# Patient Record
Sex: MALE | Race: WHITE | NOT HISPANIC OR LATINO | Employment: OTHER | URBAN - METROPOLITAN AREA
[De-identification: names, ages, dates, MRNs, and addresses within clinical notes are randomized per-mention and may not be internally consistent; named-entity substitution may affect disease eponyms.]

---

## 2018-06-20 ENCOUNTER — TELEPHONE (OUTPATIENT)
Dept: PULMONOLOGY | Facility: CLINIC | Age: 78
End: 2018-06-20

## 2018-06-20 DIAGNOSIS — R06.02 SOB (SHORTNESS OF BREATH): Primary | ICD-10-CM

## 2018-06-21 ENCOUNTER — HOSPITAL ENCOUNTER (OUTPATIENT)
Dept: PULMONOLOGY | Facility: CLINIC | Age: 78
Discharge: HOME | End: 2018-06-21
Payer: MEDICARE

## 2018-06-21 ENCOUNTER — HOSPITAL ENCOUNTER (OUTPATIENT)
Dept: RADIOLOGY | Facility: HOSPITAL | Age: 78
Discharge: HOME | End: 2018-06-21
Attending: INTERNAL MEDICINE
Payer: MEDICARE

## 2018-06-21 ENCOUNTER — OFFICE VISIT (OUTPATIENT)
Dept: PULMONOLOGY | Facility: CLINIC | Age: 78
End: 2018-06-21
Payer: MEDICARE

## 2018-06-21 VITALS
SYSTOLIC BLOOD PRESSURE: 172 MMHG | DIASTOLIC BLOOD PRESSURE: 94 MMHG | HEART RATE: 78 BPM | WEIGHT: 174.19 LBS | HEIGHT: 69 IN | WEIGHT: 174.19 LBS | OXYGEN SATURATION: 93 % | BODY MASS INDEX: 25.8 KG/M2 | HEIGHT: 69 IN | BODY MASS INDEX: 25.8 KG/M2

## 2018-06-21 DIAGNOSIS — R06.02 SHORTNESS OF BREATH: ICD-10-CM

## 2018-06-21 DIAGNOSIS — R06.02 SOB (SHORTNESS OF BREATH): ICD-10-CM

## 2018-06-21 DIAGNOSIS — I48.0 PAROXYSMAL ATRIAL FIBRILLATION: ICD-10-CM

## 2018-06-21 DIAGNOSIS — I25.10 CORONARY ARTERY DISEASE INVOLVING NATIVE CORONARY ARTERY OF NATIVE HEART WITHOUT ANGINA PECTORIS: Primary | ICD-10-CM

## 2018-06-21 LAB
PRE FEV1 FVC: 70
PRE FEV1: 3.62
PRE FVC: 5.15
PREDICTED FEV1 FVC: 78
PREDICTED FEV1: 2.91
PREDICTED FVC: 3.7

## 2018-06-21 PROCEDURE — 99213 OFFICE O/P EST LOW 20 MIN: CPT | Mod: PBBFAC,25 | Performed by: INTERNAL MEDICINE

## 2018-06-21 PROCEDURE — 94729 DIFFUSING CAPACITY: CPT | Mod: 26,S$PBB,, | Performed by: INTERNAL MEDICINE

## 2018-06-21 PROCEDURE — 71046 X-RAY EXAM CHEST 2 VIEWS: CPT | Mod: TC,FY

## 2018-06-21 PROCEDURE — 94010 BREATHING CAPACITY TEST: CPT | Mod: 26,S$PBB,, | Performed by: INTERNAL MEDICINE

## 2018-06-21 PROCEDURE — 71046 X-RAY EXAM CHEST 2 VIEWS: CPT | Mod: 26,,, | Performed by: RADIOLOGY

## 2018-06-21 PROCEDURE — 99999 PR PBB SHADOW E&M-EST. PATIENT-LVL III: CPT | Mod: PBBFAC,,, | Performed by: INTERNAL MEDICINE

## 2018-06-21 PROCEDURE — 94618 PULMONARY STRESS TESTING: CPT | Mod: PBBFAC | Performed by: INTERNAL MEDICINE

## 2018-06-21 PROCEDURE — 94010 BREATHING CAPACITY TEST: CPT | Mod: PBBFAC,59 | Performed by: INTERNAL MEDICINE

## 2018-06-21 PROCEDURE — 99204 OFFICE O/P NEW MOD 45 MIN: CPT | Mod: 25,S$PBB,ICN, | Performed by: INTERNAL MEDICINE

## 2018-06-21 PROCEDURE — 94729 DIFFUSING CAPACITY: CPT | Mod: PBBFAC | Performed by: INTERNAL MEDICINE

## 2018-06-21 PROCEDURE — 94618 PULMONARY STRESS TESTING: CPT | Mod: 26,S$PBB,, | Performed by: INTERNAL MEDICINE

## 2018-06-21 RX ORDER — TALC
1 POWDER (GRAM) TOPICAL DAILY PRN
COMMUNITY

## 2018-06-21 RX ORDER — ALLOPURINOL 300 MG/1
300 TABLET ORAL DAILY
COMMUNITY

## 2018-06-21 RX ORDER — RABEPRAZOLE SODIUM 20 MG/1
20 TABLET, DELAYED RELEASE ORAL 2 TIMES DAILY
COMMUNITY

## 2018-06-21 RX ORDER — ALPRAZOLAM 1 MG/1
0.5 TABLET ORAL
COMMUNITY
Start: 2018-06-07 | End: 2019-04-10

## 2018-06-21 RX ORDER — ISOSORBIDE MONONITRATE 30 MG/1
30 TABLET, EXTENDED RELEASE ORAL
COMMUNITY
Start: 2018-02-01 | End: 2018-07-12

## 2018-06-21 RX ORDER — SOTALOL HYDROCHLORIDE 120 MG/1
120 TABLET ORAL DAILY
Status: ON HOLD | COMMUNITY
End: 2019-01-15 | Stop reason: HOSPADM

## 2018-06-21 NOTE — LETTER
June 25, 2018      aPblo Lorenzo MD  [address]  [place] [address]           [facility] - Pulmonary Services  [facility]  [place] [address]  Phone: [phone]          Patient: Greg Nolasco   MR Number: [id number]   YOB: 1940   Date of Visit: 6/21/2018       Dear Dr. Pablo Lorenzo:    Thank you for referring Greg Nolasco to me for evaluation. Attached you will find relevant portions of my assessment and plan of care.    If you have questions, please do not hesitate to call me. I look forward to following Greg Nolasco along with you.    Sincerely,    Surya Pelletier MD    Enclosure  CC:  No Recipients    If you would like to receive this communication electronically, please contact [email or web address] or [phone] to request more information on [facility] Link access.    For providers and/or their staff who would like to refer a patient to [facility], please contact us through our one-stop-shop provider referral line, Mildred Garay, at [phone].    If you feel you have received this communication in error or would no longer like to receive these types of communications, please e-mail [email or web address]

## 2018-06-21 NOTE — Clinical Note
Miguelina:  I think this patient would benefit from being seen in [facility].  He comes from around [place], so I'd like to make things as easy as possible.  I'm getting a chest CT to assess his lung parenchyma better.  Thanks for your help.  DET

## 2018-06-21 NOTE — PROCEDURES
Greg Nolasco is a 77 y.o.  male patient, who presents for a 6 minute walk test ordered by Surya Pelletier MD.  The diagnosis is Exertional Dyspnea.  The patient's BMI is 25.8 kg/m2. Predicted distance (lower limit of normal) is 307.88 meters.    Test Results:    The test was completed with stops.  The patient stopped 1 time for a total of 50 seconds.  The total time walked was 310 seconds.  During walking, the patient reported:  Dyspnea (dizziness and off balance). The patient used supplemental oxygen during testing.       Oxygen Qualification:    06/21/2018---------Distance: 304.8 meters (1000 feet)       O2 Sat % Supplemental Oxygen Heart Rate Blood Pressure Lenard Scale   Pre-exercise  (Resting) 81 % Room Air 85 bpm 180/90 mmHg 2   Pre-exercise  (Resting) 93 % 4 L/M  78 bpm  172/94 mmHg  2    During Exercise 74 %  4 L/M  102 bpm  180/93 mmHg  5-6    Post-exercise   90 %  4 L/M  92 bpm        Recovery Time: 473 seconds    Performing nurse/tech:  L Estopinal RRT      PREVIOUS STUDY:   The patient has not had a previous study.      CLINICAL INTERPRETATION:  Six minute walk distance is 304.8 meters (1000 feet) with heavy dyspnea.  At rest, there was significant desaturation while breathing room air.  During exercise, there was significant desaturation while breathing supplemental oxygen.  Blood pressure remained stable and Heart rate increased significantly with walking.  Hypertension was present prior to exercise.  The patient reported non-pulmonary symptoms during exercise.  The patient did complete the study, walking 310 seconds of the 360 second test.  The patient may benefit from using supplemental oxygen.  No previous study performed.  Based upon age and body mass index, exercise capacity is less than predicted.   Resting oxygen saturation did improve while breathing supplemental oxygen.

## 2018-06-22 PROBLEM — I48.0 PAROXYSMAL ATRIAL FIBRILLATION: Status: ACTIVE | Noted: 2018-01-29

## 2018-06-22 PROBLEM — I25.10 CORONARY ARTERY DISEASE INVOLVING NATIVE CORONARY ARTERY OF NATIVE HEART WITHOUT ANGINA PECTORIS: Status: ACTIVE | Noted: 2018-01-29

## 2018-06-22 NOTE — PROGRESS NOTES
Subjective:       Patient ID: Greg Nolasco is a 77 y.o. male.    Chief Complaint: Shortness of Breath    HPI     Mr. Nolasco is a 77 year old past smoker (~1 ppd for ~35 years) until stopping in 1991 at the time he underwent cardiac angioplasty.  He comes to [facility] at the request of his primary care MD (Dr. Pablo Lorenzo) for evaluation of worsening dyspnea.  He reports that he has had episodic shortness of breath dating back about a year.  However, this became much more noticeable this winter (Jan/Feb 2018) when there was a big snow in [place].  At that time, he noticed getting short-winded with minimal activity, even just walking around the house.  In addition to dyspnea, he has noted increased weakness in his legs and difficulty with his balance that has occurred around the same time frame.  Lastly, he has also noted increased restless legs type symptoms with body jerking (especially his left arm) when sleeping for the last few months.  He denies cough, sputum production, chest pain, pleurisy, fever, chills, night sweats, appetite change, or weight loss.    He initially sought care at [place] [facility] where he saw Dr. Jing Fonseca (Pulmonary) and Víctor Knight (Cardiology) for his symptoms.  Selected work up is outlined below.  He was started on Anoro and albuterol for presumed COPD but has not really noticed any improvement using this regimen.  He was started on supplemental oxygen that he occasionally uses but also denies any real improvement in activity tolerance.    Pulmonary history is notable for no prior pneumonia, frequent bronchitis, cancer, or other lung diseases.  Family members (who were smokers) had COPD/emphysema.  He lives outside town with a cat and a dog but denies exposure to known hypersensitivity agents or pulmonary toxins.  He denies history of connective tissue or auto-immune diseases.  He is retired from the  where he was a .  He denies significant brake dust  "exposure.      Other history is notable for paroxysmal atrial fibrillation that has been well-controlled on sotalol for many years.  He has never been on amiodarone.  Although his blood pressure is high today, he says it was normal this morning.  He attributes his BP to rushing to get to his appointments.  He also had a thyroid "growth" biopsied in March 2018 that was benign.     Review of Systems   Constitutional: Positive for activity change. Negative for fever, chills, weight loss, appetite change, fatigue, night sweats and weakness.   HENT: Negative for sore throat, trouble swallowing and voice change.    Respiratory: Positive for shortness of breath and dyspnea on extertion. Negative for cough, hemoptysis, sputum production, choking, chest tightness, wheezing, orthopnea, asthma nighttime symptoms, pleurisy and use of rescue inhaler.    Cardiovascular: Negative for chest pain, palpitations and leg swelling.   Musculoskeletal: Negative for arthralgias, gait problem and myalgias.   Gastrointestinal: Positive for acid reflux (Occasional). Negative for nausea and vomiting.   Neurological: Positive for weakness. Negative for light-headedness and headaches.   Psychiatric/Behavioral: Positive for sleep disturbance. The patient is nervous/anxious.        Objective:      Physical Exam   Constitutional: He is oriented to person, place, and time. He appears well-nourished. No distress.   HENT:   Nose: Nose normal. No mucosal edema.   Mouth/Throat: Oropharynx is clear and moist. No oropharyngeal exudate.   Neck: No JVD present.   Cardiovascular: Normal rate, regular rhythm, normal heart sounds and intact distal pulses.  Exam reveals no gallop.    No murmur heard.  Pulmonary/Chest: Normal expansion, effort normal and breath sounds normal. No stridor. No respiratory distress. He has no decreased breath sounds. He has no wheezes. He has no rhonchi. He has no rales. He exhibits no tenderness.   Abdominal: Soft. Bowel sounds are " normal. He exhibits no distension. There is no tenderness.   Musculoskeletal: He exhibits no edema.   Lymphadenopathy: No supraclavicular adenopathy is present.     He has no cervical adenopathy.   Neurological: He is alert and oriented to person, place, and time. Gait normal.   Skin: There is erythema. Nails show no clubbing.   Chaparro complexion to his hands   Psychiatric: Judgment and thought content normal.   Nursing note and vitals reviewed.    Personal Diagnostic Review    CXR today shows mildly increased interstitial markings with normal cardiac silhouette and no pleural effusion.    PFTs show normal spirometry with severe DLCO impairment.    6MWT shows acceptable exercise capacity but severe oxygen desaturation at rest.  When using oxygen, resting SpO2 improves (81% to 93%) but he still desaturates significantly with exertion (93% to 74%).    PFTs 02/01/2018  [facility] 05/31/2018  [facility] 06/21/2018  OM   FVC  (pre-BD) 4.54 liters 5.38 liters 5.15 liters   FVC%  129%  139%   FEV1 (pre-BD)  3.64 liters 3.62 liters   FEV1%  111% 133% 126%   FEV1/FVC  67 68 70   TLC       TLC%  134%     RV       RV%  124%     DLCO    9.3 mL/mmHg/min   DLCO%  39%  39%   VA   7.04 liters   IVC   4.97 liters   6MWT   06/21/2018   Distance   305 meters   SpO2 (bryant)   74 % on O2   SpO2 (delta)   -19 % on O2       Polysomnogram at [facility] on 06/02/2018:    SLEEP PROFILE: Latency to sleep onset was 20.5 minutes. REM latency was 149 minutes. Sleep efficiency was 61 % for a total of 271.5 minutes of sleep. Of that time 12 % was spent in stage N1, 71.3 % in stage N2, 0 % in stage N3, and 16.8 % in stage R. The patient was supine and asleep for 17.9 % of the sleep interval.    RESPIRATORY PROFILE: On this diagnostic night, the Apnea/Hypopnea Index (AHI) was 3.1 and the Respiratory Disturbance Index is 6.6. The patient had 1 obstructive apneas, 0 central apneas, 0 mixed apneas, 13 hypopneas, and 16 RERAs.  The AHI was 9.2 in REM sleep as compared with 1.9 in non-REM sleep. The RDI was 13.2 in REM sleep as compared with 5.3 in non-REM sleep.     OXYGEN SATURATION PROFILE: During this diagnostic study, the oxygen bryant was 86% in non-REM sleep and 87% in REM sleep. The total time with saturations <= 88% was 187.2 minutes during this study.    PERIODIC LIMB MOVEMENT PROFILE: PLMs were not seen.     CARDIAC PROFILE: Unremarkable.     IMPRESSION:     - Mild upper airways resistance syndrome with chiefly REM related obstructive sleep apnea.    - Very poor sleep efficiency.         Echocardiogram at [facility] on 05/31/2018:    Impressions                                                             -----------                                                                                                                                     1.The estimated LV ejection fraction is 61 %                                                                                                    2.There is mild concentric LV hypertrophy                                                                                                       3.RV size is mildly dilated                                                                                                                     4.There is mild aortic valve sclerosis without significant stenosis                                                                             5.There is mild mitral annular calcification                                                                                                    6.There is mild mitral regurgitation                                                                                                            7.There is moderate tricuspid regurgitation                                                                                                     8.Moderate elevation of estimated RV systolic  pressure                                                                                          Compared to the previous Echocardiogram report, there is no change.                                                                                                                                                                                                                         Findings                                                                --------                                                                                                                                        Left Ventricle                                                          The estimated LV ejection fraction is 61 %. LV chamber size is normal.  There is mild concentric LV hypertrophy. LV systolic function is        normal. There are no wall motion abnormalities observed. Normal left    atrial pressure with Grade I diastolic dysfunction.                                                                                                                                                                     Right Ventricle                                                         RV size is mildly dilated. The RV systolic function is normal.                                                                                  Septum                                                                  There is no atrial septal defect (ASD). There is no ventricular septal  defect (VSD).                                                                                                                                                                                                           Left Atrium                                                             LA chamber size is normal. The LA volume index (MOD, Biplane) is 26.5    ml/m2.                                                                                                                                                                                                                  Right Atrium                                                            RA chamber size is normal.                                                                                                                      Aortic Valve                                                            There is mild aortic valve sclerosis without significant stenosis.      There is mild aortic regurgitation. There is no aortic valve stenosis.  AV Peak gradient: 10 mmHg.                                                                                                                      Mitral Valve                                                            There is mild mitral annular calcification. There is mild mitral        regurgitation. There is no mitral stenosis.                                                                                                                                                                             Tricuspid Valve                                                         Tricuspid valve is structurally normal. There is moderate tricuspid     regurgitation. Estimated RVSP is 72 mmHg. Moderate elevation of         estimated RV systolic pressure. There is no tricuspid valve stenosis.                                                     Assessment:       1. Coronary artery disease involving native coronary artery of native heart without angina pectoris    2. Shortness of breath    3. Paroxysmal atrial fibrillation        Outpatient Encounter Prescriptions as of 6/21/2018   Medication Sig Dispense Refill    allopurinol (ZYLOPRIM) 300 MG tablet Take 300 mg by mouth.      ALPRAZolam (XANAX) 1 MG tablet       aspirin-calcium carbonate 81 mg-300 mg calcium(777 mg) Tab Take 81 mg by  mouth.      isosorbide mononitrate (IMDUR) 30 MG 24 hr tablet Take 30 mg by mouth.      melatonin 3 mg Tab Take 1 capsule by mouth.      multivit-minerals/ferrous fum (MULTI VITAMIN ORAL) Take 1 tablet by mouth.      RABEprazole (ACIPHEX) 20 mg tablet Take 20 mg by mouth.      sotalol (BETAPACE) 120 MG Tab Take 120 mg by mouth.      umeclidinium-vilanterol 62.5-25 mcg/actuation DsDv Inhale 1 puff into the lungs.       No facility-administered encounter medications on file as of 6/21/2018.      Orders Placed This Encounter   Procedures    CT Chest Without Contrast     Standing Status:   Future     Standing Expiration Date:   6/21/2019     Order Specific Question:   May the Radiologist modify the order per protocol to meet the clinical needs of the patient?     Answer:   Yes     Plan:   Mr. Nolasco has severe desaturation at rest and with exertion despite supplemental oxygen.  Resting SpO2 does improve with supplemental oxygen, so shunt is less likely.  PFTs both in [place] and at [facility] show severe DLCO impairment with normal (to increased) mechanics on spirometry.  Even if he has COPD/emphysema or interstitial lung disease, the well-preserved forced vital capacity makes me doubt this would cause such severe diffusion defect.  Echo results suggest element of RV dysfunction and increased RV systolic pressure makes me suspect significant pulmonary hypertension.  We will check chest CT scan (non-contrast) to assess lung parenchyma.  He may also need repeat echocardiogram with contrast to rule out patent foramen ovale.  I also think that he will benefit from evaluation by Pulmonary Hypertension.  If the the CT scan is only modestly abnormal, he will likely need rest/exercise RHC to assess for pulmonary hypertension.  Sleep study in [place] does not suggest significant sleep apnea as a contributor to pulmonary hypertension if present.    · Chest CT scan without contrast  · Consult to Cardiology (Pulmonary  Hypertension)  · Return to Pulmonary Clinic in ~ three weeks at time of the CT scan.    Since he comes from [place] than two [place] away, we will try to coordinate these activities with his return visit in a few weeks.      I will forward a copy of this note to Dr. Pablo Lorenzo (Primary Care).    Surya Pelletier MD  Pulmonary/Critical Care Medicine

## 2018-06-26 DIAGNOSIS — I27.9 CHRONIC PULMONARY HEART DISEASE: ICD-10-CM

## 2018-06-26 DIAGNOSIS — R06.82 TACHYPNEA: ICD-10-CM

## 2018-06-26 DIAGNOSIS — Z79.899 POLYPHARMACY: Primary | ICD-10-CM

## 2018-07-12 ENCOUNTER — HOSPITAL ENCOUNTER (OUTPATIENT)
Dept: RADIOLOGY | Facility: HOSPITAL | Age: 78
Discharge: HOME | End: 2018-07-12
Attending: INTERNAL MEDICINE
Payer: MEDICARE

## 2018-07-12 ENCOUNTER — INITIAL CONSULT (OUTPATIENT)
Dept: TRANSPLANT | Facility: CLINIC | Age: 78
End: 2018-07-12
Payer: MEDICARE

## 2018-07-12 ENCOUNTER — HOSPITAL ENCOUNTER (OUTPATIENT)
Dept: PULMONOLOGY | Facility: CLINIC | Age: 78
Discharge: HOME | End: 2018-07-12
Payer: MEDICARE

## 2018-07-12 ENCOUNTER — OFFICE VISIT (OUTPATIENT)
Dept: PULMONOLOGY | Facility: CLINIC | Age: 78
End: 2018-07-12
Payer: MEDICARE

## 2018-07-12 ENCOUNTER — NURSE TRIAGE (OUTPATIENT)
Dept: ADMINISTRATIVE | Facility: CLINIC | Age: 78
End: 2018-07-12

## 2018-07-12 VITALS
OXYGEN SATURATION: 83 % | SYSTOLIC BLOOD PRESSURE: 112 MMHG | BODY MASS INDEX: 25.6 KG/M2 | WEIGHT: 172.81 LBS | HEART RATE: 94 BPM | HEIGHT: 69 IN | DIASTOLIC BLOOD PRESSURE: 70 MMHG

## 2018-07-12 VITALS
HEIGHT: 69 IN | DIASTOLIC BLOOD PRESSURE: 74 MMHG | WEIGHT: 171.5 LBS | BODY MASS INDEX: 25.37 KG/M2 | HEART RATE: 81 BPM | SYSTOLIC BLOOD PRESSURE: 138 MMHG | OXYGEN SATURATION: 81 % | BODY MASS INDEX: 25.4 KG/M2 | HEIGHT: 69 IN | WEIGHT: 171.31 LBS

## 2018-07-12 DIAGNOSIS — I27.9 CHRONIC PULMONARY HEART DISEASE: ICD-10-CM

## 2018-07-12 DIAGNOSIS — J40 BRONCHITIS: Primary | ICD-10-CM

## 2018-07-12 DIAGNOSIS — I25.10 CORONARY ARTERY DISEASE INVOLVING NATIVE CORONARY ARTERY OF NATIVE HEART WITHOUT ANGINA PECTORIS: ICD-10-CM

## 2018-07-12 DIAGNOSIS — J43.2 CENTRILOBULAR EMPHYSEMA: ICD-10-CM

## 2018-07-12 DIAGNOSIS — R06.00 DYSPNEA AND RESPIRATORY ABNORMALITY: ICD-10-CM

## 2018-07-12 DIAGNOSIS — I27.20 PULMONARY HYPERTENSION: ICD-10-CM

## 2018-07-12 DIAGNOSIS — I48.0 PAROXYSMAL ATRIAL FIBRILLATION: ICD-10-CM

## 2018-07-12 DIAGNOSIS — R06.89 DYSPNEA AND RESPIRATORY ABNORMALITY: ICD-10-CM

## 2018-07-12 DIAGNOSIS — R93.89 ABNORMAL CHEST CT: ICD-10-CM

## 2018-07-12 DIAGNOSIS — J96.11 CHRONIC HYPOXEMIC RESPIRATORY FAILURE: ICD-10-CM

## 2018-07-12 DIAGNOSIS — R06.02 SHORTNESS OF BREATH: ICD-10-CM

## 2018-07-12 DIAGNOSIS — R06.02 SOB (SHORTNESS OF BREATH): Primary | ICD-10-CM

## 2018-07-12 PROCEDURE — 99213 OFFICE O/P EST LOW 20 MIN: CPT | Mod: PBBFAC,25,27 | Performed by: INTERNAL MEDICINE

## 2018-07-12 PROCEDURE — 94618 PULMONARY STRESS TESTING: CPT | Mod: 26,S$PBB,, | Performed by: INTERNAL MEDICINE

## 2018-07-12 PROCEDURE — 71250 CT THORAX DX C-: CPT | Mod: 26,,, | Performed by: RADIOLOGY

## 2018-07-12 PROCEDURE — 71250 CT THORAX DX C-: CPT | Mod: TC

## 2018-07-12 PROCEDURE — 99205 OFFICE O/P NEW HI 60 MIN: CPT | Mod: S$PBB,,, | Performed by: INTERNAL MEDICINE

## 2018-07-12 PROCEDURE — 99999 PR PBB SHADOW E&M-EST. PATIENT-LVL III: CPT | Mod: PBBFAC,,, | Performed by: INTERNAL MEDICINE

## 2018-07-12 PROCEDURE — 99214 OFFICE O/P EST MOD 30 MIN: CPT | Mod: 25,S$PBB,, | Performed by: INTERNAL MEDICINE

## 2018-07-12 PROCEDURE — 99999 PR PBB SHADOW E&M-EST. PATIENT-LVL IV: CPT | Mod: PBBFAC,,, | Performed by: INTERNAL MEDICINE

## 2018-07-12 PROCEDURE — 94618 PULMONARY STRESS TESTING: CPT | Mod: PBBFAC | Performed by: INTERNAL MEDICINE

## 2018-07-12 PROCEDURE — 99214 OFFICE O/P EST MOD 30 MIN: CPT | Mod: PBBFAC,25 | Performed by: INTERNAL MEDICINE

## 2018-07-12 RX ORDER — FUROSEMIDE 20 MG/1
20 TABLET ORAL DAILY
COMMUNITY
Start: 2018-07-02 | End: 2018-07-12

## 2018-07-12 RX ORDER — AZITHROMYCIN 250 MG/1
TABLET, FILM COATED ORAL
Qty: 6 TABLET | Refills: 0 | Status: SHIPPED | OUTPATIENT
Start: 2018-07-12 | End: 2018-07-17

## 2018-07-12 NOTE — PROCEDURES
Greg Nolasco is a 77 y.o.  male patient, who presents for a 6 minute walk test ordered by Miguelina Ruffin MD.  The diagnosis is Pulmonary Hypertension.  The patient's BMI is 25.3 kg/m2. Predicted distance (lower limit of normal) is 310.69 meters.    Test Results:    The test was not completed.  The patient stopped 1 time for a total of 109 seconds.  The total time walked was 251 seconds.  During walking, the patient reported:  Dyspnea, Lightheadedness; Leg weakness. The patient used supplemental oxygen during repeat testing.       Oxygen Qualification:    07/12/2018 ----------Distance:  182.88 meters (600 feet)     O2 Sat % Supplemental Oxygen Heart Rate Blood Pressure Lenard Scale   Pre-exercise  (Resting) 82 % Room Air 69 bpm 145/75 mmHg 0   Pre-exercise  (Resting) 90 % 4 L/M  69 bpm  145/75 mmHg 0    During Exercise 66 %  4 L/M  85 bpm  171/85 mmHg 4    Post-exercise   89 %  4 L/M  71 bpm  169/88 mmHg      Recovery Time:  438 seconds    Performing nurse/tech:  LONNY Hemphill      PREVIOUS STUDY:   06/21/2018---------Distance: 304.8 meters (1000 feet)          O2 Sat % Supplemental Oxygen Heart Rate Blood Pressure Lenard Scale   Pre-exercise  (Resting) 81 % Room Air 85 bpm 180/90 mmHg 2   Pre-exercise  (Resting) 93 % 4 L/M  78 bpm  172/94 mmHg  2    During Exercise 74 %  4 L/M  102 bpm  180/93 mmHg  5-6    Post-exercise    90 %  4 L/M  92 bpm            CLINICAL INTERPRETATION:  Six minute walk distance is 182.88 meters (600 feet) with somewhat heavy dyspnea.  At rest and during exercise, there was significant desaturation while breathing supplemental oxygen.  Blood pressure increased significantly and Heart rate remained stable with walking.  The patient reported non-pulmonary symptoms during exercise.  Significant exercise impairment is likely due to desaturation and subjective symptoms.  The patient did not complete the study, walking 251 seconds of the 360 second test.  The patient may benefit from using  supplemental oxygen.  Since the previous study in June 2018, exercise capacity is significantly worse.  Based upon age and body mass index, exercise capacity is less than predicted.   Resting oxygen saturation did improve while breathing supplemental oxygen.

## 2018-07-12 NOTE — TELEPHONE ENCOUNTER
Reason for Disposition   General information question, no triage required and triager able to answer question    Protocols used: ST INFORMATION ONLY CALL-A-AH

## 2018-07-13 NOTE — PROGRESS NOTES
Subjective:       Patient ID: Greg Nolasco is a 77 y.o. male.    Chief Complaint: Shortness of Breath    HPI     Mr. Nolasco returns to Pulmonary Clinic for subsequent evaluation of exertional dyspnea and suspected pulmonary hypertension.  At his initial visit on 6/21, he was found to have well-preserved lung mechanics with severe DLCO impairment.  There were no significant CXR findings except hyperinflation, and 6MWT confirmed severe oxygen desaturation at rest (on room air) and with exertion (on oxygen).  Outside records showed increased RV pressures on echocardiogram and sleep study showed modest upper airway obstruction and desaturation during sleep.  Given his surprisingly preserved FVC and FEV1, I felt that he was likely to have significant right heart impairment and possible patent foramen ovale.    Since his visit few weeks ago, he continues to be short of breath with even modest activity.  He has been accompanied by his son at this visit, since this was a pretty extensive visit.  He denies cough or sputum production, as well as fever/night sweats.  He is sleeping better with less trouble with restless legs after his PCP started nocturnal alprazolam.    He was seen earlier today by Dr. Dale (Cardiology) who recommended evaluation by Arrhythmia and Interventional Cardiology.      Review of Systems   Respiratory: Positive for shortness of breath and dyspnea on extertion. Negative for cough, hemoptysis, sputum production, wheezing and asthma nighttime symptoms.    Cardiovascular: Negative for chest pain, palpitations and leg swelling.       Objective:      Physical Exam   Constitutional: He is oriented to person, place, and time. No distress.   SpO2 83% on room air.   Musculoskeletal: He exhibits no edema.   Neurological: He is alert and oriented to person, place, and time.   Skin: There is cyanosis.   Psychiatric: He has a normal mood and affect. Judgment normal.   Nursing note and vitals  reviewed.    Personal Diagnostic Review    I have personally reviewed today's chest CT scan with the patient and his son.  The degree of emphysematous disease is out of proportion to his normal spirometry findings.  There is extensive parenchymal destruction in both upper/mid lung zones.  In addition to an 8 mm nodule in the RUL, there is some haziness in the periphery of the right base.  There is borderline enlargement of mediastinal nodes.  There is no pleural disease present.  There is no evidence of interstitial lung disease or stigmata of asbestos exposure.      Impression     1. Severe centrilobular emphysema with upper lobe predominance.  2. Areas of localized fibrotic change in the right and left lower lobes with peripheral reticulation, cystic changes and traction bronchiectasis.  However, we do not believe this to be widespread enough to diagnose diffuse interstitial lung disease.  3. Multiple lymph nodes in the paratracheal and subcarinal area which are normal to mildly enlarged in size.  4. There is a 0.8 cm nodule in the right upper lobe (axial series 4, image 176). Per the Fleischner society guidelines, for any solitary nodule >8 mm recommend follow-up CT at 3 months, and/or CT-PET, and/or biopsy.  5. Atherosclerosis, cholelithiasis, renal cysts, and other findings as above.     Six minute walk today distance is worse than a few weeks ago with comparable degree of oxygen desaturation.      Assessment:       1. Bronchitis    2. Chronic pulmonary heart disease    3. Dyspnea and respiratory abnormality    4. Centrilobular emphysema    5. Abnormal chest CT    6. Chronic hypoxemic respiratory failure        Outpatient Encounter Prescriptions as of 7/12/2018   Medication Sig Dispense Refill    allopurinol (ZYLOPRIM) 300 MG tablet Take 300 mg by mouth.      ALPRAZolam (XANAX) 1 MG tablet 0.5 mg.       aspirin-calcium carbonate 81 mg-300 mg calcium(777 mg) Tab Take 81 mg by mouth.      melatonin 3 mg Tab  Take 1 capsule by mouth.      multivit-minerals/ferrous fum (MULTI VITAMIN ORAL) Take 1 tablet by mouth.      OXYGEN-AIR DELIVERY SYSTEMS MISC by [facility]Non-Drug; Combo Route) route.      RABEprazole (ACIPHEX) 20 mg tablet Take 20 mg by mouth.      sotalol (BETAPACE) 120 MG Tab Take 120 mg by mouth.      umeclidinium-vilanterol 62.5-25 mcg/actuation DsDv Inhale 1 puff into the lungs.      azithromycin (Z-NATE) 250 MG tablet Take 2 tablets by mouth on day 1; Take 1 tablet by mouth on days 2-5 6 tablet 0    [DISCONTINUED] furosemide (LASIX) 20 MG tablet Take 20 mg by mouth Daily.      [DISCONTINUED] isosorbide mononitrate (IMDUR) 30 MG 24 hr tablet Take 30 mg by mouth.       No facility-administered encounter medications on file as of 7/12/2018.      No orders of the defined types were placed in this encounter.    Plan:   Mr. Nolasco clearly has radiographically significant COPD/emphysema despite his preserved spirometry measures.  This makes it more likely that any right heart dysfunction is secondary to chronic lung disease.  However, he reports relatively sudden onset of exertional dyspnea earlier this year.  This seems somewhat atypical for emphysema that is usually more gradual.  I am still worried about cardiovascular contributions and I think that RHC is warranted to measure right-sided pressures and look for shunt.  Other consideration would be acute thromboembolic event.  In any event, he needs to use supplemental oxygen as much as possible with flow rate up to 4 LPM.  It is likely that he has relative polycythemia from chronic hypoxemia.    I recommended the following:    · Use supplemental oxygen at 4 lpm as much as possible.  · Keep appointments as scheduled in Cardiology.  · Continue with every other day diuretic use.  Follow daily weights and lower extremity swelling for signs of fluid retention.  · Continue using daily Anoro for now.   · Azithromycin (1 Z-pack) for focal haziness in the right base  on CT scan  · Pulmonary follow up to be determined after Cardiology evaluation(s) is complete.    Please note that this was a 30 minute visit with greater than 50% of time spent in counseling the patient and his son, as well as reviewing events and studies since the prior clinic visit with me.    Surya Pelletier MD  Pulmonary/Critical Care Medicine

## 2018-07-18 ENCOUNTER — TELEPHONE (OUTPATIENT)
Dept: ELECTROPHYSIOLOGY | Facility: CLINIC | Age: 78
End: 2018-07-18

## 2018-07-18 DIAGNOSIS — I48.0 PAROXYSMAL ATRIAL FIBRILLATION: Primary | ICD-10-CM

## 2018-07-18 NOTE — TELEPHONE ENCOUNTER
Called pt to confirm apt tomorrow PM and ask new pt questions. Pt states cardiologist is Dr. Young and PCP Dr. Lorenzo. About 15 years ago had episode of AF and reports shocked back in rhythm. Has been on sotalol ever since which is renewed by PCP but has seen cardiologist within last several years. All records are in care everywhere tab.

## 2018-07-19 ENCOUNTER — INITIAL CONSULT (OUTPATIENT)
Dept: CARDIOLOGY | Facility: CLINIC | Age: 78
End: 2018-07-19
Payer: MEDICARE

## 2018-07-19 ENCOUNTER — INITIAL CONSULT (OUTPATIENT)
Dept: ELECTROPHYSIOLOGY | Facility: CLINIC | Age: 78
End: 2018-07-19
Payer: MEDICARE

## 2018-07-19 ENCOUNTER — DOCUMENTATION ONLY (OUTPATIENT)
Dept: CARDIOLOGY | Facility: CLINIC | Age: 78
End: 2018-07-19

## 2018-07-19 ENCOUNTER — HOSPITAL ENCOUNTER (OUTPATIENT)
Dept: CARDIOLOGY | Facility: CLINIC | Age: 78
Discharge: HOME | End: 2018-07-19
Payer: MEDICARE

## 2018-07-19 VITALS
SYSTOLIC BLOOD PRESSURE: 133 MMHG | HEART RATE: 83 BPM | WEIGHT: 173.5 LBS | DIASTOLIC BLOOD PRESSURE: 71 MMHG | OXYGEN SATURATION: 80 % | BODY MASS INDEX: 25.7 KG/M2 | HEIGHT: 69 IN

## 2018-07-19 VITALS
HEART RATE: 84 BPM | BODY MASS INDEX: 25.7 KG/M2 | WEIGHT: 173.5 LBS | HEIGHT: 69 IN | SYSTOLIC BLOOD PRESSURE: 122 MMHG | DIASTOLIC BLOOD PRESSURE: 78 MMHG

## 2018-07-19 DIAGNOSIS — I48.0 PAROXYSMAL ATRIAL FIBRILLATION: ICD-10-CM

## 2018-07-19 DIAGNOSIS — J96.11 CHRONIC HYPOXEMIC RESPIRATORY FAILURE: Primary | ICD-10-CM

## 2018-07-19 DIAGNOSIS — J96.11 CHRONIC HYPOXEMIC RESPIRATORY FAILURE: ICD-10-CM

## 2018-07-19 DIAGNOSIS — I25.10 CORONARY ARTERY DISEASE INVOLVING NATIVE CORONARY ARTERY OF NATIVE HEART WITHOUT ANGINA PECTORIS: ICD-10-CM

## 2018-07-19 DIAGNOSIS — I48.0 PAROXYSMAL ATRIAL FIBRILLATION: Primary | ICD-10-CM

## 2018-07-19 PROCEDURE — 99204 OFFICE O/P NEW MOD 45 MIN: CPT | Mod: S$PBB,,, | Performed by: INTERNAL MEDICINE

## 2018-07-19 PROCEDURE — 99213 OFFICE O/P EST LOW 20 MIN: CPT | Mod: PBBFAC,27 | Performed by: INTERNAL MEDICINE

## 2018-07-19 PROCEDURE — 93005 ELECTROCARDIOGRAM TRACING: CPT | Mod: PBBFAC | Performed by: INTERNAL MEDICINE

## 2018-07-19 PROCEDURE — 93010 ELECTROCARDIOGRAM REPORT: CPT | Mod: S$PBB,,, | Performed by: INTERNAL MEDICINE

## 2018-07-19 PROCEDURE — 99999 PR PBB SHADOW E&M-EST. PATIENT-LVL III: CPT | Mod: PBBFAC,,, | Performed by: INTERNAL MEDICINE

## 2018-07-19 PROCEDURE — 99999 PR PBB SHADOW E&M-EST. PATIENT-LVL IV: CPT | Mod: PBBFAC,,, | Performed by: INTERNAL MEDICINE

## 2018-07-19 PROCEDURE — 99214 OFFICE O/P EST MOD 30 MIN: CPT | Mod: PBBFAC,25 | Performed by: INTERNAL MEDICINE

## 2018-07-19 RX ORDER — DIPHENHYDRAMINE HCL 25 MG
50 CAPSULE ORAL ONCE
Status: CANCELLED | OUTPATIENT
Start: 2018-07-19 | End: 2018-07-19

## 2018-07-19 RX ORDER — SODIUM CHLORIDE 9 MG/ML
3 INJECTION, SOLUTION INTRAVENOUS CONTINUOUS
Status: CANCELLED | OUTPATIENT
Start: 2018-07-19 | End: 2018-07-19

## 2018-07-19 RX ORDER — FUROSEMIDE 10 MG/ML
SOLUTION ORAL
COMMUNITY
End: 2018-07-23 | Stop reason: CLARIF

## 2018-07-19 RX ORDER — ATORVASTATIN CALCIUM 10 MG/1
TABLET, FILM COATED ORAL
Status: ON HOLD | COMMUNITY
Start: 2018-07-13 | End: 2018-08-01 | Stop reason: HOSPADM

## 2018-07-19 NOTE — PROGRESS NOTES
Interventional Cardiology Clinic Note  Reason for Visit: Shortness  Of breath    HPI:   Mr. Nolasco is a 77 y.o. Male with CAD (s/p angioplasty '91), ex-smoker (quit '91) on 2-3L O2 via NC at home, history of AF without recent RVR episodes, who was initially referred by Dr. Pelletier to Dr. Dale for evaluation of Shortness of breath.     He was walking up to 2 miles daily, but since this Jan/Feb, he reports progressively worsening shortness of breath on exertion. He can now walk up to about 50 feet before he has to stop and catch his breath. He denies any exertional chest pain. His shortness of breath is now present at rest. He has associated PND and bendopnea, but is able to sleep flat.  Denies leg edema. He denies palpitations, syncope, or near syncope.    He is on 2-3L O2 via NC at home, but did not bring it with him. Denies fevers, chills, chest pain    6mw 7/12/18  Distance: 183 meters  Oxygen sats: 90% to 66%  BP: 145/75 to 171/85  HR 69 to 85 bpm    ROS:    Constitution: Negative for fever, chills, weight loss or gain.   HENT: Negative for sore throat, rhinorrhea, or headache.  Eyes: Negative for blurred or double vision.   Cardiovascular: See above  Pulmonary: Negative for SOB   Gastrointestinal: Negative for abdominal pain, nausea, vomiting, or diarrhea.   : Negative for dysuria.   Neurological: Negative for focal weakness or sensory changes.  PMH:     Past Medical History:   Diagnosis Date    Chronic hypoxemic respiratory failure     BARBARA (obstructive sleep apnea)      Past Surgical History:   Procedure Laterality Date    BACK SURGERY      HERNIA REPAIR      KNEE SURGERY      SHOULDER SURGERY      SINUS SURGERY       Allergies:     Review of patient's allergies indicates:   Allergen Reactions    Clindamycin Shortness Of Breath    Penicillins Rash     Medications:     Current Outpatient Prescriptions on File Prior to Visit   Medication Sig Dispense Refill    allopurinol (ZYLOPRIM) 300 MG  "tablet Take 300 mg by mouth.      ALPRAZolam (XANAX) 1 MG tablet 0.5 mg.       aspirin-calcium carbonate 81 mg-300 mg calcium(777 mg) Tab Take 81 mg by mouth.      atorvastatin (LIPITOR) 10 MG tablet       melatonin 3 mg Tab Take 1 capsule by mouth.      multivit-minerals/ferrous fum (MULTI VITAMIN ORAL) Take 1 tablet by mouth.      OXYGEN-AIR DELIVERY SYSTEMS [place] [facility]Non-Drug; Combo Route) route.      RABEprazole (ACIPHEX) 20 mg tablet Take 20 mg by mouth.      sotalol (BETAPACE) 120 MG Tab Take 120 mg by mouth.      umeclidinium-vilanterol 62.5-25 mcg/actuation DsDv Inhale 1 puff into the lungs.       No current facility-administered medications on file prior to visit.      Social History:     Social History   Substance Use Topics    Smoking status: Never Smoker    Smokeless tobacco: Current User    Alcohol use Yes     Family History:   History reviewed. No pertinent family history.  Physical Exam:   /71 (BP Location: Right arm, Patient Position: Sitting, BP Method: Large (Automatic))   Pulse 83   Ht 5' 9" (1.753 m)   Wt 78.7 kg (173 lb 8 oz)   SpO2 (!) 80%   BMI 25.62 kg/m²      Constitutional: Mild respiratory effort, obese, conversant  HEENT: Sclera anicteric, PERRLA, EOMI. Lips cyanotic  Neck: No JVD, no masses, good movement  CV: RRR, S1 and S2 normal, no additional heart sounds or murmurs. Pulses 2+ and equal bilaterally in radial arteries, Jonathan's normal on right. Distal pulses are 2+ and equal in the femoral, DP and PT areas bilaterally. LUSB second heart sound mildly muffled  Pulm: Clear to auscultation bilaterally with symmetrical expansion. No extended expiratory phase or wheezing noted  GI: Abdomen soft, non-tender, good bowel sounds  Extremities: Both extremities intact and grossly normal, skin is warm, no edema noted. Fingertips (but not toes) cyanotic  Skin: No ecchymosis, erythema, or ulcers  Psych: AOx3, appropriate affect  Neuro: CNII-XII intact, no focal " deficits      Labs:     Lab Results   Component Value Date     07/12/2018    K 4.9 07/12/2018     07/12/2018    CO2 22 (L) 07/12/2018    BUN 12 07/12/2018    CREATININE 1.2 07/12/2018    ANIONGAP 12 07/12/2018     No results found for: HGBA1C  Lab Results   Component Value Date    BNP 1,120 (H) 07/12/2018    Lab Results   Component Value Date    WBC 8.46 07/12/2018    HGB 18.6 (H) 07/12/2018    HCT 56.0 (H) 07/12/2018     07/12/2018    GRAN 4.8 07/12/2018    GRAN 57.1 07/12/2018     No results found for: CHOL, HDL, LDLCALC, TRIG       Imaging:   OSH echo:  LVEF 60, mild RV dysfunction moderate PAH       EKG: NSR, first degree heart block, normal axis, prolonged QT, QTc 489.  Assessment:   Greg Nolasco is a 77 y.o. male being seen for initial consult for evaluation for shunt/RV failure given his pulmonary hypertension and shortness of breath    Plan:      1. SOB (shortness of breath) - likely multifactorial due to shunt vs PAH, possibly exacerbated by emphysema (latter mild by pulmonary exam). CT shows significant calcifications in the aorta and coronaries, part of dyspnea may be attributable to obstructive CAD, but not hypoxia.  - proceed with coronary angiograph, RHC, and shunt run tomorrow  - NPO after midnight  - will need supportive O2  - likely chronic given secondary polycythemia (hgb: 18.6, Hct: 56) & peripheral cyanosis     Access: Right radial (5fr) and right antecubital vein    2. Paroxysmal atrial fibrillation - in NSR, [id number] = 2  - takes sotalol for last 15 years  - on baby aspirin, wants to continue this    3. Chronic emphysematous pulmonary heart disease  - if no cardiovascular cause found, follow up for further workup/treatment with pulmonology    -The risks, benefits & alternatives of the procedure were explained to the patient.    -The risks of coronary angiography include but are not limited to:  Bleeding, infection, heart rhythm abnormalities, allergic reactions, kidney  injury, stroke and death.    -Should stenting be indicated, the patient has agreed to dual anti-platelet therapy for 1-consecutive year with a drug-eluting stent and a minimum of 1-month with the use of a bare metal stent.    -The risks of moderate sedation include hypotension, respiratory depression, arrhythmias, bronchospasm, & death.    -Informed consent was obtained & the patient is agreeable to proceed with the procedure.  -This patient was discussed with the attending interventional cardiologist who agrees with the above assessment & plan.     Signed:  Pepe Obrien MD  Cardiology Fellow  Pager - [phone]  7/19/2018 3:52 PM    I have personally taken the history and examined this patient and agree with the resident's note as stated above.  Mr. Nolasco reports approximately 6 months of progressive dyspnea that appears to be associated with severe hypoxia.  His exam is remarkable for cyanotic finger tips and lips, but pink toes.  He has no murmur on exam today.  The etiology of his dypsnea and his hypoxia are likely multi-factorial  -Rec EKG  -Rec TTE to evaluate for shunt  -Will proceed with LHC/ RHC with shunt run as stated above  All of the patient's and his son's questions were answered.

## 2018-07-19 NOTE — PROGRESS NOTES
OUTPATIENT CATHETERIZATION INSTRUCTIONS    You have been scheduled for a procedure in the catheterization lab on Friday, July 20, 2018.     Please report to the Cardiology Waiting Area on the Third floor of the hospital and check in at 10 AM.   You will then be taken to the SSCU (Short Stay Cardiac Unit) and prepared for your procedure. Please be aware that this is not the time of your procedure but the time you are to arrive. The procedures are scheduled on an hourly basis; however, emergency cases take precedence over all other cases.       You may not have anything to eat or drink after midnight the night before your test. You may take your regular morning medications with water. If there are any medications that you should not take you will be instructed to hold them that morning. If you are diabetic and on Metformin (Glucophage) do not take it the day before, the day of, and for 2 days after your procedure.      The procedure will take 1-2 hours to perform. After the procedure, you will return to SSCU on the third floor of the hospital. You will need to lie still (or keep your arm still) for the next 4 to 6 hours to minimize bleeding from the puncture site. Your family may remain in the room with you during this time.       You may be able to be discharged home that same afternoon if there is someone to drive you home and there were no complications. If you have one of the balloon, stent, or device procedures you may spend the night in the hospital. Your doctor will determine, based on your progress, the date and time of your discharge. The results of your procedure will be discussed with you before you are discharged. Any further testing or procedures will be scheduled for you either before you leave or you will be called with these appointments.       If you should have any questions, concerns, or need to change the date of your procedure, please call  JENNFIER Riojas @ [phone]    Special  Instructions:  Drink plenty of water the day before and after your procedure.       THE ABOVE INSTRUCTIONS WERE GIVEN TO THE PATIENT VERBALLY AND THEY VERBALIZED UNDERSTANDING.  THEY DO NOT REQUIRE ANY SPECIAL NEEDS AND DO NOT HAVE ANY LEARNING BARRIERS.          Directions for Reporting to Cardiology Waiting Area in the Hospital  If you park in the Parking Garage:  Take elevators to the1st floor of the parking garage.  Continue past the gift shop, coffee shop, and piano.  Take a right and go to the gold elevators. (Elevator B)  Take the elevator to the 3rd floor.  Follow the arrow on the sign on the wall that says Cath Lab Registration/EP Lab Registration.  Follow the long hallway all the way around until you come to a big open area.  This is the registration area.  Check in at Reception Desk.    OR    If family is dropping you off:  Have them drop you off at the front of the Hospital under the green overhang.  Enter through the doors and take a right.  Take the E elevators to the 3rd floor Cardiology Waiting Area.  Check in at the Reception Desk in the waiting room.

## 2018-07-19 NOTE — PROGRESS NOTES
Subjective:    Patient ID:  Greg Nolasco is a 77 y.o. male who presents for evaluation of Shortness of Breath      HPI  I had the pleasure of seeing Mr. Nolasco in our electrophysiology clinic in consultation for his paroxysmal atrial fibrillation. As you are aware he is a pleasant 77 year-old man with coronary artery disease s/p angioplasty, paroxysmal atrial fibrillation, and severe pulmonary hypertension by echocardiography with ambulatory desaturation. He is on home oxygen. He was referred from [facility] to [facility] for PH evaluation. He is being evaluated by Dr. Dale and was referred to discuss if sotalol is a necessary medication. Mr. Nolasco reports having 3 distinct episodes of AF 20 years ago. Went to local ER ([facility]) for each episdoe. He was given IV medications and spontaneously converted. He has never required cardioversion and has never been on an anticoagulant. He does not report anticoagulation has ever been discussed with him. He was initiated on sotalol 120mg bid. He has had no further episodes since starting sotalol.     My interpretation of today's in clinic ECG is sinus rhythm with QTc of 489 msec.    Review of Systems   Constitution: Positive for malaise/fatigue. Negative for weakness.   HENT: Negative for congestion and sore throat.    Eyes: Negative for visual disturbance.   Cardiovascular: Positive for dyspnea on exertion. Negative for chest pain, irregular heartbeat, leg swelling, near-syncope, orthopnea, palpitations, paroxysmal nocturnal dyspnea and syncope.   Respiratory: Positive for shortness of breath. Negative for cough.    Hematologic/Lymphatic: Negative for bleeding problem. Does not bruise/bleed easily.   Skin: Negative.    Musculoskeletal: Negative.    Gastrointestinal: Negative for bloating, abdominal pain, hematochezia and melena.   Neurological: Negative for focal weakness and light-headedness.        Objective:    Physical Exam   Constitutional: He is  oriented to person, place, and time. He appears well-developed and well-nourished. No distress.   HENT:   Head: Normocephalic and atraumatic.   Eyes: Conjunctivae are normal. Right eye exhibits no discharge.   Neck: Neck supple.   Cardiovascular: Normal rate, regular rhythm and normal heart sounds.  Exam reveals no gallop and no friction rub.    No murmur heard.  Pulmonary/Chest: Effort normal and breath sounds normal. No respiratory distress. He has no wheezes. He has no rales.   Abdominal: Soft. Bowel sounds are normal. He exhibits no distension. There is no tenderness. There is no rebound.   Musculoskeletal: He exhibits no edema.   Neurological: He is alert and oriented to person, place, and time.   Skin: Skin is warm and dry. He is not diaphoretic.   Psychiatric: He has a normal mood and affect. His behavior is normal. Judgment and thought content normal.   Vitals reviewed.        Assessment:       1. Paroxysmal atrial fibrillation    2. Coronary artery disease involving native coronary artery of native heart without angina pectoris    3. Chronic hypoxemic respiratory failure         Plan:       In summary, Mr. Nolasco is a pleasant 77 year-old man with coronary artery disease s/p angioplasty, paroxysmal atrial fibrillation, and severe pulmonary hypertension by echocardiography with ambulatory desaturation. His AF has responded well to sotalol. I would not stop this as he has tolerated it well and would not tolerate atrial fibrillation in current state of severe PH and hypoxemia. I had a long discussion with the patient about the pathophysiology and risks of atrial fibrillation and its basic pathophysiology, including its health implications and treatment options with the patient. Specifically, I addressed the need for CVA (stroke) prophylaxis with aspirin versus oral anticoagulation (warfarin vs DOACs, discussed bleeding risks). His [id number] score is 3 and I recommend long-term anticoagulation for stroke  prophylaxis. He currently is not interested in anticoagulation initiation. He will continue to follow with his local cardiologist and can follow-up with me on an as needed basis. He was given literature on atrial fibrillation, stroke risk, and how anticoagulation helps prevent strokes.    Thank you for allowing me to participate in the care of this patient. Please do not hesitate to call me with any questions or concerns.    Royce Bajwa MD, PhD  Cardiac Electrophysiology

## 2018-07-19 NOTE — LETTER
July 19, 2018      Ken Dale MD  [address]  [facility] [address]           [facility]  [address]  [facility] [address]  Phone: [phone]          Patient: Greg Nolasco   MR Number: [id number]   YOB: 1940   Date of Visit: 7/19/2018       Dear Dr. Ken Dale:    Thank you for referring Greg Nolasco to me for evaluation. Attached you will find relevant portions of my assessment and plan of care.    If you have questions, please do not hesitate to call me. I look forward to following Greg Nolasco along with you.    Sincerely,    Dex Lomas MD    Enclosure  CC:  No Recipients    If you would like to receive this communication electronically, please contact [email or web address] or [phone] to request more information on [employer] Link access.    For providers and/or their staff who would like to refer a patient to [facility], please contact us through our one-stop-shop provider referral line, [facility], at [phone].    If you feel you have received this communication in error or would no longer like to receive these types of communications, please e-mail [email or web address]

## 2018-07-19 NOTE — PATIENT INSTRUCTIONS
Understanding Atrial Fibrillation    An arrhythmia is any problem with the speed or pattern of the heartbeat. Atrial fibrillation (AFib) is a common type of arrhythmia. It causes fast, chaotic electrical signals in the atria. This leads to poor functioning of the heart. It also affects how much blood your heart can pump out to the body.  Afib may occur once in a while and go away on its own. Or it may continue for longer periods and need treatment.  AFib can lead to serious problems, such as stroke. Your healthcare provider will need to monitor and manage it.  What happens during atrial fibrillation?   The heart has an electrical system that sends signals to control the heartbeat. As signals move through the heart, they tell the hearts upper chambers (atria) and lower chambers (ventricles) when to squeeze (contract) and relax. This lets blood move through the heart and out to the body and lungs.  With AFib, the atria receive abnormal signals. This causes them to contract in a fast and irregular way, and out of sync with the ventricles. When this happens, the atria also have a harder time moving blood into the ventricles. Blood may then pool in the atria, which increases the risk for blood clots and stroke. The ventricles also may contract too quickly and irregularly. As a result, they may not pump blood to the body and lungs as well as they should. This can weaken the heart muscle over time and cause heart failure.  What causes atrial fibrillation?  AFib is more common in older adults. It has many possible causes including:  · Coronary artery disease  · Heart valve disease  · Heart attack  · Heart surgery  · High blood pressure  · Thyroid disease  · Diabetes  · Lung disease  · Sleep apnea  · Heavy alcohol use  In some cases of AFib, doctors do not know the cause.  What are the symptoms of atrial fibrillation?  AFib may or may not cause symptoms. If symptoms do occur, they may include:  · A fast, pounding,  irregular heartbeat  · Shortness of breath  · Tiredness  · Dizziness or fainting  · Chest pain  How is atrial fibrillation treated?  Treatments for AFib can include any of the options below.  · Medicines. You may be prescribed:  ¨ Heart rate medicines to help slow down the heartbeat  ¨ Heart rhythm medicines to help the heart beat more regularly  ¨ Anti-clotting medicines to help reduce the risk for blood clots and stroke  · Electrical cardioversion. Your healthcare provider uses special pads or paddles to send one or more brief electrical shocks to the heart. This can help reset the heartbeat to normal.  · Ablation. Long, thin tubes called catheters are threaded through a blood vessel to the heart. There, the catheters send out hot or cold energy to the areas causing the abnormal signals. This energy destroys the problem tissue or cells. This improves the chances that your heart will stay in normal rhythm without using medicines. If your heart rate and rhythm cant be controlled, you may need ablation and a pacemaker. These will help control the heart rate and regularity of the heartbeat.  · Surgery. During surgery, your healthcare provider may use different methods to create scar tissue in the areas of the heart causing the abnormal signals. The scar tissue disrupts the abnormal signals and may stop AFib from occurring.  What are the complications of atrial fibrillation?  These can include:  · Blood clots  · Stroke  · Heart failure. This problem occurs when the heart muscle weakens so much that it can no longer pump blood well.  When should I call my healthcare provider?  Call your healthcare provider right away if you have any of these:  · Symptoms that dont get better with treatment, or get worse  · New symptoms   Date Last Reviewed: 5/1/2016  © 3067-7377 [employer]. [address] [place] [address], [place] [address]. All rights reserved. This information is not intended as a substitute for professional  medical care. Always follow your healthcare professional's instructions.        Living with Atrial Fibrillation: Preventing Stroke  Atrial fibrillation (AFib) is the most common abnormal heart rhythm in the world. The heart has 2 upper chambers called atria and 2 lower chambers called ventricles. AFib causes the atria to quiver (fibrillate) instead of pumping normally. Blood can then pool in the heart instead of moving in and out as usual. This can cause blood clots to form inside the heart. A clot can break free, travel to the brain and cause a stroke. A stroke can cause brain damage very quickly.    Taking medicine to prevent stroke  Your healthcare provider may prescribe a medicine to help prevent blood clots. This type of medicine is called a blood thinner. Blood thinners include:  · Antiplatelet medicines, such as aspirin or clopidrogrel  · Anticoagulation medicines, such as warfarin, dabigatran, rivaroxaban, apixaban, or edoxaban  Risks of blood thinner medicine  Blood thinners increase your risk of bleeding. If you take certain blood thinners, you may need to take extra steps to stay healthy. You may need regular blood tests to check the levels of medicine in your blood. Youll need to be careful not to injure yourself. And you may need to watch your diet for foods that affect blood clotting.  If your blood is too thin, you may have symptoms of excess bleeding, such as:  · Unusual bruising  · Bleeding from the gums  · Blood in the urine or stool  · Black stools  · Vomiting blood  · Nosebleeds  · An unusual or severe headache  Taking the right dose  Youll need to make sure to take the medicine exactly as directed by your healthcare provider. Take it at the same time each day. If you miss a dose, call your provider right away to find out how much to take. Never take a double dose. If you take too much, it can cause too much bleeding. It can cause bleeding you can see, on the outside of your body. And it can  cause bleeding on the inside of your body that you may not be aware of.  Getting your blood tested  Depending on which blood thinner you take, you may need to have your blood tested on a regular schedule. This is to make sure you dont have too much or too little of the medicine in your blood. Too much can cause excess bleeding. Too little may not prevent blood clots from harming you.  You may need to visit a hospital or clinic every week to have your blood tested. Or a nurse may come to your home and test your blood. In some cases, you may be able to test your blood at home with a small machine. Talk with your healthcare provider to find out whats best for you. After the blood test, your healthcare provider may tell you to change your dose of medicine.  Watching your diet  Some foods can affect how certain blood thinners work. In particular, warfarin levels are sensitive to your diet. For example, many foods contain vitamin K. Vitamin K is a substance that helps your blood clot. You dont need to avoid foods that have vitamin K. But you do need to keep the amount of them you eat steady as possible from day to day. Examples of foods high in vitamin K are asparagus, avocado, broccoli, cabbage, kale, spinach, and some other leafy green vegetables. Oils, such as soybean, canola, and olive, are also high in vitamin K.  Other foods and drinks can affect the way blood thinners work in your body. These include:  · Grapefruit and grapefruit juice  · Cranberries and cranberry juice  · Fish oil supplements  · Garlic, chidi, licorice, and turmeric  · Herbs used in herbal teas or supplements  · Alcohol  If any of these items are part of your regular diet, continue using them as you normally would. Dont make any major changes in your diet without first talking with your healthcare provider.  You may also need to limit fats in your diet to 2 to 4 tablespoons a day.  Preventing injury  Because blood thinners make you bleed  more, youll need to protect yourself from breaks in the skin. Follow these guidelines:  · Don't go barefoot - always wear shoes.  · Don't trim corns or calluses yourself.  · Consider using an electric razor instead of a manual one.  · Use a soft-bristled toothbrush and waxed dental floss.  Youll also need to avoid any activities that may cause injury. If you fall or are injured, you could be bleeding inside your body and not know it. Make sure to get medical attention right away if you fall, hit your head, or have any other kind of injury.  Other safety tips  While on your medicine, be sure to:  · Tell all of your healthcare providers that you take a blood thinner for AFib. This includes all of your doctors, dental care providers, and your pharmacist.  · Ask your doctor before taking any new medicines, vitamins, or other supplements. Any of these can cause problems when you take a blood thinner.  · Wear a medical alert bracelet or carry an ID card in your wallet if you will be taking blood thinners for months or longer.  · Keep all appointments for your blood tests.  Procedures to prevent stroke  Most blood clots that form in the heart occur in a pouch of the left atrium called the appendage. This pouch can often be large and have multiple lobes which can permit blood pooling and clot formation. Left atrial appendage closure is a nonsurgical procedure in which a self-expanding plug is placed at the opening of the left atrial appendage to close off the appendage from the rest of the heart. Once the plug has fully sealed, no blood can enter or leave the appendage. This reduces blood clot formation and stroke risk. Ask your doctor if you qualify for this type of procedure.  Other ways to help prevent stroke  Your healthcare provider might give you other advice about how to lower your risk for stroke, such as:  · Lowering your cholesterol with lifestyle changes or medicine  · Not smoking  · Getting physical  activity  · Losing weight if needed  · Eating a heart-healthy diet  · Not drinking too much alcohol     When to call your healthcare provider  Call your healthcare provider right away if you have any of these:  · Unusual or severe headache  · Confusion, weakness, or numbness  · Loss of vision  · Difficulty with speech  · Bleeding that wont stop  · Coughing or vomiting blood  · Bright red blood in the stool  · Fall or injury to the head  · Symptoms of atrial fibrillation that are new or getting worse   Date Last Reviewed: 5/1/2016  © 6186-5820 [employer]. [address] [place] [address]. All rights reserved. This information is not intended as a substitute for professional medical care. Always follow your healthcare professional's instructions.

## 2018-07-20 PROCEDURE — B31S1ZZ FLUOROSCOPY OF RIGHT PULMONARY ARTERY USING LOW OSMOLAR CONTRAST: ICD-10-PCS | Performed by: INTERNAL MEDICINE

## 2018-07-20 PROCEDURE — B31T1ZZ FLUOROSCOPY OF LEFT PULMONARY ARTERY USING LOW OSMOLAR CONTRAST: ICD-10-PCS | Performed by: INTERNAL MEDICINE

## 2018-07-20 PROCEDURE — 4A023N8 MEASUREMENT OF CARDIAC SAMPLING AND PRESSURE, BILATERAL, PERCUTANEOUS APPROACH: ICD-10-PCS | Performed by: INTERNAL MEDICINE

## 2018-07-20 PROCEDURE — B2111ZZ FLUOROSCOPY OF MULTIPLE CORONARY ARTERIES USING LOW OSMOLAR CONTRAST: ICD-10-PCS | Performed by: INTERNAL MEDICINE

## 2018-07-20 PROCEDURE — 5A1221Z PERFORMANCE OF CARDIAC OUTPUT, CONTINUOUS: ICD-10-PCS | Performed by: INTERNAL MEDICINE

## 2018-07-23 ENCOUNTER — TELEPHONE (OUTPATIENT)
Dept: TRANSPLANT | Facility: CLINIC | Age: 78
End: 2018-07-23

## 2018-07-23 ENCOUNTER — NURSE TRIAGE (OUTPATIENT)
Dept: ADMINISTRATIVE | Facility: CLINIC | Age: 78
End: 2018-07-23

## 2018-07-23 ENCOUNTER — HOSPITAL ENCOUNTER (INPATIENT)
Facility: HOSPITAL | Age: 78
LOS: 9 days | Discharge: HOME | DRG: 287 | End: 2018-08-01
Attending: EMERGENCY MEDICINE | Admitting: INTERNAL MEDICINE
Payer: MEDICARE

## 2018-07-23 DIAGNOSIS — I27.9 CHRONIC PULMONARY HEART DISEASE: ICD-10-CM

## 2018-07-23 DIAGNOSIS — I27.29 RIGHT HEART FAILURE DUE TO PULMONARY HYPERTENSION: ICD-10-CM

## 2018-07-23 DIAGNOSIS — I27.20 PULMONARY HYPERTENSION: ICD-10-CM

## 2018-07-23 DIAGNOSIS — R06.02 SHORTNESS OF BREATH: ICD-10-CM

## 2018-07-23 DIAGNOSIS — I50.813 ACUTE ON CHRONIC RIGHT-SIDED CONGESTIVE HEART FAILURE: Primary | ICD-10-CM

## 2018-07-23 DIAGNOSIS — I25.10 CORONARY ARTERY DISEASE INVOLVING NATIVE CORONARY ARTERY OF NATIVE HEART WITHOUT ANGINA PECTORIS: ICD-10-CM

## 2018-07-23 DIAGNOSIS — I50.810 RIGHT HEART FAILURE DUE TO PULMONARY HYPERTENSION: ICD-10-CM

## 2018-07-23 LAB
ALBUMIN SERPL BCP-MCNC: 3.9 G/DL
ALLENS TEST: ABNORMAL
ALP SERPL-CCNC: 135 U/L
ALT SERPL W/O P-5'-P-CCNC: 14 U/L
ANION GAP SERPL CALC-SCNC: 11 MMOL/L
AST SERPL-CCNC: 26 U/L
BASOPHILS # BLD AUTO: 0.07 K/UL
BASOPHILS NFR BLD: 0.9 %
BILIRUB SERPL-MCNC: 3.5 MG/DL
BNP SERPL-MCNC: 970 PG/ML
BUN SERPL-MCNC: 10 MG/DL
CALCIUM SERPL-MCNC: 9.7 MG/DL
CHLORIDE SERPL-SCNC: 108 MMOL/L
CO2 SERPL-SCNC: 21 MMOL/L
CREAT SERPL-MCNC: 1.1 MG/DL
DIFFERENTIAL METHOD: ABNORMAL
EOSINOPHIL # BLD AUTO: 0.4 K/UL
EOSINOPHIL NFR BLD: 4.6 %
ERYTHROCYTE [DISTWIDTH] IN BLOOD BY AUTOMATED COUNT: 15.6 %
EST. GFR  (AFRICAN AMERICAN): >60 ML/MIN/1.73 M^2
EST. GFR  (NON AFRICAN AMERICAN): >60 ML/MIN/1.73 M^2
GLUCOSE SERPL-MCNC: 97 MG/DL
HCO3 UR-SCNC: 21.7 MMOL/L (ref 24–28)
HCT VFR BLD AUTO: 45.8 %
HGB BLD-MCNC: 16.3 G/DL
IMM GRANULOCYTES # BLD AUTO: 0.03 K/UL
IMM GRANULOCYTES NFR BLD AUTO: 0.4 %
INR PPP: 1.1
LYMPHOCYTES # BLD AUTO: 2.1 K/UL
LYMPHOCYTES NFR BLD: 27.4 %
MCH RBC QN AUTO: 32.7 PG
MCHC RBC AUTO-ENTMCNC: 35.6 G/DL
MCV RBC AUTO: 92 FL
MONOCYTES # BLD AUTO: 0.8 K/UL
MONOCYTES NFR BLD: 10.2 %
NEUTROPHILS # BLD AUTO: 4.4 K/UL
NEUTROPHILS NFR BLD: 56.5 %
NRBC BLD-RTO: 0 /100 WBC
PCO2 BLDA: 27.8 MMHG (ref 35–45)
PH SMN: 7.5 [PH] (ref 7.35–7.45)
PLATELET # BLD AUTO: 196 K/UL
PMV BLD AUTO: 9.1 FL
PO2 BLDA: 39 MMHG (ref 40–60)
POC BE: -2 MMOL/L
POC SATURATED O2: 79 % (ref 95–100)
POC TCO2: 23 MMOL/L (ref 24–29)
POTASSIUM SERPL-SCNC: 3.7 MMOL/L
PROT SERPL-MCNC: 7 G/DL
PROTHROMBIN TIME: 11 SEC
RBC # BLD AUTO: 4.99 M/UL
SAMPLE: ABNORMAL
SITE: ABNORMAL
SODIUM SERPL-SCNC: 140 MMOL/L
TROPONIN I SERPL DL<=0.01 NG/ML-MCNC: 0.07 NG/ML
WBC # BLD AUTO: 7.75 K/UL

## 2018-07-23 PROCEDURE — 63600175 PHARM REV CODE 636 W HCPCS: Performed by: EMERGENCY MEDICINE

## 2018-07-23 PROCEDURE — 96374 THER/PROPH/DIAG INJ IV PUSH: CPT

## 2018-07-23 PROCEDURE — 63600175 PHARM REV CODE 636 W HCPCS: Performed by: STUDENT IN AN ORGANIZED HEALTH CARE EDUCATION/TRAINING PROGRAM

## 2018-07-23 PROCEDURE — 83880 ASSAY OF NATRIURETIC PEPTIDE: CPT

## 2018-07-23 PROCEDURE — 82803 BLOOD GASES ANY COMBINATION: CPT

## 2018-07-23 PROCEDURE — 93010 ELECTROCARDIOGRAM REPORT: CPT | Mod: ,,, | Performed by: INTERNAL MEDICINE

## 2018-07-23 PROCEDURE — 99285 EMERGENCY DEPT VISIT HI MDM: CPT | Mod: ,,, | Performed by: EMERGENCY MEDICINE

## 2018-07-23 PROCEDURE — 80053 COMPREHEN METABOLIC PANEL: CPT

## 2018-07-23 PROCEDURE — 99285 EMERGENCY DEPT VISIT HI MDM: CPT | Mod: 25

## 2018-07-23 PROCEDURE — 12000002 HC ACUTE/MED SURGE SEMI-PRIVATE ROOM

## 2018-07-23 PROCEDURE — 85025 COMPLETE CBC W/AUTO DIFF WBC: CPT

## 2018-07-23 PROCEDURE — 96372 THER/PROPH/DIAG INJ SC/IM: CPT

## 2018-07-23 PROCEDURE — 85610 PROTHROMBIN TIME: CPT

## 2018-07-23 PROCEDURE — A4216 STERILE WATER/SALINE, 10 ML: HCPCS | Performed by: STUDENT IN AN ORGANIZED HEALTH CARE EDUCATION/TRAINING PROGRAM

## 2018-07-23 PROCEDURE — 93005 ELECTROCARDIOGRAM TRACING: CPT

## 2018-07-23 PROCEDURE — 84484 ASSAY OF TROPONIN QUANT: CPT

## 2018-07-23 PROCEDURE — 20600001 HC STEP DOWN PRIVATE ROOM

## 2018-07-23 PROCEDURE — 25000003 PHARM REV CODE 250: Performed by: STUDENT IN AN ORGANIZED HEALTH CARE EDUCATION/TRAINING PROGRAM

## 2018-07-23 RX ORDER — ATORVASTATIN CALCIUM 20 MG/1
40 TABLET, FILM COATED ORAL DAILY
Status: DISCONTINUED | OUTPATIENT
Start: 2018-07-24 | End: 2018-08-01 | Stop reason: HOSPADM

## 2018-07-23 RX ORDER — FUROSEMIDE 10 MG/ML
40 INJECTION INTRAMUSCULAR; INTRAVENOUS 2 TIMES DAILY
Status: DISCONTINUED | OUTPATIENT
Start: 2018-07-24 | End: 2018-07-26

## 2018-07-23 RX ORDER — PANTOPRAZOLE SODIUM 40 MG/1
40 TABLET, DELAYED RELEASE ORAL DAILY
Status: DISCONTINUED | OUTPATIENT
Start: 2018-07-24 | End: 2018-08-01 | Stop reason: HOSPADM

## 2018-07-23 RX ORDER — ALLOPURINOL 100 MG/1
300 TABLET ORAL DAILY
Status: DISCONTINUED | OUTPATIENT
Start: 2018-07-24 | End: 2018-07-24

## 2018-07-23 RX ORDER — SODIUM CHLORIDE 0.9 % (FLUSH) 0.9 %
3 SYRINGE (ML) INJECTION EVERY 8 HOURS
Status: DISCONTINUED | OUTPATIENT
Start: 2018-07-23 | End: 2018-08-01 | Stop reason: HOSPADM

## 2018-07-23 RX ORDER — FUROSEMIDE 10 MG/ML
40 INJECTION INTRAMUSCULAR; INTRAVENOUS
Status: COMPLETED | OUTPATIENT
Start: 2018-07-23 | End: 2018-07-23

## 2018-07-23 RX ORDER — ACETAMINOPHEN 325 MG/1
650 TABLET ORAL EVERY 4 HOURS PRN
Status: DISCONTINUED | OUTPATIENT
Start: 2018-07-23 | End: 2018-08-01 | Stop reason: HOSPADM

## 2018-07-23 RX ORDER — HEPARIN SODIUM 5000 [USP'U]/ML
5000 INJECTION, SOLUTION INTRAVENOUS; SUBCUTANEOUS EVERY 8 HOURS
Status: DISCONTINUED | OUTPATIENT
Start: 2018-07-23 | End: 2018-08-01 | Stop reason: HOSPADM

## 2018-07-23 RX ORDER — FUROSEMIDE 20 MG/1
20 TABLET ORAL DAILY
Status: ON HOLD | COMMUNITY
End: 2018-08-01

## 2018-07-23 RX ORDER — ASPIRIN 81 MG/1
81 TABLET ORAL DAILY
Status: DISCONTINUED | OUTPATIENT
Start: 2018-07-24 | End: 2018-07-24

## 2018-07-23 RX ORDER — SOTALOL HYDROCHLORIDE 120 MG/1
120 TABLET ORAL EVERY 12 HOURS
Status: DISCONTINUED | OUTPATIENT
Start: 2018-07-24 | End: 2018-08-01 | Stop reason: HOSPADM

## 2018-07-23 RX ADMIN — HEPARIN SODIUM 5000 UNITS: 5000 INJECTION, SOLUTION INTRAVENOUS; SUBCUTANEOUS at 10:07

## 2018-07-23 RX ADMIN — FUROSEMIDE 40 MG: 10 INJECTION, SOLUTION INTRAMUSCULAR; INTRAVENOUS at 07:07

## 2018-07-23 RX ADMIN — Medication 3 ML: at 10:07

## 2018-07-23 NOTE — TELEPHONE ENCOUNTER
Reason for Disposition   Nursing judgment    Protocols used: ST NO PROTOCOL CALL: SICK ADULT-A-[place]    Mr. Nolasco states he continues to experience sob at rest. Patient states he has a new onset of swelling in his feet and ankles. Mr. Nolasco is s/p a cardiac cath on 7/20/18.

## 2018-07-23 NOTE — ED NOTES
LOC: The patient is awake, alert, and aware of environment. The patient is oriented x 3 and speaking appropriately.   APPEARANCE: No acute distress noted.   PSYCHOSOCIAL: Patient is calm and cooperative.   SKIN: The skin is warm, dry.   RESPIRATORY: Airway is open and patent. Bilateral chest rise and fall. Respirations are spontaneous, even and slightly labored. 02 sat 97% on 4 L NC. No accessory muscle use noted.   CARDIAC: Patient has a normal rate and rhythm. Denies chest pain at this time.  ABDOMEN: Soft and non tender to palpation. No distention noted.   URINARY:  Voids independently.   EXTREMITIES: 2+ pitting edema noted to bilateral lower extremities up to knees.   NEUROLOGIC: Eyes open spontaneously. Speech clear. Tolerating saliva secretions well. Able to follow commands, demonstrating ability to actively and appropriately communicate within context of current conversation. Symmetrical facial muscles. Moving all extremities well. Movement is purposeful.   MUSCULOSKELETAL: No obvious deformities noted.

## 2018-07-23 NOTE — TELEPHONE ENCOUNTER
Mrs. Nolasco called to report that he is not feeling well. He was not able to get an appt until 7/31 and asks if there is any medication he can take. He reports being short of breath and sounds short of breath on the call. Conferred with Dr. Dale and instructed the patient to come to [facility] ED and prepare for a stay. Patient is agreeable. Will have to call his son and arrange for transportation. Possibly arrive in the evening.  Notified [facility].

## 2018-07-23 NOTE — ED PROVIDER NOTES
Encounter Date: 7/23/2018    SCRIBE #1 NOTE: I, Edward Baron, am scribing for, and in the presence of,  Dr. Rankin. I have scribed the entire note.       History     Chief Complaint   Patient presents with    Hypoxia     Pt presented to the ED via pov. Pt c/o hypoxia and bilateral leg swelling. Pt has a hx of pulmonary hypotension. Pt had a heart cath on Thursday.     Leg Swelling     Time patient was seen by the provider: 6:08 PM      The patient is a 77 y.o. male with co-morbidities including: Chronic hypoxemic respiratory failure on 4 L home oxygen, CAD, and Pulmonary HTN as well as heart cath 4 days ago who presents to the ED with a complaint of worsening SOB as well as associated sudden onset of bilateral leg swelling. Pt states that his SOB has been worsening since January but was the worst within the past week. Pt reports his SOB is worsened with exertion. Pt also states his leg swelling is greater on right than left. Other associated sx include: productive cough and an episode of chest pressure experienced last night. Pt denies any fever. He is on lasix and reports medical compliance.  He was recently hospitalized for shortness of breath at which time patient was recently hospitalized at which time right heart catheterization revealed severe pulmonary hypertension. He was discharged for outpatient follow up with Pulm HTN clinic.  However, he has been unable to follow up with them since that time.  He spoke with [facility] clinic given severity of symptoms he recommended drive in from his home in [facility].      The history is provided by the patient, medical records and a relative.     Review of patient's allergies indicates:   Allergen Reactions    Clindamycin Shortness Of Breath    Penicillins Rash     Past Medical History:   Diagnosis Date    Chronic hypoxemic respiratory failure     Coronary artery disease     BARBARA (obstructive sleep apnea)      Past Surgical History:   Procedure Laterality  Date    ANGIOPLASTY  1991    BACK SURGERY      HERNIA REPAIR      KNEE SURGERY      SHOULDER SURGERY      SINUS SURGERY       History reviewed. No pertinent family history.  Social History   Substance Use Topics    Smoking status: Former Smoker     Quit date: 1998    Smokeless tobacco: Current User    Alcohol use Yes     Review of Systems   Constitutional: Negative for fever.   HENT: Negative for sore throat.    Respiratory: Positive for cough (productive) and shortness of breath (worsened with exertion).    Cardiovascular: Positive for leg swelling (bilateral; right greater than left). Negative for chest pain.        + chest pressure   Gastrointestinal: Negative for nausea.   Genitourinary: Negative for dysuria.   Musculoskeletal: Negative for back pain.   Skin: Negative for rash.   Neurological: Negative for weakness.   Hematological: Does not bruise/bleed easily.       Physical Exam     Initial Vitals [07/23/18 1754]   BP Pulse Resp Temp SpO2   134/80 68 17 97.8 °F (36.6 °C) (!) 85 %      MAP       --         Physical Exam    Nursing note and vitals reviewed.  Constitutional: He appears well-developed and well-nourished. He is not diaphoretic. No distress.   HENT:   Head: Normocephalic and atraumatic.   Eyes: Conjunctivae and EOM are normal.   Neck: Normal range of motion. Neck supple. JVD present.   Cardiovascular: Normal rate, regular rhythm, normal heart sounds and intact distal pulses. Exam reveals no gallop and no friction rub.    No murmur heard.  Pulmonary/Chest: No respiratory distress. He has no wheezes. He has no rhonchi. He has rales. He exhibits no tenderness.   Increased WOB on 4 L nasal cannula and speaking in 5-word sentences.  Tachypnea.   Subcostal retractions noted.   Faint bibasilar rales noted.    Abdominal: Soft. Bowel sounds are normal. He exhibits no distension and no mass. There is no tenderness. There is no rebound and no guarding.   Musculoskeletal: Normal range of motion. He  exhibits edema. He exhibits no tenderness.   1+ pitting edema to ankles bilaterally.   No calf assymmetry.    Lymphadenopathy:     He has no cervical adenopathy.   Neurological: He is alert and oriented to person, place, and time. He has normal strength. No sensory deficit.   Skin: Skin is warm and dry. Capillary refill takes less than 2 seconds.   Psychiatric: He has a normal mood and affect.         ED Course   Procedures  Labs Reviewed   CBC W/ AUTO DIFFERENTIAL - Abnormal; Notable for the following:        Result Value    MCH 32.7 (*)     RDW 15.6 (*)     MPV 9.1 (*)     All other components within normal limits   COMPREHENSIVE METABOLIC PANEL - Abnormal; Notable for the following:     CO2 21 (*)     Total Bilirubin 3.5 (*)     All other components within normal limits   TROPONIN I - Abnormal; Notable for the following:     Troponin I 0.073 (*)     All other components within normal limits   B-TYPE NATRIURETIC PEPTIDE - Abnormal; Notable for the following:      (*)     All other components within normal limits   ISTAT PROCEDURE - Abnormal; Notable for the following:     POC PH 7.500 (*)     POC PCO2 27.8 (*)     POC PO2 39 (*)     POC HCO3 21.7 (*)     POC SATURATED O2 79 (*)     POC TCO2 23 (*)     All other components within normal limits   PROTIME-INR     EKG Readings: (Independently Interpreted)   NSR at rate of 74 bpm. T-wave inversion in leads 3, aVF, V2, and V3. ST segments are normal. Normal axis. Prolonged QTC of 495 msec. No changes form previous EKG.        Imaging Results          X-Ray Chest AP Portable (Final result)  Result time 07/23/18 18:45:39    Final result by Reymundo Lou MD (07/23/18 18:45:39)                 Impression:      Pulmonary emphysema without detrimental change or radiographic acute intrathoracic process seen.  Specifically, no focal consolidation or convincing evidence of failure.      Electronically signed by: Reymundo Lou MD  Date:    07/23/2018  Time:    18:45              Narrative:    EXAMINATION:  XR CHEST AP PORTABLE    CLINICAL HISTORY:  CHF;    TECHNIQUE:  Single frontal view of the chest was performed.    COMPARISON:  CT thorax 07/12/2018 and chest radiograph 06/21/2018    FINDINGS:  Monitoring leads overlie the chest.  Patient is slightly rotated.  Skin fold projects over the right hemithorax.  Cardiac silhouette remains enlarged without convincing evidence of failure.  Grossly stable mediastinal contours and hilar regions.  Pulmonary hyperinflation with bilateral coarsened interstitial opacities consistent with sequela of known pulmonary emphysema, without large consolidation, pleural effusion or pneumothorax seen.  No acute osseous process seen.  PA and lateral views can be obtained.                              X-Rays:   Independently Interpreted Readings:   Chest X-Ray: CXR is without evidence of pneumonia or pneumothorax. Overall, no significant change from previous.      Medical Decision Making:   History:   Old Medical Records: I decided to obtain old medical records.  Initial Assessment:   76 yo male with hx of pulmonary HTN, hypoxic respiratory failure on home O2, and BARBARA presents with worsening SOB and leg swelling. This patient's differential diagnosis includes, but is not limited to: ACS, pneumothorax, pleural effusion, pneumonia, pulmonary embolus, congestive heart failure, viral infections.    Will obtain labs and CXR. Given severity of pt's sx and hypoxia, he will likely require admission.     Reassessment:  VBG with no hypercapnia. BNP persistently elevated at 970, this is down from 1120 on July 12. Troponin is positive at 0.073. I do not feel this troponin is secondary to ACS, rather more consistent with CHF. Discussed with Cardiology fellow for possible admission for pulmonary HTN service. Will administer Lasix. Pt appears unchanged on 4 L nasal cannula with pulse ox of 97%.     Reassessment:  Pt will be admitted to Pulm HTN service.  Independently  Interpreted Test(s):   I have ordered and independently interpreted X-rays - see prior notes.  I have ordered and independently interpreted EKG Reading(s) - see prior notes  Clinical Tests:   Lab Tests: Ordered and Reviewed  Radiological Study: Ordered and Reviewed  Medical Tests: Ordered and Reviewed  Other:   I have discussed this case with another health care provider.            Scribe Attestation:   Scribe #1: I performed the above scribed service and the documentation accurately describes the services I performed. I attest to the accuracy of the note.    Attending Attestation:           Physician Attestation for Scribe:      Comments: I, Dr. Enzo Rankin, personally performed the services described in this documentation. All medical record entries made by the scribe were at my direction and in my presence.  I have reviewed the chart and agree that the record reflects my personal performance and is accurate and complete. Enzo Rankin MD.  1:37 AM 07/24/2018                 Clinical Impression:   The primary encounter diagnosis was Acute on chronic right-sided congestive heart failure. Diagnoses of Shortness of breath and Pulmonary hypertension were also pertinent to this visit.      Disposition:   Disposition: Admitted                        Enzo Rankin MD  07/24/18 0138

## 2018-07-24 PROBLEM — I27.29 RIGHT HEART FAILURE DUE TO PULMONARY HYPERTENSION: Status: ACTIVE | Noted: 2018-07-24

## 2018-07-24 PROBLEM — I50.810 RIGHT HEART FAILURE DUE TO PULMONARY HYPERTENSION: Status: ACTIVE | Noted: 2018-07-24

## 2018-07-24 PROBLEM — I50.813 ACUTE ON CHRONIC RIGHT HEART FAILURE: Status: ACTIVE | Noted: 2018-07-24

## 2018-07-24 PROBLEM — R06.02 SHORTNESS OF BREATH: Status: RESOLVED | Noted: 2018-07-23 | Resolved: 2018-07-24

## 2018-07-24 LAB
ALBUMIN SERPL BCP-MCNC: 3.8 G/DL
ALP SERPL-CCNC: 129 U/L
ALT SERPL W/O P-5'-P-CCNC: 16 U/L
ANION GAP SERPL CALC-SCNC: 9 MMOL/L
ANION GAP SERPL CALC-SCNC: 9 MMOL/L
AST SERPL-CCNC: 22 U/L
BASOPHILS # BLD AUTO: 0.08 K/UL
BASOPHILS NFR BLD: 1 %
BILIRUB SERPL-MCNC: 3.1 MG/DL
BUN SERPL-MCNC: 11 MG/DL
BUN SERPL-MCNC: 11 MG/DL
CALCIUM SERPL-MCNC: 9.2 MG/DL
CALCIUM SERPL-MCNC: 9.2 MG/DL
CHLORIDE SERPL-SCNC: 105 MMOL/L
CHLORIDE SERPL-SCNC: 105 MMOL/L
CO2 SERPL-SCNC: 25 MMOL/L
CO2 SERPL-SCNC: 25 MMOL/L
CREAT SERPL-MCNC: 1.1 MG/DL
CREAT SERPL-MCNC: 1.1 MG/DL
DIFFERENTIAL METHOD: ABNORMAL
EOSINOPHIL # BLD AUTO: 0.5 K/UL
EOSINOPHIL NFR BLD: 5.8 %
ERYTHROCYTE [DISTWIDTH] IN BLOOD BY AUTOMATED COUNT: 15.5 %
EST. GFR  (AFRICAN AMERICAN): >60 ML/MIN/1.73 M^2
EST. GFR  (AFRICAN AMERICAN): >60 ML/MIN/1.73 M^2
EST. GFR  (NON AFRICAN AMERICAN): >60 ML/MIN/1.73 M^2
EST. GFR  (NON AFRICAN AMERICAN): >60 ML/MIN/1.73 M^2
GLUCOSE SERPL-MCNC: 93 MG/DL
GLUCOSE SERPL-MCNC: 93 MG/DL
HCT VFR BLD AUTO: 46 %
HGB BLD-MCNC: 16.2 G/DL
IMM GRANULOCYTES # BLD AUTO: 0.02 K/UL
IMM GRANULOCYTES NFR BLD AUTO: 0.2 %
LYMPHOCYTES # BLD AUTO: 2.1 K/UL
LYMPHOCYTES NFR BLD: 24.8 %
MAGNESIUM SERPL-MCNC: 2.4 MG/DL
MCH RBC QN AUTO: 32.5 PG
MCHC RBC AUTO-ENTMCNC: 35.2 G/DL
MCV RBC AUTO: 92 FL
MONOCYTES # BLD AUTO: 1 K/UL
MONOCYTES NFR BLD: 11.5 %
NEUTROPHILS # BLD AUTO: 4.7 K/UL
NEUTROPHILS NFR BLD: 56.7 %
NRBC BLD-RTO: 0 /100 WBC
PHOSPHATE SERPL-MCNC: 4.5 MG/DL
PLATELET # BLD AUTO: 205 K/UL
PMV BLD AUTO: 9 FL
POTASSIUM SERPL-SCNC: 3.7 MMOL/L
POTASSIUM SERPL-SCNC: 3.7 MMOL/L
PROT SERPL-MCNC: 6.9 G/DL
RBC # BLD AUTO: 4.99 M/UL
SODIUM SERPL-SCNC: 139 MMOL/L
SODIUM SERPL-SCNC: 139 MMOL/L
TROPONIN I SERPL DL<=0.01 NG/ML-MCNC: 0.06 NG/ML
TROPONIN I SERPL DL<=0.01 NG/ML-MCNC: 0.06 NG/ML
WBC # BLD AUTO: 8.29 K/UL

## 2018-07-24 PROCEDURE — 83735 ASSAY OF MAGNESIUM: CPT

## 2018-07-24 PROCEDURE — 25000003 PHARM REV CODE 250: Performed by: NURSE PRACTITIONER

## 2018-07-24 PROCEDURE — 84484 ASSAY OF TROPONIN QUANT: CPT

## 2018-07-24 PROCEDURE — 99223 1ST HOSP IP/OBS HIGH 75: CPT | Mod: AI,,, | Performed by: INTERNAL MEDICINE

## 2018-07-24 PROCEDURE — 84100 ASSAY OF PHOSPHORUS: CPT

## 2018-07-24 PROCEDURE — 25000003 PHARM REV CODE 250: Performed by: STUDENT IN AN ORGANIZED HEALTH CARE EDUCATION/TRAINING PROGRAM

## 2018-07-24 PROCEDURE — A4216 STERILE WATER/SALINE, 10 ML: HCPCS | Performed by: STUDENT IN AN ORGANIZED HEALTH CARE EDUCATION/TRAINING PROGRAM

## 2018-07-24 PROCEDURE — 80053 COMPREHEN METABOLIC PANEL: CPT

## 2018-07-24 PROCEDURE — 94761 N-INVAS EAR/PLS OXIMETRY MLT: CPT

## 2018-07-24 PROCEDURE — 36415 COLL VENOUS BLD VENIPUNCTURE: CPT

## 2018-07-24 PROCEDURE — 63600175 PHARM REV CODE 636 W HCPCS: Performed by: STUDENT IN AN ORGANIZED HEALTH CARE EDUCATION/TRAINING PROGRAM

## 2018-07-24 PROCEDURE — 99233 SBSQ HOSP IP/OBS HIGH 50: CPT | Mod: ,,, | Performed by: INTERNAL MEDICINE

## 2018-07-24 PROCEDURE — 85025 COMPLETE CBC W/AUTO DIFF WBC: CPT

## 2018-07-24 PROCEDURE — 20600001 HC STEP DOWN PRIVATE ROOM

## 2018-07-24 RX ORDER — POTASSIUM CHLORIDE 20 MEQ/1
20 TABLET, EXTENDED RELEASE ORAL ONCE
Status: COMPLETED | OUTPATIENT
Start: 2018-07-24 | End: 2018-07-24

## 2018-07-24 RX ORDER — ALLOPURINOL 100 MG/1
300 TABLET ORAL EVERY EVENING
Status: DISCONTINUED | OUTPATIENT
Start: 2018-07-24 | End: 2018-08-01 | Stop reason: HOSPADM

## 2018-07-24 RX ORDER — ASPIRIN 81 MG/1
81 TABLET ORAL EVERY EVENING
Status: DISCONTINUED | OUTPATIENT
Start: 2018-07-24 | End: 2018-08-01 | Stop reason: HOSPADM

## 2018-07-24 RX ADMIN — HEPARIN SODIUM 5000 UNITS: 5000 INJECTION, SOLUTION INTRAVENOUS; SUBCUTANEOUS at 02:07

## 2018-07-24 RX ADMIN — Medication 3 ML: at 02:07

## 2018-07-24 RX ADMIN — FUROSEMIDE 40 MG: 10 INJECTION, SOLUTION INTRAMUSCULAR; INTRAVENOUS at 05:07

## 2018-07-24 RX ADMIN — ALLOPURINOL 300 MG: 100 TABLET ORAL at 05:07

## 2018-07-24 RX ADMIN — ASPIRIN 81 MG: 81 TABLET, COATED ORAL at 05:07

## 2018-07-24 RX ADMIN — SOTALOL HYDROCHLORIDE 120 MG: 120 TABLET ORAL at 12:07

## 2018-07-24 RX ADMIN — HEPARIN SODIUM 5000 UNITS: 5000 INJECTION, SOLUTION INTRAVENOUS; SUBCUTANEOUS at 08:07

## 2018-07-24 RX ADMIN — SOTALOL HYDROCHLORIDE 120 MG: 120 TABLET ORAL at 08:07

## 2018-07-24 RX ADMIN — FUROSEMIDE 40 MG: 10 INJECTION, SOLUTION INTRAMUSCULAR; INTRAVENOUS at 08:07

## 2018-07-24 RX ADMIN — PANTOPRAZOLE SODIUM 40 MG: 40 TABLET, DELAYED RELEASE ORAL at 08:07

## 2018-07-24 RX ADMIN — ATORVASTATIN CALCIUM 40 MG: 20 TABLET, FILM COATED ORAL at 08:07

## 2018-07-24 RX ADMIN — POTASSIUM CHLORIDE 20 MEQ: 1500 TABLET, EXTENDED RELEASE ORAL at 08:07

## 2018-07-24 NOTE — H&P
[facility]  Heart Transplant  H&P    Patient Name: Greg Nolasco  MRN: [id number]  Admission Date: 7/23/2018  Attending Physician: Robert Lomas MD  Primary Care Provider: Pablo Lorenzo MD  Principal Problem:<principal problem not specified>    Subjective:     History of Present Illness:  Greg Nolasco is a 78 yo M with multivessel CAD (by [facility] 7/20/2018), severe PH, PAF (on sotalol), COPD with chronic hypoxic respiratory failure (on 4 L home O2) who is admitted for management of acute on chronic right sided heart failure exacerbation and PH.    Patient notes progressive exertional dyspnea since the beginning of this year, acutely worsened over the past 3 weeks. His symptoms have been associated with 2 pillow orthopnea, PND, and ankle edema.     He was initially referred to [facility] for PH evaluation from [facility] and was seen by Dr. Dale on 7/12 who recommend L and R heart caths. He was also seen by Dr. Bajwa of  for his PAF who advised continuation of his long term tx with home sotalol 120 BID and recommended AC ([id number] 3), which patient declined.      The patient's last 2D echo was notable for preserved EF, estimated PASP 62, R to L shunt on bubble study c/w PFO, moderately enlarged RV with mod depressed function. A CT chest showed centrilobular emphysema and bilateral lower lobe reticulation but not c/w diffuse ILD.    He underwent LHC/RHC on 7/20 which showed multivessel CAD (80% mLAD, 80% D1, 95% dOM1, 95%RCA). RHC with RA 12, RV 94, PA 94/38/65, PW 10; CO 3.2, CI 1.6; R->L shunting across PFO. He was planned for a follow up visit but has had worsening dyspnea over last 3 weeks and was advised to present for admission.      6mw 7/12/18  Distance: 183 meters  Oxygen sats: 90% to 66%  BP: 145/75 to 171/85  HR 69 to 85 bpm     TTE 7/20/18   1 - Concentric remodeling.     2 - Normal left ventricular systolic function (EF 60-65%).     3 - Right ventricular enlargement with  moderately depressed systolic function.     4 - Impaired LV relaxation, normal LAP (grade 1 diastolic dysfunction).     5 - Biatrial enlargement.     6 - Mild tricuspid regurgitation.     7 - Pulmonary hypertension. The estimated PA systolic pressure is 62 mmHg.     8 - Intermediate central venous pressure.     9 - Small pericardial effusion apically (there is shaggy material seen along the visceral pericardium at the RV apex).        Past Medical History:   Diagnosis Date    Chronic hypoxemic respiratory failure     Coronary artery disease     BARBARA (obstructive sleep apnea)        Past Surgical History:   Procedure Laterality Date    ANGIOPLASTY  1991    BACK SURGERY      HERNIA REPAIR      KNEE SURGERY      SHOULDER SURGERY      SINUS SURGERY         Review of patient's allergies indicates:   Allergen Reactions    Clindamycin Shortness Of Breath    Penicillins Rash       Current Facility-Administered Medications   Medication    acetaminophen tablet 650 mg    allopurinol tablet 300 mg    aspirin EC tablet 81 mg    atorvastatin tablet 40 mg    furosemide injection 40 mg    heparin (porcine) injection 5,000 Units    pantoprazole EC tablet 40 mg    sodium chloride 0.9% flush 3 mL    sotalol tablet 120 mg     Family History     None        Social History Main Topics    Smoking status: Former Smoker     Quit date: 1998    Smokeless tobacco: Current User    Alcohol use Yes    Drug use: No    Sexual activity: Not on file     Review of Systems   Constitutional: Positive for fatigue and unexpected weight change (20 lb weight loss). Negative for appetite change, chills, diaphoresis and fever.   HENT: Negative for sinus pain, sinus pressure, sneezing, sore throat and trouble swallowing.    Respiratory: Positive for shortness of breath. Negative for cough, chest tightness and wheezing.    Cardiovascular: Positive for leg swelling. Negative for chest pain and palpitations.   Gastrointestinal: Negative  for abdominal distention, abdominal pain, blood in stool, constipation, diarrhea, nausea and vomiting.   Genitourinary: Negative for decreased urine volume, dysuria, hematuria and urgency.   Skin: Negative for color change, pallor and rash.   Neurological: Negative for dizziness, tremors, syncope, light-headedness, numbness and headaches.   Psychiatric/Behavioral: Negative for confusion. The patient is not nervous/anxious.      Objective:     Vital Signs (Most Recent):  Temp: 98 °F (36.7 °C) (07/23/18 2217)  Pulse: 68 (07/23/18 2259)  Resp: 18 (07/23/18 2259)  BP: 114/66 (07/23/18 2302)  SpO2: 95 % (07/23/18 2259) Vital Signs (24h Range):  Temp:  [97.8 °F (36.6 °C)-98 °F (36.7 °C)] 98 °F (36.7 °C)  Pulse:  [64-69] 68  Resp:  [16-21] 18  SpO2:  [85 %-97 %] 95 %  BP: (109-134)/(63-80) 114/66     Patient Vitals for the past 72 hrs (Last 3 readings):   Weight   07/23/18 1754 78.5 kg (173 lb)     Body mass index is 25.55 kg/m².    No intake or output data in the 24 hours ending 07/24/18 0157    Physical Exam   Constitutional: He is oriented to person, place, and time. No distress.   Chronically ill appearing, pleasant elderly man   HENT:   Head: Normocephalic and atraumatic.   Eyes: EOM are normal. Pupils are equal, round, and reactive to light.   Neck: Normal range of motion. Neck supple. JVD present.   Cardiovascular: Normal rate, regular rhythm, normal heart sounds and intact distal pulses.  Exam reveals no gallop and no friction rub.    No murmur heard.  Pulmonary/Chest: Effort normal. He has no wheezes. He has no rales.   Diminished breath sounds   Abdominal: Soft. Bowel sounds are normal. He exhibits no distension. There is no tenderness.   Musculoskeletal: Normal range of motion. He exhibits no edema.   Neurological: He is alert and oriented to person, place, and time.   Skin: Skin is warm and dry. Capillary refill takes less than 2 seconds. No erythema.   Psychiatric: He has a normal mood and affect. Judgment and  thought content normal.       Significant Labs:  CBC:    Recent Labs  Lab 07/23/18  1830   WBC 7.75   RBC 4.99   HGB 16.3   HCT 45.8      MCV 92   MCH 32.7*   MCHC 35.6     BNP:    Recent Labs  Lab 07/23/18  1830   *     CMP:    Recent Labs  Lab 07/23/18  1830   GLU 97   CALCIUM 9.7   ALBUMIN 3.9   PROT 7.0      K 3.7   CO2 21*      BUN 10   CREATININE 1.1   ALKPHOS 135   ALT 14   AST 26   BILITOT 3.5*      Coagulation:     Recent Labs  Lab 07/23/18  1830   INR 1.1     LDH:  No results for input(s): LDH in the last 72 hours.  Microbiology:  Microbiology Results (last 7 days)     ** No results found for the last 168 hours. **          I have reviewed all pertinent labs within the past 24 hours.    Diagnostic Results:  I have reviewed all pertinent imaging results/findings within the past 24 hours.    Assessment/Plan:     * Shortness of breath    - Patient presenting with increasing POPE, orthopnea, and PND over last 3 weeks in setting of severe PH likely c/w acute right sided CHF exacerbation. Cannot exclude underlying multivessel CAD / unstable angina as possible etiology. Will admit to [facility] for IV diuresis and further evaluation.  - Initiated Lasix 40 mg IV BID. Monitor I/Os and daily weights. Holding PTA Lasix 20 mg PO daily (patient was taking every other day).   - Will discuss options for advanced therapies.             Pulmonary hypertension    - Patient diagnosed with severe PH (PA pressure by [facility] 94/38, mean 65) which appears out of proportion to his underlying chronic lung disease / WHO group 3.   - Will continue to work up and discuss advanced therapies.  - Continue IV diuresis as above.              Paroxysmal atrial fibrillation    - Continue PTA sotalol 120 mg BID.  Patient has declined AC initiation.         Coronary artery disease involving native coronary artery of native heart without angina pectoris    - Patient diagnosed with multivessel CAD by [facility] 7/20 as detailed above. No  interventions done. Unlikely a surgical candidate given his advanced pulmonary hypertension and chronic lung disease. Continue ASA 81 mg daily and high intensity statin therapy.             Viky Lyon MD  Heart Transplant  [facility]    This patient was discussed with Dr. Lomas.

## 2018-07-24 NOTE — ASSESSMENT & PLAN NOTE
Multi-vessel CAD with 80% mLAD, 80% D1, 95% dOM1, 95%RCA. High risk for surgical intervention. Given desaturation, PCI would need to happen while on Extra-Corporeal-Membrane-Oxygenation support.     -have offered the option of PCI while on ECMO support to patient and family  -tentatively planned for Friday  -agree with therapies targeted at optimizing pulmonary pressures in the interim, defer management to HTS service    Access: Right radial, right CFA, left CFV    -The risks, benefits & alternatives of the procedure were explained to the patient.    -The risks of coronary angiography include but are not limited to:  Bleeding, infection, heart rhythm abnormalities, allergic reactions, kidney injury, stroke and death.    -Should stenting be indicated, the patient has agreed to dual anti-platelet therapy for 1-consecutive year with a drug-eluting stent and a minimum of 1-month with the use of a bare metal stent.    -The risks of moderate sedation include hypotension, respiratory depression, arrhythmias, bronchospasm, & death.    -Informed consent was obtained & the patient is agreeable to proceed with the procedure.  -This patient was discussed with the attending interventional cardiologist who agrees with the above assessment & plan.   -Likewise risks of ECMO, and the possibility of being ECMO dependent were explained to patient and family at bedside.

## 2018-07-24 NOTE — PLAN OF CARE
Problem: Patient Care Overview  Goal: Plan of Care Review  Outcome: Ongoing (interventions implemented as appropriate)  Pt AAOx4, VSS, in NAD throughout shift.  Pt up to couch periodically throughout shift.  Pt removes nasal cannula at will, tolerates decreased SpO2 well but sats 80% or less when nasal cannula removed.  RN educating pt on reasons why nasal cannula needed all the time, pt verbalizing understanding.  Pt >= 92% on 5L NC.  Pt up to restroom with minimal assistance.  Pt with positive response to lasix administrations, approximately 1500 cc urine output so far this shift.  Pt denies pain throughout shift.  RN maintaining fall risk precautions throughout shift.  Pt denies pain or other need at this time; RN will continue to monitor, assess, and alter plan of care as needed until report given to oncoming night shift nurse.

## 2018-07-24 NOTE — ASSESSMENT & PLAN NOTE
- Patient diagnosed with severe PH (PA pressure by RHC 94/38, mean 65) which appears out of proportion to his underlying chronic lung disease   - Will continue to work up and discuss advanced therapies.  - Continue IV diuresis as above.   - PA gram done during L/RHC 7/20 showed pruning of bilateral PA c/w pulmonary HTN (no discrete lesion)  - Get PFT's for completion of w/u

## 2018-07-24 NOTE — ED NOTES
Patient assisted with ambulation to restroom and back to room. Patient on stretcher, Bed placed in low/locked position, side rails up x 2, call light is within reach of patient or family, Patient placed into position of comfort. Patient in NAD and offers no complaints at this time. Will continue to monitor.

## 2018-07-24 NOTE — SUBJECTIVE & OBJECTIVE
Interval History: Remains SOB with minimal exertion. sPO2 on 4L 90% or greater. No chest pain.     Continuous Infusions:  Scheduled Meds:   allopurinol  300 mg Oral Daily    aspirin  81 mg Oral Daily    atorvastatin  40 mg Oral Daily    furosemide  40 mg Intravenous BID    heparin (porcine)  5,000 Units Subcutaneous Q8H    pantoprazole  40 mg Oral Daily    sodium chloride 0.9%  3 mL Intravenous Q8H    sotalol  120 mg Oral Q12H     PRN Meds:acetaminophen    Review of patient's allergies indicates:   Allergen Reactions    Clindamycin Shortness Of Breath    Penicillins Rash     Objective:     Vital Signs (Most Recent):  Temp: 98.2 °F (36.8 °C) (07/24/18 0750)  Pulse: 67 (07/24/18 1011)  Resp: 16 (07/24/18 0750)  BP: 119/66 (07/24/18 0750)  SpO2: (!) 90 % (07/24/18 0750) Vital Signs (24h Range):  Temp:  [97.5 °F (36.4 °C)-98.2 °F (36.8 °C)] 98.2 °F (36.8 °C)  Pulse:  [60-69] 67  Resp:  [16-22] 16  SpO2:  [85 %-99 %] 90 %  BP: (109-134)/(61-80) 119/66     Patient Vitals for the past 72 hrs (Last 3 readings):   Weight   07/23/18 2302 78.4 kg (172 lb 12 oz)   07/23/18 1754 78.5 kg (173 lb)     Body mass index is 25.51 kg/m².      Intake/Output Summary (Last 24 hours) at 07/24/18 1134  Last data filed at 07/24/18 1011   Gross per 24 hour   Intake              415 ml   Output              850 ml   Net             -435 ml       Hemodynamic Parameters:       Telemetry: SR    Physical Exam   Constitutional: He is oriented to person, place, and time. He appears well-developed and well-nourished.   HENT:   Head: Normocephalic and atraumatic.   Eyes: Conjunctivae and EOM are normal. Pupils are equal, round, and reactive to light.   Neck: Normal range of motion. Neck supple. No JVD present.   Unable to visualize JVP   Cardiovascular: Normal rate, regular rhythm, normal heart sounds and intact distal pulses.    Pulmonary/Chest: Effort normal and breath sounds normal.   Abdominal: Soft. Bowel sounds are normal.    Musculoskeletal: Normal range of motion. He exhibits no edema.   Neurological: He is alert and oriented to person, place, and time.   Skin: Skin is warm and dry. Capillary refill takes 2 to 3 seconds.   Psychiatric: He has a normal mood and affect. His behavior is normal. Judgment and thought content normal.       Significant Labs:  CBC:    Recent Labs  Lab 07/23/18 1830 07/24/18  0236   WBC 7.75 8.29   RBC 4.99 4.99   HGB 16.3 16.2   HCT 45.8 46.0    205   MCV 92 92   MCH 32.7* 32.5*   MCHC 35.6 35.2     BNP:    Recent Labs  Lab 07/23/18  1830   *     CMP:    Recent Labs  Lab 07/23/18 1830 07/24/18  0236   GLU 97 93  93   CALCIUM 9.7 9.2  9.2   ALBUMIN 3.9 3.8   PROT 7.0 6.9    139  139   K 3.7 3.7  3.7   CO2 21* 25  25    105  105   BUN 10 11  11   CREATININE 1.1 1.1  1.1   ALKPHOS 135 129   ALT 14 16   AST 26 22   BILITOT 3.5* 3.1*      Coagulation:     Recent Labs  Lab 07/23/18  1830   INR 1.1     LDH:  No results for input(s): LDH in the last 72 hours.  Microbiology:  Microbiology Results (last 7 days)     ** No results found for the last 168 hours. **          I have reviewed all pertinent labs within the past 24 hours.    Estimated Creatinine Clearance: 56.2 mL/min (based on SCr of 1.1 mg/dL).    Diagnostic Results:  I have reviewed all pertinent imaging results/findings within the past 24 hours.

## 2018-07-24 NOTE — UM SECONDARY REVIEW
Physician Advisor External    Level of Care Issue    Approved Inpatient- 07/24/2018 @ 0720- Per Dr. Hemanth Harmon, EHR, determination is Inpatient appropriate.

## 2018-07-24 NOTE — ASSESSMENT & PLAN NOTE
- Patient presenting with increasing POPE, orthopnea, and PND over last 3 weeks in setting of severe PH likely c/w acute right sided CHF exacerbation. Cannot exclude underlying multivessel CAD / unstable angina as possible etiology.  - Continue Lasix 40 mg IV BID. Monitor I/Os and daily weights. Holding PTA Lasix 20 mg PO daily (patient was taking every other day).   - Will discuss options for advanced therapies. He is reluctant to pursue IV PH therapy

## 2018-07-24 NOTE — CONSULTS
[facility]  Cardiology  Consult Note    Patient Name: Greg Nolasco  MRN: [id number]  Admission Date: 7/23/2018  Hospital Length of Stay: 0 days  Code Status: Full Code   Attending Provider: Enzo Rankin MD   Consulting Provider: Dileep Mcclellan MD  Primary Care Physician: Pablo Lorenzo MD  Principal Problem:<principal problem not specified>    Patient information was obtained from patient and ER records.     Inpatient consult to Cardiology  Consult performed by: DILEEP MCCLELLAN  Consult ordered by: DILEEP MCCLELLAN        Subjective:     Chief Complaint:  Shortness of breath    HPI:   77 year-old man with hx of CAD s/p angioplasty 1991, pAF on Sotalol, severe PH, COPD, chronic hypoxic respiratory failure on 2-3L home O2 initially referred to [facility] for PH evaluation from [facility]. He was seen by Dr. Dale, recommend L and R heart cath. He was also seen by Dr. Bajwa with EP for his AF, continued his long term tx with home sotalol 120 bid and recommended AC ([id number] 3), but pt declined. He noted progressive dyspnea since the beginning of this year, associated with PND, bendopnea, and 2 pillow orthopnea. Notes ankle edema.    His echo was notable for preserved EF, est PASP 62, R to L shunt on bubble study c/w PFO, moderately enlarged RV with mod depressed fx. A CT chest showed centrilobular emphysema and bilateral lower lobe reticulation but not c/w diffuse ILD.  He had a LHC/RHC showing multivessel CAD (80% mLAD, 80% D1, 95% dOM1, 95%RCA). RHC with RA 12, RV 94, PA 94/38/65, PW 10; CO 3.2, CI 1.6; R->L shunting across PFO. He was planned for a follow up visit but has had worsening dyspnea over last 3 weeks and was advised to present for admission.     6mw 7/12/18  Distance: 183 meters  Oxygen sats: 90% to 66%  BP: 145/75 to 171/85  HR 69 to 85 bpm     TTE 7/20/18   1 - Concentric remodeling.     2 - Normal left ventricular systolic function (EF 60-65%).     3 - Right  ventricular enlargement with moderately depressed systolic function.     4 - Impaired LV relaxation, normal LAP (grade 1 diastolic dysfunction).     5 - Biatrial enlargement.     6 - Mild tricuspid regurgitation.     7 - Pulmonary hypertension. The estimated PA systolic pressure is 62 mmHg.     8 - Intermediate central venous pressure.     9 - Small pericardial effusion apically (there is shaggy material seen along the visceral pericardium at the RV apex).     Past Medical History:   Diagnosis Date    Chronic hypoxemic respiratory failure     Coronary artery disease     BARBARA (obstructive sleep apnea)        Past Surgical History:   Procedure Laterality Date    ANGIOPLASTY  1991    BACK SURGERY      HERNIA REPAIR      KNEE SURGERY      SHOULDER SURGERY      SINUS SURGERY         Review of patient's allergies indicates:   Allergen Reactions    Clindamycin Shortness Of Breath    Penicillins Rash       No current facility-administered medications on file prior to encounter.      Current Outpatient Prescriptions on File Prior to Encounter   Medication Sig    allopurinol (ZYLOPRIM) 300 MG tablet Take 300 mg by mouth.    ALPRAZolam (XANAX) 1 MG tablet 0.5 mg.     aspirin (ECOTRIN) 81 MG EC tablet Take 81 mg by mouth once daily.    atorvastatin (LIPITOR) 10 MG tablet     melatonin 3 mg Tab Take 1 capsule by mouth.    multivit-minerals/ferrous fum (MULTI VITAMIN ORAL) Take 1 tablet by mouth.    OXYGEN-AIR DELIVERY SYSTEMS MISC by Misc.(Non-Drug; Combo Route) route.    RABEprazole (ACIPHEX) 20 mg tablet Take 20 mg by mouth.    sotalol (BETAPACE) 120 MG Tab Take 120 mg by mouth.    umeclidinium-vilanterol 62.5-25 mcg/actuation DsDv Inhale 1 puff into the lungs.    [DISCONTINUED] furosemide (LASIX) 10 mg/mL (alcohol free) solution Take by mouth.    [DISCONTINUED] aspirin-calcium carbonate 81 mg-300 mg calcium(777 mg) Tab Take 81 mg by mouth.     Family History     None        Social History Main Topics     Smoking status: Former Smoker     Quit date: 1998    Smokeless tobacco: Current User    Alcohol use Yes    Drug use: No    Sexual activity: Not on file     Review of Systems   All other systems reviewed and are negative.    Objective:     Vital Signs (Most Recent):  Temp: 98 °F (36.7 °C) (07/23/18 2217)  Pulse: 68 (07/23/18 2259)  Resp: 18 (07/23/18 2259)  BP: 114/66 (07/23/18 2302)  SpO2: 95 % (07/23/18 2259) Vital Signs (24h Range):  Temp:  [97.8 °F (36.6 °C)-98 °F (36.7 °C)] 98 °F (36.7 °C)  Pulse:  [64-69] 68  Resp:  [16-21] 18  SpO2:  [85 %-97 %] 95 %  BP: (109-134)/(63-80) 114/66     Weight: 78.5 kg (173 lb)  Body mass index is 25.55 kg/m².    SpO2: 95 %  O2 Device (Oxygen Therapy): nasal cannula    No intake or output data in the 24 hours ending 07/23/18 2331    Lines/Drains/Airways     Peripheral Intravenous Line                 Peripheral IV - Single Lumen 07/23/18 1830 Right Forearm less than 1 day                Physical Exam   Constitutional: He is oriented to person, place, and time. He appears well-nourished. No distress.   HENT:   Head: Atraumatic.   Eyes: EOM are normal. No scleral icterus.   Neck: Neck supple.   Slightly elevated JVP   Cardiovascular: Normal rate, regular rhythm and normal heart sounds.  Exam reveals no gallop and no friction rub.    No murmur heard.  Pulmonary/Chest: Effort normal and breath sounds normal. He has no wheezes. He has no rales.   Abdominal: Soft. He exhibits no distension. There is no tenderness.   Musculoskeletal: He exhibits no edema.   Neurological: He is alert and oriented to person, place, and time. No cranial nerve deficit.   Skin: Skin is warm and dry. No erythema.       Significant Labs:   CMP   Recent Labs  Lab 07/23/18  1830      K 3.7      CO2 21*   GLU 97   BUN 10   CREATININE 1.1   CALCIUM 9.7   PROT 7.0   ALBUMIN 3.9   BILITOT 3.5*   ALKPHOS 135   AST 26   ALT 14   ANIONGAP 11   ESTGFRAFRICA >60.0   EGFRNONAA >60.0   , CBC   Recent  Labs  Lab 07/23/18  1830   WBC 7.75   HGB 16.3   HCT 45.8       and INR   Recent Labs  Lab 07/23/18  1830   INR 1.1         Assessment and Plan:     Pulmonary hypertension    77M with hx of CAD s/p angioplasty 1991, pAF on Sotalol, severe PH, COPD, chronic hypoxic respiratory failure on 2-3L home O2 initially referred to [facility] for PH evaluation, now with worsening progressive dyspnea over last 2-3 weeks , FCIII PH. Echo with EF 60-65%, mod depressed RV, R->L shunt thru PFO. LHC with multivessel CAD as per HPI. RHC with severe PH (PAP 94/38) with R to L shunt thru PFO, PCWP 10. CT with changes c/w COPD +/- small ILD changes. PH out of proportion to chronic lung disease (WHO Group 3), working up etiology. Euvolemic on RHC. O2 requirement.    -Admit to [facility] for further management  -Trial IV diuresis  -Continue home sotalol  -Will discuss advanced therapies        Paroxysmal atrial fibrillation    -[id number] 3  -Continue home sotalol  -Can further discuss anticoagulation          Thank you for your consult.    Dileep Jhaveri MD  Cardiology   [facility]

## 2018-07-24 NOTE — HPI
This is an active 76yo man seen in our clinic for progressive hypoxemic respiratory failure characterized by POPE and bendopnea in the setting of long-standing chronic emphysema. He underwent echo, Left and right heart cathetrization for these symptoms on 07/20, and was admitted last night to [facility] service for worsening POPE and new-onset swelling.    The 2D echo was notable for preserved EF, estimated PASP 62, R to L shunt on bubble study c/w PFO, moderately enlarged RV with mod depressed function.      The LHC/RHC showed multivessel CAD (80% mLAD, 80% D1, 95% dOM1, 95%RCA) and RAP 12, RV 94, PA 94/38, PW 10; CO 3.2, CI 1.6; R->L shunting across PFO. During the procedure he had significant peripheral hypoxia with delayed response to oxygen therapy, oxygen saturation in the pulmonary veins was adequate; supporting the significant R->L shunt found on echo.    Since admission he states that he feels a little better and that his edema has improved since admission.

## 2018-07-24 NOTE — CONSULTS
[facility]  Interventional Cardiology  Consult Note    Patient Name: Greg Nolasco  MRN: [id number]  Admission Date: 7/23/2018  Hospital Length of Stay: 1 days  Code Status: Full Code   Attending Provider: Robert Lomas MD  Consulting Provider: Pepe Obrien MD  Primary Care Physician: Pablo Lorenzo MD  Principal Problem:Shortness of breath    Patient information was obtained from patient and past medical records.     Inpatient consult to Interventional Cardiology  Consult performed by: PEPE OBRIEN  Consult ordered by: VU TIJERINA        Subjective:     Chief Complaint:  Shortness of breath     HPI:  This is an active 76yo man seen in our clinic for progressive hypoxemic respiratory failure characterized by POPE and bendopnea in the setting of long-standing chronic emphysema. He underwent echo, Left and right heart cathetrization for these symptoms on 07/20, and was admitted last night to [facility] service for worsening POPE and new-onset swelling.    The 2D echo was notable for preserved EF, estimated PASP 62, R to L shunt on bubble study c/w PFO, moderately enlarged RV with mod depressed function.      The LHC/RHC showed multivessel CAD (80% mLAD, 80% D1, 95% dOM1, 95%RCA) and RAP 12, RV 94, PA 94/38, PW 10; CO 3.2, CI 1.6; R->L shunting across PFO. During the procedure he had significant peripheral hypoxia with delayed response to oxygen therapy, oxygen saturation in the pulmonary veins was adequate; supporting the significant R->L shunt found on echo.    Since admission he states that he feels a little better and that his edema has improved since admission.    Past Medical History:   Diagnosis Date    Chronic hypoxemic respiratory failure     Coronary artery disease     BARBARA (obstructive sleep apnea)        Past Surgical History:   Procedure Laterality Date    ANGIOPLASTY  1991    BACK SURGERY      HERNIA REPAIR      KNEE SURGERY      SHOULDER SURGERY       SINUS SURGERY         Review of patient's allergies indicates:   Allergen Reactions    Clindamycin Shortness Of Breath    Penicillins Rash       PTA Medications   Medication Sig    allopurinol (ZYLOPRIM) 300 MG tablet Take 300 mg by mouth.    ALPRAZolam (XANAX) 1 MG tablet 0.5 mg.     aspirin (ECOTRIN) 81 MG EC tablet Take 81 mg by mouth once daily.    atorvastatin (LIPITOR) 10 MG tablet     furosemide (LASIX) 20 MG tablet Take 20 mg by mouth once daily.    melatonin 3 mg Tab Take 1 capsule by mouth.    multivit-minerals/ferrous fum (MULTI VITAMIN ORAL) Take 1 tablet by mouth.    OXYGEN-AIR DELIVERY SYSTEMS MISC by [facility].(Non-Drug; Combo Route) route.    RABEprazole (ACIPHEX) 20 mg tablet Take 20 mg by mouth.    sotalol (BETAPACE) 120 MG Tab Take 120 mg by mouth.    umeclidinium-vilanterol 62.5-25 mcg/actuation DsDv Inhale 1 puff into the lungs.     Family History     None        Social History Main Topics    Smoking status: Former Smoker     Quit date: 1998    Smokeless tobacco: Current User    Alcohol use Yes    Drug use: No    Sexual activity: Not on file     Review of Systems   Constitution: Negative for chills, fever, weakness and weight gain.   HENT: Negative for congestion.    Eyes: Negative for visual disturbance.   Cardiovascular: Positive for dyspnea on exertion, leg swelling and orthopnea. Negative for chest pain, claudication, palpitations and syncope.   Respiratory: Positive for shortness of breath. Negative for cough and snoring.    Hematologic/Lymphatic: Does not bruise/bleed easily.   Skin: Negative for rash.   Musculoskeletal: Negative for muscle cramps and myalgias.        Right groin pain   Gastrointestinal: Negative for bloating, abdominal pain, constipation, diarrhea and melena.   Genitourinary: Negative for bladder incontinence.   Neurological: Negative for excessive daytime sleepiness and focal weakness.   Psychiatric/Behavioral: Negative for depression and suicidal ideas.      Objective:     Vital Signs (Most Recent):  Temp: 98.2 °F (36.8 °C) (07/24/18 1625)  Pulse: 69 (07/24/18 1625)  Resp: 18 (07/24/18 1625)  BP: 119/65 (07/24/18 1625)  SpO2: (!) 90 % (07/24/18 1625) Vital Signs (24h Range):  Temp:  [97.5 °F (36.4 °C)-98.2 °F (36.8 °C)] 98.2 °F (36.8 °C)  Pulse:  [60-74] 69  Resp:  [16-22] 18  SpO2:  [85 %-99 %] 90 %  BP: (102-134)/(61-80) 119/65     Weight: 78.4 kg (172 lb 12 oz)  Body mass index is 25.51 kg/m².    SpO2: (!) 90 %  O2 Device (Oxygen Therapy): nasal cannula      Intake/Output Summary (Last 24 hours) at 07/24/18 1656  Last data filed at 07/24/18 1144   Gross per 24 hour   Intake              415 ml   Output             1550 ml   Net            -1135 ml       Lines/Drains/Airways     Peripheral Intravenous Line                 Peripheral IV - Single Lumen 07/24/18 1615 Left Forearm less than 1 day                Physical Exam   Constitutional: He is oriented to person, place, and time. He appears well-developed and well-nourished. No distress.   On nasal canula   HENT:   Head: Normocephalic and atraumatic.   Mouth/Throat: Oropharynx is clear and moist.   Eyes: Conjunctivae and EOM are normal. Pupils are equal, round, and reactive to light. No scleral icterus.   Neck: Normal range of motion. Neck supple. No JVD present.   Cardiovascular: Normal rate, regular rhythm, normal heart sounds and intact distal pulses.    No murmur heard.  Pulses:       Radial pulses are 2+ on the right side, and 2+ on the left side.        Femoral pulses are 2+ on the right side, and 2+ on the left side.       Dorsalis pedis pulses are 2+ on the right side, and 2+ on the left side.        Posterior tibial pulses are 2+ on the right side, and 2+ on the left side.   Pulmonary/Chest: Effort normal and breath sounds normal. No respiratory distress.   Symmetrical expansion   Abdominal: Soft. Bowel sounds are normal. There is no hepatosplenomegaly. There is no tenderness.   Musculoskeletal: Normal  range of motion. He exhibits no edema.   Right groin soft, with hematoma   Neurological: He is alert and oriented to person, place, and time. No cranial nerve deficit.   Skin: Skin is warm and dry. No rash noted. He is not diaphoretic.   Cyanotic at the fingertips (improved from prior)   Psychiatric: He has a normal mood and affect. Judgment and thought content normal.       Significant Labs:   ABG:   Recent Labs  Lab 07/23/18  1824   PH 7.500*   PCO2 27.8*   HCO3 21.7*   POCSATURATED 79*   BE -2   , CMP   Recent Labs  Lab 07/23/18  1830 07/24/18  0236    139  139   K 3.7 3.7  3.7    105  105   CO2 21* 25  25   GLU 97 93  93   BUN 10 11  11   CREATININE 1.1 1.1  1.1   CALCIUM 9.7 9.2  9.2   PROT 7.0 6.9   ALBUMIN 3.9 3.8   BILITOT 3.5* 3.1*   ALKPHOS 135 129   AST 26 22   ALT 14 16   ANIONGAP 11 9  9   ESTGFRAFRICA >60.0 >60.0  >60.0   EGFRNONAA >60.0 >60.0  >60.0   , CBC   Recent Labs  Lab 07/23/18  1830 07/24/18  0236   WBC 7.75 8.29   HGB 16.3 16.2   HCT 45.8 46.0    205   , INR   Recent Labs  Lab 07/23/18  1830   INR 1.1   , Lipid Panel No results for input(s): CHOL, HDL, LDLCALC, TRIG, CHOLHDL in the last 48 hours. and Troponin   Recent Labs  Lab 07/23/18  1830 07/24/18  0236 07/24/18  0944   TROPONINI 0.073* 0.064* 0.055*       Significant Imaging: Cardiac Cath:   -7/20    Multi-vessel CAD.    Pulmonary hypertension.    Right to left shunting across PFO.    Pulmonary vein saturation on room air=90%.    LV saturation on room air=70%.    Pulmonary vein saturation on 100% KWL=390%.    Echocardiogram: 07/20   1 - Concentric remodeling.     2 - Normal left ventricular systolic function (EF 60-65%).     3 - Right ventricular enlargement with moderately depressed systolic function.     4 - Impaired LV relaxation, normal LAP (grade 1 diastolic dysfunction).     5 - Biatrial enlargement.     6 - Mild tricuspid regurgitation.     7 - Pulmonary hypertension. The estimated PA systolic  pressure is 62 mmHg.     8 - Intermediate central venous pressure.     9 - Small pericardial effusion apically (there is shaggy material seen along the visceral pericardium at the RV apex).     EKG: NSR 74BPM, T-wave inversions in III, AvF, V1-V3, Qtc 495    Assessment and Plan:     Coronary artery disease involving native coronary artery of native heart without angina pectoris    Multi-vessel CAD with 80% mLAD, 80% D1, 95% dOM1, 95%RCA. High risk for surgical intervention. Given desaturation, PCI would need to happen while on Extra-Corporeal-Membrane-Oxygenation support.     -have offered the option of PCI while on ECMO support to patient and family  -tentatively planned for Friday  -agree with therapies targeted at optimizing pulmonary pressures in the interim, defer management to [facility] service    Access: Right radial, right CFA, left CFV    -The risks, benefits & alternatives of the procedure were explained to the patient.    -The risks of coronary angiography include but are not limited to:  Bleeding, infection, heart rhythm abnormalities, allergic reactions, kidney injury, stroke and death.    -Should stenting be indicated, the patient has agreed to dual anti-platelet therapy for 1-consecutive year with a drug-eluting stent and a minimum of 1-month with the use of a bare metal stent.    -The risks of moderate sedation include hypotension, respiratory depression, arrhythmias, bronchospasm, & death.    -Informed consent was obtained & the patient is agreeable to proceed with the procedure.  -This patient was discussed with the attending interventional cardiologist who agrees with the above assessment & plan.   -Likewise risks of ECMO, and the possibility of being ECMO dependent were explained to patient and family at bedside.            VTE Risk Mitigation         Ordered     heparin (porcine) injection 5,000 Units  Every 8 hours      07/23/18 1958     IP VTE HIGH RISK PATIENT  Once      07/23/18 1958           Thank you for your consult. I will follow-up with patient. Please contact us if you have any additional questions.    Pepe Obrien MD   [phone]  Interventional Cardiology   [facility]

## 2018-07-24 NOTE — PROGRESS NOTES
[facility]  Heart Transplant  Progress Note    Patient Name: Greg Nolasco  MRN: [id number]  Admission Date: 7/23/2018  Hospital Length of Stay: 1 days  Attending Physician: Robert Lomas MD  Primary Care Provider: Pablo Lorenzo MD  Principal Problem:Shortness of breath    Subjective:     Interval History: Remains SOB with minimal exertion. sPO2 on 4L 90% or greater. No chest pain.     Continuous Infusions:  Scheduled Meds:   allopurinol  300 mg Oral Daily    aspirin  81 mg Oral Daily    atorvastatin  40 mg Oral Daily    furosemide  40 mg Intravenous BID    heparin (porcine)  5,000 Units Subcutaneous Q8H    pantoprazole  40 mg Oral Daily    sodium chloride 0.9%  3 mL Intravenous Q8H    sotalol  120 mg Oral Q12H     PRN Meds:acetaminophen    Review of patient's allergies indicates:   Allergen Reactions    Clindamycin Shortness Of Breath    Penicillins Rash     Objective:     Vital Signs (Most Recent):  Temp: 98.2 °F (36.8 °C) (07/24/18 0750)  Pulse: 67 (07/24/18 1011)  Resp: 16 (07/24/18 0750)  BP: 119/66 (07/24/18 0750)  SpO2: (!) 90 % (07/24/18 0750) Vital Signs (24h Range):  Temp:  [97.5 °F (36.4 °C)-98.2 °F (36.8 °C)] 98.2 °F (36.8 °C)  Pulse:  [60-69] 67  Resp:  [16-22] 16  SpO2:  [85 %-99 %] 90 %  BP: (109-134)/(61-80) 119/66     Patient Vitals for the past 72 hrs (Last 3 readings):   Weight   07/23/18 2302 78.4 kg (172 lb 12 oz)   07/23/18 1754 78.5 kg (173 lb)     Body mass index is 25.51 kg/m².      Intake/Output Summary (Last 24 hours) at 07/24/18 1134  Last data filed at 07/24/18 1011   Gross per 24 hour   Intake              415 ml   Output              850 ml   Net             -435 ml       Hemodynamic Parameters:       Telemetry: SR    Physical Exam   Constitutional: He is oriented to person, place, and time. He appears well-developed and well-nourished.   HENT:   Head: Normocephalic and atraumatic.   Eyes: Conjunctivae and EOM are normal. Pupils are equal, round, and  reactive to light.   Neck: Normal range of motion. Neck supple. No JVD present.   Unable to visualize JVP   Cardiovascular: Normal rate, regular rhythm, normal heart sounds and intact distal pulses.    Pulmonary/Chest: Effort normal and breath sounds normal.   Abdominal: Soft. Bowel sounds are normal.   Musculoskeletal: Normal range of motion. He exhibits no edema.   Neurological: He is alert and oriented to person, place, and time.   Skin: Skin is warm and dry. Capillary refill takes 2 to 3 seconds.   Psychiatric: He has a normal mood and affect. His behavior is normal. Judgment and thought content normal.       Significant Labs:  CBC:    Recent Labs  Lab 07/23/18  1830 07/24/18  0236   WBC 7.75 8.29   RBC 4.99 4.99   HGB 16.3 16.2   HCT 45.8 46.0    205   MCV 92 92   MCH 32.7* 32.5*   MCHC 35.6 35.2     BNP:    Recent Labs  Lab 07/23/18  1830   *     CMP:    Recent Labs  Lab 07/23/18  1830 07/24/18  0236   GLU 97 93  93   CALCIUM 9.7 9.2  9.2   ALBUMIN 3.9 3.8   PROT 7.0 6.9    139  139   K 3.7 3.7  3.7   CO2 21* 25  25    105  105   BUN 10 11  11   CREATININE 1.1 1.1  1.1   ALKPHOS 135 129   ALT 14 16   AST 26 22   BILITOT 3.5* 3.1*      Coagulation:     Recent Labs  Lab 07/23/18  1830   INR 1.1     LDH:  No results for input(s): LDH in the last 72 hours.  Microbiology:  Microbiology Results (last 7 days)     ** No results found for the last 168 hours. **          I have reviewed all pertinent labs within the past 24 hours.    Estimated Creatinine Clearance: 56.2 mL/min (based on SCr of 1.1 mg/dL).    Diagnostic Results:  I have reviewed all pertinent imaging results/findings within the past 24 hours.    Assessment and Plan:     Greg Nolasco is a 78 yo M with multivessel CAD (by [facility] 7/20/2018), severe PH, PAF (on sotalol), COPD with chronic hypoxic respiratory failure (on 4 L home O2) who is admitted for management of acute on chronic right sided heart failure exacerbation and  PH.    Patient notes progressive exertional dyspnea since the beginning of this year, acutely worsened over the past 3 weeks. His symptoms have been associated with 2 pillow orthopnea, PND, and ankle edema.     He was initially referred to [facility] for PH evaluation from [facility] and was seen by Dr. Dlae on 7/12 who recommend L and R heart caths. He was also seen by Dr. Bajwa of  for his PAF who advised continuation of his long term tx with home sotalol 120 BID and recommended AC ([id number] 3), which patient declined.      The patient's last 2D echo was notable for preserved EF, estimated PASP 62, R to L shunt on bubble study c/w PFO, moderately enlarged RV with mod depressed function. A CT chest showed centrilobular emphysema and bilateral lower lobe reticulation but not c/w diffuse ILD.    He underwent LHC/RHC on 7/20 which showed multivessel CAD (80% mLAD, 80% D1, 95% dOM1, 95%RCA). RHC with RA 12, RV 94, PA 94/38/65, PW 10; CO 3.2, CI 1.6; R->L shunting across PFO. He was planned for a follow up visit but has had worsening dyspnea over last 3 weeks and was advised to present for admission.      6mw 7/12/18  Distance: 183 meters  Oxygen sats: 90% to 66%  BP: 145/75 to 171/85  HR 69 to 85 bpm     TTE 7/20/18   1 - Concentric remodeling.     2 - Normal left ventricular systolic function (EF 60-65%).     3 - Right ventricular enlargement with moderately depressed systolic function.     4 - Impaired LV relaxation, normal LAP (grade 1 diastolic dysfunction).     5 - Biatrial enlargement.     6 - Mild tricuspid regurgitation.     7 - Pulmonary hypertension. The estimated PA systolic pressure is 62 mmHg.     8 - Intermediate central venous pressure.     9 - Small pericardial effusion apically (there is shaggy material seen along the visceral pericardium at the RV apex).        * Shortness of breath    - Patient presenting with increasing POPE, orthopnea, and PND over last 3 weeks in setting of severe PH likely  c/w acute right sided CHF exacerbation. Cannot exclude underlying multivessel CAD / unstable angina as possible etiology.  - Continue Lasix 40 mg IV BID. Monitor I/Os and daily weights. Holding PTA Lasix 20 mg PO daily (patient was taking every other day).   - Will discuss options for advanced therapies. He is reluctant to pursue IV PH therapy            Pulmonary hypertension    - Patient diagnosed with severe PH (PA pressure by RHC 94/38, mean 65) which appears out of proportion to his underlying chronic lung disease   - Will continue to work up and discuss advanced therapies.  - Continue IV diuresis as above.   - PA gram done during L/RHC 7/20 showed pruning of bilateral PA c/w pulmonary HTN (no discrete lesion)  - Get PFT's for completion of w/u             Chronic hypoxemic respiratory failure    - Continue supplemental O2 to keep sPO2 > 90 (on 4L ATC at home)        Paroxysmal atrial fibrillation    - Continue PTA sotalol 120 mg BID.  Patient has declined AC initiation. In SR on tele        Coronary artery disease involving native coronary artery of native heart without angina pectoris    - Patient diagnosed with multivessel CAD by [facility] 7/20 as detailed above. No interventions done. Unlikely a surgical candidate given his advanced pulmonary hypertension and chronic lung disease.   - Discussed with Dr. Dex Lomas. He plans possible PCI(s) on Thursday  - Continue ASA, [place] [id number]  Heart Transplant  [facility]

## 2018-07-24 NOTE — ASSESSMENT & PLAN NOTE
- Patient diagnosed with multivessel CAD by [facility] 7/20 as detailed above. No interventions done. Unlikely a surgical candidate given his advanced pulmonary hypertension and chronic lung disease.   - Discussed with Dr. Dex Lomas. He plans possible PCI(s) on Thursday  - Continue ASA, statin

## 2018-07-24 NOTE — SUBJECTIVE & OBJECTIVE
Past Medical History:   Diagnosis Date    Chronic hypoxemic respiratory failure     Coronary artery disease     BARBARA (obstructive sleep apnea)        Past Surgical History:   Procedure Laterality Date    ANGIOPLASTY  1991    BACK SURGERY      HERNIA REPAIR      KNEE SURGERY      SHOULDER SURGERY      SINUS SURGERY         Review of patient's allergies indicates:   Allergen Reactions    Clindamycin Shortness Of Breath    Penicillins Rash       No current facility-administered medications on file prior to encounter.      Current Outpatient Prescriptions on File Prior to Encounter   Medication Sig    allopurinol (ZYLOPRIM) 300 MG tablet Take 300 mg by mouth.    ALPRAZolam (XANAX) 1 MG tablet 0.5 mg.     aspirin (ECOTRIN) 81 MG EC tablet Take 81 mg by mouth once daily.    atorvastatin (LIPITOR) 10 MG tablet     melatonin 3 mg Tab Take 1 capsule by mouth.    multivit-minerals/ferrous fum (MULTI VITAMIN ORAL) Take 1 tablet by mouth.    OXYGEN-AIR DELIVERY SYSTEMS MISC by [facility].(Non-Drug; Combo Route) route.    RABEprazole (ACIPHEX) 20 mg tablet Take 20 mg by mouth.    sotalol (BETAPACE) 120 MG Tab Take 120 mg by mouth.    umeclidinium-vilanterol 62.5-25 mcg/actuation DsDv Inhale 1 puff into the lungs.    [DISCONTINUED] furosemide (LASIX) 10 mg/mL (alcohol free) solution Take by mouth.    [DISCONTINUED] aspirin-calcium carbonate 81 mg-300 mg calcium(777 mg) Tab Take 81 mg by mouth.     Family History     None        Social History Main Topics    Smoking status: Former Smoker     Quit date: 1998    Smokeless tobacco: Current User    Alcohol use Yes    Drug use: No    Sexual activity: Not on file     Review of Systems   All other systems reviewed and are negative.    Objective:     Vital Signs (Most Recent):  Temp: 98 °F (36.7 °C) (07/23/18 2217)  Pulse: 68 (07/23/18 2259)  Resp: 18 (07/23/18 2259)  BP: 114/66 (07/23/18 2302)  SpO2: 95 % (07/23/18 2259) Vital Signs (24h Range):  Temp:  [97.8 °F  (36.6 °C)-98 °F (36.7 °C)] 98 °F (36.7 °C)  Pulse:  [64-69] 68  Resp:  [16-21] 18  SpO2:  [85 %-97 %] 95 %  BP: (109-134)/(63-80) 114/66     Weight: 78.5 kg (173 lb)  Body mass index is 25.55 kg/m².    SpO2: 95 %  O2 Device (Oxygen Therapy): nasal cannula    No intake or output data in the 24 hours ending 07/23/18 2331    Lines/Drains/Airways     Peripheral Intravenous Line                 Peripheral IV - Single Lumen 07/23/18 1830 Right Forearm less than 1 day                Physical Exam   Constitutional: He is oriented to person, place, and time. He appears well-nourished. No distress.   HENT:   Head: Atraumatic.   Eyes: EOM are normal. No scleral icterus.   Neck: Neck supple.   Slightly elevated JVP   Cardiovascular: Normal rate, regular rhythm and normal heart sounds.  Exam reveals no gallop and no friction rub.    No murmur heard.  Pulmonary/Chest: Effort normal and breath sounds normal. He has no wheezes. He has no rales.   Abdominal: Soft. He exhibits no distension. There is no tenderness.   Musculoskeletal: He exhibits no edema.   Neurological: He is alert and oriented to person, place, and time. No cranial nerve deficit.   Skin: Skin is warm and dry. No erythema.       Significant Labs:   CMP   Recent Labs  Lab 07/23/18 1830      K 3.7      CO2 21*   GLU 97   BUN 10   CREATININE 1.1   CALCIUM 9.7   PROT 7.0   ALBUMIN 3.9   BILITOT 3.5*   ALKPHOS 135   AST 26   ALT 14   ANIONGAP 11   ESTGFRAFRICA >60.0   EGFRNONAA >60.0   , CBC   Recent Labs  Lab 07/23/18 1830   WBC 7.75   HGB 16.3   HCT 45.8       and INR   Recent Labs  Lab 07/23/18 1830   INR 1.1

## 2018-07-24 NOTE — HPI
77 year-old man with hx of CAD s/p angioplasty 1991, pAF on Sotalol, severe PH, COPD, chronic hypoxic respiratory failure on 2-3L home O2 initially referred to [facility] for PH evaluation from [facility]. He was seen by Dr. Dale, recommend L and R heart cath. He was also seen by Dr. Bajwa with EP for his AF, continued his long term tx with home sotalol 120 bid and recommended AC ([id number] 3), but pt declined. He noted progressive dyspnea since the beginning of this year, associated with PND, bendopnea, and 2 pillow orthopnea. Notes ankle edema.    His echo was notable for preserved EF, est PASP 62, R to L shunt on bubble study c/w PFO, moderately enlarged RV with mod depressed fx. A CT chest showed centrilobular emphysema and bilateral lower lobe reticulation but not c/w diffuse ILD.  He had a LHC/RHC showing multivessel CAD (80% mLAD, 80% D1, 95% dOM1, 95%RCA). RHC with RA 12, RV 94, PA 94/38/65, PW 10; CO 3.2, CI 1.6; R->L shunting across PFO. He was planned for a follow up visit but has had worsening dyspnea over last 3 weeks and was advised to present for admission.     6mw 7/12/18  Distance: 183 meters  Oxygen sats: 90% to 66%  BP: 145/75 to 171/85  HR 69 to 85 bpm     TTE 7/20/18   1 - Concentric remodeling.     2 - Normal left ventricular systolic function (EF 60-65%).     3 - Right ventricular enlargement with moderately depressed systolic function.     4 - Impaired LV relaxation, normal LAP (grade 1 diastolic dysfunction).     5 - Biatrial enlargement.     6 - Mild tricuspid regurgitation.     7 - Pulmonary hypertension. The estimated PA systolic pressure is 62 mmHg.     8 - Intermediate central venous pressure.     9 - Small pericardial effusion apically (there is shaggy material seen along the visceral pericardium at the RV apex).

## 2018-07-24 NOTE — SUBJECTIVE & OBJECTIVE
Past Medical History:   Diagnosis Date    Chronic hypoxemic respiratory failure     Coronary artery disease     BARBARA (obstructive sleep apnea)        Past Surgical History:   Procedure Laterality Date    ANGIOPLASTY  1991    BACK SURGERY      HERNIA REPAIR      KNEE SURGERY      SHOULDER SURGERY      SINUS SURGERY         Review of patient's allergies indicates:   Allergen Reactions    Clindamycin Shortness Of Breath    Penicillins Rash       PTA Medications   Medication Sig    allopurinol (ZYLOPRIM) 300 MG tablet Take 300 mg by mouth.    ALPRAZolam (XANAX) 1 MG tablet 0.5 mg.     aspirin (ECOTRIN) 81 MG EC tablet Take 81 mg by mouth once daily.    atorvastatin (LIPITOR) 10 MG tablet     furosemide (LASIX) 20 MG tablet Take 20 mg by mouth once daily.    melatonin 3 mg Tab Take 1 capsule by mouth.    multivit-minerals/ferrous fum (MULTI VITAMIN ORAL) Take 1 tablet by mouth.    OXYGEN-AIR DELIVERY SYSTEMS MISC by Misc.(Non-Drug; Combo Route) route.    RABEprazole (ACIPHEX) 20 mg tablet Take 20 mg by mouth.    sotalol (BETAPACE) 120 MG Tab Take 120 mg by mouth.    umeclidinium-vilanterol 62.5-25 mcg/actuation DsDv Inhale 1 puff into the lungs.     Family History     None        Social History Main Topics    Smoking status: Former Smoker     Quit date: 1998    Smokeless tobacco: Current User    Alcohol use Yes    Drug use: No    Sexual activity: Not on file     Review of Systems   Constitution: Negative for chills, fever, weakness and weight gain.   HENT: Negative for congestion.    Eyes: Negative for visual disturbance.   Cardiovascular: Positive for dyspnea on exertion, leg swelling and orthopnea. Negative for chest pain, claudication, palpitations and syncope.   Respiratory: Positive for shortness of breath. Negative for cough and snoring.    Hematologic/Lymphatic: Does not bruise/bleed easily.   Skin: Negative for rash.   Musculoskeletal: Negative for muscle cramps and myalgias.         Right groin pain   Gastrointestinal: Negative for bloating, abdominal pain, constipation, diarrhea and melena.   Genitourinary: Negative for bladder incontinence.   Neurological: Negative for excessive daytime sleepiness and focal weakness.   Psychiatric/Behavioral: Negative for depression and suicidal ideas.     Objective:     Vital Signs (Most Recent):  Temp: 98.2 °F (36.8 °C) (07/24/18 1625)  Pulse: 69 (07/24/18 1625)  Resp: 18 (07/24/18 1625)  BP: 119/65 (07/24/18 1625)  SpO2: (!) 90 % (07/24/18 1625) Vital Signs (24h Range):  Temp:  [97.5 °F (36.4 °C)-98.2 °F (36.8 °C)] 98.2 °F (36.8 °C)  Pulse:  [60-74] 69  Resp:  [16-22] 18  SpO2:  [85 %-99 %] 90 %  BP: (102-134)/(61-80) 119/65     Weight: 78.4 kg (172 lb 12 oz)  Body mass index is 25.51 kg/m².    SpO2: (!) 90 %  O2 Device (Oxygen Therapy): nasal cannula      Intake/Output Summary (Last 24 hours) at 07/24/18 1656  Last data filed at 07/24/18 1144   Gross per 24 hour   Intake              415 ml   Output             1550 ml   Net            -1135 ml       Lines/Drains/Airways     Peripheral Intravenous Line                 Peripheral IV - Single Lumen 07/24/18 1615 Left Forearm less than 1 day                Physical Exam   Constitutional: He is oriented to person, place, and time. He appears well-developed and well-nourished. No distress.   On nasal canula   HENT:   Head: Normocephalic and atraumatic.   Mouth/Throat: Oropharynx is clear and moist.   Eyes: Conjunctivae and EOM are normal. Pupils are equal, round, and reactive to light. No scleral icterus.   Neck: Normal range of motion. Neck supple. No JVD present.   Cardiovascular: Normal rate, regular rhythm, normal heart sounds and intact distal pulses.    No murmur heard.  Pulses:       Radial pulses are 2+ on the right side, and 2+ on the left side.        Femoral pulses are 2+ on the right side, and 2+ on the left side.       Dorsalis pedis pulses are 2+ on the right side, and 2+ on the left side.         Posterior tibial pulses are 2+ on the right side, and 2+ on the left side.   Pulmonary/Chest: Effort normal and breath sounds normal. No respiratory distress.   Symmetrical expansion   Abdominal: Soft. Bowel sounds are normal. There is no hepatosplenomegaly. There is no tenderness.   Musculoskeletal: Normal range of motion. He exhibits no edema.   Right groin soft, with hematoma   Neurological: He is alert and oriented to person, place, and time. No cranial nerve deficit.   Skin: Skin is warm and dry. No rash noted. He is not diaphoretic.   Cyanotic at the fingertips (improved from prior)   Psychiatric: He has a normal mood and affect. Judgment and thought content normal.       Significant Labs:   ABG:   Recent Labs  Lab 07/23/18  1824   PH 7.500*   PCO2 27.8*   HCO3 21.7*   POCSATURATED 79*   BE -2   , CMP   Recent Labs  Lab 07/23/18  1830 07/24/18  0236    139  139   K 3.7 3.7  3.7    105  105   CO2 21* 25  25   GLU 97 93  93   BUN 10 11  11   CREATININE 1.1 1.1  1.1   CALCIUM 9.7 9.2  9.2   PROT 7.0 6.9   ALBUMIN 3.9 3.8   BILITOT 3.5* 3.1*   ALKPHOS 135 129   AST 26 22   ALT 14 16   ANIONGAP 11 9  9   ESTGFRAFRICA >60.0 >60.0  >60.0   EGFRNONAA >60.0 >60.0  >60.0   , CBC   Recent Labs  Lab 07/23/18  1830 07/24/18  0236   WBC 7.75 8.29   HGB 16.3 16.2   HCT 45.8 46.0    205   , INR   Recent Labs  Lab 07/23/18  1830   INR 1.1   , Lipid Panel No results for input(s): CHOL, HDL, LDLCALC, TRIG, CHOLHDL in the last 48 hours. and Troponin   Recent Labs  Lab 07/23/18  1830 07/24/18  0236 07/24/18  0944   TROPONINI 0.073* 0.064* 0.055*       Significant Imaging: Cardiac Cath:   -7/20    Multi-vessel CAD.    Pulmonary hypertension.    Right to left shunting across PFO.    Pulmonary vein saturation on room air=90%.    LV saturation on room air=70%.    Pulmonary vein saturation on 100% PGF=906%.    Echocardiogram: 07/20   1 - Concentric remodeling.     2 - Normal left ventricular systolic  function (EF 60-65%).     3 - Right ventricular enlargement with moderately depressed systolic function.     4 - Impaired LV relaxation, normal LAP (grade 1 diastolic dysfunction).     5 - Biatrial enlargement.     6 - Mild tricuspid regurgitation.     7 - Pulmonary hypertension. The estimated PA systolic pressure is 62 mmHg.     8 - Intermediate central venous pressure.     9 - Small pericardial effusion apically (there is shaggy material seen along the visceral pericardium at the RV apex).     EKG: NSR 74BPM, T-wave inversions in III, AvF, V1-V3, Qtc 495

## 2018-07-24 NOTE — ASSESSMENT & PLAN NOTE
77M with hx of CAD s/p angioplasty 1991, pAF on Sotalol, severe PH, COPD, chronic hypoxic respiratory failure on 2-3L home O2 initially referred to [facility] for PH evaluation, now with worsening progressive dyspnea over last 2-3 weeks , FCIII PH. Echo with EF 60-65%, mod depressed RV, R->L shunt thru PFO. LHC with multivessel CAD as per HPI. RHC with severe PH (PAP 94/38) with R to L shunt thru PFO, PCWP 10. CT with changes c/w COPD +/- small ILD changes. PH out of proportion to chronic lung disease (WHO Group 3), working up etiology. Euvolemic on RHC. O2 requirement.    -Admit to [facility] for further management  -Trial IV diuresis  -Continue home sotalol  -Will discuss advanced therapies

## 2018-07-24 NOTE — PROGRESS NOTES
Pt arrived to the unit from ED via transport. Pt able to ambulate from stretcher to bed w/o assistance. VSS, see flowsheet for further assessment data. Pt oriented to room and call light; pt verbalized understanding.    POPE noted upon assessment. O2 80% on 4L via n/c. Pt placed on 5L, O2 95-99% on 5L. Will titrate back down to 4L once pt catches his breath.    Telemetry monitoring in progress; NSR    Pt up ad layne; no new skin breakdown noted.    Will continue to monitor.

## 2018-07-24 NOTE — ASSESSMENT & PLAN NOTE
- Patient diagnosed with severe PH (PA pressure by RHC 94/38, mean 65) which appears out of proportion to his underlying chronic lung disease / WHO group 3.   - Will continue to work up and discuss advanced therapies.  - Continue IV diuresis as above.

## 2018-07-24 NOTE — PROGRESS NOTES
Admit Note     Met with patient and son to assess needs. Patient is a 77 y.o.  male, admitted for Shortness of breath [R06.02] per medical record.      Patient admitted from ED on 7/23/2018.  At this time, patient presents as alert and oriented x 4, pleasant, good eye contact, calm, communicative, cooperative and asking and answering questions appropriately.  At this time, patients caregiver presents as alert and oriented x 4, pleasant, good eye contact, calm, communicative, cooperative and asking and answering questions appropriately.    Household/Family Systems     Patient resides alone at     [address] [address]    Mailing address:   [address]  [place] [place] [address]    Cell [phone]  Aiden Nolasco, son, [phone] ([place])  Carly Nolasco  [phone] ([place])    Both children are about 17 miles from pt.    Support system includes son Aiden and dgtr Carly Rodríguez.  Patient does not have dependents that are need of being cared for.     Patients primary caregiver is Aiden, kesha son.    During admission, patient's caregiver plans to stay in patient's room during day and at hotel at night.  Confirmed patient and patients caregivers do have access to reliable transportation.    Cognitive Status/Learning     Patient reports reading ability as high school and states patient does not have difficulty with reading, writing, seeing, hearing, comprehension, learning and memory.  Patient reports patient learns best by hands-on.   Needed: No.   Highest education level: Attended College/Technical School    Vocation/Disability   .  Working for Income: No  If no, reason not working: Patient Choice - Retired  Patient is retired from [place] [employer].    Adherence     Patient reports a high level of adherence to patients health care regimen.  Adherence counseling and education provided. Patient verbalizes understanding.    Substance Use    Patient  reports the following substance usage.    Tobacco: pt reports smoked for 36 years and reports no use since . Pt reports use was 1ppd.  Alcohol: pt reports likes a cold beer or a daiquiri and is not a heavy drinker.  Illicit Drugs/Non-prescribed Medications: none, patient denies any use.    Patient states clear understanding of the potential impact of substance use.  Substance abstinence/cessation counseling, education and resources provided and reviewed.     Services Utilizing/ADLS    Infusion Service: Prior to admission, patient utilizing? no  Home Health: Prior to admission, patient utilizing? no  DME: Prior to admission, yes, home and portable O2 via [facility] in [place]  Pulmonary/Cardiac Rehab: Prior to admission, no  Dialysis:  Prior to admission, no  Transplant Specialty Pharmacy:  Prior to admission, no.    Prior to admission, patient reports patient was independent with ADLS and was driving. Pt reports recent increasing fatigue in shower and reports would rest and then resume. Patient reports patient is able to care for self at this time..  Patient indicates a willingness to care for self once medically cleared to do so.    Insurance/Medications    Insured by   Payor/Plan Subscr  Sex Relation Sub. Ins. ID Effective Group Num   1.  FOR L* CHANDLER BALL 1940 Male  [id number]                                    [address]   2. MEDICARE - MECHANDLER FINE 1940 Male  [id number]                                    [address]      Primary Insurance (for UNOS reporting): Public Insurance - Other Government  Secondary Insurance (for UNOS reporting): Public Insurance - Medicare FFS (Fee For Service)    Patient reports patient is able to obtain and afford medications at this time and at time of discharge.    Living Will/Healthcare Power of     Patient states patient does not have a LW and/or HCPA.   provided education regarding LW and HCPA and the  completion of forms.    Coping/Mental Health    Patient is coping well with the aid of  family members and deyanira. Patient denies mental health difficulties. Pt reports is [employer].    Discharge Planning    At time of discharge, patient plans to return to patient's home under the care of self and son.  Patients son will transport patient.  Per rounds today, expected discharge date has not been medically determined at this time. Patient and patients caregiver verbalize understanding and are involved in treatment planning and discharge process.    Additional Concerns    Patient is being followed for needs, education, resources, information, emotional support, supportive counseling, and for supportive and skilled discharge plan of care.  providing ongoing psychosocial support, education, resources and d/c planning as needed.  SW remains available. Patient denies additional needs and/or concerns at this time. Patient verbalizes understanding and agreement with information reviewed, social work availability, and how to access available resources as needed.

## 2018-07-24 NOTE — ASSESSMENT & PLAN NOTE
- Patient diagnosed with multivessel CAD by [facility] 7/20 as detailed above. No interventions done. Unlikely a surgical candidate given his advanced pulmonary hypertension and chronic lung disease. Continue ASA 81 mg daily and high intensity statin therapy. Cycle troponin.

## 2018-07-24 NOTE — ASSESSMENT & PLAN NOTE
- Patient presenting with increasing POPE, orthopnea, and PND over last 3 weeks in setting of severe PH likely c/w acute right sided CHF exacerbation. Cannot exclude underlying multivessel CAD / unstable angina as possible etiology. Will admit to [facility] for IV diuresis and further evaluation.  - Initiated Lasix 40 mg IV BID. Monitor I/Os and daily weights. Holding PTA Lasix 20 mg PO daily (patient was taking every other day).   - Will discuss options for advanced therapies.

## 2018-07-24 NOTE — ED NOTES
Telemetry monitoring room notified of patient placed on box #[id number] going to Room 8049A Sinus rhythm confirmed.

## 2018-07-24 NOTE — SUBJECTIVE & OBJECTIVE
Past Medical History:   Diagnosis Date    Chronic hypoxemic respiratory failure     Coronary artery disease     BARBARA (obstructive sleep apnea)        Past Surgical History:   Procedure Laterality Date    ANGIOPLASTY  1991    BACK SURGERY      HERNIA REPAIR      KNEE SURGERY      SHOULDER SURGERY      SINUS SURGERY         Review of patient's allergies indicates:   Allergen Reactions    Clindamycin Shortness Of Breath    Penicillins Rash       Current Facility-Administered Medications   Medication    acetaminophen tablet 650 mg    allopurinol tablet 300 mg    aspirin EC tablet 81 mg    atorvastatin tablet 40 mg    furosemide injection 40 mg    heparin (porcine) injection 5,000 Units    pantoprazole EC tablet 40 mg    sodium chloride 0.9% flush 3 mL    sotalol tablet 120 mg     Family History     None        Social History Main Topics    Smoking status: Former Smoker     Quit date: 1998    Smokeless tobacco: Current User    Alcohol use Yes    Drug use: No    Sexual activity: Not on file     Review of Systems   Constitutional: Positive for fatigue and unexpected weight change (20 lb weight loss). Negative for appetite change, chills, diaphoresis and fever.   HENT: Negative for sinus pain, sinus pressure, sneezing, sore throat and trouble swallowing.    Respiratory: Positive for shortness of breath. Negative for cough, chest tightness and wheezing.    Cardiovascular: Positive for leg swelling. Negative for chest pain and palpitations.   Gastrointestinal: Negative for abdominal distention, abdominal pain, blood in stool, constipation, diarrhea, nausea and vomiting.   Genitourinary: Negative for decreased urine volume, dysuria, hematuria and urgency.   Skin: Negative for color change, pallor and rash.   Neurological: Negative for dizziness, tremors, syncope, light-headedness, numbness and headaches.   Psychiatric/Behavioral: Negative for confusion. The patient is not nervous/anxious.       Objective:     Vital Signs (Most Recent):  Temp: 98 °F (36.7 °C) (07/23/18 2217)  Pulse: 68 (07/23/18 2259)  Resp: 18 (07/23/18 2259)  BP: 114/66 (07/23/18 2302)  SpO2: 95 % (07/23/18 2259) Vital Signs (24h Range):  Temp:  [97.8 °F (36.6 °C)-98 °F (36.7 °C)] 98 °F (36.7 °C)  Pulse:  [64-69] 68  Resp:  [16-21] 18  SpO2:  [85 %-97 %] 95 %  BP: (109-134)/(63-80) 114/66     Patient Vitals for the past 72 hrs (Last 3 readings):   Weight   07/23/18 1754 78.5 kg (173 lb)     Body mass index is 25.55 kg/m².    No intake or output data in the 24 hours ending 07/24/18 0157    Physical Exam   Constitutional: He is oriented to person, place, and time. No distress.   Chronically ill appearing, pleasant elderly man   HENT:   Head: Normocephalic and atraumatic.   Eyes: EOM are normal. Pupils are equal, round, and reactive to light.   Neck: Normal range of motion. Neck supple. JVD present.   Cardiovascular: Normal rate, regular rhythm, normal heart sounds and intact distal pulses.  Exam reveals no gallop and no friction rub.    No murmur heard.  Pulmonary/Chest: Effort normal. He has no wheezes. He has no rales.   Diminished breath sounds   Abdominal: Soft. Bowel sounds are normal. He exhibits no distension. There is no tenderness.   Musculoskeletal: Normal range of motion. He exhibits no edema.   Neurological: He is alert and oriented to person, place, and time.   Skin: Skin is warm and dry. Capillary refill takes less than 2 seconds. No erythema.   Psychiatric: He has a normal mood and affect. Judgment and thought content normal.       Significant Labs:  CBC:    Recent Labs  Lab 07/23/18  1830   WBC 7.75   RBC 4.99   HGB 16.3   HCT 45.8      MCV 92   MCH 32.7*   MCHC 35.6     BNP:    Recent Labs  Lab 07/23/18  1830   *     CMP:    Recent Labs  Lab 07/23/18  1830   GLU 97   CALCIUM 9.7   ALBUMIN 3.9   PROT 7.0      K 3.7   CO2 21*      BUN 10   CREATININE 1.1   ALKPHOS 135   ALT 14   AST 26   BILITOT  3.5*      Coagulation:     Recent Labs  Lab 07/23/18  1830   INR 1.1     LDH:  No results for input(s): LDH in the last 72 hours.  Microbiology:  Microbiology Results (last 7 days)     ** No results found for the last 168 hours. **          I have reviewed all pertinent labs within the past 24 hours.    Diagnostic Results:  I have reviewed all pertinent imaging results/findings within the past 24 hours.

## 2018-07-24 NOTE — NURSING
Notified EN Perez NP, that pt does not have morning labs ordered.  No new orders received at this time.  RN will continue to monitor.

## 2018-07-24 NOTE — HPI
Greg Nolasco is a 76 yo M with multivessel CAD (by LHC 7/20/2018), severe PH, PAF (on sotalol), COPD with chronic hypoxic respiratory failure (on 4 L home O2) who is admitted for management of acute on chronic right sided heart failure exacerbation and PH.    Patient notes progressive exertional dyspnea since the beginning of this year, acutely worsened over the past 3 weeks. His symptoms have been associated with 2 pillow orthopnea, PND, and ankle edema.     He was initially referred to [facility] for PH evaluation from [facility] and was seen by Dr. Dale on 7/12 who recommend L and R heart caths. He was also seen by Dr. Bajwa of  for his PAF who advised continuation of his long term tx with home sotalol 120 BID and recommended AC ([id number] 3), which patient declined.      The patient's last 2D echo was notable for preserved EF, estimated PASP 62, R to L shunt on bubble study c/w PFO, moderately enlarged RV with mod depressed function. A CT chest showed centrilobular emphysema and bilateral lower lobe reticulation but not c/w diffuse ILD.    He underwent LHC/RHC on 7/20 which showed multivessel CAD (80% mLAD, 80% D1, 95% dOM1, 95%RCA). RHC with RA 12, RV 94, PA 94/38/65, PW 10; CO 3.2, CI 1.6; R->L shunting across PFO. He was planned for a follow up visit but has had worsening dyspnea over last 3 weeks and was advised to present for admission.      6mw 7/12/18  Distance: 183 meters  Oxygen sats: 90% to 66%  BP: 145/75 to 171/85  HR 69 to 85 bpm     TTE 7/20/18   1 - Concentric remodeling.     2 - Normal left ventricular systolic function (EF 60-65%).     3 - Right ventricular enlargement with moderately depressed systolic function.     4 - Impaired LV relaxation, normal LAP (grade 1 diastolic dysfunction).     5 - Biatrial enlargement.     6 - Mild tricuspid regurgitation.     7 - Pulmonary hypertension. The estimated PA systolic pressure is 62 mmHg.     8 - Intermediate central venous pressure.     9 -  Small pericardial effusion apically (there is shaggy material seen along the visceral pericardium at the RV apex).

## 2018-07-25 PROBLEM — E80.6 HYPERBILIRUBINEMIA: Status: ACTIVE | Noted: 2018-07-25

## 2018-07-25 LAB
ALBUMIN SERPL BCP-MCNC: 3.7 G/DL
ALP SERPL-CCNC: 134 U/L
ALT SERPL W/O P-5'-P-CCNC: 14 U/L
ANION GAP SERPL CALC-SCNC: 12 MMOL/L
ANION GAP SERPL CALC-SCNC: 12 MMOL/L
AST SERPL-CCNC: 24 U/L
BASOPHILS # BLD AUTO: 0.07 K/UL
BASOPHILS NFR BLD: 0.8 %
BILIRUB SERPL-MCNC: 3.6 MG/DL
BUN SERPL-MCNC: 17 MG/DL
BUN SERPL-MCNC: 17 MG/DL
CALCIUM SERPL-MCNC: 9.6 MG/DL
CALCIUM SERPL-MCNC: 9.6 MG/DL
CHLORIDE SERPL-SCNC: 103 MMOL/L
CHLORIDE SERPL-SCNC: 103 MMOL/L
CO2 SERPL-SCNC: 21 MMOL/L
CO2 SERPL-SCNC: 21 MMOL/L
CREAT SERPL-MCNC: 1.1 MG/DL
CREAT SERPL-MCNC: 1.1 MG/DL
DIFFERENTIAL METHOD: ABNORMAL
EOSINOPHIL # BLD AUTO: 0.5 K/UL
EOSINOPHIL NFR BLD: 5.3 %
ERYTHROCYTE [DISTWIDTH] IN BLOOD BY AUTOMATED COUNT: 15.2 %
EST. GFR  (AFRICAN AMERICAN): >60 ML/MIN/1.73 M^2
EST. GFR  (AFRICAN AMERICAN): >60 ML/MIN/1.73 M^2
EST. GFR  (NON AFRICAN AMERICAN): >60 ML/MIN/1.73 M^2
EST. GFR  (NON AFRICAN AMERICAN): >60 ML/MIN/1.73 M^2
GLUCOSE SERPL-MCNC: 89 MG/DL
GLUCOSE SERPL-MCNC: 89 MG/DL
HCT VFR BLD AUTO: 47 %
HGB BLD-MCNC: 16.5 G/DL
IMM GRANULOCYTES # BLD AUTO: 0.04 K/UL
IMM GRANULOCYTES NFR BLD AUTO: 0.5 %
LYMPHOCYTES # BLD AUTO: 2.1 K/UL
LYMPHOCYTES NFR BLD: 24.4 %
MAGNESIUM SERPL-MCNC: 2.3 MG/DL
MCH RBC QN AUTO: 32.5 PG
MCHC RBC AUTO-ENTMCNC: 35.1 G/DL
MCV RBC AUTO: 93 FL
MONOCYTES # BLD AUTO: 1 K/UL
MONOCYTES NFR BLD: 11.8 %
NEUTROPHILS # BLD AUTO: 4.9 K/UL
NEUTROPHILS NFR BLD: 57.2 %
NRBC BLD-RTO: 0 /100 WBC
PHOSPHATE SERPL-MCNC: 4.3 MG/DL
PLATELET # BLD AUTO: 210 K/UL
PMV BLD AUTO: 9.2 FL
POTASSIUM SERPL-SCNC: 3.7 MMOL/L
POTASSIUM SERPL-SCNC: 3.7 MMOL/L
PROT SERPL-MCNC: 6.8 G/DL
RBC # BLD AUTO: 5.07 M/UL
SODIUM SERPL-SCNC: 136 MMOL/L
SODIUM SERPL-SCNC: 136 MMOL/L
T4 FREE SERPL-MCNC: 1.01 NG/DL
TSH SERPL DL<=0.005 MIU/L-ACNC: 0.56 UIU/ML
WBC # BLD AUTO: 8.54 K/UL

## 2018-07-25 PROCEDURE — 25000003 PHARM REV CODE 250: Performed by: STUDENT IN AN ORGANIZED HEALTH CARE EDUCATION/TRAINING PROGRAM

## 2018-07-25 PROCEDURE — 85025 COMPLETE CBC W/AUTO DIFF WBC: CPT

## 2018-07-25 PROCEDURE — 84443 ASSAY THYROID STIM HORMONE: CPT

## 2018-07-25 PROCEDURE — 83735 ASSAY OF MAGNESIUM: CPT

## 2018-07-25 PROCEDURE — 99232 SBSQ HOSP IP/OBS MODERATE 35: CPT | Mod: ,,, | Performed by: NURSE PRACTITIONER

## 2018-07-25 PROCEDURE — 20600001 HC STEP DOWN PRIVATE ROOM

## 2018-07-25 PROCEDURE — 25000003 PHARM REV CODE 250: Performed by: NURSE PRACTITIONER

## 2018-07-25 PROCEDURE — 84439 ASSAY OF FREE THYROXINE: CPT

## 2018-07-25 PROCEDURE — 84100 ASSAY OF PHOSPHORUS: CPT

## 2018-07-25 PROCEDURE — 63600175 PHARM REV CODE 636 W HCPCS: Performed by: STUDENT IN AN ORGANIZED HEALTH CARE EDUCATION/TRAINING PROGRAM

## 2018-07-25 PROCEDURE — 36415 COLL VENOUS BLD VENIPUNCTURE: CPT

## 2018-07-25 PROCEDURE — 80053 COMPREHEN METABOLIC PANEL: CPT

## 2018-07-25 RX ORDER — POTASSIUM CHLORIDE 20 MEQ/1
20 TABLET, EXTENDED RELEASE ORAL 2 TIMES DAILY
Status: DISCONTINUED | OUTPATIENT
Start: 2018-07-25 | End: 2018-07-26

## 2018-07-25 RX ADMIN — FUROSEMIDE 40 MG: 10 INJECTION, SOLUTION INTRAMUSCULAR; INTRAVENOUS at 05:07

## 2018-07-25 RX ADMIN — FUROSEMIDE 40 MG: 10 INJECTION, SOLUTION INTRAMUSCULAR; INTRAVENOUS at 08:07

## 2018-07-25 RX ADMIN — ATORVASTATIN CALCIUM 40 MG: 20 TABLET, FILM COATED ORAL at 08:07

## 2018-07-25 RX ADMIN — POTASSIUM CHLORIDE 20 MEQ: 1500 TABLET, EXTENDED RELEASE ORAL at 08:07

## 2018-07-25 RX ADMIN — POTASSIUM CHLORIDE 20 MEQ: 1500 TABLET, EXTENDED RELEASE ORAL at 09:07

## 2018-07-25 RX ADMIN — ALLOPURINOL 300 MG: 100 TABLET ORAL at 05:07

## 2018-07-25 RX ADMIN — ASPIRIN 81 MG: 81 TABLET, COATED ORAL at 05:07

## 2018-07-25 RX ADMIN — SOTALOL HYDROCHLORIDE 120 MG: 120 TABLET ORAL at 09:07

## 2018-07-25 RX ADMIN — PANTOPRAZOLE SODIUM 40 MG: 40 TABLET, DELAYED RELEASE ORAL at 08:07

## 2018-07-25 NOTE — NURSING
Notified heart transplant MD of pt's SpO2 90-92% on 8L O2.  Orders modified.  RN will continue to monitor.

## 2018-07-25 NOTE — PLAN OF CARE
Problem: Patient Care Overview  Goal: Plan of Care Review  Outcome: Ongoing (interventions implemented as appropriate)  Patient aaox4. Bed kept locked, lowest position with 2x side rails up while in bed. nonslip footwear when out of bed. Ambulates independently. Family at bedside, attentive to patient's needs. Call bell and personal items within reach. 2g Na diet, 1500ml fluid restriction. 4L NC O2 90-91%. Telemetry monitoring SR with continuous pulse ox.  Diuresing with IVP lasix BID. Left FA PIV cdi. Went for NM lung/vent perfusion imaging this morning. Standard precautions maintained.

## 2018-07-25 NOTE — ASSESSMENT & PLAN NOTE
- Total bili remains elevated at 3.6. Continue to monitor   Spoke to patient in regards for request of increasing his Buspar Medication.  Patient was seen on 05/05/18 for anxiety and at that time was started on low dose 7.5 mg twice daily.  Patient was to call after a few weeks of being on low dose if he felt he needed an adjustment.  Patient is stating it is helping but he feels like it could help better with an increased dose.

## 2018-07-25 NOTE — SUBJECTIVE & OBJECTIVE
Interval History: Remains SOB with minimal exertion. sPO2 90 or greater on his usual home O2 of 4LPM. Is now willing to consider IV pulmonary vasodilator therapy. Would require ECMO support if PCI's attempted.    Continuous Infusions:  Scheduled Meds:   allopurinol  300 mg Oral Daily    aspirin  81 mg Oral Daily    atorvastatin  40 mg Oral Daily    furosemide  40 mg Intravenous BID    heparin (porcine)  5,000 Units Subcutaneous Q8H    pantoprazole  40 mg Oral Daily    potassium chloride SA  20 mEq Oral BID    sodium chloride 0.9%  3 mL Intravenous Q8H    sotalol  120 mg Oral Q12H     PRN Meds:acetaminophen    Review of patient's allergies indicates:   Allergen Reactions    Clindamycin Shortness Of Breath    Penicillins Rash     Objective:     Vital Signs (Most Recent):  Temp: 98.2 °F (36.8 °C) (07/25/18 0800)  Pulse: 67 (07/25/18 0800)  Resp: 18 (07/25/18 0800)  BP: (!) 90/55 (07/25/18 0800)  SpO2: (!) 91 % (07/25/18 0800) Vital Signs (24h Range):  Temp:  [97.6 °F (36.4 °C)-98.3 °F (36.8 °C)] 98.2 °F (36.8 °C)  Pulse:  [63-74] 67  Resp:  [16-20] 18  SpO2:  [89 %-93 %] 91 %  BP: ()/(55-68) 90/55     Patient Vitals for the past 72 hrs (Last 3 readings):   Weight   07/25/18 0300 77.7 kg (171 lb 6.4 oz)   07/23/18 2302 78.4 kg (172 lb 12 oz)   07/23/18 1754 78.5 kg (173 lb)     Body mass index is 25.31 kg/m².      Intake/Output Summary (Last 24 hours) at 07/25/18 1139  Last data filed at 07/25/18 0800   Gross per 24 hour   Intake             1655 ml   Output             2750 ml   Net            -1095 ml       Hemodynamic Parameters:       Telemetry: SR    Physical Exam   Constitutional: He is oriented to person, place, and time. He appears well-developed and well-nourished.   HENT:   Head: Normocephalic and atraumatic.   Eyes: EOM are normal. Pupils are equal, round, and reactive to light. Scleral icterus is present.   Neck: Normal range of motion. Neck supple. No JVD present.   Unable to visualize JVP    Cardiovascular: Normal rate, regular rhythm, normal heart sounds and intact distal pulses.    Pulmonary/Chest: Effort normal and breath sounds normal.   Abdominal: Soft. Bowel sounds are normal.   Musculoskeletal: Normal range of motion. He exhibits no edema.   Neurological: He is alert and oriented to person, place, and time.   Skin: Skin is warm and dry. Capillary refill takes more than 3 seconds.   Finger nail beds cyanotic   Psychiatric: He has a normal mood and affect. His behavior is normal. Judgment and thought content normal.       Significant Labs:  CBC:    Recent Labs  Lab 07/23/18 1830 07/24/18  0236 07/25/18  0359   WBC 7.75 8.29 8.54   RBC 4.99 4.99 5.07   HGB 16.3 16.2 16.5   HCT 45.8 46.0 47.0    205 210   MCV 92 92 93   MCH 32.7* 32.5* 32.5*   MCHC 35.6 35.2 35.1     BNP:    Recent Labs  Lab 07/23/18 1830   *     CMP:    Recent Labs  Lab 07/23/18  1830 07/24/18  0236 07/25/18  0359   GLU 97 93  93 89  89   CALCIUM 9.7 9.2  9.2 9.6  9.6   ALBUMIN 3.9 3.8 3.7   PROT 7.0 6.9 6.8    139  139 136  136   K 3.7 3.7  3.7 3.7  3.7   CO2 21* 25  25 21*  21*    105  105 103  103   BUN 10 11  11 17  17   CREATININE 1.1 1.1  1.1 1.1  1.1   ALKPHOS 135 129 134   ALT 14 16 14   AST 26 22 24   BILITOT 3.5* 3.1* 3.6*      Coagulation:     Recent Labs  Lab 07/23/18  1830   INR 1.1     LDH:  No results for input(s): LDH in the last 72 hours.  Microbiology:  Microbiology Results (last 7 days)     ** No results found for the last 168 hours. **          I have reviewed all pertinent labs within the past 24 hours.    Estimated Creatinine Clearance: 56.2 mL/min (based on SCr of 1.1 mg/dL).    Diagnostic Results:  I have reviewed all pertinent imaging results/findings within the past 24 hours.

## 2018-07-25 NOTE — ASSESSMENT & PLAN NOTE
- Continue supplemental O2 to keep sPO2 > 90 (on 4L ATC at home)  - PFT's 6/21 showed normal spirometry; diffusion capacity severely decreased  - Will get V/Q scan today to see level of mismatch

## 2018-07-25 NOTE — PROGRESS NOTES
[facility]  Heart Transplant  Progress Note    Patient Name: Greg Nolasco  MRN: [id number]  Admission Date: 7/23/2018  Hospital Length of Stay: 2 days  Attending Physician: Robert Lomas MD  Primary Care Provider: Pablo Lorenzo MD  Principal Problem:Shortness of breath    Subjective:     Interval History: Feels OK. Has to discuss with his daughter and son whether or not he wants to undergo PIC(s) on Friday with likely ECMO support by Dr. Dex Lomas. Reluctant to start IV pulmonary vasodilator therapy - wants to try inhaled and/or pills first. Net -ve 1.8L since admit on IVP Lasix. O2 sats 90% or above on usual home dose of O2 4LPM.    Continuous Infusions:  Scheduled Meds:   allopurinol  300 mg Oral Daily    aspirin  81 mg Oral Daily    atorvastatin  40 mg Oral Daily    furosemide  40 mg Intravenous BID    heparin (porcine)  5,000 Units Subcutaneous Q8H    pantoprazole  40 mg Oral Daily    potassium chloride SA  20 mEq Oral BID    sodium chloride 0.9%  3 mL Intravenous Q8H    sotalol  120 mg Oral Q12H     PRN Meds:acetaminophen    Review of patient's allergies indicates:   Allergen Reactions    Clindamycin Shortness Of Breath    Penicillins Rash     Objective:     Vital Signs (Most Recent):  Temp: 98.2 °F (36.8 °C) (07/25/18 0800)  Pulse: 67 (07/25/18 0800)  Resp: 18 (07/25/18 0800)  BP: (!) 90/55 (07/25/18 0800)  SpO2: (!) 91 % (07/25/18 0800) Vital Signs (24h Range):  Temp:  [97.6 °F (36.4 °C)-98.3 °F (36.8 °C)] 98.2 °F (36.8 °C)  Pulse:  [63-74] 67  Resp:  [16-20] 18  SpO2:  [89 %-93 %] 91 %  BP: ()/(55-68) 90/55     Patient Vitals for the past 72 hrs (Last 3 readings):   Weight   07/25/18 0300 77.7 kg (171 lb 6.4 oz)   07/23/18 2302 78.4 kg (172 lb 12 oz)   07/23/18 1754 78.5 kg (173 lb)     Body mass index is 25.31 kg/m².      Intake/Output Summary (Last 24 hours) at 07/25/18 1139  Last data filed at 07/25/18 0800   Gross per 24 hour   Intake             1655 ml    Output             2750 ml   Net            -1095 ml       Hemodynamic Parameters:       Telemetry: SR    Physical Exam   Constitutional: He is oriented to person, place, and time. He appears well-developed and well-nourished.   HENT:   Head: Normocephalic and atraumatic.   Eyes: Conjunctivae and EOM are normal. Pupils are equal, round, and reactive to light.   Neck: Normal range of motion. Neck supple. No JVD present.   Unable to visualize JVP   Cardiovascular: Normal rate, regular rhythm, normal heart sounds and intact distal pulses.    Pulmonary/Chest: Effort normal and breath sounds normal.   Abdominal: Soft. Bowel sounds are normal.   Musculoskeletal: Normal range of motion. He exhibits no edema.   Neurological: He is alert and oriented to person, place, and time.   Skin: Skin is warm and dry. Capillary refill takes 2 to 3 seconds.   Psychiatric: He has a normal mood and affect. His behavior is normal. Judgment and thought content normal.       Significant Labs:  CBC:    Recent Labs  Lab 07/23/18  1830 07/24/18  0236 07/25/18  0359   WBC 7.75 8.29 8.54   RBC 4.99 4.99 5.07   HGB 16.3 16.2 16.5   HCT 45.8 46.0 47.0    205 210   MCV 92 92 93   MCH 32.7* 32.5* 32.5*   MCHC 35.6 35.2 35.1     BNP:    Recent Labs  Lab 07/23/18  1830   *     CMP:    Recent Labs  Lab 07/23/18  1830 07/24/18  0236 07/25/18  0359   GLU 97 93  93 89  89   CALCIUM 9.7 9.2  9.2 9.6  9.6   ALBUMIN 3.9 3.8 3.7   PROT 7.0 6.9 6.8    139  139 136  136   K 3.7 3.7  3.7 3.7  3.7   CO2 21* 25  25 21*  21*    105  105 103  103   BUN 10 11  11 17  17   CREATININE 1.1 1.1  1.1 1.1  1.1   ALKPHOS 135 129 134   ALT 14 16 14   AST 26 22 24   BILITOT 3.5* 3.1* 3.6*      Coagulation:     Recent Labs  Lab 07/23/18  1830   INR 1.1     LDH:  No results for input(s): LDH in the last 72 hours.  Microbiology:  Microbiology Results (last 7 days)     ** No results found for the last 168 hours. **          I have  reviewed all pertinent labs within the past 24 hours.    Estimated Creatinine Clearance: 56.2 mL/min (based on SCr of 1.1 mg/dL).    Diagnostic Results:  I have reviewed all pertinent imaging results/findings within the past 24 hours.    Assessment and Plan:     Greg Nolasco is a 76 yo M with multivessel CAD (by [facility] 7/20/2018), severe PH, PAF (on sotalol), COPD with chronic hypoxic respiratory failure (on 4 L home O2) who is admitted for management of acute on chronic right sided heart failure exacerbation and PH.    Patient notes progressive exertional dyspnea since the beginning of this year, acutely worsened over the past 3 weeks. His symptoms have been associated with 2 pillow orthopnea, PND, and ankle edema.     He was initially referred to [facility] for PH evaluation from [facility] and was seen by Dr. Dale on 7/12 who recommend L and R heart caths. He was also seen by Dr. Bajwa of  for his PAF who advised continuation of his long term tx with home sotalol 120 BID and recommended AC ([id number] 3), which patient declined.      The patient's last 2D echo was notable for preserved EF, estimated PASP 62, R to L shunt on bubble study c/w PFO, moderately enlarged RV with mod depressed function. A CT chest showed centrilobular emphysema and bilateral lower lobe reticulation but not c/w diffuse ILD.    He underwent LHC/RHC on 7/20 which showed multivessel CAD (80% mLAD, 80% D1, 95% dOM1, 95%RCA). RHC with RA 12, RV 94, PA 94/38/65, PW 10; CO 3.2, CI 1.6; R->L shunting across PFO. He was planned for a follow up visit but has had worsening dyspnea over last 3 weeks and was advised to present for admission.      6mw 7/12/18  Distance: 183 meters  Oxygen sats: 90% to 66%  BP: 145/75 to 171/85  HR 69 to 85 bpm     TTE 7/20/18   1 - Concentric remodeling.     2 - Normal left ventricular systolic function (EF 60-65%).     3 - Right ventricular enlargement with moderately depressed systolic function.     4 - Impaired  LV relaxation, normal LAP (grade 1 diastolic dysfunction).     5 - Biatrial enlargement.     6 - Mild tricuspid regurgitation.     7 - Pulmonary hypertension. The estimated PA systolic pressure is 62 mmHg.     8 - Intermediate central venous pressure.     9 - Small pericardial effusion apically (there is shaggy material seen along the visceral pericardium at the RV apex).        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 Getting V/Q scan today to see level of mismatch, but will likely not benefit nearly as much with inhaled prostacyclins over IV  - Continue IV diuresis as above.   - PA gram done during L/RHC 7/20 showed pruning of bilateral PA c/w pulmonary HTN (no discrete lesions)          Right to left cardiac shunt    - Seen on R/[facility] in July             Chronic hypoxemic respiratory failure    - Continue supplemental O2 to keep sPO2 > 90 (on 4L ATC at home)  - PFT's 6/21 showed normal spirometry; diffusion capacity severely decreased  - Will get V/Q scan today to see level of mismatch        Paroxysmal atrial fibrillation    - Continue PTA sotalol 120 mg BID.  Patient has declined AC initiation. In SR on tele        Coronary artery disease involving native coronary artery of native heart without angina pectoris    - Patient diagnosed with multivessel CAD by [facility] 7/20 as detailed above. No interventions done. Unlikely a surgical candidate given his advanced pulmonary hypertension and chronic lung disease.   - Discussed with Dr. Dex Lomas. He plans possible PCI(s) on Friday with likely ECMO support if patient agrees   - Continue ASA, [place] [id number]  Heart Transplant  [facility]

## 2018-07-25 NOTE — PROGRESS NOTES
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 sPO2 90 or greater on his usual home O2 of 4LPM. Is now willing to consider IV pulmonary vasodilator therapy. Would require ECMO support if PCI's attempted.    Continuous Infusions:  Scheduled Meds:   allopurinol  300 mg Oral Daily    aspirin  81 mg Oral Daily    atorvastatin  40 mg Oral Daily    furosemide  40 mg Intravenous BID    heparin (porcine)  5,000 Units Subcutaneous Q8H    pantoprazole  40 mg Oral Daily    potassium chloride SA  20 mEq Oral BID    sodium chloride 0.9%  3 mL Intravenous Q8H    sotalol  120 mg Oral Q12H     PRN Meds:acetaminophen    Review of patient's allergies indicates:   Allergen Reactions    Clindamycin Shortness Of Breath    Penicillins Rash     Objective:     Vital Signs (Most Recent):  Temp: 98.2 °F (36.8 °C) (07/25/18 0800)  Pulse: 67 (07/25/18 0800)  Resp: 18 (07/25/18 0800)  BP: (!) 90/55 (07/25/18 0800)  SpO2: (!) 91 % (07/25/18 0800) Vital Signs (24h Range):  Temp:  [97.6 °F (36.4 °C)-98.3 °F (36.8 °C)] 98.2 °F (36.8 °C)  Pulse:  [63-74] 67  Resp:  [16-20] 18  SpO2:  [89 %-93 %] 91 %  BP: ()/(55-68) 90/55     Patient Vitals for the past 72 hrs (Last 3 readings):   Weight   07/25/18 0300 77.7 kg (171 lb 6.4 oz)   07/23/18 2302 78.4 kg (172 lb 12 oz)   07/23/18 1754 78.5 kg (173 lb)     Body mass index is 25.31 kg/m².      Intake/Output Summary (Last 24 hours) at 07/25/18 1139  Last data filed at 07/25/18 0800   Gross per 24 hour   Intake             1655 ml   Output             2750 ml   Net            -1095 ml       Hemodynamic Parameters:       Telemetry:     Physical  Exam   Constitutional: He is oriented to person, place, and time. He appears well-developed and well-nourished.   HENT:   Head: Normocephalic and atraumatic.   Eyes: EOM are normal. Pupils are equal, round, and reactive to light. Scleral icterus is present.   Neck: Normal range of motion. Neck supple. No JVD present.   Unable to visualize JVP   Cardiovascular: Normal rate, regular rhythm, normal heart sounds and intact distal pulses.    Pulmonary/Chest: Effort normal and breath sounds normal.   Abdominal: Soft. Bowel sounds are normal.   Musculoskeletal: Normal range of motion. He exhibits no edema.   Neurological: He is alert and oriented to person, place, and time.   Skin: Skin is warm and dry. Capillary refill takes more than 3 seconds.   Finger nail beds cyanotic   Psychiatric: He has a normal mood and affect. His behavior is normal. Judgment and thought content normal.       Significant Labs:  CBC:    Recent Labs  Lab 07/23/18  1830 07/24/18  0236 07/25/18  0359   WBC 7.75 8.29 8.54   RBC 4.99 4.99 5.07   HGB 16.3 16.2 16.5   HCT 45.8 46.0 47.0    205 210   MCV 92 92 93   MCH 32.7* 32.5* 32.5*   MCHC 35.6 35.2 35.1     BNP:    Recent Labs  Lab 07/23/18  1830   *     CMP:    Recent Labs  Lab 07/23/18  1830 07/24/18  0236 07/25/18  0359   GLU 97 93  93 89  89   CALCIUM 9.7 9.2  9.2 9.6  9.6   ALBUMIN 3.9 3.8 3.7   PROT 7.0 6.9 6.8    139  139 136  136   K 3.7 3.7  3.7 3.7  3.7   CO2 21* 25  25 21*  21*    105  105 103  103   BUN 10 11  11 17  17   CREATININE 1.1 1.1  1.1 1.1  1.1   ALKPHOS 135 129 134   ALT 14 16 14   AST 26 22 24   BILITOT 3.5* 3.1* 3.6*      Coagulation:     Recent Labs  Lab 07/23/18  1830   INR 1.1     LDH:  No results for input(s): LDH in the last 72 hours.  Microbiology:  Microbiology Results (last 7 days)     ** No results found for the last 168 hours. **          I have reviewed all pertinent labs within the past 24 hours.    Estimated Creatinine  Clearance: 56.2 mL/min (based on SCr of 1.1 mg/dL).    Diagnostic Results:  I have reviewed all pertinent imaging results/findings within the past 24 hours.    Assessment and Plan:     Greg Nolasco is a 78 yo M with multivessel CAD (by [facility] 7/20/2018), severe PH, PAF (on sotalol), COPD with chronic hypoxic respiratory failure (on 4 L home O2) who is admitted for management of acute on chronic right sided heart failure exacerbation and PH.    Patient notes progressive exertional dyspnea since the beginning of this year, acutely worsened over the past 3 weeks. His symptoms have been associated with 2 pillow orthopnea, PND, and ankle edema.     He was initially referred to [facility] for PH evaluation from [facility] and was seen by Dr. Dale on 7/12 who recommend L and R heart caths. He was also seen by Dr. Bajwa of  for his PAF who advised continuation of his long term tx with home sotalol 120 BID and recommended AC ([id number] 3), which patient declined.      The patient's last 2D echo was notable for preserved EF, estimated PASP 62, R to L shunt on bubble study c/w PFO, moderately enlarged RV with mod depressed function. A CT chest showed centrilobular emphysema and bilateral lower lobe reticulation but not c/w diffuse ILD.    He underwent LHC/RHC on 7/20 which showed multivessel CAD (80% mLAD, 80% D1, 95% dOM1, 95%RCA). RHC with RA 12, RV 94, PA 94/38/65, PW 10; CO 3.2, CI 1.6; R->L shunting across PFO. He was planned for a follow up visit but has had worsening dyspnea over last 3 weeks and was advised to present for admission.      6mw 7/12/18  Distance: 183 meters  Oxygen sats: 90% to 66%  BP: 145/75 to 171/85  HR 69 to 85 bpm     TTE 7/20/18   1 - Concentric remodeling.     2 - Normal left ventricular systolic function (EF 60-65%).     3 - Right ventricular enlargement with moderately depressed systolic function.     4 - Impaired LV relaxation, normal LAP (grade 1 diastolic dysfunction).     5 - Biatrial  enlargement.     6 - Mild tricuspid regurgitation.     7 - Pulmonary hypertension. The estimated PA systolic pressure is 62 mmHg.     8 - Intermediate central venous pressure.     9 - Small pericardial effusion apically (there is shaggy material seen along the visceral pericardium at the RV apex).        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 Getting V/Q scan today to see level of mismatch, but will likely not benefit nearly as much with inhaled prostacyclins over IV. He is now willing to consider IV therapy  - Continue IV diuresis as above.   - PA gram done during L/RHC 7/20 showed pruning of bilateral PA c/w pulmonary HTN (no discrete lesions)          Chronic hypoxemic respiratory failure    - Continue supplemental O2 to keep sPO2 > 90 (on 4L ATC at home)  - PFT's 6/21 showed normal spirometry; diffusion capacity severely decreased  - Will get V/Q scan today to see level of mismatch        Right to left cardiac shunt    - Seen on R/[facility] in July             Coronary artery disease involving native coronary artery of native heart without angina pectoris    - Patient diagnosed with multivessel CAD by [facility] 7/20 as detailed above. No interventions done. Unlikely a surgical candidate given his advanced pulmonary hypertension and chronic lung disease.   - Discussed with Dr. Dex Lomas. He would require ECMO for high risk PCI(s). Has no chest discomfort.   - Continue ASA, statin        Paroxysmal atrial fibrillation    - Continue PTA sotalol 120 mg BID.  Patient has declined AC initiation. In SR on tele        Hyperbilirubinemia    - Total bili remains elevated at 3.6. Continue [place] [id number]  Heart Transplant  [facility]

## 2018-07-25 NOTE — ASSESSMENT & PLAN NOTE
- Patient diagnosed with multivessel CAD by [facility] 7/20 as detailed above. No interventions done. Unlikely a surgical candidate given his advanced pulmonary hypertension and chronic lung disease.   - Discussed with Dr. Dex Lomas. He would require ECMO for high risk PCI(s). Has no chest discomfort.   - Continue ASA, statin

## 2018-07-25 NOTE — PLAN OF CARE
Problem: Patient Care Overview  Goal: Plan of Care Review  Pt aaox4 vswnl and no c/o pain. Bed in low position and callbell within reach. Family member at bedside. 4liters o2 n/c with sat 88-91%. Telemetry maintained nsr. Continuous pulse ox maintained. Pt ambulates with standby assist. Pt being diuresed now with lasix.

## 2018-07-25 NOTE — ASSESSMENT & PLAN NOTE
- Patient diagnosed with severe PH (PA pressure by RHC 94/38, mean 65) which appears out of proportion to his underlying chronic lung disease   - Will continue to work up and discuss advanced therapies. Getting V/Q scan today to see level of mismatch, but will likely not benefit nearly as much with inhaled prostacyclins over IV. He is now willing to consider IV therapy  - Continue IV diuresis as above.   - PA gram done during L/RHC 7/20 showed pruning of bilateral PA c/w pulmonary HTN (no discrete lesions)

## 2018-07-26 ENCOUNTER — TELEPHONE (OUTPATIENT)
Dept: TRANSPLANT | Facility: CLINIC | Age: 78
End: 2018-07-26

## 2018-07-26 LAB
ALBUMIN SERPL BCP-MCNC: 3.9 G/DL
ALP SERPL-CCNC: 145 U/L
ALT SERPL W/O P-5'-P-CCNC: 15 U/L
ANION GAP SERPL CALC-SCNC: 8 MMOL/L
ANION GAP SERPL CALC-SCNC: 8 MMOL/L
AST SERPL-CCNC: 24 U/L
BASOPHILS # BLD AUTO: 0.09 K/UL
BASOPHILS NFR BLD: 0.9 %
BILIRUB SERPL-MCNC: 3.1 MG/DL
BUN SERPL-MCNC: 21 MG/DL
BUN SERPL-MCNC: 21 MG/DL
CALCIUM SERPL-MCNC: 10.2 MG/DL
CALCIUM SERPL-MCNC: 10.2 MG/DL
CHLORIDE SERPL-SCNC: 101 MMOL/L
CHLORIDE SERPL-SCNC: 101 MMOL/L
CO2 SERPL-SCNC: 30 MMOL/L
CO2 SERPL-SCNC: 30 MMOL/L
CREAT SERPL-MCNC: 1.5 MG/DL
CREAT SERPL-MCNC: 1.5 MG/DL
DIFFERENTIAL METHOD: ABNORMAL
EOSINOPHIL # BLD AUTO: 0.5 K/UL
EOSINOPHIL NFR BLD: 4.9 %
ERYTHROCYTE [DISTWIDTH] IN BLOOD BY AUTOMATED COUNT: 15.6 %
EST. GFR  (AFRICAN AMERICAN): 51.2 ML/MIN/1.73 M^2
EST. GFR  (AFRICAN AMERICAN): 51.2 ML/MIN/1.73 M^2
EST. GFR  (NON AFRICAN AMERICAN): 44.3 ML/MIN/1.73 M^2
EST. GFR  (NON AFRICAN AMERICAN): 44.3 ML/MIN/1.73 M^2
GLUCOSE SERPL-MCNC: 95 MG/DL
GLUCOSE SERPL-MCNC: 95 MG/DL
HCT VFR BLD AUTO: 52.1 %
HGB BLD-MCNC: 18 G/DL
IMM GRANULOCYTES # BLD AUTO: 0.05 K/UL
IMM GRANULOCYTES NFR BLD AUTO: 0.5 %
LYMPHOCYTES # BLD AUTO: 2.3 K/UL
LYMPHOCYTES NFR BLD: 21.9 %
MAGNESIUM SERPL-MCNC: 2.5 MG/DL
MCH RBC QN AUTO: 32.7 PG
MCHC RBC AUTO-ENTMCNC: 34.5 G/DL
MCV RBC AUTO: 95 FL
MONOCYTES # BLD AUTO: 1.2 K/UL
MONOCYTES NFR BLD: 11.9 %
NEUTROPHILS # BLD AUTO: 6.2 K/UL
NEUTROPHILS NFR BLD: 59.9 %
NRBC BLD-RTO: 0 /100 WBC
PHOSPHATE SERPL-MCNC: 4.2 MG/DL
PLATELET # BLD AUTO: 229 K/UL
PMV BLD AUTO: 9.2 FL
POTASSIUM SERPL-SCNC: 4.7 MMOL/L
POTASSIUM SERPL-SCNC: 4.7 MMOL/L
PROT SERPL-MCNC: 7.3 G/DL
RBC # BLD AUTO: 5.5 M/UL
SODIUM SERPL-SCNC: 139 MMOL/L
SODIUM SERPL-SCNC: 139 MMOL/L
WBC # BLD AUTO: 10.29 K/UL

## 2018-07-26 PROCEDURE — 80053 COMPREHEN METABOLIC PANEL: CPT

## 2018-07-26 PROCEDURE — 63600175 PHARM REV CODE 636 W HCPCS: Mod: JG | Performed by: INTERNAL MEDICINE

## 2018-07-26 PROCEDURE — 36415 COLL VENOUS BLD VENIPUNCTURE: CPT

## 2018-07-26 PROCEDURE — 25000003 PHARM REV CODE 250: Performed by: STUDENT IN AN ORGANIZED HEALTH CARE EDUCATION/TRAINING PROGRAM

## 2018-07-26 PROCEDURE — 25000003 PHARM REV CODE 250: Performed by: NURSE PRACTITIONER

## 2018-07-26 PROCEDURE — 20600001 HC STEP DOWN PRIVATE ROOM

## 2018-07-26 PROCEDURE — 83735 ASSAY OF MAGNESIUM: CPT

## 2018-07-26 PROCEDURE — 85025 COMPLETE CBC W/AUTO DIFF WBC: CPT

## 2018-07-26 PROCEDURE — 25000003 PHARM REV CODE 250: Performed by: INTERNAL MEDICINE

## 2018-07-26 PROCEDURE — 84100 ASSAY OF PHOSPHORUS: CPT

## 2018-07-26 RX ORDER — ONDANSETRON 2 MG/ML
4 INJECTION INTRAMUSCULAR; INTRAVENOUS EVERY 8 HOURS PRN
Status: DISCONTINUED | OUTPATIENT
Start: 2018-07-26 | End: 2018-08-01 | Stop reason: HOSPADM

## 2018-07-26 RX ORDER — OXYCODONE HYDROCHLORIDE 5 MG/1
5 TABLET ORAL EVERY 6 HOURS PRN
Status: DISCONTINUED | OUTPATIENT
Start: 2018-07-26 | End: 2018-08-01 | Stop reason: HOSPADM

## 2018-07-26 RX ORDER — LOPERAMIDE HYDROCHLORIDE 2 MG/1
2 CAPSULE ORAL EVERY 4 HOURS PRN
Status: DISCONTINUED | OUTPATIENT
Start: 2018-07-26 | End: 2018-08-01 | Stop reason: HOSPADM

## 2018-07-26 RX ORDER — ONDANSETRON 8 MG/1
8 TABLET, ORALLY DISINTEGRATING ORAL EVERY 8 HOURS PRN
Status: DISCONTINUED | OUTPATIENT
Start: 2018-07-26 | End: 2018-08-01 | Stop reason: HOSPADM

## 2018-07-26 RX ADMIN — SOTALOL HYDROCHLORIDE 120 MG: 120 TABLET ORAL at 08:07

## 2018-07-26 RX ADMIN — ALLOPURINOL 300 MG: 100 TABLET ORAL at 06:07

## 2018-07-26 RX ADMIN — ASPIRIN 81 MG: 81 TABLET, COATED ORAL at 06:07

## 2018-07-26 RX ADMIN — Medication: at 12:07

## 2018-07-26 RX ADMIN — TREPROSTINIL 2 NG/KG/MIN: 20 INJECTION, SOLUTION INTRAVENOUS; SUBCUTANEOUS at 12:07

## 2018-07-26 RX ADMIN — PANTOPRAZOLE SODIUM 40 MG: 40 TABLET, DELAYED RELEASE ORAL at 08:07

## 2018-07-26 RX ADMIN — ATORVASTATIN CALCIUM 40 MG: 20 TABLET, FILM COATED ORAL at 08:07

## 2018-07-26 NOTE — ASSESSMENT & PLAN NOTE
- Patient diagnosed with severe PH (PA pressure by RHC 94/38, mean 65) which appears out of proportion to his underlying chronic lung disease   - Initiate IV Remodulin today via PIV with plan to place tunneled cath before discharge. If intolerant, may have to consider inhaled prostacyclin (see below V/Q scan)  - Creatinine has bumped to 1.5. Will D/C IVP Lasix and likely resume oral Lasix tomorrow (was on 20 mg qd at home)  - PA gram done during L/RHC 7/20 showed pruning of bilateral PA c/w pulmonary HTN (no discrete lesions)  - V/Q scan 7/25: low prob for PE. Matched segmental defect apical segment of RUL

## 2018-07-26 NOTE — TELEPHONE ENCOUNTER
Patient's son called to clarify information that his father received concerning PH and IV Remodulin. Answered questions and addressed concerns. Mr. Nolasco and his seem to have a good understanding of the information and the process. Both his son and daughter will act as mixing partners for Mr. Nolasco.

## 2018-07-26 NOTE — ASSESSMENT & PLAN NOTE
- Continue supplemental O2 to keep sPO2 > 90 (on 4L ATC at home)  - PFT's 6/21 showed normal spirometry; diffusion capacity severely decreased  - V/Q scan as above

## 2018-07-26 NOTE — SUBJECTIVE & OBJECTIVE
Interval History: Feels and looks somewhat better today. Wants to start IV Remodulin    Continuous Infusions:   treprostinil (REMODULIN) infusion      veletri/remodulin cassette      veletri/remodulin tubing       Scheduled Meds:   allopurinol  300 mg Oral Daily    aspirin  81 mg Oral Daily    atorvastatin  40 mg Oral Daily    heparin (porcine)  5,000 Units Subcutaneous Q8H    pantoprazole  40 mg Oral Daily    sodium chloride 0.9%  3 mL Intravenous Q8H    sotalol  120 mg Oral Q12H     PRN Meds:acetaminophen, loperamide, ondansetron, ondansetron, oxyCODONE    Review of patient's allergies indicates:   Allergen Reactions    Clindamycin Shortness Of Breath    Penicillins Rash     Objective:     Vital Signs (Most Recent):  Temp: 98.1 °F (36.7 °C) (07/26/18 0800)  Pulse: 76 (07/26/18 0800)  Resp: 18 (07/26/18 0800)  BP: 95/60 (07/26/18 0800)  SpO2: (!) 94 % (07/26/18 0800) Vital Signs (24h Range):  Temp:  [97.4 °F (36.3 °C)-98.3 °F (36.8 °C)] 98.1 °F (36.7 °C)  Pulse:  [67-83] 76  Resp:  [16-20] 18  SpO2:  [90 %-95 %] 94 %  BP: ()/(56-85) 95/60     Patient Vitals for the past 72 hrs (Last 3 readings):   Weight   07/26/18 0500 74.5 kg (164 lb 4 oz)   07/25/18 0300 77.7 kg (171 lb 6.4 oz)   07/23/18 2302 78.4 kg (172 lb 12 oz)     Body mass index is 24.26 kg/m².      Intake/Output Summary (Last 24 hours) at 07/26/18 1024  Last data filed at 07/26/18 0800   Gross per 24 hour   Intake             1280 ml   Output             3300 ml   Net            -2020 ml       Hemodynamic Parameters:       Telemetry: SR    Physical Exam   Constitutional: He is oriented to person, place, and time. He appears well-developed and well-nourished.   HENT:   Head: Normocephalic and atraumatic.   Eyes: EOM are normal. Pupils are equal, round, and reactive to light. Scleral icterus is present.   Neck: Normal range of motion. Neck supple. No JVD present.   Unable to visualize JVP   Cardiovascular: Normal rate, regular rhythm,  normal heart sounds and intact distal pulses.    Pulmonary/Chest: Effort normal and breath sounds normal.   Abdominal: Soft. Bowel sounds are normal.   Musculoskeletal: Normal range of motion. He exhibits no edema.   Neurological: He is alert and oriented to person, place, and time.   Skin: Skin is warm and dry. Capillary refill takes more than 3 seconds.   Finger nail beds cyanotic   Psychiatric: He has a normal mood and affect. His behavior is normal. Judgment and thought content normal.       Significant Labs:  CBC:    Recent Labs  Lab 07/24/18 0236 07/25/18 0359 07/26/18  0417   WBC 8.29 8.54 10.29   RBC 4.99 5.07 5.50   HGB 16.2 16.5 18.0   HCT 46.0 47.0 52.1    210 229   MCV 92 93 95   MCH 32.5* 32.5* 32.7*   MCHC 35.2 35.1 34.5     BNP:    Recent Labs  Lab 07/23/18  1830   *     CMP:    Recent Labs  Lab 07/24/18 0236 07/25/18 0359 07/26/18  0417   GLU 93  93 89  89 95  95   CALCIUM 9.2  9.2 9.6  9.6 10.2  10.2   ALBUMIN 3.8 3.7 3.9   PROT 6.9 6.8 7.3     139 136  136 139  139   K 3.7  3.7 3.7  3.7 4.7  4.7   CO2 25  25 21*  21* 30*  30*     105 103  103 101  101   BUN 11  11 17  17 21  21   CREATININE 1.1  1.1 1.1  1.1 1.5*  1.5*   ALKPHOS 129 134 145*   ALT 16 14 15   AST 22 24 24   BILITOT 3.1* 3.6* 3.1*      Coagulation:     Recent Labs  Lab 07/23/18  1830   INR 1.1     LDH:  No results for input(s): LDH in the last 72 hours.  Microbiology:  Microbiology Results (last 7 days)     ** No results found for the last 168 hours. **          I have reviewed all pertinent labs within the past 24 hours.    Estimated Creatinine Clearance: 41.2 mL/min (A) (based on SCr of 1.5 mg/dL (H)).    Diagnostic Results:  I have reviewed all pertinent imaging results/findings within the past 24 hours.

## 2018-07-26 NOTE — ASSESSMENT & PLAN NOTE
- Patient diagnosed with multivessel CAD by [facility] 7/20 as detailed above. No interventions done. Unlikely a surgical candidate given his advanced pulmonary hypertension and chronic lung disease.   - Discussed with Dr. Dex Lomas. He would require ECMO for high risk PCI(s). Has no chest discomfort.   - Continue ASA, statin

## 2018-07-26 NOTE — PLAN OF CARE
Problem: Patient Care Overview  Goal: Plan of Care Review  Pt AAOx4, bed low locked, call light within reach, wearing nonskid socks. Pt instructed to use call light for assistance, pt verbalizes understanding.   Tele NSR 60's - 80's. O2 sat 5L NC 90 -96%. Pt remains free from injury/harm at this time. Afebrile. See flowsheet for full assessment/VS.

## 2018-07-26 NOTE — PROGRESS NOTES
PIV remodulin started at 1200.  To be titrated by 1 ng/kg/min q6hr as tolerated. Next titration 1800  2ng/kg/min  DW 75kg  22ml/24hr

## 2018-07-26 NOTE — PROGRESS NOTES
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 Wants to start IV Remodulin    Continuous Infusions:   treprostinil (REMODULIN) infusion      veletri/remodulin cassette      veletri/remodulin tubing       Scheduled Meds:   allopurinol  300 mg Oral Daily    aspirin  81 mg Oral Daily    atorvastatin  40 mg Oral Daily    heparin (porcine)  5,000 Units Subcutaneous Q8H    pantoprazole  40 mg Oral Daily    sodium chloride 0.9%  3 mL Intravenous Q8H    sotalol  120 mg Oral Q12H     PRN Meds:acetaminophen, loperamide, ondansetron, ondansetron, oxyCODONE    Review of patient's allergies indicates:   Allergen Reactions    Clindamycin Shortness Of Breath    Penicillins Rash     Objective:     Vital Signs (Most Recent):  Temp: 98.1 °F (36.7 °C) (07/26/18 0800)  Pulse: 76 (07/26/18 0800)  Resp: 18 (07/26/18 0800)  BP: 95/60 (07/26/18 0800)  SpO2: (!) 94 % (07/26/18 0800) Vital Signs (24h Range):  Temp:  [97.4 °F (36.3 °C)-98.3 °F (36.8 °C)] 98.1 °F (36.7 °C)  Pulse:  [67-83] 76  Resp:  [16-20] 18  SpO2:  [90 %-95 %] 94 %  BP: ()/(56-85) 95/60     Patient Vitals for the past 72 hrs (Last 3 readings):   Weight   07/26/18 0500 74.5 kg (164 lb 4 oz)   07/25/18 0300 77.7 kg (171 lb 6.4 oz)   07/23/18 2302 78.4 kg (172 lb 12 oz)     Body mass index is 24.26 kg/m².      Intake/Output Summary (Last 24 hours) at 07/26/18 1024  Last data filed at 07/26/18 0800   Gross per 24 hour   Intake             1280 ml   Output             3300 ml   Net            -2020 ml       Hemodynamic Parameters:       Telemetry: SR    Physical Exam   Constitutional: He is oriented to person,  place, and time. He appears well-developed and well-nourished.   HENT:   Head: Normocephalic and atraumatic.   Eyes: EOM are normal. Pupils are equal, round, and reactive to light. Scleral icterus is present.   Neck: Normal range of motion. Neck supple. No JVD present.   Unable to visualize JVP   Cardiovascular: Normal rate, regular rhythm, normal heart sounds and intact distal pulses.    Pulmonary/Chest: Effort normal and breath sounds normal.   Abdominal: Soft. Bowel sounds are normal.   Musculoskeletal: Normal range of motion. He exhibits no edema.   Neurological: He is alert and oriented to person, place, and time.   Skin: Skin is warm and dry. Capillary refill takes more than 3 seconds.   Finger nail beds cyanotic   Psychiatric: He has a normal mood and affect. His behavior is normal. Judgment and thought content normal.       Significant Labs:  CBC:    Recent Labs  Lab 07/24/18  0236 07/25/18  0359 07/26/18  0417   WBC 8.29 8.54 10.29   RBC 4.99 5.07 5.50   HGB 16.2 16.5 18.0   HCT 46.0 47.0 52.1    210 229   MCV 92 93 95   MCH 32.5* 32.5* 32.7*   MCHC 35.2 35.1 34.5     BNP:    Recent Labs  Lab 07/23/18  1830   *     CMP:    Recent Labs  Lab 07/24/18  0236 07/25/18  0359 07/26/18  0417   GLU 93  93 89  89 95  95   CALCIUM 9.2  9.2 9.6  9.6 10.2  10.2   ALBUMIN 3.8 3.7 3.9   PROT 6.9 6.8 7.3     139 136  136 139  139   K 3.7  3.7 3.7  3.7 4.7  4.7   CO2 25  25 21*  21* 30*  30*     105 103  103 101  101   BUN 11  11 17  17 21  21   CREATININE 1.1  1.1 1.1  1.1 1.5*  1.5*   ALKPHOS 129 134 145*   ALT 16 14 15   AST 22 24 24   BILITOT 3.1* 3.6* 3.1*      Coagulation:     Recent Labs  Lab 07/23/18  1830   INR 1.1     LDH:  No results for input(s): LDH in the last 72 hours.  Microbiology:  Microbiology Results (last 7 days)     ** No results found for the last 168 hours. **          I have reviewed all pertinent labs within the past 24 hours.    Estimated  Creatinine Clearance: 41.2 mL/min (A) (based on SCr of 1.5 mg/dL (H)).    Diagnostic Results:  I have reviewed all pertinent imaging results/findings within the past 24 hours.    Assessment and Plan:     Greg Nolasco is a 78 yo M with multivessel CAD (by [facility] 7/20/2018), severe PH, PAF (on sotalol), COPD with chronic hypoxic respiratory failure (on 4 L home O2) who is admitted for management of acute on chronic right sided heart failure exacerbation and PH.    Patient notes progressive exertional dyspnea since the beginning of this year, acutely worsened over the past 3 weeks. His symptoms have been associated with 2 pillow orthopnea, PND, and ankle edema.     He was initially referred to [facility] for PH evaluation from [facility] and was seen by Dr. Dale on 7/12 who recommend L and R heart caths. He was also seen by Dr. Bajwa of  for his PAF who advised continuation of his long term tx with home sotalol 120 BID and recommended AC ([id number] 3), which patient declined.      The patient's last 2D echo was notable for preserved EF, estimated PASP 62, R to L shunt on bubble study c/w PFO, moderately enlarged RV with mod depressed function. A CT chest showed centrilobular emphysema and bilateral lower lobe reticulation but not c/w diffuse ILD.    He underwent LHC/RHC on 7/20 which showed multivessel CAD (80% mLAD, 80% D1, 95% dOM1, 95%RCA). RHC with RA 12, RV 94, PA 94/38/65, PW 10; CO 3.2, CI 1.6; R->L shunting across PFO. He was planned for a follow up visit but has had worsening dyspnea over last 3 weeks and was advised to present for admission.      6mw 7/12/18  Distance: 183 meters  Oxygen sats: 90% to 66%  BP: 145/75 to 171/85  HR 69 to 85 bpm     TTE 7/20/18   1 - Concentric remodeling.     2 - Normal left ventricular systolic function (EF 60-65%).     3 - Right ventricular enlargement with moderately depressed systolic function.     4 - Impaired LV relaxation, normal LAP (grade 1 diastolic dysfunction).      5 - Biatrial enlargement.     6 - Mild tricuspid regurgitation.     7 - Pulmonary hypertension. The estimated PA systolic pressure is 62 mmHg.     8 - Intermediate central venous pressure.     9 - Small pericardial effusion apically (there is shaggy material seen along the visceral pericardium at the RV apex).        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 If intolerant, may have to consider inhaled prostacyclin (see below V/Q scan)  - Creatinine has bumped to 1.5. Will D/C IVP Lasix and likely resume oral Lasix tomorrow (was on 20 mg qd at home)  - PA gram done during L/RHC 7/20 showed pruning of bilateral PA c/w pulmonary HTN (no discrete lesions)  - V/Q scan 7/25: low prob for PE. Matched segmental defect apical segment of RUL          Chronic hypoxemic respiratory failure    - Continue supplemental O2 to keep sPO2 > 90 (on 4L ATC at home)  - PFT's 6/21 showed normal spirometry; diffusion capacity severely decreased  - V/Q scan as above        Right to left cardiac shunt    - Seen on R/[facility] in July             Coronary artery disease involving native coronary artery of native heart without angina pectoris    - Patient diagnosed with multivessel CAD by [facility] 7/20 as detailed above. No interventions done. Unlikely a surgical candidate given his advanced pulmonary hypertension and chronic lung disease.   - Discussed with Dr. Dex Lomas. He would require ECMO for high risk PCI(s). Has no chest discomfort.   - Continue ASA, statin        Paroxysmal atrial fibrillation    - Continue PTA sotalol 120 mg BID.  Patient has declined AC initiation. In SR on tele        Hyperbilirubinemia    - Total bili has trended back down to 3.1. Continue to monitor            Madisyn Perez  NP [id number]  Heart Transplant  [facility]

## 2018-07-27 LAB
ALBUMIN SERPL BCP-MCNC: 3.8 G/DL
ALP SERPL-CCNC: 130 U/L
ALT SERPL W/O P-5'-P-CCNC: 21 U/L
ANION GAP SERPL CALC-SCNC: 8 MMOL/L
ANION GAP SERPL CALC-SCNC: 8 MMOL/L
AST SERPL-CCNC: 31 U/L
BASOPHILS # BLD AUTO: 0.08 K/UL
BASOPHILS NFR BLD: 0.9 %
BILIRUB SERPL-MCNC: 3 MG/DL
BNP SERPL-MCNC: 203 PG/ML
BUN SERPL-MCNC: 22 MG/DL
BUN SERPL-MCNC: 22 MG/DL
CALCIUM SERPL-MCNC: 9.5 MG/DL
CALCIUM SERPL-MCNC: 9.5 MG/DL
CHLORIDE SERPL-SCNC: 101 MMOL/L
CHLORIDE SERPL-SCNC: 101 MMOL/L
CO2 SERPL-SCNC: 27 MMOL/L
CO2 SERPL-SCNC: 27 MMOL/L
CREAT SERPL-MCNC: 1.1 MG/DL
CREAT SERPL-MCNC: 1.1 MG/DL
DIFFERENTIAL METHOD: ABNORMAL
EOSINOPHIL # BLD AUTO: 0.5 K/UL
EOSINOPHIL NFR BLD: 6 %
ERYTHROCYTE [DISTWIDTH] IN BLOOD BY AUTOMATED COUNT: 15.4 %
EST. GFR  (AFRICAN AMERICAN): >60 ML/MIN/1.73 M^2
EST. GFR  (AFRICAN AMERICAN): >60 ML/MIN/1.73 M^2
EST. GFR  (NON AFRICAN AMERICAN): >60 ML/MIN/1.73 M^2
EST. GFR  (NON AFRICAN AMERICAN): >60 ML/MIN/1.73 M^2
GLUCOSE SERPL-MCNC: 93 MG/DL
GLUCOSE SERPL-MCNC: 93 MG/DL
HCT VFR BLD AUTO: 49.7 %
HGB BLD-MCNC: 17.1 G/DL
IMM GRANULOCYTES # BLD AUTO: 0.04 K/UL
IMM GRANULOCYTES NFR BLD AUTO: 0.4 %
LYMPHOCYTES # BLD AUTO: 2.2 K/UL
LYMPHOCYTES NFR BLD: 24 %
MAGNESIUM SERPL-MCNC: 2.6 MG/DL
MCH RBC QN AUTO: 32.3 PG
MCHC RBC AUTO-ENTMCNC: 34.4 G/DL
MCV RBC AUTO: 94 FL
MONOCYTES # BLD AUTO: 1.2 K/UL
MONOCYTES NFR BLD: 13.6 %
NEUTROPHILS # BLD AUTO: 4.9 K/UL
NEUTROPHILS NFR BLD: 55.1 %
NRBC BLD-RTO: 0 /100 WBC
PHOSPHATE SERPL-MCNC: 3.5 MG/DL
PLATELET # BLD AUTO: 217 K/UL
PMV BLD AUTO: 9.1 FL
POTASSIUM SERPL-SCNC: 4 MMOL/L
POTASSIUM SERPL-SCNC: 4 MMOL/L
PROT SERPL-MCNC: 7.1 G/DL
RBC # BLD AUTO: 5.29 M/UL
SODIUM SERPL-SCNC: 136 MMOL/L
SODIUM SERPL-SCNC: 136 MMOL/L
WBC # BLD AUTO: 8.97 K/UL

## 2018-07-27 PROCEDURE — 25000003 PHARM REV CODE 250: Performed by: INTERNAL MEDICINE

## 2018-07-27 PROCEDURE — 25000003 PHARM REV CODE 250: Performed by: NURSE PRACTITIONER

## 2018-07-27 PROCEDURE — 25000003 PHARM REV CODE 250: Performed by: STUDENT IN AN ORGANIZED HEALTH CARE EDUCATION/TRAINING PROGRAM

## 2018-07-27 PROCEDURE — 85025 COMPLETE CBC W/AUTO DIFF WBC: CPT

## 2018-07-27 PROCEDURE — 84100 ASSAY OF PHOSPHORUS: CPT

## 2018-07-27 PROCEDURE — 63600175 PHARM REV CODE 636 W HCPCS: Performed by: STUDENT IN AN ORGANIZED HEALTH CARE EDUCATION/TRAINING PROGRAM

## 2018-07-27 PROCEDURE — 20600001 HC STEP DOWN PRIVATE ROOM

## 2018-07-27 PROCEDURE — 99232 SBSQ HOSP IP/OBS MODERATE 35: CPT | Mod: ,,, | Performed by: NURSE PRACTITIONER

## 2018-07-27 PROCEDURE — 63600175 PHARM REV CODE 636 W HCPCS: Mod: JG | Performed by: INTERNAL MEDICINE

## 2018-07-27 PROCEDURE — 36415 COLL VENOUS BLD VENIPUNCTURE: CPT

## 2018-07-27 PROCEDURE — 83735 ASSAY OF MAGNESIUM: CPT

## 2018-07-27 PROCEDURE — 83880 ASSAY OF NATRIURETIC PEPTIDE: CPT

## 2018-07-27 PROCEDURE — 80053 COMPREHEN METABOLIC PANEL: CPT

## 2018-07-27 RX ORDER — FUROSEMIDE 40 MG/1
40 TABLET ORAL DAILY
Status: DISCONTINUED | OUTPATIENT
Start: 2018-07-27 | End: 2018-07-28

## 2018-07-27 RX ADMIN — LOPERAMIDE HYDROCHLORIDE 2 MG: 2 CAPSULE ORAL at 07:07

## 2018-07-27 RX ADMIN — ACETAMINOPHEN 650 MG: 325 TABLET, FILM COATED ORAL at 03:07

## 2018-07-27 RX ADMIN — OXYCODONE HYDROCHLORIDE 5 MG: 5 TABLET ORAL at 06:07

## 2018-07-27 RX ADMIN — ACETAMINOPHEN 650 MG: 325 TABLET, FILM COATED ORAL at 07:07

## 2018-07-27 RX ADMIN — HEPARIN SODIUM 5000 UNITS: 5000 INJECTION, SOLUTION INTRAVENOUS; SUBCUTANEOUS at 01:07

## 2018-07-27 RX ADMIN — SOTALOL HYDROCHLORIDE 120 MG: 120 TABLET ORAL at 08:07

## 2018-07-27 RX ADMIN — ATORVASTATIN CALCIUM 40 MG: 20 TABLET, FILM COATED ORAL at 07:07

## 2018-07-27 RX ADMIN — OXYCODONE HYDROCHLORIDE 5 MG: 5 TABLET ORAL at 07:07

## 2018-07-27 RX ADMIN — FUROSEMIDE 40 MG: 40 TABLET ORAL at 12:07

## 2018-07-27 RX ADMIN — TREPROSTINIL 6 NG/KG/MIN: 20 INJECTION, SOLUTION INTRAVENOUS; SUBCUTANEOUS at 02:07

## 2018-07-27 RX ADMIN — HEPARIN SODIUM 5000 UNITS: 5000 INJECTION, SOLUTION INTRAVENOUS; SUBCUTANEOUS at 08:07

## 2018-07-27 RX ADMIN — ASPIRIN 81 MG: 81 TABLET, COATED ORAL at 06:07

## 2018-07-27 RX ADMIN — ALLOPURINOL 300 MG: 100 TABLET ORAL at 06:07

## 2018-07-27 RX ADMIN — PANTOPRAZOLE SODIUM 40 MG: 40 TABLET, DELAYED RELEASE ORAL at 07:07

## 2018-07-27 NOTE — PROGRESS NOTES
Remodulin titrated to 5 ng/kg/min x DW 75kg, 54 ml/24hr. Pt tolerating well, will continue to monitor.

## 2018-07-27 NOTE — PLAN OF CARE
Problem: Patient Care Overview  Goal: Plan of Care Review  Outcome: Ongoing (interventions implemented as appropriate)  Pt aao x3. Vss. No acute distress. Pt steady on his feet. Remodulin infusing as per order to left arm IV. Nursing increasing dose every 6 hrs as tolerated. Pt requested pain med and imodium once today. Pt had isolated episode of diarrhea this morning. Pt ambulated in hallway without O2. Pt spot checked and found to be satting 77% on room air with ambulation. Pt 80% once back in room and 90% after recovering. Pt refusing portable O2 (rolling O2 at bedside per nurse) when walking in hallway.  See assessment for full chart details. Will continue to monitor, assess and adjust care as needed.

## 2018-07-27 NOTE — ASSESSMENT & PLAN NOTE
- Patient diagnosed with severe PH (PA pressure by RHC 94/38, mean 65) which appears out of proportion to his underlying chronic lung disease   - IV Remodulin initiated 7/26 via PIV with plan to place tunneled cath before discharge. If intolerant, may have to consider inhaled prostacyclin (see below V/Q scan)  - Creatinine has trended back down to 1.1 since stopping IVP Lasix yesterday. Will resume oral Lasix at 40 mg qd (was on 20 mg qd at home)  - PA gram done during L/RHC 7/20 showed pruning of bilateral PA c/w pulmonary HTN (no discrete lesions)  - V/Q scan 7/25: low prob for PE. Matched segmental defect apical segment of RUL

## 2018-07-27 NOTE — PROGRESS NOTES
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 Has noticed no improvement in activity tolerance yet. O2 sats are good on 4 LPM    Continuous Infusions:   treprostinil (REMODULIN) infusion 5 ng/kg/min (07/27/18 0601)    veletri/remodulin cassette      veletri/remodulin tubing       Scheduled Meds:   allopurinol  300 mg Oral Daily    aspirin  81 mg Oral Daily    atorvastatin  40 mg Oral Daily    heparin (porcine)  5,000 Units Subcutaneous Q8H    pantoprazole  40 mg Oral Daily    sodium chloride 0.9%  3 mL Intravenous Q8H    sotalol  120 mg Oral Q12H     PRN Meds:acetaminophen, loperamide, ondansetron, ondansetron, oxyCODONE    Review of patient's allergies indicates:   Allergen Reactions    Clindamycin Shortness Of Breath    Penicillins Rash     Objective:     Vital Signs (Most Recent):  Temp: 98.8 °F (37.1 °C) (07/27/18 0707)  Pulse: 70 (07/27/18 1000)  Resp: 16 (07/27/18 0707)  BP: (!) 104/56 (07/27/18 0707)  SpO2: (!) 94 % (07/27/18 0707) Vital Signs (24h Range):  Temp:  [98 °F (36.7 °C)-98.8 °F (37.1 °C)] 98.8 °F (37.1 °C)  Pulse:  [63-94] 70  Resp:  [16-18] 16  SpO2:  [89 %-96 %] 94 %  BP: ()/(53-76) 104/56     Patient Vitals for the past 72 hrs (Last 3 readings):   Weight   07/27/18 0600 74.6 kg (164 lb 8 oz)   07/26/18 0500 74.5 kg (164 lb 4 oz)   07/25/18 0300 77.7 kg (171 lb 6.4 oz)     Body mass index is 24.29 kg/m².      Intake/Output Summary (Last 24 hours) at 07/27/18 1101  Last data filed at 07/27/18 0600   Gross per 24 hour   Intake             1220 ml   Output              450  ml   Net              770 ml       Hemodynamic Parameters:       Telemetry: SR    Physical Exam   Constitutional: He is oriented to person, place, and time. He appears well-developed and well-nourished.   HENT:   Head: Normocephalic and atraumatic.   Eyes: EOM are normal. Pupils are equal, round, and reactive to light. Scleral icterus is present.   Neck: Normal range of motion. Neck supple. No JVD present.   Unable to visualize JVP   Cardiovascular: Normal rate, regular rhythm, normal heart sounds and intact distal pulses.    Pulmonary/Chest: Effort normal and breath sounds normal.   Abdominal: Soft. Bowel sounds are normal.   Musculoskeletal: Normal range of motion. He exhibits no edema.   Neurological: He is alert and oriented to person, place, and time.   Skin: Skin is warm and dry. Capillary refill takes more than 3 seconds.   Finger nail beds cyanotic   Psychiatric: He has a normal mood and affect. His behavior is normal. Judgment and thought content normal.       Significant Labs:  CBC:    Recent Labs  Lab 07/25/18  0359 07/26/18  0417 07/27/18  0530   WBC 8.54 10.29 8.97   RBC 5.07 5.50 5.29   HGB 16.5 18.0 17.1   HCT 47.0 52.1 49.7    229 217   MCV 93 95 94   MCH 32.5* 32.7* 32.3*   MCHC 35.1 34.5 34.4     BNP:    Recent Labs  Lab 07/23/18  1830 07/27/18  0530   * 203*     CMP:    Recent Labs  Lab 07/25/18  0359 07/26/18  0417 07/27/18  0530   GLU 89  89 95  95 93  93   CALCIUM 9.6  9.6 10.2  10.2 9.5  9.5   ALBUMIN 3.7 3.9 3.8   PROT 6.8 7.3 7.1     136 139  139 136  136   K 3.7  3.7 4.7  4.7 4.0  4.0   CO2 21*  21* 30*  30* 27  27     103 101  101 101  101   BUN 17  17 21  21 22  22   CREATININE 1.1  1.1 1.5*  1.5* 1.1  1.1   ALKPHOS 134 145* 130   ALT 14 15 21   AST 24 24 31   BILITOT 3.6* 3.1* 3.0*      Coagulation:     Recent Labs  Lab 07/23/18  1830   INR 1.1     LDH:  No results for input(s): LDH in the last 72 hours.  Microbiology:  Microbiology  Results (last 7 days)     ** No results found for the last 168 hours. **          I have reviewed all pertinent labs within the past 24 hours.    Estimated Creatinine Clearance: 56.2 mL/min (based on SCr of 1.1 mg/dL).    Diagnostic Results:  I have reviewed all pertinent imaging results/findings within the past 24 hours.    Assessment and Plan:     Greg Nolasco is a 76 yo M with multivessel CAD (by [facility] 7/20/2018), severe PH, PAF (on sotalol), COPD with chronic hypoxic respiratory failure (on 4 L home O2) who is admitted for management of acute on chronic right sided heart failure exacerbation and PH.    Patient notes progressive exertional dyspnea since the beginning of this year, acutely worsened over the past 3 weeks. His symptoms have been associated with 2 pillow orthopnea, PND, and ankle edema.     He was initially referred to [facility] for PH evaluation from [facility] and was seen by Dr. Dale on 7/12 who recommend L and R heart caths. He was also seen by Dr. Bajwa of  for his PAF who advised continuation of his long term tx with home sotalol 120 BID and recommended AC ([id number]), which patient declined.      The patient's last 2D echo was notable for preserved EF, estimated PASP 62, R to L shunt on bubble study c/w PFO, moderately enlarged RV with mod depressed function. A CT chest showed centrilobular emphysema and bilateral lower lobe reticulation but not c/w diffuse ILD.    He underwent LHC/RHC on 7/20 which showed multivessel CAD (80% mLAD, 80% D1, 95% dOM1, 95%RCA). RHC with RA 12, RV 94, PA 94/38/65, PW 10; CO 3.2, CI 1.6; R->L shunting across PFO. He was planned for a follow up visit but has had worsening dyspnea over last 3 weeks and was advised to present for admission.      6mw 7/12/18  Distance: 183 meters  Oxygen sats: 90% to 66%  BP: 145/75 to 171/85  HR 69 to 85 bpm     TTE 7/20/18   1 - Concentric remodeling.     2 - Normal left ventricular systolic function (EF 60-65%).     3 -  Right ventricular enlargement with moderately depressed systolic function.     4 - Impaired LV relaxation, normal LAP (grade 1 diastolic dysfunction).     5 - Biatrial enlargement.     6 - Mild tricuspid regurgitation.     7 - Pulmonary hypertension. The estimated PA systolic pressure is 62 mmHg.     8 - Intermediate central venous pressure.     9 - Small pericardial effusion apically (there is shaggy material seen along the visceral pericardium at the RV apex).        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 If intolerant, may have to consider inhaled prostacyclin (see below V/Q scan)  - Creatinine has trended back down to 1.1 since stopping IVP Lasix yesterday. Will resume oral Lasix at 40 mg qd (was on 20 mg qd at home)  - PA gram done during L/RHC 7/20 showed pruning of bilateral PA c/w pulmonary HTN (no discrete lesions)  - V/Q scan 7/25: low prob for PE. Matched segmental defect apical segment of RUL          Chronic hypoxemic respiratory failure    - Continue supplemental O2 to keep sPO2 > 90 (on 4L ATC at home)  - PFT's 6/21 showed normal spirometry; diffusion capacity severely decreased  - V/Q scan as above        Right to left cardiac shunt    - Seen on R/[facility] in July             Coronary artery disease involving native coronary artery of native heart without angina pectoris    - Patient diagnosed with multivessel CAD by [facility] 7/20 as detailed above. No interventions done. Unlikely a surgical candidate given his advanced pulmonary hypertension and chronic lung disease.   - Discussed with Dr. Dex Lomas. He would require ECMO for high risk PCI(s). Has no chest discomfort.   - Continue ASA, statin        Paroxysmal atrial fibrillation    - Continue PTA sotalol 120 mg  BID.  Patient has declined AC initiation. In SR on tele        Hyperbilirubinemia    - Total bili has trended back down to 3.0. Continue [place] [id number]  Heart Transplant  [facility]

## 2018-07-27 NOTE — PROGRESS NOTES
Remodulin titrated to 4ng/kg/min x DW 75kg, 43 mL/24hr. Pt tolerating well, will continue to monitor.

## 2018-07-27 NOTE — SUBJECTIVE & OBJECTIVE
Interval History: Currently on 5 ng/kg/min of IV Remodulin with slight HA and a loose bowel movement this morning. Has noticed no improvement in activity tolerance yet. O2 sats are good on 4 LPM    Continuous Infusions:   treprostinil (REMODULIN) infusion 5 ng/kg/min (07/27/18 0601)    veletri/remodulin cassette      veletri/remodulin tubing       Scheduled Meds:   allopurinol  300 mg Oral Daily    aspirin  81 mg Oral Daily    atorvastatin  40 mg Oral Daily    heparin (porcine)  5,000 Units Subcutaneous Q8H    pantoprazole  40 mg Oral Daily    sodium chloride 0.9%  3 mL Intravenous Q8H    sotalol  120 mg Oral Q12H     PRN Meds:acetaminophen, loperamide, ondansetron, ondansetron, oxyCODONE    Review of patient's allergies indicates:   Allergen Reactions    Clindamycin Shortness Of Breath    Penicillins Rash     Objective:     Vital Signs (Most Recent):  Temp: 98.8 °F (37.1 °C) (07/27/18 0707)  Pulse: 70 (07/27/18 1000)  Resp: 16 (07/27/18 0707)  BP: (!) 104/56 (07/27/18 0707)  SpO2: (!) 94 % (07/27/18 0707) Vital Signs (24h Range):  Temp:  [98 °F (36.7 °C)-98.8 °F (37.1 °C)] 98.8 °F (37.1 °C)  Pulse:  [63-94] 70  Resp:  [16-18] 16  SpO2:  [89 %-96 %] 94 %  BP: ()/(53-76) 104/56     Patient Vitals for the past 72 hrs (Last 3 readings):   Weight   07/27/18 0600 74.6 kg (164 lb 8 oz)   07/26/18 0500 74.5 kg (164 lb 4 oz)   07/25/18 0300 77.7 kg (171 lb 6.4 oz)     Body mass index is 24.29 kg/m².      Intake/Output Summary (Last 24 hours) at 07/27/18 1101  Last data filed at 07/27/18 0600   Gross per 24 hour   Intake             1220 ml   Output              450 ml   Net              770 ml       Hemodynamic Parameters:       Telemetry: SR    Physical Exam   Constitutional: He is oriented to person, place, and time. He appears well-developed and well-nourished.   HENT:   Head: Normocephalic and atraumatic.   Eyes: EOM are normal. Pupils are equal, round, and reactive to light. Scleral icterus is  present.   Neck: Normal range of motion. Neck supple. No JVD present.   Unable to visualize JVP   Cardiovascular: Normal rate, regular rhythm, normal heart sounds and intact distal pulses.    Pulmonary/Chest: Effort normal and breath sounds normal.   Abdominal: Soft. Bowel sounds are normal.   Musculoskeletal: Normal range of motion. He exhibits no edema.   Neurological: He is alert and oriented to person, place, and time.   Skin: Skin is warm and dry. Capillary refill takes more than 3 seconds.   Finger nail beds cyanotic   Psychiatric: He has a normal mood and affect. His behavior is normal. Judgment and thought content normal.       Significant Labs:  CBC:    Recent Labs  Lab 07/25/18  0359 07/26/18  0417 07/27/18  0530   WBC 8.54 10.29 8.97   RBC 5.07 5.50 5.29   HGB 16.5 18.0 17.1   HCT 47.0 52.1 49.7    229 217   MCV 93 95 94   MCH 32.5* 32.7* 32.3*   MCHC 35.1 34.5 34.4     BNP:    Recent Labs  Lab 07/23/18  1830 07/27/18  0530   * 203*     CMP:    Recent Labs  Lab 07/25/18  0359 07/26/18  0417 07/27/18  0530   GLU 89  89 95  95 93  93   CALCIUM 9.6  9.6 10.2  10.2 9.5  9.5   ALBUMIN 3.7 3.9 3.8   PROT 6.8 7.3 7.1     136 139  139 136  136   K 3.7  3.7 4.7  4.7 4.0  4.0   CO2 21*  21* 30*  30* 27  27     103 101  101 101  101   BUN 17  17 21  21 22  22   CREATININE 1.1  1.1 1.5*  1.5* 1.1  1.1   ALKPHOS 134 145* 130   ALT 14 15 21   AST 24 24 31   BILITOT 3.6* 3.1* 3.0*      Coagulation:     Recent Labs  Lab 07/23/18  1830   INR 1.1     LDH:  No results for input(s): LDH in the last 72 hours.  Microbiology:  Microbiology Results (last 7 days)     ** No results found for the last 168 hours. **          I have reviewed all pertinent labs within the past 24 hours.    Estimated Creatinine Clearance: 56.2 mL/min (based on SCr of 1.1 mg/dL).    Diagnostic Results:  I have reviewed all pertinent imaging results/findings within the past 24 hours.

## 2018-07-28 LAB
ALBUMIN SERPL BCP-MCNC: 3.8 G/DL
ALP SERPL-CCNC: 127 U/L
ALT SERPL W/O P-5'-P-CCNC: 18 U/L
ANION GAP SERPL CALC-SCNC: 10 MMOL/L
ANION GAP SERPL CALC-SCNC: 10 MMOL/L
AST SERPL-CCNC: 23 U/L
BASOPHILS # BLD AUTO: 0.07 K/UL
BASOPHILS NFR BLD: 1 %
BILIRUB SERPL-MCNC: 2.7 MG/DL
BUN SERPL-MCNC: 22 MG/DL
BUN SERPL-MCNC: 22 MG/DL
CALCIUM SERPL-MCNC: 9.4 MG/DL
CALCIUM SERPL-MCNC: 9.4 MG/DL
CHLORIDE SERPL-SCNC: 103 MMOL/L
CHLORIDE SERPL-SCNC: 103 MMOL/L
CO2 SERPL-SCNC: 22 MMOL/L
CO2 SERPL-SCNC: 22 MMOL/L
CREAT SERPL-MCNC: 0.9 MG/DL
CREAT SERPL-MCNC: 0.9 MG/DL
DIFFERENTIAL METHOD: ABNORMAL
EOSINOPHIL # BLD AUTO: 0.5 K/UL
EOSINOPHIL NFR BLD: 6.9 %
ERYTHROCYTE [DISTWIDTH] IN BLOOD BY AUTOMATED COUNT: 14.9 %
EST. GFR  (AFRICAN AMERICAN): >60 ML/MIN/1.73 M^2
EST. GFR  (AFRICAN AMERICAN): >60 ML/MIN/1.73 M^2
EST. GFR  (NON AFRICAN AMERICAN): >60 ML/MIN/1.73 M^2
EST. GFR  (NON AFRICAN AMERICAN): >60 ML/MIN/1.73 M^2
GLUCOSE SERPL-MCNC: 107 MG/DL
GLUCOSE SERPL-MCNC: 107 MG/DL
HCT VFR BLD AUTO: 48.9 %
HGB BLD-MCNC: 16.6 G/DL
IMM GRANULOCYTES # BLD AUTO: 0.03 K/UL
IMM GRANULOCYTES NFR BLD AUTO: 0.4 %
LYMPHOCYTES # BLD AUTO: 1.7 K/UL
LYMPHOCYTES NFR BLD: 25.5 %
MAGNESIUM SERPL-MCNC: 2.4 MG/DL
MCH RBC QN AUTO: 31.5 PG
MCHC RBC AUTO-ENTMCNC: 33.9 G/DL
MCV RBC AUTO: 93 FL
MONOCYTES # BLD AUTO: 0.8 K/UL
MONOCYTES NFR BLD: 11.9 %
NEUTROPHILS # BLD AUTO: 3.6 K/UL
NEUTROPHILS NFR BLD: 54.3 %
NRBC BLD-RTO: 0 /100 WBC
PHOSPHATE SERPL-MCNC: 3.8 MG/DL
PLATELET # BLD AUTO: 204 K/UL
PMV BLD AUTO: 8.7 FL
POTASSIUM SERPL-SCNC: 3.9 MMOL/L
POTASSIUM SERPL-SCNC: 3.9 MMOL/L
PROT SERPL-MCNC: 6.9 G/DL
RBC # BLD AUTO: 5.27 M/UL
SODIUM SERPL-SCNC: 135 MMOL/L
SODIUM SERPL-SCNC: 135 MMOL/L
WBC # BLD AUTO: 6.7 K/UL

## 2018-07-28 PROCEDURE — 25000003 PHARM REV CODE 250: Performed by: STUDENT IN AN ORGANIZED HEALTH CARE EDUCATION/TRAINING PROGRAM

## 2018-07-28 PROCEDURE — 85025 COMPLETE CBC W/AUTO DIFF WBC: CPT

## 2018-07-28 PROCEDURE — 83735 ASSAY OF MAGNESIUM: CPT

## 2018-07-28 PROCEDURE — 25000003 PHARM REV CODE 250: Performed by: NURSE PRACTITIONER

## 2018-07-28 PROCEDURE — 20600001 HC STEP DOWN PRIVATE ROOM

## 2018-07-28 PROCEDURE — 25000003 PHARM REV CODE 250: Performed by: INTERNAL MEDICINE

## 2018-07-28 PROCEDURE — 99232 SBSQ HOSP IP/OBS MODERATE 35: CPT | Mod: ,,, | Performed by: INTERNAL MEDICINE

## 2018-07-28 PROCEDURE — 63600175 PHARM REV CODE 636 W HCPCS: Performed by: STUDENT IN AN ORGANIZED HEALTH CARE EDUCATION/TRAINING PROGRAM

## 2018-07-28 PROCEDURE — 80053 COMPREHEN METABOLIC PANEL: CPT

## 2018-07-28 PROCEDURE — 36415 COLL VENOUS BLD VENIPUNCTURE: CPT

## 2018-07-28 PROCEDURE — 63600175 PHARM REV CODE 636 W HCPCS: Mod: JG | Performed by: NURSE PRACTITIONER

## 2018-07-28 PROCEDURE — 84100 ASSAY OF PHOSPHORUS: CPT

## 2018-07-28 RX ADMIN — HEPARIN SODIUM 5000 UNITS: 5000 INJECTION, SOLUTION INTRAVENOUS; SUBCUTANEOUS at 02:07

## 2018-07-28 RX ADMIN — ASPIRIN 81 MG: 81 TABLET, COATED ORAL at 05:07

## 2018-07-28 RX ADMIN — ATORVASTATIN CALCIUM 40 MG: 20 TABLET, FILM COATED ORAL at 08:07

## 2018-07-28 RX ADMIN — SOTALOL HYDROCHLORIDE 120 MG: 120 TABLET ORAL at 08:07

## 2018-07-28 RX ADMIN — ACETAMINOPHEN 650 MG: 325 TABLET, FILM COATED ORAL at 09:07

## 2018-07-28 RX ADMIN — ALLOPURINOL 300 MG: 100 TABLET ORAL at 05:07

## 2018-07-28 RX ADMIN — HEPARIN SODIUM 5000 UNITS: 5000 INJECTION, SOLUTION INTRAVENOUS; SUBCUTANEOUS at 09:07

## 2018-07-28 RX ADMIN — TREPROSTINIL 7 NG/KG/MIN: 20 INJECTION, SOLUTION INTRAVENOUS; SUBCUTANEOUS at 02:07

## 2018-07-28 RX ADMIN — FUROSEMIDE 40 MG: 40 TABLET ORAL at 08:07

## 2018-07-28 RX ADMIN — SOTALOL HYDROCHLORIDE 120 MG: 120 TABLET ORAL at 09:07

## 2018-07-28 RX ADMIN — PANTOPRAZOLE SODIUM 40 MG: 40 TABLET, DELAYED RELEASE ORAL at 08:07

## 2018-07-28 RX ADMIN — OXYCODONE HYDROCHLORIDE 5 MG: 5 TABLET ORAL at 11:07

## 2018-07-28 NOTE — PROGRESS NOTES
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 Up shaving in bathroom on RA and looking good.     Continuous Infusions:   treprostinil (REMODULIN) infusion      veletri/remodulin cassette      veletri/remodulin tubing       Scheduled Meds:   allopurinol  300 mg Oral Daily    aspirin  81 mg Oral Daily    atorvastatin  40 mg Oral Daily    furosemide  40 mg Oral Daily    heparin (porcine)  5,000 Units Subcutaneous Q8H    pantoprazole  40 mg Oral Daily    sodium chloride 0.9%  3 mL Intravenous Q8H    sotalol  120 mg Oral Q12H     PRN Meds:acetaminophen, loperamide, ondansetron, ondansetron, oxyCODONE    Review of patient's allergies indicates:   Allergen Reactions    Clindamycin Shortness Of Breath    Penicillins Rash     Objective:     Vital Signs (Most Recent):  Temp: 97.5 °F (36.4 °C) (07/28/18 0744)  Pulse: 67 (07/28/18 0744)  Resp: 20 (07/28/18 0744)  BP: 122/71 (07/28/18 0744)  SpO2: 95 % (07/28/18 0744) Vital Signs (24h Range):  Temp:  [96.2 °F (35.7 °C)-98.5 °F (36.9 °C)] 97.5 °F (36.4 °C)  Pulse:  [63-96] 67  Resp:  [16-20] 20  SpO2:  [88 %-96 %] 95 %  BP: ()/(56-80) 122/71     Patient Vitals for the past 72 hrs (Last 3 readings):   Weight   07/28/18 0600 74.4 kg (164 lb)   07/27/18 0600 74.6 kg (164 lb 8 oz)   07/26/18 0500 74.5 kg (164 lb 4 oz)     Body mass index is 24.22 kg/m².    No intake or output data in the 24 hours ending 07/28/18 0916       Telemetry: SR    Physical Exam   Constitutional: He is oriented to person, place, and time. He appears well-developed and well-nourished.   HENT:   Head: Normocephalic and atraumatic.   Eyes: EOM are normal.  Pupils are equal, round, and reactive to light.   Neck: Normal range of motion. Neck supple. No JVD present.   Cardiovascular: Normal rate, regular rhythm, normal heart sounds and intact distal pulses.    Pulmonary/Chest: Effort normal and breath sounds normal.   Abdominal: Soft. Bowel sounds are normal.   Musculoskeletal: Normal range of motion. He exhibits no edema.   Neurological: He is alert and oriented to person, place, and time.   Skin: Skin is warm and dry. Capillary refill takes more than 3 seconds.   Psychiatric: He has a normal mood and affect. His behavior is normal. Judgment and thought content normal.       Significant Labs:  CBC:    Recent Labs  Lab 07/26/18  0417 07/27/18  0530 07/28/18  0544   WBC 10.29 8.97 6.70   RBC 5.50 5.29 5.27   HGB 18.0 17.1 16.6   HCT 52.1 49.7 48.9    217 204   MCV 95 94 93   MCH 32.7* 32.3* 31.5*   MCHC 34.5 34.4 33.9     BNP:    Recent Labs  Lab 07/23/18  1830 07/27/18  0530   * 203*     CMP:    Recent Labs  Lab 07/26/18  0417 07/27/18  0530 07/28/18  0544   GLU 95  95 93  93 107  107   CALCIUM 10.2  10.2 9.5  9.5 9.4  9.4   ALBUMIN 3.9 3.8 3.8   PROT 7.3 7.1 6.9     139 136  136 135*  135*   K 4.7  4.7 4.0  4.0 3.9  3.9   CO2 30*  30* 27  27 22*  22*     101 101  101 103  103   BUN 21  21 22  22 22  22   CREATININE 1.5*  1.5* 1.1  1.1 0.9  0.9   ALKPHOS 145* 130 127   ALT 15 21 18   AST 24 31 23   BILITOT 3.1* 3.0* 2.7*      Coagulation:     Recent Labs  Lab 07/23/18  1830   INR 1.1     LDH:  No results for input(s): LDH in the last 72 hours.  Microbiology:  Microbiology Results (last 7 days)     ** No results found for the last 168 hours. **        I have reviewed all pertinent labs within the past 24 hours.    Estimated Creatinine Clearance: 68.7 mL/min (based on SCr of 0.9 mg/dL).    Diagnostic Results:  I have reviewed all pertinent imaging results/findings within the past 24 hours.    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    Patient notes progressive exertional dyspnea since the beginning of this year, acutely worsened over the past 3 weeks. His symptoms have been associated with 2 pillow orthopnea, PND, and ankle edema.     He was initially referred to \[facility]\"" for PH evaluation from [facility] and was seen by Dr. Dale on 7/12 who recommend L and R heart caths. He was also seen by Dr. Bajwa of  for his PAF who advised continuation of his long term tx with home sotalol 120 BID and recommended AC ([id number] 3), which patient declined.      The patient's last 2D echo was notable for preserved EF, estimated PASP 62, R to L shunt on bubble study c/w PFO, moderately enlarged RV with mod depressed function. A CT chest showed centrilobular emphysema and bilateral lower lobe reticulation but not c/w diffuse ILD.    He underwent LHC/RHC on 7/20 which showed multivessel CAD (80% mLAD, 80% D1, 95% dOM1, 95%RCA). RHC with RA 12, RV 94, PA 94/38/65, PW 10; CO 3.2, CI 1.6; R->L shunting across PFO. He was planned for a follow up visit but has had worsening dyspnea over last 3 weeks and was advised to present for admission.      6mw 7/12/18  Distance: 183 meters  Oxygen sats: 90% to 66%  BP: 145/75 to 171/85  HR 69 to 85 bpm     TTE 7/20/18   1 - Concentric remodeling.     2 - Normal left ventricular systolic function (EF 60-65%).     3 - Right ventricular enlargement with moderately depressed systolic function.     4 - Impaired LV relaxation, normal LAP (grade 1 diastolic dysfunction).     5 - Biatrial enlargement.     6 - Mild tricuspid regurgitation.     7 - Pulmonary hypertension. The estimated PA systolic pressure is 62 mmHg.     8 - Intermediate central venous pressure.     9  - Small pericardial effusion apically (there is shaggy material seen along the visceral pericardium at the RV apex).        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 Continue up titration as tolerated.   - If intolerant to IV Remodulin, may have to consider inhaled prostacyclin (see below V/Q scan).  - Continue Lasix at 40 mg qd (was on 20 mg qd at home)  - PA gram done during L/RHC 7/20 showed pruning of bilateral PA c/w pulmonary HTN (no discrete lesions).  - V/Q scan 7/25: low prob for PE. Matched segmental defect apical segment of RUL.          Chronic hypoxemic respiratory failure    - Continue supplemental O2 to keep sPO2 > 90 (on 4L ATC at home)  - PFT's 6/21 showed normal spirometry; diffusion capacity severely decreased  - V/Q scan as above        Paroxysmal atrial fibrillation    - Continue PTA sotalol 120 mg BID.  Patient has declined AC initiation. In SR on tele        Coronary artery disease involving native coronary artery of native heart without angina pectoris    - Patient diagnosed with multivessel CAD by [facility] 7/20 as detailed above. No interventions done. Unlikely a surgical candidate given his advanced pulmonary hypertension and chronic lung disease.   - Discussed with Dr. Dex Lomas. He would require ECMO for high risk PCI(s). Has no chest discomfort.   - Continue ASA, statin        Right to left cardiac shunt    - Seen on R/LHC in July             Hyperbilirubinemia    - Total bili has trending down.            Ab Carney NP  Heart Transplant  [facility]

## 2018-07-28 NOTE — PLAN OF CARE
Problem: Patient Care Overview  Goal: Plan of Care Review  Outcome: Ongoing (interventions implemented as appropriate)  Patient AAO, independent, increasing remodulin dose Q6, currently at 7nng, increased concentration today.  Only side effect noted thus far is slight HA treated with prn medication.  Weaned off O2 today, currently 92% on RA.  Family at bedside. SR per tele.

## 2018-07-28 NOTE — PROGRESS NOTES
Titrated remodulin from 7ng/kg/min to 8ng/kg/min @ 0011. Infusing at 3.6 ml/hr + 86ml/24hrs. Refer to flowsheet for q15 min vitals. No adverse effects noted- will continue to monitor.

## 2018-07-28 NOTE — SUBJECTIVE & OBJECTIVE
Interval History: No complaints this am. Up shaving in bathroom on RA and looking good.     Continuous Infusions:   treprostinil (REMODULIN) infusion      veletri/remodulin cassette      veletri/remodulin tubing       Scheduled Meds:   allopurinol  300 mg Oral Daily    aspirin  81 mg Oral Daily    atorvastatin  40 mg Oral Daily    furosemide  40 mg Oral Daily    heparin (porcine)  5,000 Units Subcutaneous Q8H    pantoprazole  40 mg Oral Daily    sodium chloride 0.9%  3 mL Intravenous Q8H    sotalol  120 mg Oral Q12H     PRN Meds:acetaminophen, loperamide, ondansetron, ondansetron, oxyCODONE    Review of patient's allergies indicates:   Allergen Reactions    Clindamycin Shortness Of Breath    Penicillins Rash     Objective:     Vital Signs (Most Recent):  Temp: 97.5 °F (36.4 °C) (07/28/18 0744)  Pulse: 67 (07/28/18 0744)  Resp: 20 (07/28/18 0744)  BP: 122/71 (07/28/18 0744)  SpO2: 95 % (07/28/18 0744) Vital Signs (24h Range):  Temp:  [96.2 °F (35.7 °C)-98.5 °F (36.9 °C)] 97.5 °F (36.4 °C)  Pulse:  [63-96] 67  Resp:  [16-20] 20  SpO2:  [88 %-96 %] 95 %  BP: ()/(56-80) 122/71     Patient Vitals for the past 72 hrs (Last 3 readings):   Weight   07/28/18 0600 74.4 kg (164 lb)   07/27/18 0600 74.6 kg (164 lb 8 oz)   07/26/18 0500 74.5 kg (164 lb 4 oz)     Body mass index is 24.22 kg/m².    No intake or output data in the 24 hours ending 07/28/18 0916       Telemetry: SR    Physical Exam   Constitutional: He is oriented to person, place, and time. He appears well-developed and well-nourished.   HENT:   Head: Normocephalic and atraumatic.   Eyes: EOM are normal. Pupils are equal, round, and reactive to light.   Neck: Normal range of motion. Neck supple. No JVD present.   Cardiovascular: Normal rate, regular rhythm, normal heart sounds and intact distal pulses.    Pulmonary/Chest: Effort normal and breath sounds normal.   Abdominal: Soft. Bowel sounds are normal.   Musculoskeletal: Normal range of motion.  He exhibits no edema.   Neurological: He is alert and oriented to person, place, and time.   Skin: Skin is warm and dry. Capillary refill takes more than 3 seconds.   Psychiatric: He has a normal mood and affect. His behavior is normal. Judgment and thought content normal.       Significant Labs:  CBC:    Recent Labs  Lab 07/26/18  0417 07/27/18  0530 07/28/18  0544   WBC 10.29 8.97 6.70   RBC 5.50 5.29 5.27   HGB 18.0 17.1 16.6   HCT 52.1 49.7 48.9    217 204   MCV 95 94 93   MCH 32.7* 32.3* 31.5*   MCHC 34.5 34.4 33.9     BNP:    Recent Labs  Lab 07/23/18  1830 07/27/18  0530   * 203*     CMP:    Recent Labs  Lab 07/26/18 0417 07/27/18  0530 07/28/18  0544   GLU 95  95 93  93 107  107   CALCIUM 10.2  10.2 9.5  9.5 9.4  9.4   ALBUMIN 3.9 3.8 3.8   PROT 7.3 7.1 6.9     139 136  136 135*  135*   K 4.7  4.7 4.0  4.0 3.9  3.9   CO2 30*  30* 27  27 22*  22*     101 101  101 103  103   BUN 21  21 22  22 22  22   CREATININE 1.5*  1.5* 1.1  1.1 0.9  0.9   ALKPHOS 145* 130 127   ALT 15 21 18   AST 24 31 23   BILITOT 3.1* 3.0* 2.7*      Coagulation:     Recent Labs  Lab 07/23/18  1830   INR 1.1     LDH:  No results for input(s): LDH in the last 72 hours.  Microbiology:  Microbiology Results (last 7 days)     ** No results found for the last 168 hours. **        I have reviewed all pertinent labs within the past 24 hours.    Estimated Creatinine Clearance: 68.7 mL/min (based on SCr of 0.9 mg/dL).    Diagnostic Results:  I have reviewed all pertinent imaging results/findings within the past 24 hours.

## 2018-07-28 NOTE — PROGRESS NOTES
Weaned patient off O2 per Dr ELSA Lomas's request.  Patient currently 91% on RA, no SOB noted.  Received telephone order from Maida ESCOTO to dc continuous O2.  Will continue to monitor.

## 2018-07-28 NOTE — PLAN OF CARE
Problem: Patient Care Overview  Goal: Plan of Care Review  Outcome: Ongoing (interventions implemented as appropriate)  AAOx4, VSS, SpO- >90% on 3 L NC, tele- NSR, afebrile, sleeping between care  - remodulin titration by 1ng/kg/min q6h continued. Plan to switch to higher concentration at 0900 this AM. No adverse effects noted, tolerating well. PO sotalol given q12hr  - 40 Lasix PO given daily, 300 cc urine overnight.   - Daughter remains at bedside overnight, non skid socks worn, call light within reach. Will continue to monitor.

## 2018-07-28 NOTE — ASSESSMENT & PLAN NOTE
- Patient diagnosed with severe PH (PA pressure by RHC 94/38, mean 65) which appears out of proportion to his underlying chronic lung disease   - IV Remodulin initiated 7/26 via PIV with plan to place tunneled cath before discharge. Continue up titration as tolerated.   - If intolerant to IV Remodulin, may have to consider inhaled prostacyclin (see below V/Q scan).  - Continue Lasix at 40 mg qd (was on 20 mg qd at home)  - PA gram done during L/RHC 7/20 showed pruning of bilateral PA c/w pulmonary HTN (no discrete lesions).  - V/Q scan 7/25: low prob for PE. Matched segmental defect apical segment of RUL.

## 2018-07-29 LAB
ALBUMIN SERPL BCP-MCNC: 3.8 G/DL
ALP SERPL-CCNC: 124 U/L
ALT SERPL W/O P-5'-P-CCNC: 17 U/L
ANION GAP SERPL CALC-SCNC: 9 MMOL/L
AST SERPL-CCNC: 22 U/L
BASOPHILS # BLD AUTO: 0.08 K/UL
BASOPHILS NFR BLD: 1.2 %
BILIRUB SERPL-MCNC: 2.4 MG/DL
BNP SERPL-MCNC: 158 PG/ML
BUN SERPL-MCNC: 19 MG/DL
CALCIUM SERPL-MCNC: 10 MG/DL
CHLORIDE SERPL-SCNC: 100 MMOL/L
CO2 SERPL-SCNC: 28 MMOL/L
CREAT SERPL-MCNC: 1 MG/DL
DIFFERENTIAL METHOD: ABNORMAL
EOSINOPHIL # BLD AUTO: 0.4 K/UL
EOSINOPHIL NFR BLD: 6.3 %
ERYTHROCYTE [DISTWIDTH] IN BLOOD BY AUTOMATED COUNT: 14.8 %
EST. GFR  (AFRICAN AMERICAN): >60 ML/MIN/1.73 M^2
EST. GFR  (NON AFRICAN AMERICAN): >60 ML/MIN/1.73 M^2
GLUCOSE SERPL-MCNC: 88 MG/DL
HCT VFR BLD AUTO: 48.9 %
HGB BLD-MCNC: 16.7 G/DL
IMM GRANULOCYTES # BLD AUTO: 0.03 K/UL
IMM GRANULOCYTES NFR BLD AUTO: 0.4 %
LYMPHOCYTES # BLD AUTO: 2.1 K/UL
LYMPHOCYTES NFR BLD: 31.1 %
MAGNESIUM SERPL-MCNC: 2.4 MG/DL
MCH RBC QN AUTO: 32 PG
MCHC RBC AUTO-ENTMCNC: 34.2 G/DL
MCV RBC AUTO: 94 FL
MONOCYTES # BLD AUTO: 1.1 K/UL
MONOCYTES NFR BLD: 15.4 %
NEUTROPHILS # BLD AUTO: 3.1 K/UL
NEUTROPHILS NFR BLD: 45.6 %
NRBC BLD-RTO: 0 /100 WBC
PHOSPHATE SERPL-MCNC: 3.5 MG/DL
PLATELET # BLD AUTO: 201 K/UL
PMV BLD AUTO: 9.1 FL
POTASSIUM SERPL-SCNC: 3.7 MMOL/L
PROT SERPL-MCNC: 6.9 G/DL
RBC # BLD AUTO: 5.22 M/UL
SODIUM SERPL-SCNC: 137 MMOL/L
WBC # BLD AUTO: 6.88 K/UL

## 2018-07-29 PROCEDURE — 63600175 PHARM REV CODE 636 W HCPCS: Performed by: STUDENT IN AN ORGANIZED HEALTH CARE EDUCATION/TRAINING PROGRAM

## 2018-07-29 PROCEDURE — 36415 COLL VENOUS BLD VENIPUNCTURE: CPT

## 2018-07-29 PROCEDURE — 25000003 PHARM REV CODE 250: Performed by: STUDENT IN AN ORGANIZED HEALTH CARE EDUCATION/TRAINING PROGRAM

## 2018-07-29 PROCEDURE — 83735 ASSAY OF MAGNESIUM: CPT

## 2018-07-29 PROCEDURE — 63600175 PHARM REV CODE 636 W HCPCS: Mod: JG | Performed by: NURSE PRACTITIONER

## 2018-07-29 PROCEDURE — 85025 COMPLETE CBC W/AUTO DIFF WBC: CPT

## 2018-07-29 PROCEDURE — 25000003 PHARM REV CODE 250: Performed by: NURSE PRACTITIONER

## 2018-07-29 PROCEDURE — 25000003 PHARM REV CODE 250: Performed by: INTERNAL MEDICINE

## 2018-07-29 PROCEDURE — 84100 ASSAY OF PHOSPHORUS: CPT

## 2018-07-29 PROCEDURE — 20600001 HC STEP DOWN PRIVATE ROOM

## 2018-07-29 PROCEDURE — 80053 COMPREHEN METABOLIC PANEL: CPT

## 2018-07-29 PROCEDURE — 83880 ASSAY OF NATRIURETIC PEPTIDE: CPT

## 2018-07-29 RX ADMIN — OXYCODONE HYDROCHLORIDE 5 MG: 5 TABLET ORAL at 11:07

## 2018-07-29 RX ADMIN — HEPARIN SODIUM 5000 UNITS: 5000 INJECTION, SOLUTION INTRAVENOUS; SUBCUTANEOUS at 10:07

## 2018-07-29 RX ADMIN — ACETAMINOPHEN 650 MG: 325 TABLET, FILM COATED ORAL at 07:07

## 2018-07-29 RX ADMIN — ATORVASTATIN CALCIUM 40 MG: 20 TABLET, FILM COATED ORAL at 08:07

## 2018-07-29 RX ADMIN — PANTOPRAZOLE SODIUM 40 MG: 40 TABLET, DELAYED RELEASE ORAL at 08:07

## 2018-07-29 RX ADMIN — HEPARIN SODIUM 5000 UNITS: 5000 INJECTION, SOLUTION INTRAVENOUS; SUBCUTANEOUS at 01:07

## 2018-07-29 RX ADMIN — ASPIRIN 81 MG: 81 TABLET, COATED ORAL at 06:07

## 2018-07-29 RX ADMIN — SOTALOL HYDROCHLORIDE 120 MG: 120 TABLET ORAL at 10:07

## 2018-07-29 RX ADMIN — SOTALOL HYDROCHLORIDE 120 MG: 120 TABLET ORAL at 08:07

## 2018-07-29 RX ADMIN — TREPROSTINIL 11 NG/KG/MIN: 20 INJECTION, SOLUTION INTRAVENOUS; SUBCUTANEOUS at 04:07

## 2018-07-29 RX ADMIN — HEPARIN SODIUM 5000 UNITS: 5000 INJECTION, SOLUTION INTRAVENOUS; SUBCUTANEOUS at 06:07

## 2018-07-29 RX ADMIN — TREPROSTINIL 10 NG/KG/MIN: 20 INJECTION, SOLUTION INTRAVENOUS; SUBCUTANEOUS at 10:07

## 2018-07-29 RX ADMIN — ALLOPURINOL 300 MG: 100 TABLET ORAL at 06:07

## 2018-07-29 NOTE — ASSESSMENT & PLAN NOTE
- Patient diagnosed with severe PH (PA pressure by RHC 94/38, mean 65) which appears out of proportion to his underlying chronic lung disease   - IV Remodulin initiated 7/26 via PIV with plan to place tunneled cath before discharge. Continue up titration as tolerated.   - If intolerant to IV Remodulin, may have to consider inhaled prostacyclin (see below V/Q scan).  - PA gram done during L/RHC 7/20 showed pruning of bilateral PA c/w pulmonary HTN (no discrete lesions).  - V/Q scan 7/25: low prob for PE. Matched segmental defect apical segment of RUL.  - Holding PO Lasix.

## 2018-07-29 NOTE — SUBJECTIVE & OBJECTIVE
Interval History: No complaints this am. Feeling much better.    Continuous Infusions:   treprostinil (REMODULIN) infusion 9 ng/kg/min (07/29/18 0313)    veletri/remodulin cassette      veletri/remodulin tubing       Scheduled Meds:   allopurinol  300 mg Oral Daily    aspirin  81 mg Oral Daily    atorvastatin  40 mg Oral Daily    heparin (porcine)  5,000 Units Subcutaneous Q8H    pantoprazole  40 mg Oral Daily    sodium chloride 0.9%  3 mL Intravenous Q8H    sotalol  120 mg Oral Q12H     PRN Meds:acetaminophen, loperamide, ondansetron, ondansetron, oxyCODONE    Review of patient's allergies indicates:   Allergen Reactions    Clindamycin Shortness Of Breath    Penicillins Rash     Objective:     Vital Signs (Most Recent):  Temp: 97.8 °F (36.6 °C) (07/29/18 0753)  Pulse: 70 (07/29/18 0753)  Resp: 16 (07/29/18 0753)  BP: (!) 93/59 (07/29/18 0753)  SpO2: (!) 92 % (07/29/18 0753) Vital Signs (24h Range):  Temp:  [97.5 °F (36.4 °C)-98.7 °F (37.1 °C)] 97.8 °F (36.6 °C)  Pulse:  [63-79] 70  Resp:  [16-66] 16  SpO2:  [85 %-97 %] 92 %  BP: ()/(55-83) 93/59     Patient Vitals for the past 72 hrs (Last 3 readings):   Weight   07/29/18 0600 72.7 kg (160 lb 3.2 oz)   07/28/18 0600 74.4 kg (164 lb)   07/27/18 0600 74.6 kg (164 lb 8 oz)     Body mass index is 23.66 kg/m².      Intake/Output Summary (Last 24 hours) at 07/29/18 0857  Last data filed at 07/29/18 0600   Gross per 24 hour   Intake                0 ml   Output             1275 ml   Net            -1275 ml        Telemetry: SR    Physical Exam   Constitutional: He is oriented to person, place, and time. He appears well-developed and well-nourished.   HENT:   Head: Normocephalic and atraumatic.   Eyes: EOM are normal. Pupils are equal, round, and reactive to light.   Neck: Normal range of motion. Neck supple. No JVD present.   Cardiovascular: Normal rate, regular rhythm, normal heart sounds and intact distal pulses.    Pulmonary/Chest: Effort normal and  breath sounds normal.   Abdominal: Soft. Bowel sounds are normal.   Musculoskeletal: Normal range of motion. He exhibits no edema.   Neurological: He is alert and oriented to person, place, and time.   Skin: Skin is warm and dry. Capillary refill takes more than 3 seconds.   Psychiatric: He has a normal mood and affect. His behavior is normal. Judgment and thought content normal.       Significant Labs:  CBC:    Recent Labs  Lab 07/27/18  0530 07/28/18  0544 07/29/18  0524   WBC 8.97 6.70 6.88   RBC 5.29 5.27 5.22   HGB 17.1 16.6 16.7   HCT 49.7 48.9 48.9    204 201   MCV 94 93 94   MCH 32.3* 31.5* 32.0*   MCHC 34.4 33.9 34.2     BNP:    Recent Labs  Lab 07/23/18  1830 07/27/18  0530 07/29/18  0524   * 203* 158*     CMP:    Recent Labs  Lab 07/27/18  0530 07/28/18  0544 07/29/18  0524   GLU 93  93 107  107 88   CALCIUM 9.5  9.5 9.4  9.4 10.0   ALBUMIN 3.8 3.8 3.8   PROT 7.1 6.9 6.9     136 135*  135* 137   K 4.0  4.0 3.9  3.9 3.7   CO2 27  27 22*  22* 28     101 103  103 100   BUN 22  22 22  22 19   CREATININE 1.1  1.1 0.9  0.9 1.0   ALKPHOS 130 127 124   ALT 21 18 17   AST 31 23 22   BILITOT 3.0* 2.7* 2.4*      Coagulation:     Recent Labs  Lab 07/23/18  1830   INR 1.1     LDH:  No results for input(s): LDH in the last 72 hours.  Microbiology:  Microbiology Results (last 7 days)     ** No results found for the last 168 hours. **        I have reviewed all pertinent labs within the past 24 hours.    Estimated Creatinine Clearance: 61.9 mL/min (based on SCr of 1 mg/dL).    Diagnostic Results:  I have reviewed all pertinent imaging results/findings within the past 24 hours.

## 2018-07-29 NOTE — PROGRESS NOTES
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 Feeling much better.    Continuous Infusions:   treprostinil (REMODULIN) infusion 9 ng/kg/min (07/29/18 0313)    veletri/remodulin cassette      veletri/remodulin tubing       Scheduled Meds:   allopurinol  300 mg Oral Daily    aspirin  81 mg Oral Daily    atorvastatin  40 mg Oral Daily    heparin (porcine)  5,000 Units Subcutaneous Q8H    pantoprazole  40 mg Oral Daily    sodium chloride 0.9%  3 mL Intravenous Q8H    sotalol  120 mg Oral Q12H     PRN Meds:acetaminophen, loperamide, ondansetron, ondansetron, oxyCODONE    Review of patient's allergies indicates:   Allergen Reactions    Clindamycin Shortness Of Breath    Penicillins Rash     Objective:     Vital Signs (Most Recent):  Temp: 97.8 °F (36.6 °C) (07/29/18 0753)  Pulse: 70 (07/29/18 0753)  Resp: 16 (07/29/18 0753)  BP: (!) 93/59 (07/29/18 0753)  SpO2: (!) 92 % (07/29/18 0753) Vital Signs (24h Range):  Temp:  [97.5 °F (36.4 °C)-98.7 °F (37.1 °C)] 97.8 °F (36.6 °C)  Pulse:  [63-79] 70  Resp:  [16-66] 16  SpO2:  [85 %-97 %] 92 %  BP: ()/(55-83) 93/59     Patient Vitals for the past 72 hrs (Last 3 readings):   Weight   07/29/18 0600 72.7 kg (160 lb 3.2 oz)   07/28/18 0600 74.4 kg (164 lb)   07/27/18 0600 74.6 kg (164 lb 8 oz)     Body mass index is 23.66 kg/m².      Intake/Output Summary (Last 24 hours) at 07/29/18 0857  Last data filed at 07/29/18 0600   Gross per 24 hour   Intake                0 ml   Output             1275 ml   Net            -1275 ml        Telemetry: SR    Physical Exam   Constitutional: He is oriented to person, place, and time. He appears  well-developed and well-nourished.   HENT:   Head: Normocephalic and atraumatic.   Eyes: EOM are normal. Pupils are equal, round, and reactive to light.   Neck: Normal range of motion. Neck supple. No JVD present.   Cardiovascular: Normal rate, regular rhythm, normal heart sounds and intact distal pulses.    Pulmonary/Chest: Effort normal and breath sounds normal.   Abdominal: Soft. Bowel sounds are normal.   Musculoskeletal: Normal range of motion. He exhibits no edema.   Neurological: He is alert and oriented to person, place, and time.   Skin: Skin is warm and dry. Capillary refill takes more than 3 seconds.   Psychiatric: He has a normal mood and affect. His behavior is normal. Judgment and thought content normal.       Significant Labs:  CBC:    Recent Labs  Lab 07/27/18  0530 07/28/18  0544 07/29/18  0524   WBC 8.97 6.70 6.88   RBC 5.29 5.27 5.22   HGB 17.1 16.6 16.7   HCT 49.7 48.9 48.9    204 201   MCV 94 93 94   MCH 32.3* 31.5* 32.0*   MCHC 34.4 33.9 34.2     BNP:    Recent Labs  Lab 07/23/18  1830 07/27/18  0530 07/29/18  0524   * 203* 158*     CMP:    Recent Labs  Lab 07/27/18  0530 07/28/18  0544 07/29/18  0524   GLU 93  93 107  107 88   CALCIUM 9.5  9.5 9.4  9.4 10.0   ALBUMIN 3.8 3.8 3.8   PROT 7.1 6.9 6.9     136 135*  135* 137   K 4.0  4.0 3.9  3.9 3.7   CO2 27  27 22*  22* 28     101 103  103 100   BUN 22  22 22  22 19   CREATININE 1.1  1.1 0.9  0.9 1.0   ALKPHOS 130 127 124   ALT 21 18 17   AST 31 23 22   BILITOT 3.0* 2.7* 2.4*      Coagulation:     Recent Labs  Lab 07/23/18  1830   INR 1.1     LDH:  No results for input(s): LDH in the last 72 hours.  Microbiology:  Microbiology Results (last 7 days)     ** No results found for the last 168 hours. **        I have reviewed all pertinent labs within the past 24 hours.    Estimated Creatinine Clearance: 61.9 mL/min (based on SCr of 1 mg/dL).    Diagnostic Results:  I have reviewed all pertinent imaging  results/findings within the past 24 hours.    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    Patient notes progressive exertional dyspnea since the beginning of this year, acutely worsened over the past 3 weeks. His symptoms have been associated with 2 pillow orthopnea, PND, and ankle edema.     He was initially referred to [facility] evaluation from [facility] and was seen by Dr. Dale on 7/12 who recommend L and R heart caths. He was also seen by Dr. Bajwa of  for his PAF who advised continuation of his long term tx with home sotalol 120 BID and recommended AC ([id number] 3), which patient declined.      The patient's last 2D echo was notable for preserved EF, estimated PASP 62, R to L shunt on bubble study c/w PFO, moderately enlarged RV with mod depressed function. A CT chest showed centrilobular emphysema and bilateral lower lobe reticulation but not c/w diffuse ILD.    He underwent LHC/RHC on 7/20 which showed multivessel CAD (80% mLAD, 80% D1, 95% dOM1, 95%RCA). RHC with RA 12, RV 94, PA 94/38/65, PW 10; CO 3.2, CI 1.6; R->L shunting across PFO. He was planned for a follow up visit but has had worsening dyspnea over last 3 weeks and was advised to present for admission.      6mw 7/12/18  Distance: 183 meters  Oxygen sats: 90% to 66%  BP: 145/75 to 171/85  HR 69 to 85 bpm     TTE 7/20/18   1 - Concentric remodeling.     2 - Normal left ventricular systolic function (EF 60-65%).     3 - Right ventricular enlargement with moderately depressed systolic function.     4 - Impaired LV relaxation, normal LAP (grade 1 diastolic dysfunction).     5 - Biatrial enlargement.     6 - Mild tricuspid regurgitation.     7 - Pulmonary hypertension. The estimated PA systolic  pressure is 62 mmHg.     8 - Intermediate central venous pressure.     9 - Small pericardial effusion apically (there is shaggy material seen along the visceral pericardium at the RV apex).        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 Continue up titration as tolerated.   - If intolerant to IV Remodulin, may have to consider inhaled prostacyclin (see below V/Q scan).  - PA gram done during L/RHC 7/20 showed pruning of bilateral PA c/w pulmonary HTN (no discrete lesions).  - V/Q scan 7/25: low prob for PE. Matched segmental defect apical segment of RUL.  - Holding PO Lasix.          Chronic hypoxemic respiratory failure    - Continue supplemental O2 to keep sPO2 > 90 (on 4L ATC at home).  - PFT's 6/21 showed normal spirometry; diffusion capacity severely decreased.  - V/Q scan as above.        Paroxysmal atrial fibrillation    - Continue PTA sotalol 120 mg BID.  Patient has declined AC initiation. In SR on tele        Coronary artery disease involving native coronary artery of native heart without angina pectoris    - Patient diagnosed with multivessel CAD by [facility] 7/20 as detailed above. No interventions done. Unlikely a surgical candidate given his advanced pulmonary hypertension and chronic lung disease.   - Discussed with Dr. Dex Lomas. He would require ECMO for high risk PCI(s). Has no chest discomfort.   - Continue ASA, statin        Right to left cardiac shunt    - Seen on R/LHC in July             Hyperbilirubinemia    - Total bili has trending down.            Ab Carney, NP  Heart Transplant  [facility]

## 2018-07-29 NOTE — PLAN OF CARE
"Problem: Patient Care Overview  Goal: Plan of Care Review  Outcome: Ongoing (interventions implemented as appropriate)  Patient AAO today ambulates in the halls independently, POPE not noted.  Patient currently tolerating remodulin well, slight HA noted today, was not bad enough to take medication.  Currently at 11nng.  2LNC for comfort at times.  Patient states that he is "feelin better".      "

## 2018-07-29 NOTE — ASSESSMENT & PLAN NOTE
- Continue supplemental O2 to keep sPO2 > 90 (on 4L ATC at home).  - PFT's 6/21 showed normal spirometry; diffusion capacity severely decreased.  - V/Q scan as above.

## 2018-07-30 LAB
ALBUMIN SERPL BCP-MCNC: 4 G/DL
ALP SERPL-CCNC: 129 U/L
ALT SERPL W/O P-5'-P-CCNC: 27 U/L
ANION GAP SERPL CALC-SCNC: 7 MMOL/L
AST SERPL-CCNC: 33 U/L
BASOPHILS # BLD AUTO: 0.07 K/UL
BASOPHILS NFR BLD: 1.1 %
BILIRUB SERPL-MCNC: 2.7 MG/DL
BUN SERPL-MCNC: 20 MG/DL
CALCIUM SERPL-MCNC: 10.3 MG/DL
CHLORIDE SERPL-SCNC: 101 MMOL/L
CO2 SERPL-SCNC: 29 MMOL/L
CREAT SERPL-MCNC: 0.9 MG/DL
DIFFERENTIAL METHOD: ABNORMAL
EOSINOPHIL # BLD AUTO: 0.3 K/UL
EOSINOPHIL NFR BLD: 5.4 %
ERYTHROCYTE [DISTWIDTH] IN BLOOD BY AUTOMATED COUNT: 14.7 %
EST. GFR  (AFRICAN AMERICAN): >60 ML/MIN/1.73 M^2
EST. GFR  (NON AFRICAN AMERICAN): >60 ML/MIN/1.73 M^2
GLUCOSE SERPL-MCNC: 101 MG/DL
HCT VFR BLD AUTO: 50.6 %
HGB BLD-MCNC: 17.7 G/DL
IMM GRANULOCYTES # BLD AUTO: 0.03 K/UL
IMM GRANULOCYTES NFR BLD AUTO: 0.5 %
LYMPHOCYTES # BLD AUTO: 1.6 K/UL
LYMPHOCYTES NFR BLD: 25.9 %
MAGNESIUM SERPL-MCNC: 2.4 MG/DL
MCH RBC QN AUTO: 32.4 PG
MCHC RBC AUTO-ENTMCNC: 35 G/DL
MCV RBC AUTO: 93 FL
MONOCYTES # BLD AUTO: 0.8 K/UL
MONOCYTES NFR BLD: 12.2 %
NEUTROPHILS # BLD AUTO: 3.5 K/UL
NEUTROPHILS NFR BLD: 54.9 %
NRBC BLD-RTO: 0 /100 WBC
PHOSPHATE SERPL-MCNC: 2.9 MG/DL
PLATELET # BLD AUTO: 213 K/UL
PMV BLD AUTO: 8.7 FL
POTASSIUM SERPL-SCNC: 4.9 MMOL/L
PROT SERPL-MCNC: 7.5 G/DL
RBC # BLD AUTO: 5.47 M/UL
SODIUM SERPL-SCNC: 137 MMOL/L
WBC # BLD AUTO: 6.29 K/UL

## 2018-07-30 PROCEDURE — 25000003 PHARM REV CODE 250: Performed by: NURSE PRACTITIONER

## 2018-07-30 PROCEDURE — 63600175 PHARM REV CODE 636 W HCPCS: Performed by: STUDENT IN AN ORGANIZED HEALTH CARE EDUCATION/TRAINING PROGRAM

## 2018-07-30 PROCEDURE — 83735 ASSAY OF MAGNESIUM: CPT

## 2018-07-30 PROCEDURE — 25000003 PHARM REV CODE 250: Performed by: INTERNAL MEDICINE

## 2018-07-30 PROCEDURE — 36415 COLL VENOUS BLD VENIPUNCTURE: CPT

## 2018-07-30 PROCEDURE — 80053 COMPREHEN METABOLIC PANEL: CPT

## 2018-07-30 PROCEDURE — 85025 COMPLETE CBC W/AUTO DIFF WBC: CPT

## 2018-07-30 PROCEDURE — 25000003 PHARM REV CODE 250: Performed by: STUDENT IN AN ORGANIZED HEALTH CARE EDUCATION/TRAINING PROGRAM

## 2018-07-30 PROCEDURE — 20600001 HC STEP DOWN PRIVATE ROOM

## 2018-07-30 PROCEDURE — 84100 ASSAY OF PHOSPHORUS: CPT

## 2018-07-30 PROCEDURE — 99233 SBSQ HOSP IP/OBS HIGH 50: CPT | Mod: ,,, | Performed by: INTERNAL MEDICINE

## 2018-07-30 PROCEDURE — 63600175 PHARM REV CODE 636 W HCPCS: Mod: JG | Performed by: NURSE PRACTITIONER

## 2018-07-30 RX ORDER — CALCIUM CARBONATE 200(500)MG
1000 TABLET,CHEWABLE ORAL DAILY PRN
Status: DISCONTINUED | OUTPATIENT
Start: 2018-07-30 | End: 2018-08-01 | Stop reason: HOSPADM

## 2018-07-30 RX ADMIN — CALCIUM CARBONATE (ANTACID) CHEW TAB 500 MG 1000 MG: 500 CHEW TAB at 03:07

## 2018-07-30 RX ADMIN — TREPROSTINIL 15 NG/KG/MIN: 20 INJECTION, SOLUTION INTRAVENOUS; SUBCUTANEOUS at 03:07

## 2018-07-30 RX ADMIN — HEPARIN SODIUM 5000 UNITS: 5000 INJECTION, SOLUTION INTRAVENOUS; SUBCUTANEOUS at 09:07

## 2018-07-30 RX ADMIN — PANTOPRAZOLE SODIUM 40 MG: 40 TABLET, DELAYED RELEASE ORAL at 09:07

## 2018-07-30 RX ADMIN — ALLOPURINOL 300 MG: 100 TABLET ORAL at 05:07

## 2018-07-30 RX ADMIN — ASPIRIN 81 MG: 81 TABLET, COATED ORAL at 05:07

## 2018-07-30 RX ADMIN — HEPARIN SODIUM 5000 UNITS: 5000 INJECTION, SOLUTION INTRAVENOUS; SUBCUTANEOUS at 05:07

## 2018-07-30 RX ADMIN — ATORVASTATIN CALCIUM 40 MG: 20 TABLET, FILM COATED ORAL at 09:07

## 2018-07-30 RX ADMIN — OXYCODONE HYDROCHLORIDE 5 MG: 5 TABLET ORAL at 09:07

## 2018-07-30 RX ADMIN — HEPARIN SODIUM 5000 UNITS: 5000 INJECTION, SOLUTION INTRAVENOUS; SUBCUTANEOUS at 02:07

## 2018-07-30 RX ADMIN — CALCIUM CARBONATE (ANTACID) CHEW TAB 500 MG 1000 MG: 500 CHEW TAB at 09:07

## 2018-07-30 RX ADMIN — SOTALOL HYDROCHLORIDE 120 MG: 120 TABLET ORAL at 09:07

## 2018-07-30 RX ADMIN — TREPROSTINIL 14 NG/KG/MIN: 20 INJECTION, SOLUTION INTRAVENOUS; SUBCUTANEOUS at 09:07

## 2018-07-30 NOTE — SUBJECTIVE & OBJECTIVE
Interval History:    NO acute overnight event. On Remodulin up titration protocol (1ng after after every 6 hrs), currently at 14ng without significant side effects . Able to ambulate in hallway without difficulty but desaturating to 88-89  there after per nurse. Report having home oxygen for use with activities. Scheduled for Rubin placement tomorrow.    Continuous Infusions:   treprostinil (REMODULIN) infusion 14 ng/kg/min (07/30/18 0909)    veletri/remodulin cassette      veletri/remodulin tubing       Scheduled Meds:   allopurinol  300 mg Oral Daily    aspirin  81 mg Oral Daily    atorvastatin  40 mg Oral Daily    heparin (porcine)  5,000 Units Subcutaneous Q8H    pantoprazole  40 mg Oral Daily    sodium chloride 0.9%  3 mL Intravenous Q8H    sotalol  120 mg Oral Q12H     PRN Meds:acetaminophen, loperamide, ondansetron, ondansetron, oxyCODONE    Review of patient's allergies indicates:   Allergen Reactions    Clindamycin Shortness Of Breath    Penicillins Rash     Objective:     Vital Signs (Most Recent):  Temp: 97.8 °F (36.6 °C) (07/30/18 1117)  Pulse: 69 (07/30/18 1123)  Resp: 16 (07/30/18 1117)  BP: 111/66 (07/30/18 1117)  SpO2: (!) 94 % (07/30/18 1117) Vital Signs (24h Range):  Temp:  [97.5 °F (36.4 °C)-98.5 °F (36.9 °C)] 97.8 °F (36.6 °C)  Pulse:  [66-76] 69  Resp:  [16-18] 16  SpO2:  [90 %-96 %] 94 %  BP: ()/(56-71) 111/66     Patient Vitals for the past 72 hrs (Last 3 readings):   Weight   07/30/18 0600 74.5 kg (164 lb 3.9 oz)   07/29/18 0600 72.7 kg (160 lb 3.2 oz)   07/28/18 0600 74.4 kg (164 lb)     Body mass index is 24.25 kg/m².      Intake/Output Summary (Last 24 hours) at 07/30/18 1245  Last data filed at 07/30/18 0737   Gross per 24 hour   Intake             1260 ml   Output             1000 ml   Net              260 ml       Hemodynamic Parameters:       Telemetry: no event    Physical Exam   Constitutional: He is oriented to person, place, and time.   Mild temporal muscle  atropy   Neck: No JVD present.   Cardiovascular: Normal rate, regular rhythm and normal heart sounds.  Exam reveals no gallop.    Pulmonary/Chest: Effort normal and breath sounds normal.   Abdominal: Soft. Bowel sounds are normal.   Musculoskeletal: He exhibits no edema or tenderness.   Neurological: He is alert and oriented to person, place, and time.   Skin: Skin is warm and dry.   Psychiatric: He has a normal mood and affect.   Nursing note and vitals reviewed.        Significant Labs:  CBC:    Recent Labs  Lab 07/28/18  0544 07/29/18  0524 07/30/18  0915   WBC 6.70 6.88 6.29   RBC 5.27 5.22 5.47   HGB 16.6 16.7 17.7   HCT 48.9 48.9 50.6    201 213   MCV 93 94 93   MCH 31.5* 32.0* 32.4*   MCHC 33.9 34.2 35.0     BNP:    Recent Labs  Lab 07/23/18  1830 07/27/18  0530 07/29/18  0524   * 203* 158*     CMP:    Recent Labs  Lab 07/28/18  0544 07/29/18  0524 07/30/18  0915     107 88 101   CALCIUM 9.4  9.4 10.0 10.3   ALBUMIN 3.8 3.8 4.0   PROT 6.9 6.9 7.5   *  135* 137 137   K 3.9  3.9 3.7 4.9   CO2 22*  22* 28 29     103 100 101   BUN 22  22 19 20   CREATININE 0.9  0.9 1.0 0.9   ALKPHOS 127 124 129   ALT 18 17 27   AST 23 22 33   BILITOT 2.7* 2.4* 2.7*      Coagulation:     Recent Labs  Lab 07/23/18  1830   INR 1.1     LDH:  No results for input(s): LDH in the last 72 hours.  Microbiology:  Microbiology Results (last 7 days)     ** No results found for the last 168 hours. **          I have reviewed all pertinent labs within the past 24 hours.    Estimated Creatinine Clearance: 68.7 mL/min (based on SCr of 0.9 mg/dL).    Diagnostic Results:

## 2018-07-30 NOTE — ASSESSMENT & PLAN NOTE
- Patient diagnosed with multivessel CAD by [facility] 7/20 as detailed above. No interventions done. Unlikely a surgical candidate given his advanced pulmonary hypertension and chronic lung disease.   - Discussed with Dr. Dex Lomas. He would require ECMO for high risk PCI(s). Has no chest discomfort.   - Continue ASA, statin. No BB for now

## 2018-07-30 NOTE — HPI
Greg Nolasco is a 76 yo M with multivessel CAD (by C 7/20/2018), severe PH, PAF (on sotalol), COPD with chronic hypoxic respiratory failure (on 4 L home O2) who is admitted for management of acute on chronic right sided heart failure exacerbation and PH.     Patient notes progressive exertional dyspnea since the beginning of this year, acutely worsened over the past 3 weeks. His symptoms have been associated with 2 pillow orthopnea, PND, and ankle edema.      He was initially referred to \[facility]\"" for PH evaluation from [facility] and was seen by Dr. Dale on 7/12 who recommend L and R heart caths. He was also seen by Dr. Bajwa of  for his PAF who advised continuation of his long term tx with home sotalol 120 BID and recommended AC ([id number] 3), which patient declined.      The patient's last 2D echo was notable for preserved EF, estimated PASP 62, R to L shunt on bubble study c/w PFO, moderately enlarged RV with mod depressed function. A CT chest showed centrilobular emphysema and bilateral lower lobe reticulation but not c/w diffuse ILD.     He underwent LHC/RHC on 7/20 which showed multivessel CAD (80% mLAD, 80% D1, 95% dOM1, 95%RCA). RHC with RA 12, RV 94, PA 94/38/65, PW 10; CO 3.2, CI 1.6; R->L shunting across PFO. He was planned for a follow up visit but has had worsening dyspnea over last 3 weeks and was advised to present for admission.

## 2018-07-30 NOTE — ASSESSMENT & PLAN NOTE
- Patient diagnosed with severe PH (PA pressure by RHC 94/38, mean 65) which appears out of proportion to his underlying chronic lung disease   - IV Remodulin initiated 7/26 via PIV. Current rate increase at 1ng after every 6 hrs  - Will place tunneled cath tomorrow. NPO tonight  - If intolerant to IV Remodulin, may have to consider inhaled prostacyclin (see below V/Q scan).  - PA gram done during L/RHC 7/20 showed pruning of bilateral PA c/w pulmonary HTN (no discrete lesions).  - V/Q scan 7/25: low prob for PE. Matched segmental defect apical segment of RUL.

## 2018-07-30 NOTE — ASSESSMENT & PLAN NOTE
- Continue PTA sotalol 120 mg BID.   - Patient has declined AC initiation during this admission. In SR on tele

## 2018-07-30 NOTE — ASSESSMENT & PLAN NOTE
- Continue supplemental O2 to keep sPO2 > 90 (on 4L ATC at home).  - currently at 2LNC at rest and with activities. Will continue the same on dc  - PFT's 6/21 showed normal spirometry; diffusion capacity severely decreased.  - V/Q scan as above.

## 2018-07-30 NOTE — PROGRESS NOTES
Titrated up to 12ng/kg/min @ 2220  1.8 ml/hr  43 ml/24hr  See flow sheet for q15min vitals, slight headache relieved by oxycodone 5mg PO. Will continue to monitor.

## 2018-07-30 NOTE — PLAN OF CARE
Problem: Patient Care Overview  Goal: Plan of Care Review  Outcome: Ongoing (interventions implemented as appropriate)  AAOx4, VSS, SpO- >92% on 2L, Tele- NSR rate 60-70s overnight  Titrating 1ng/kg/min q6h continued, tolerating well. BP remain >90. Complains of slight headache which is relieved by 5 mg oxycodone PO.  Plan to titrate to 13ng/kg/min @ 0430 this AM. Bed in lowest position, non skid socks worn, call light within reach, fall risk remains in place- pt vocalizes understanding.

## 2018-07-30 NOTE — PROGRESS NOTES
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 On Remodulin up titration protocol (1ng after after every 6 hrs), currently at 14ng without significant side effects . Able to ambulate in hallway without difficulty but desaturating to 88-89  there after per nurse. Report having home oxygen for use with activities. Scheduled for Rubin placement tomorrow.    Continuous Infusions:   treprostinil (REMODULIN) infusion 14 ng/kg/min (07/30/18 0909)    veletri/remodulin cassette      veletri/remodulin tubing       Scheduled Meds:   allopurinol  300 mg Oral Daily    aspirin  81 mg Oral Daily    atorvastatin  40 mg Oral Daily    heparin (porcine)  5,000 Units Subcutaneous Q8H    pantoprazole  40 mg Oral Daily    sodium chloride 0.9%  3 mL Intravenous Q8H    sotalol  120 mg Oral Q12H     PRN Meds:acetaminophen, loperamide, ondansetron, ondansetron, oxyCODONE    Review of patient's allergies indicates:   Allergen Reactions    Clindamycin Shortness Of Breath    Penicillins Rash     Objective:     Vital Signs (Most Recent):  Temp: 97.8 °F (36.6 °C) (07/30/18 1117)  Pulse: 69 (07/30/18 1123)  Resp: 16 (07/30/18 1117)  BP: 111/66 (07/30/18 1117)  SpO2: (!) 94 % (07/30/18 1117) Vital Signs (24h Range):  Temp:  [97.5 °F (36.4 °C)-98.5 °F (36.9 °C)] 97.8 °F (36.6 °C)  Pulse:  [66-76] 69  Resp:  [16-18] 16  SpO2:  [90 %-96 %] 94 %  BP: ()/(56-71) 111/66     Patient Vitals for the past 72 hrs (Last 3 readings):   Weight   07/30/18 0600 74.5 kg (164 lb 3.9 oz)   07/29/18 0600 72.7 kg (160 lb 3.2 oz)   07/28/18 0600 74.4 kg (164 lb)     Body mass index is 24.25  kg/m².      Intake/Output Summary (Last 24 hours) at 07/30/18 1245  Last data filed at 07/30/18 0737   Gross per 24 hour   Intake             1260 ml   Output             1000 ml   Net              260 ml       Hemodynamic Parameters:       Telemetry: no event    Physical Exam   Constitutional: He is oriented to person, place, and time.   Mild temporal muscle atropy   Neck: No JVD present.   Cardiovascular: Normal rate, regular rhythm and normal heart sounds.  Exam reveals no gallop.    Pulmonary/Chest: Effort normal and breath sounds normal.   Abdominal: Soft. Bowel sounds are normal.   Musculoskeletal: He exhibits no edema or tenderness.   Neurological: He is alert and oriented to person, place, and time.   Skin: Skin is warm and dry.   Psychiatric: He has a normal mood and affect.   Nursing note and vitals reviewed.        Significant Labs:  CBC:    Recent Labs  Lab 07/28/18  0544 07/29/18  0524 07/30/18  0915   WBC 6.70 6.88 6.29   RBC 5.27 5.22 5.47   HGB 16.6 16.7 17.7   HCT 48.9 48.9 50.6    201 213   MCV 93 94 93   MCH 31.5* 32.0* 32.4*   MCHC 33.9 34.2 35.0     BNP:    Recent Labs  Lab 07/23/18  1830 07/27/18  0530 07/29/18  0524   * 203* 158*     CMP:    Recent Labs  Lab 07/28/18  0544 07/29/18  0524 07/30/18  0915     107 88 101   CALCIUM 9.4  9.4 10.0 10.3   ALBUMIN 3.8 3.8 4.0   PROT 6.9 6.9 7.5   *  135* 137 137   K 3.9  3.9 3.7 4.9   CO2 22*  22* 28 29     103 100 101   BUN 22  22 19 20   CREATININE 0.9  0.9 1.0 0.9   ALKPHOS 127 124 129   ALT 18 17 27   AST 23 22 33   BILITOT 2.7* 2.4* 2.7*      Coagulation:     Recent Labs  Lab 07/23/18  1830   INR 1.1     LDH:  No results for input(s): LDH in the last 72 hours.  Microbiology:  Microbiology Results (last 7 days)     ** No results found for the last 168 hours. **          I have reviewed all pertinent labs within the past 24 hours.    Estimated Creatinine Clearance: 68.7 mL/min (based on SCr of 0.9  mg/dL).    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    Patient notes progressive exertional dyspnea since the beginning of this year, acutely worsened over the past 3 weeks. His symptoms have been associated with 2 pillow orthopnea, PND, and ankle edema.     He was initially referred to [facility] for PH evaluation from [facility] and was seen by Dr. Dale on 7/12 who recommend L and R heart caths. He was also seen by Dr. Bajwa of  for his PAF who advised continuation of his long term tx with home sotalol 120 BID and recommended AC ([id number] 3), which patient declined.      The patient's last 2D echo was notable for preserved EF, estimated PASP 62, R to L shunt on bubble study c/w PFO, moderately enlarged RV with mod depressed function. A CT chest showed centrilobular emphysema and bilateral lower lobe reticulation but not c/w diffuse ILD.    He underwent LHC/RHC on 7/20 which showed multivessel CAD (80% mLAD, 80% D1, 95% dOM1, 95%RCA). RHC with RA 12, RV 94, PA 94/38/65, PW 10; CO 3.2, CI 1.6; R->L shunting across PFO. He was planned for a follow up visit but has had worsening dyspnea over last 3 weeks and was advised to present for admission.      6mw 7/12/18  Distance: 183 meters  Oxygen sats: 90% to 66%  BP: 145/75 to 171/85  HR 69 to 85 bpm     TTE 7/20/18   1 - Concentric remodeling.     2 - Normal left ventricular systolic function (EF 60-65%).     3 - Right ventricular enlargement with moderately depressed systolic function.     4 - Impaired LV relaxation, normal LAP (grade 1 diastolic dysfunction).     5 - Biatrial enlargement.     6 - Mild tricuspid regurgitation.     7 - Pulmonary hypertension. The estimated PA systolic pressure is 62  mmHg.     8 - Intermediate central venous pressure.     9 - Small pericardial effusion apically (there is shaggy material seen along the visceral pericardium at the RV apex).        * 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    - Patient diagnosed with severe PH (PA pressure by RH 94/38, mean 65) which appears out of proportion to his underlying chronic lung disease   - IV Remodulin initiated 7/26 via PIV. Current rate increase at 1ng after every 6 hrs  - Will place tunneled cath tomorrow. NPO tonight  - If intolerant to IV Remodulin, may have to consider inhaled prostacyclin (see below V/Q scan).  - PA gram done during L/RHC 7/20 showed pruning of bilateral PA c/w pulmonary HTN (no discrete lesions).  - V/Q scan 7/25: low prob for PE. Matched segmental defect apical segment of RUL.            Hyperbilirubinemia    - secondary to congestive hepatopathy  -? Hepatitis        Right to left cardiac shunt    - Seen on R/[facility] in July  -No plan for closing due to severe PH             Chronic hypoxemic respiratory failure    - Continue supplemental O2 to keep sPO2 > 90 (on 4L ATC at home).  - currently at 2LNC at rest and with activities. Will continue the same on dc  - PFT's 6/21 showed normal spirometry; diffusion capacity severely decreased.  - V/Q scan as above.        Paroxysmal atrial fibrillation    - Continue PTA sotalol 120 mg BID.   - Patient has declined AC initiation during this admission. In SR on tele        Coronary artery disease involving native coronary artery of native heart without angina pectoris    - Patient diagnosed with multivessel CAD by [facility] 7/20 as detailed above. No interventions done. Unlikely a surgical candidate given his advanced pulmonary hypertension and chronic lung disease.   - Discussed with Dr. Dex Lomas. He would require ECMO for high risk PCI(s). Has no chest discomfort.   - Continue ASA, statin. No BB for now          Discussed plan of care with Dr. Uma VARELA  MD Bryson  Heart Transplant  [facility]

## 2018-07-30 NOTE — PRE ADMISSION SCREENING
IV Remodulin Plan of Care:    Patient currently at 14ng/kg/min, continue titrating by 1ng/kg/min q6hrs as tolerated by the patient .     Dosing sheet at bedside    Patient will be NPO after midnight for SINGLE LUMEN Rubin placement on 7/31~11am    Patient will have education scheduled with Ron[facility] ([facility]) for ~1-2pm.    If education goes well on 7/31 and 8/1  Probable discharge home will be WED 8/1

## 2018-07-30 NOTE — PLAN OF CARE
Problem: Patient Care Overview  Goal: Plan of Care Review  Pt tolerating Remodulin well, on 15ng/kg/hr currently, only complaint was of small headache, no pain medication requested. VS stable. NPO after midnight. C/o indigestion, Tums ordered and given, relieved pt's indigestion.

## 2018-07-30 NOTE — PHYSICIAN QUERY
PT Name: Greg Nolasco  MR #: [id number]     Physician Query Form - Documentation Clarification      CDS/: Gayle Allred               Contact information: [email or web address]      This form is a permanent document in the medical record.     Query Date: July 30, 2018    By submitting this query, we are merely seeking further clarification of documentation. Please utilize your independent clinical judgment when addressing the question(s) below.    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 Current rate increase at 1ng after every 6 hrs  - Will place tunneled cath tomorrow. NPO tonight  - If intolerant to IV Remodulin, may have to consider inhaled prostacyclin (see below V/Q scan).  - PA gram done during L/RHC 7/20 showed pruning of bilateral PA c/w pulmonary HTN (no discrete lesions).  - V/Q scan 7/25: low prob for PE. Matched segmental defect apical segment of RUL.     Progress notes                                                                             Doctor, Please specify diagnosis or diagnoses associated with above clinical findings.    Provider Use Only        [     ] Acute Right heart failure   [xxxx     ] Acute on Chronic Right heart failure   [     ] Other:____________________                                                                                                             [  ] Clinically undetermined

## 2018-07-31 LAB
ALBUMIN SERPL BCP-MCNC: 3.9 G/DL
ALBUMIN SERPL BCP-MCNC: 3.9 G/DL
ALP SERPL-CCNC: 124 U/L
ALP SERPL-CCNC: 124 U/L
ALT SERPL W/O P-5'-P-CCNC: 40 U/L
ALT SERPL W/O P-5'-P-CCNC: 40 U/L
ANION GAP SERPL CALC-SCNC: 8 MMOL/L
AST SERPL-CCNC: 48 U/L
AST SERPL-CCNC: 48 U/L
BASOPHILS # BLD AUTO: 0.08 K/UL
BASOPHILS NFR BLD: 1.1 %
BILIRUB DIRECT SERPL-MCNC: 0.8 MG/DL
BILIRUB SERPL-MCNC: 2.6 MG/DL
BILIRUB SERPL-MCNC: 2.6 MG/DL
BUN SERPL-MCNC: 19 MG/DL
CALCIUM SERPL-MCNC: 9.9 MG/DL
CHLORIDE SERPL-SCNC: 101 MMOL/L
CO2 SERPL-SCNC: 26 MMOL/L
CREAT SERPL-MCNC: 1 MG/DL
DIFFERENTIAL METHOD: ABNORMAL
EOSINOPHIL # BLD AUTO: 0.5 K/UL
EOSINOPHIL NFR BLD: 6.8 %
ERYTHROCYTE [DISTWIDTH] IN BLOOD BY AUTOMATED COUNT: 14.6 %
EST. GFR  (AFRICAN AMERICAN): >60 ML/MIN/1.73 M^2
EST. GFR  (NON AFRICAN AMERICAN): >60 ML/MIN/1.73 M^2
GLUCOSE SERPL-MCNC: 92 MG/DL
HCT VFR BLD AUTO: 46.7 %
HGB BLD-MCNC: 16.4 G/DL
IMM GRANULOCYTES # BLD AUTO: 0.03 K/UL
IMM GRANULOCYTES NFR BLD AUTO: 0.4 %
LYMPHOCYTES # BLD AUTO: 2.2 K/UL
LYMPHOCYTES NFR BLD: 30.4 %
MAGNESIUM SERPL-MCNC: 2.3 MG/DL
MCH RBC QN AUTO: 32.7 PG
MCHC RBC AUTO-ENTMCNC: 35.1 G/DL
MCV RBC AUTO: 93 FL
MONOCYTES # BLD AUTO: 1 K/UL
MONOCYTES NFR BLD: 14 %
NEUTROPHILS # BLD AUTO: 3.5 K/UL
NEUTROPHILS NFR BLD: 47.3 %
NRBC BLD-RTO: 0 /100 WBC
PHOSPHATE SERPL-MCNC: 3.6 MG/DL
PLATELET # BLD AUTO: 187 K/UL
PMV BLD AUTO: 9.2 FL
POTASSIUM SERPL-SCNC: 3.9 MMOL/L
PROT SERPL-MCNC: 7 G/DL
PROT SERPL-MCNC: 7 G/DL
RBC # BLD AUTO: 5.02 M/UL
SODIUM SERPL-SCNC: 135 MMOL/L
WBC # BLD AUTO: 7.36 K/UL

## 2018-07-31 PROCEDURE — 25000003 PHARM REV CODE 250: Performed by: STUDENT IN AN ORGANIZED HEALTH CARE EDUCATION/TRAINING PROGRAM

## 2018-07-31 PROCEDURE — 63600175 PHARM REV CODE 636 W HCPCS

## 2018-07-31 PROCEDURE — 36558 INSERT TUNNELED CV CATH: CPT | Mod: ,,, | Performed by: INTERNAL MEDICINE

## 2018-07-31 PROCEDURE — 76937 US GUIDE VASCULAR ACCESS: CPT | Mod: 26,,, | Performed by: INTERNAL MEDICINE

## 2018-07-31 PROCEDURE — 85025 COMPLETE CBC W/AUTO DIFF WBC: CPT

## 2018-07-31 PROCEDURE — C1750 CATH, HEMODIALYSIS,LONG-TERM: HCPCS

## 2018-07-31 PROCEDURE — 0JH63XZ INSERTION OF TUNNELED VASCULAR ACCESS DEVICE INTO CHEST SUBCUTANEOUS TISSUE AND FASCIA, PERCUTANEOUS APPROACH: ICD-10-PCS | Performed by: INTERNAL MEDICINE

## 2018-07-31 PROCEDURE — 80076 HEPATIC FUNCTION PANEL: CPT

## 2018-07-31 PROCEDURE — 80053 COMPREHEN METABOLIC PANEL: CPT

## 2018-07-31 PROCEDURE — 63600175 PHARM REV CODE 636 W HCPCS: Mod: JG | Performed by: NURSE PRACTITIONER

## 2018-07-31 PROCEDURE — 25000003 PHARM REV CODE 250

## 2018-07-31 PROCEDURE — 83735 ASSAY OF MAGNESIUM: CPT

## 2018-07-31 PROCEDURE — 02HV33Z INSERTION OF INFUSION DEVICE INTO SUPERIOR VENA CAVA, PERCUTANEOUS APPROACH: ICD-10-PCS | Performed by: INTERNAL MEDICINE

## 2018-07-31 PROCEDURE — 25000003 PHARM REV CODE 250: Performed by: NURSE PRACTITIONER

## 2018-07-31 PROCEDURE — 25000003 PHARM REV CODE 250: Performed by: INTERNAL MEDICINE

## 2018-07-31 PROCEDURE — 99152 MOD SED SAME PHYS/QHP 5/>YRS: CPT | Mod: ,,, | Performed by: INTERNAL MEDICINE

## 2018-07-31 PROCEDURE — 36415 COLL VENOUS BLD VENIPUNCTURE: CPT

## 2018-07-31 PROCEDURE — 63600175 PHARM REV CODE 636 W HCPCS: Performed by: STUDENT IN AN ORGANIZED HEALTH CARE EDUCATION/TRAINING PROGRAM

## 2018-07-31 PROCEDURE — C1894 INTRO/SHEATH, NON-LASER: HCPCS

## 2018-07-31 PROCEDURE — 84100 ASSAY OF PHOSPHORUS: CPT

## 2018-07-31 PROCEDURE — 20600001 HC STEP DOWN PRIVATE ROOM

## 2018-07-31 PROCEDURE — 77001 FLUOROGUIDE FOR VEIN DEVICE: CPT | Mod: 26,,, | Performed by: INTERNAL MEDICINE

## 2018-07-31 PROCEDURE — 99232 SBSQ HOSP IP/OBS MODERATE 35: CPT | Mod: ,,, | Performed by: INTERNAL MEDICINE

## 2018-07-31 RX ADMIN — ATORVASTATIN CALCIUM 40 MG: 20 TABLET, FILM COATED ORAL at 08:07

## 2018-07-31 RX ADMIN — HEPARIN SODIUM 5000 UNITS: 5000 INJECTION, SOLUTION INTRAVENOUS; SUBCUTANEOUS at 10:07

## 2018-07-31 RX ADMIN — PANTOPRAZOLE SODIUM 40 MG: 40 TABLET, DELAYED RELEASE ORAL at 08:07

## 2018-07-31 RX ADMIN — Medication: at 04:07

## 2018-07-31 RX ADMIN — ACETAMINOPHEN 650 MG: 325 TABLET, FILM COATED ORAL at 10:07

## 2018-07-31 RX ADMIN — ASPIRIN 81 MG: 81 TABLET, COATED ORAL at 05:07

## 2018-07-31 RX ADMIN — ALLOPURINOL 300 MG: 100 TABLET ORAL at 05:07

## 2018-07-31 RX ADMIN — TREPROSTINIL 19 NG/KG/MIN: 20 INJECTION, SOLUTION INTRAVENOUS; SUBCUTANEOUS at 04:07

## 2018-07-31 RX ADMIN — SOTALOL HYDROCHLORIDE 120 MG: 120 TABLET ORAL at 10:07

## 2018-07-31 RX ADMIN — SOTALOL HYDROCHLORIDE 120 MG: 120 TABLET ORAL at 08:07

## 2018-07-31 RX ADMIN — OXYCODONE HYDROCHLORIDE 5 MG: 5 TABLET ORAL at 10:07

## 2018-07-31 NOTE — PROGRESS NOTES
Titrated to 16ng/kg/min  Infusing @ 2.4 ml/hr  58ml/24hrs  See flow sheet for vitals, tolerating well. Slight headache relieved by oxycodone 5 mg PO. Plan for next titration @ 0330 this AM

## 2018-07-31 NOTE — PROGRESS NOTES
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 Headache anterior and mid head, non throbbing or associated with nausea, vomiting or visual changes. Scheduled for Rubin today. Remodulin up-titration continuing.     Continuous Infusions:   treprostinil (REMODULIN) infusion 18 ng/kg/min (07/31/18 0930)    veletri/remodulin cassette      veletri/remodulin tubing       Scheduled Meds:   allopurinol  300 mg Oral Daily    aspirin  81 mg Oral Daily    atorvastatin  40 mg Oral Daily    heparin (porcine)  5,000 Units Subcutaneous Q8H    pantoprazole  40 mg Oral Daily    sodium chloride 0.9%  3 mL Intravenous Q8H    sotalol  120 mg Oral Q12H     PRN Meds:acetaminophen, calcium carbonate, loperamide, ondansetron, ondansetron, oxyCODONE    Review of patient's allergies indicates:   Allergen Reactions    Clindamycin Shortness Of Breath    Penicillins Rash     Objective:     Vital Signs (Most Recent):  Temp: 98.1 °F (36.7 °C) (07/31/18 1136)  Pulse: 66 (07/31/18 1136)  Resp: 17 (07/31/18 1136)  BP: (!) 93/58 (07/31/18 1136)  SpO2: 95 % (07/31/18 1136) Vital Signs (24h Range):  Temp:  [97.6 °F (36.4 °C)-98.6 °F (37 °C)] 98.1 °F (36.7 °C)  Pulse:  [63-75] 66  Resp:  [15-18] 17  SpO2:  [91 %-97 %] 95 %  BP: ()/(53-80) 93/58     Patient Vitals for the past 72 hrs (Last 3 readings):   Weight   07/31/18 0400 74.5 kg (164 lb 3.2 oz)   07/30/18 0600 74.5 kg (164 lb 3.9 oz)   07/29/18 0600 72.7 kg (160 lb 3.2 oz)     Body mass index is 24.25 kg/m².      Intake/Output Summary (Last 24 hours) at 07/31/18 1457  Last data filed at 07/31/18 1200   Gross  per 24 hour   Intake              480 ml   Output              200 ml   Net              280 ml       Hemodynamic Parameters:       Telemetry: no event    Physical Exam    Constitutional: He is oriented to person, place, and time.   Mild temporal muscle atropy   Neck: No JVD present.   Cardiovascular: Normal rate, regular rhythm and normal heart sounds.  Exam reveals no gallop.    Pulmonary/Chest: Effort normal and breath sounds normal.   Abdominal: Soft. Bowel sounds are normal.   Musculoskeletal: He exhibits no edema or tenderness.   Neurological: He is alert and oriented to person, place, and time.   Skin: Skin is warm and dry.   Psychiatric: He has a normal mood and affect.   Nursing note and vitals reviewed.    Significant Labs:  CBC:    Recent Labs  Lab 07/29/18  0524 07/30/18  0915 07/31/18  0433   WBC 6.88 6.29 7.36   RBC 5.22 5.47 5.02   HGB 16.7 17.7 16.4   HCT 48.9 50.6 46.7    213 187   MCV 94 93 93   MCH 32.0* 32.4* 32.7*   MCHC 34.2 35.0 35.1     BNP:    Recent Labs  Lab 07/27/18  0530 07/29/18  0524   * 158*     CMP:    Recent Labs  Lab 07/29/18  0524 07/30/18  0915 07/31/18  0433   GLU 88 101 92   CALCIUM 10.0 10.3 9.9   ALBUMIN 3.8 4.0 3.9   PROT 6.9 7.5 7.0    137 135*   K 3.7 4.9 3.9   CO2 28 29 26    101 101   BUN 19 20 19   CREATININE 1.0 0.9 1.0   ALKPHOS 124 129 124   ALT 17 27 40   AST 22 33 48*   BILITOT 2.4* 2.7* 2.6*      Coagulation:   No results for input(s): PT, INR, APTT in the last 168 hours.  LDH:  No results for input(s): LDH in the last 72 hours.  Microbiology:  Microbiology Results (last 7 days)     ** No results found for the last 168 hours. **          I have reviewed all pertinent labs within the past 24 hours.    Estimated Creatinine Clearance: 61.9 mL/min (based on SCr of 1 mg/dL).    Diagnostic Results:          Assessment and Plan:     Greg Nolasco is a 78 yo M with multivessel CAD (by [facility] 7/20/2018), severe PH, PAF (on sotalol), COPD with  chronic hypoxic respiratory failure (on 4 L home O2) who is admitted for management of acute on chronic right sided heart failure exacerbation and PH.    Patient notes progressive exertional dyspnea since the beginning of this year, acutely worsened over the past 3 weeks. His symptoms have been associated with 2 pillow orthopnea, PND, and ankle edema.     He was initially referred to [facility] for PH evaluation from [facility] and was seen by Dr. Dale on 7/12 who recommend L and R heart caths. He was also seen by Dr. Bajwa of  for his PAF who advised continuation of his long term tx with home sotalol 120 BID and recommended AC ([id number] 3), which patient declined.      The patient's last 2D echo was notable for preserved EF, estimated PASP 62, R to L shunt on bubble study c/w PFO, moderately enlarged RV with mod depressed function. A CT chest showed centrilobular emphysema and bilateral lower lobe reticulation but not c/w diffuse ILD.    He underwent LHC/RHC on 7/20 which showed multivessel CAD (80% mLAD, 80% D1, 95% dOM1, 95%RCA). RHC with RA 12, RV 94, PA 94/38/65, PW 10; CO 3.2, CI 1.6; R->L shunting across PFO. He was planned for a follow up visit but has had worsening dyspnea over last 3 weeks and was advised to present for admission.      6mw 7/12/18  Distance: 183 meters  Oxygen sats: 90% to 66%  BP: 145/75 to 171/85  HR 69 to 85 bpm     TTE 7/20/18   1 - Concentric remodeling.     2 - Normal left ventricular systolic function (EF 60-65%).     3 - Right ventricular enlargement with moderately depressed systolic function.     4 - Impaired LV relaxation, normal LAP (grade 1 diastolic dysfunction).     5 - Biatrial enlargement.     6 - Mild tricuspid regurgitation.     7 - Pulmonary hypertension. The estimated PA systolic pressure is 62 mmHg.     8 - Intermediate central venous pressure.     9 - Small pericardial effusion apically (there is shaggy material seen along the visceral pericardium at the RV  apex).        * 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    - Patient diagnosed with severe PH (PA pressure by RHC 94/38, mean 65) which appears out of proportion to his underlying chronic lung disease   - IV Remodulin initiated 7/26 via PIV. Current rate increase at 1ng after every 6 hrs  -  Mild side effect  - Rubin today. Continue education before dc tomorrow  - PA gram done during L/RHC 7/20 showed pruning of bilateral PA c/w pulmonary HTN (no discrete lesions).  - V/Q scan 7/25: low prob for PE. Matched segmental defect apical segment of RUL.            Hyperbilirubinemia    - secondary to congestive hepatopathy  -? Hepatitis        Right to left cardiac shunt    - Seen on R/[facility] in July  -No plan for closing due to severe PH             Chronic hypoxemic respiratory failure    - Continue supplemental O2 to keep sPO2 > 90 (on 4L ATC at home).  - currently at 2LNC at rest and with activities. Will continue the same on dc  - PFT's 6/21 showed normal spirometry; diffusion capacity severely decreased.  - V/Q negative for PE        Paroxysmal atrial fibrillation    - Continue PTA sotalol 120 mg BID.   - Patient has declined AC initiation during this admission. In SR on tele        Coronary artery disease involving native coronary artery of native heart without angina pectoris    - Patient diagnosed with multivessel CAD by [facility] 7/20 as detailed above. No interventions done.  - Poor surgical candidate given his advanced pulmonary hypertension and chronic lung disease.   - Discussed with Dr. Dex Lomas. He would require ECMO for high risk PCI(s). Has no chest discomfort.   - Continue ASA, statin. No BB for now          Discussed plan of care with Dr. Uma Massey MD  Heart Transplant  [facility]

## 2018-07-31 NOTE — ASSESSMENT & PLAN NOTE
- Patient diagnosed with multivessel CAD by [facility] 7/20 as detailed above. No interventions done.  - Poor surgical candidate given his advanced pulmonary hypertension and chronic lung disease.   - Discussed with Dr. Dex Lomas. He would require ECMO for high risk PCI(s). Has no chest discomfort.   - Continue ASA, statin. No BB for now

## 2018-07-31 NOTE — NURSING
Remodulin approved and patient will be serviced by [facility]. SP RN will call patient and family to set up time for inpatient Remodulin education and mixing.

## 2018-07-31 NOTE — SUBJECTIVE & OBJECTIVE
Interval History:     Head a mild headache this morning requiring oxycodone x1. Headache anterior and mid head, non throbbing or associated with nausea, vomiting or visual changes. Scheduled for Rubin today. Remodulin up-titration continuing.     Continuous Infusions:   treprostinil (REMODULIN) infusion 18 ng/kg/min (07/31/18 0930)    veletri/remodulin cassette      veletri/remodulin tubing       Scheduled Meds:   allopurinol  300 mg Oral Daily    aspirin  81 mg Oral Daily    atorvastatin  40 mg Oral Daily    heparin (porcine)  5,000 Units Subcutaneous Q8H    pantoprazole  40 mg Oral Daily    sodium chloride 0.9%  3 mL Intravenous Q8H    sotalol  120 mg Oral Q12H     PRN Meds:acetaminophen, calcium carbonate, loperamide, ondansetron, ondansetron, oxyCODONE    Review of patient's allergies indicates:   Allergen Reactions    Clindamycin Shortness Of Breath    Penicillins Rash     Objective:     Vital Signs (Most Recent):  Temp: 98.1 °F (36.7 °C) (07/31/18 1136)  Pulse: 66 (07/31/18 1136)  Resp: 17 (07/31/18 1136)  BP: (!) 93/58 (07/31/18 1136)  SpO2: 95 % (07/31/18 1136) Vital Signs (24h Range):  Temp:  [97.6 °F (36.4 °C)-98.6 °F (37 °C)] 98.1 °F (36.7 °C)  Pulse:  [63-75] 66  Resp:  [15-18] 17  SpO2:  [91 %-97 %] 95 %  BP: ()/(53-80) 93/58     Patient Vitals for the past 72 hrs (Last 3 readings):   Weight   07/31/18 0400 74.5 kg (164 lb 3.2 oz)   07/30/18 0600 74.5 kg (164 lb 3.9 oz)   07/29/18 0600 72.7 kg (160 lb 3.2 oz)     Body mass index is 24.25 kg/m².      Intake/Output Summary (Last 24 hours) at 07/31/18 1457  Last data filed at 07/31/18 1200   Gross per 24 hour   Intake              480 ml   Output              200 ml   Net              280 ml       Hemodynamic Parameters:       Telemetry: no event    Physical Exam    Constitutional: He is oriented to person, place, and time.   Mild temporal muscle atropy   Neck: No JVD present.   Cardiovascular: Normal rate, regular rhythm and normal  heart sounds.  Exam reveals no gallop.    Pulmonary/Chest: Effort normal and breath sounds normal.   Abdominal: Soft. Bowel sounds are normal.   Musculoskeletal: He exhibits no edema or tenderness.   Neurological: He is alert and oriented to person, place, and time.   Skin: Skin is warm and dry.   Psychiatric: He has a normal mood and affect.   Nursing note and vitals reviewed.    Significant Labs:  CBC:    Recent Labs  Lab 07/29/18  0524 07/30/18  0915 07/31/18  0433   WBC 6.88 6.29 7.36   RBC 5.22 5.47 5.02   HGB 16.7 17.7 16.4   HCT 48.9 50.6 46.7    213 187   MCV 94 93 93   MCH 32.0* 32.4* 32.7*   MCHC 34.2 35.0 35.1     BNP:    Recent Labs  Lab 07/27/18  0530 07/29/18  0524   * 158*     CMP:    Recent Labs  Lab 07/29/18  0524 07/30/18  0915 07/31/18  0433   GLU 88 101 92   CALCIUM 10.0 10.3 9.9   ALBUMIN 3.8 4.0 3.9   PROT 6.9 7.5 7.0    137 135*   K 3.7 4.9 3.9   CO2 28 29 26    101 101   BUN 19 20 19   CREATININE 1.0 0.9 1.0   ALKPHOS 124 129 124   ALT 17 27 40   AST 22 33 48*   BILITOT 2.4* 2.7* 2.6*      Coagulation:   No results for input(s): PT, INR, APTT in the last 168 hours.  LDH:  No results for input(s): LDH in the last 72 hours.  Microbiology:  Microbiology Results (last 7 days)     ** No results found for the last 168 hours. **          I have reviewed all pertinent labs within the past 24 hours.    Estimated Creatinine Clearance: 61.9 mL/min (based on SCr of 1 mg/dL).    Diagnostic Results:

## 2018-07-31 NOTE — PLAN OF CARE
Problem: Patient Care Overview  Goal: Plan of Care Review  Outcome: Ongoing (interventions implemented as appropriate)  Patient aaox4. Bed kept locked, lowest position with 2x side rails up while in bed. nonslip footwear when out of bed. Ambulates independently. Call bell and personal items within reach. Family at bedside attentive to patient's needs. Left FA PIVs cdi. NPO since midnight for rubin placement. remodulin infusing at 18ng/kg/min, DW 75kg. titrating q6hr. Will titrate again this afternoon. No complaints of headache so far this shift. On 2L NC O2 90-95%. To have remodulin education this afternoon. Possible discharge tomorrow.     Rubin placed to right IJ. Site cdi. remodulin titrated up to 19ng/kg/min,  moved to rubin line, at 1630.

## 2018-07-31 NOTE — ASSESSMENT & PLAN NOTE
- Continue supplemental O2 to keep sPO2 > 90 (on 4L ATC at home).  - currently at 2LNC at rest and with activities. Will continue the same on dc  - PFT's 6/21 showed normal spirometry; diffusion capacity severely decreased.  - V/Q negative for PE

## 2018-07-31 NOTE — ASSESSMENT & PLAN NOTE
- Patient diagnosed with severe PH (PA pressure by RHC 94/38, mean 65) which appears out of proportion to his underlying chronic lung disease   - IV Remodulin initiated 7/26 via PIV. Current rate increase at 1ng after every 6 hrs  -  Mild side effect  - Scottie today. Continue education before dc tomorrow  - PA gram done during L/RHC 7/20 showed pruning of bilateral PA c/w pulmonary HTN (no discrete lesions).  - V/Q scan 7/25: low prob for PE. Matched segmental defect apical segment of RUL.

## 2018-07-31 NOTE — PLAN OF CARE
Problem: Patient Care Overview  Goal: Plan of Care Review  AAOx4, VSS, SpO- >92% on 2L, Tele- NSR rate 60-70s overnight  Titrating 1ng/kg/min q6h continued, tolerating well. SBP remain >90. Complains of slight headache which is relieved by 5 mg oxycodone PO.  Plan to titrate to 17ng/kg/min @ 0330 this AM.Has been NPO since MN for garcía placement, remodulin teaching to follow. Pt's son will be present. Possible d/c in afternoon or Wednesday AM.   Bed in lowest position, non skid socks worn, call light within reach, fall risk remains in place- pt vocalizes understanding. Will continue to monitor.

## 2018-07-31 NOTE — PROCEDURES
Surgery Date: 7/31/18    (s) and Role:   Walt Smith MD    Indication: IV Remodulin     Pre-op Diagnosis:    Pulmonary Hypertension    Post-op Diagnosis:  Pulmonary Hypertension    Procedure:   1. Insertion Tunneled, Cuffed, Single Lumen Rubin   2. Conscious Sedation     Anesthesia: IV Sedation + Local     Findings/Key Components:   Catheter placed in RA-SVC junction. Good flush and draw through each port.  Estimated Blood Loss: 5mL   Specimens     None         PROCEDURE: After obtaining consent, the patient was taken to the [facility] suite. Sedation was administered. The neck and chest were prepped and draped in usual sterile fashion. We began using ultrasound guidance to examine the right internal jugular vein. It appeared to be widely patent and was free of thrombus that appeared suitable for access for rubin catheter and a permanent recording was placed on the patient's chart. We accessed the internal jugular vein using a Seldinger technique with an micropunture needle with out difficulty, micropuncture wire was passed into the superior vena cava through the heart into the inferior vena cava. The wire position was confirmed with intraoperative fluoroscopy, then used a dilators to dilate the tract of the catheter. Counterincision was made on the chest wall just below the clavicle. Local anesthesia was used to anesthetize the track and a tunneler was used to pass the catheter underneath the chest wall until the felt cuff was just underneath the counter incision. The large peel-away sheath was then inserted over the guidewire under fluoroscopic guidance. The dilator and wire were removed. The catheter was placed through the peel-away sheath and positioned appropriately at the right atrial SVC junction. Fluoroscopy was used to confirm that the catheter tip was in appropriate position and there was no kinking at the apex of the catheter. A permanent recording of the catheter position was also taken with  fluoroscopy. Port had excellent draw and flush. The catheter was then secured in place with 3-0 Prolene. The counterincision in the neck was closed with a 3-0 Vicryl. Patient tolerated procedure well and was discharged in stable condition.     Anesthesia:  Conscious sedation was achieved with 100 mcg of Fentanyl and 2 mg of Midazolam (Versed). Local anesthesia was achieved with 20 ml of Lidocaine 2%. Moderate conscious sedation was performed and cardiorespiratory functions were monitored the entire procedure by me and RN. Sedation time 30 minutes.

## 2018-07-31 NOTE — PROGRESS NOTES
Titrated pt up to 17ng/kg/min, 61 ml/24hrs-- BP taken q15 mins, latest BP 80/53 MAP 63 MD Ribera notified, orders to wait 15 min and retake BP. If BP remains <80, hold titration for 1 hour and retry. Pt asymptomatic, will continue to monitor.

## 2018-07-31 NOTE — H&P
Nephrology H&P      Consult Requested By: Robetr Lomas MD  Reason for Consult: garcía    SUBJECTIVE:     History of Present Illness:  Greg Nolasco is a 78 yo M with multivessel CAD (by [facility] 7/20/2018), severe PH, PAF (on sotalol), COPD with chronic hypoxic respiratory failure (on 4 L home O2) who is admitted for management of acute on chronic right sided heart failure exacerbation and PH. KISHORE consulted for garcía catheter placement for IV Remodulin . Patient is doing well and has no complaints.     PTA Medications   Medication Sig    allopurinol (ZYLOPRIM) 300 MG tablet Take 300 mg by mouth.    ALPRAZolam (XANAX) 1 MG tablet 0.5 mg.     aspirin (ECOTRIN) 81 MG EC tablet Take 81 mg by mouth once daily.    atorvastatin (LIPITOR) 10 MG tablet     furosemide (LASIX) 20 MG tablet Take 20 mg by mouth once daily.    melatonin 3 mg Tab Take 1 capsule by mouth.    multivit-minerals/ferrous fum (MULTI VITAMIN ORAL) Take 1 tablet by mouth.    OXYGEN-AIR DELIVERY SYSTEMS MISC by [facility].(Non-Drug; Combo Route) route.    RABEprazole (ACIPHEX) 20 mg tablet Take 20 mg by mouth.    sotalol (BETAPACE) 120 MG Tab Take 120 mg by mouth.    umeclidinium-vilanterol 62.5-25 mcg/actuation DsDv Inhale 1 puff into the lungs.       Review of patient's allergies indicates:   Allergen Reactions    Clindamycin Shortness Of Breath    Penicillins Rash        Past Medical History:   Diagnosis Date    Chronic hypoxemic respiratory failure     Coronary artery disease     BARBARA (obstructive sleep apnea)      Past Surgical History:   Procedure Laterality Date    ANGIOPLASTY  1991    BACK SURGERY      HERNIA REPAIR      KNEE SURGERY      SHOULDER SURGERY      SINUS SURGERY       History reviewed. No pertinent family history.  Social History   Substance Use Topics    Smoking status: Former Smoker     Quit date: 1998    Smokeless tobacco: Current User    Alcohol use Yes       Review of Systems:  Constitutional: Negative for  fatigue.   Eyes: Negative for discharge.   Respiratory: Negative for cough, shortness of breath and wheezing.   Cardiovascular: Negative for chest pain and palpitations.   Gastrointestinal: Negative for nausea, vomiting, abdominal pain and diarrhea.   Genitourinary: Negative for dysuria, urgency, frequency and hematuria.   Skin: Negative for color change and rash.   Psychiatric/Behavioral: Negative for confusion.      OBJECTIVE:     Vital Signs (Most Recent)  Temp: 98.2 °F (36.8 °C) (07/31/18 0800)  Pulse: 68 (07/31/18 1105)  Resp: 16 (07/31/18 1030)  BP: (!) 88/60 (07/31/18 1030)  SpO2: 95 % (07/31/18 1030)         Intake/Output Summary (Last 24 hours) at 07/31/18 1119  Last data filed at 07/31/18 0800   Gross per 24 hour   Intake             1030 ml   Output              350 ml   Net              680 ml       Physical Exam:  Gen: AAOx3, NAD  HEENT: mmm  Neck: no bruit, no JVD  CV: RRR, no m/r  Resp: CTAx2, normal effort  GI: soft, ND, NTTP, +BS  Extr: no LE edema  Neuro: normal reflexes, no focal deficits    Laboratory:  CBC with Diff:     Recent Labs  Lab 07/29/18  0524 07/30/18  0915 07/31/18  0433   WBC 6.88 6.29 7.36   HGB 16.7 17.7 16.4   HCT 48.9 50.6 46.7    213 187   LYMPH 31.1  2.1 25.9  1.6 30.4  2.2   MONO 15.4*  1.1* 12.2  0.8 14.0  1.0   EOSINOPHIL 6.3 5.4 6.8     COAG:  No results for input(s): APTT, INR, PTT in the last 168 hours.    CMP:   Recent Labs  Lab 07/29/18  0524 07/30/18  0915 07/31/18  0433   GLU 88 101 92   CALCIUM 10.0 10.3 9.9   ALBUMIN 3.8 4.0 3.9   PROT 6.9 7.5 7.0    137 135*   K 3.7 4.9 3.9   CO2 28 29 26    101 101   BUN 19 20 19   CREATININE 1.0 0.9 1.0   ALKPHOS 124 129 124   ALT 17 27 40   AST 22 33 48*   BILITOT 2.4* 2.7* 2.6*   MG 2.4 2.4 2.3   PHOS 3.5 2.9 3.6     Estimated Creatinine Clearance: 61.9 mL/min (based on SCr of 1 mg/dL).  Corrected Calcium:      Lab Results   Component Value Date    LABPROT 11.0 07/23/2018     Lab Results   Component  Value Date    CALCIUM 9.9 07/31/2018    PHOS 3.6 07/31/2018     No results found for: IRON, TIBC, FERRITIN        Diagnostic Results:  Labs: Reviewed      Scheduled Meds:   allopurinol  300 mg Oral Daily    aspirin  81 mg Oral Daily    atorvastatin  40 mg Oral Daily    heparin (porcine)  5,000 Units Subcutaneous Q8H    pantoprazole  40 mg Oral Daily    sodium chloride 0.9%  3 mL Intravenous Q8H    sotalol  120 mg Oral Q12H     Continuous Infusions:   treprostinil (REMODULIN) infusion 18 ng/kg/min (07/31/18 0930)    veletri/remodulin cassette      veletri/remodulin tubing               ASSESSMENT/PLAN:     Patient Active Problem List   Diagnosis    Dyspnea and respiratory abnormality    Coronary artery disease involving native coronary artery of native heart without angina pectoris    Paroxysmal atrial fibrillation    Chronic pulmonary heart disease    Centrilobular emphysema    Abnormal chest CT    Chronic hypoxemic respiratory failure    Right to left cardiac shunt    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    Hyperbilirubinemia       Plan:     Will place single lumen garcía catheter for IV remodulin.

## 2018-08-01 VITALS
RESPIRATION RATE: 15 BRPM | TEMPERATURE: 98 F | HEIGHT: 69 IN | HEART RATE: 69 BPM | SYSTOLIC BLOOD PRESSURE: 116 MMHG | WEIGHT: 163.81 LBS | DIASTOLIC BLOOD PRESSURE: 62 MMHG | OXYGEN SATURATION: 96 % | BODY MASS INDEX: 24.26 KG/M2

## 2018-08-01 PROBLEM — E80.6 HYPERBILIRUBINEMIA: Status: RESOLVED | Noted: 2018-07-25 | Resolved: 2018-08-01

## 2018-08-01 LAB
ALBUMIN SERPL BCP-MCNC: 3.9 G/DL
ALP SERPL-CCNC: 120 U/L
ALT SERPL W/O P-5'-P-CCNC: 59 U/L
ANION GAP SERPL CALC-SCNC: 9 MMOL/L
AST SERPL-CCNC: 65 U/L
BASOPHILS # BLD AUTO: 0.08 K/UL
BASOPHILS NFR BLD: 1.1 %
BILIRUB SERPL-MCNC: 3 MG/DL
BUN SERPL-MCNC: 20 MG/DL
CALCIUM SERPL-MCNC: 9.7 MG/DL
CHLORIDE SERPL-SCNC: 101 MMOL/L
CO2 SERPL-SCNC: 25 MMOL/L
CREAT SERPL-MCNC: 1 MG/DL
DIFFERENTIAL METHOD: ABNORMAL
EOSINOPHIL # BLD AUTO: 0.4 K/UL
EOSINOPHIL NFR BLD: 5 %
ERYTHROCYTE [DISTWIDTH] IN BLOOD BY AUTOMATED COUNT: 14.6 %
EST. GFR  (AFRICAN AMERICAN): >60 ML/MIN/1.73 M^2
EST. GFR  (NON AFRICAN AMERICAN): >60 ML/MIN/1.73 M^2
GLUCOSE SERPL-MCNC: 89 MG/DL
HCT VFR BLD AUTO: 48.4 %
HGB BLD-MCNC: 16.9 G/DL
IMM GRANULOCYTES # BLD AUTO: 0.03 K/UL
IMM GRANULOCYTES NFR BLD AUTO: 0.4 %
LYMPHOCYTES # BLD AUTO: 2 K/UL
LYMPHOCYTES NFR BLD: 27.7 %
MAGNESIUM SERPL-MCNC: 2.3 MG/DL
MCH RBC QN AUTO: 32.2 PG
MCHC RBC AUTO-ENTMCNC: 34.9 G/DL
MCV RBC AUTO: 92 FL
MONOCYTES # BLD AUTO: 1 K/UL
MONOCYTES NFR BLD: 12.9 %
NEUTROPHILS # BLD AUTO: 3.9 K/UL
NEUTROPHILS NFR BLD: 52.9 %
NRBC BLD-RTO: 0 /100 WBC
PHOSPHATE SERPL-MCNC: 3.7 MG/DL
PLATELET # BLD AUTO: 209 K/UL
PMV BLD AUTO: 9.6 FL
POTASSIUM SERPL-SCNC: 4.1 MMOL/L
PROT SERPL-MCNC: 7 G/DL
RBC # BLD AUTO: 5.25 M/UL
SODIUM SERPL-SCNC: 135 MMOL/L
WBC # BLD AUTO: 7.34 K/UL

## 2018-08-01 PROCEDURE — 25000003 PHARM REV CODE 250: Performed by: STUDENT IN AN ORGANIZED HEALTH CARE EDUCATION/TRAINING PROGRAM

## 2018-08-01 PROCEDURE — 85025 COMPLETE CBC W/AUTO DIFF WBC: CPT

## 2018-08-01 PROCEDURE — 63600175 PHARM REV CODE 636 W HCPCS: Performed by: STUDENT IN AN ORGANIZED HEALTH CARE EDUCATION/TRAINING PROGRAM

## 2018-08-01 PROCEDURE — 83735 ASSAY OF MAGNESIUM: CPT

## 2018-08-01 PROCEDURE — 84100 ASSAY OF PHOSPHORUS: CPT

## 2018-08-01 PROCEDURE — 36415 COLL VENOUS BLD VENIPUNCTURE: CPT

## 2018-08-01 PROCEDURE — 80053 COMPREHEN METABOLIC PANEL: CPT

## 2018-08-01 RX ORDER — ATORVASTATIN CALCIUM 40 MG/1
40 TABLET, FILM COATED ORAL DAILY
Qty: 90 TABLET | Refills: 3 | Status: SHIPPED | OUTPATIENT
Start: 2018-08-02 | End: 2019-05-15 | Stop reason: SDUPTHER

## 2018-08-01 RX ORDER — FUROSEMIDE 20 MG/1
20 TABLET ORAL EVERY OTHER DAY
Qty: 30 TABLET | Refills: 0
Start: 2018-08-01 | End: 2018-11-30 | Stop reason: SDUPTHER

## 2018-08-01 RX ADMIN — ACETAMINOPHEN 650 MG: 325 TABLET, FILM COATED ORAL at 02:08

## 2018-08-01 RX ADMIN — HEPARIN SODIUM 5000 UNITS: 5000 INJECTION, SOLUTION INTRAVENOUS; SUBCUTANEOUS at 06:08

## 2018-08-01 RX ADMIN — SOTALOL HYDROCHLORIDE 120 MG: 120 TABLET ORAL at 08:08

## 2018-08-01 RX ADMIN — ATORVASTATIN CALCIUM 40 MG: 20 TABLET, FILM COATED ORAL at 08:08

## 2018-08-01 RX ADMIN — PANTOPRAZOLE SODIUM 40 MG: 40 TABLET, DELAYED RELEASE ORAL at 08:08

## 2018-08-01 NOTE — PROGRESS NOTES
MD Radha at bedside- orders to titrate remodulin back down to 19ng/kg/min (2.85ml/hr, 68ml/24hrs) and to hold next six hour titration. VSS, will continue to monitor.

## 2018-08-01 NOTE — PLAN OF CARE
Problem: Patient Care Overview  Goal: Plan of Care Review  Outcome: Ongoing (interventions implemented as appropriate)  AAOx4, VSS, SpO- >92% on 2L, Tele- NSR rate 60-70s overnight  Titrating 1ng/kg/min q6h continued, tolerating well. SBP remain >90. Complains of persistent headache throughout night- pt states it is tolerable when remodulin returned to 19ng/kg/min- plan to hold next titration this AM.  Possible d/c following teaching today. Pt states son will be back in AM.   Bed in lowest position, non skid socks worn, call light within reach, fall risk remains in place- pt vocalizes understanding. Will continue to monitor.

## 2018-08-01 NOTE — ASSESSMENT & PLAN NOTE
- Patient diagnosed with severe PH (PA pressure by RHC 94/38, mean 65) which appears out of proportion to his underlying chronic lung disease   - Continue IV Remodulin at 19ng  - Mild side effect (headache control with Tylenol as needed)  - PA gram done during L/RHC 7/20 showed pruning of bilateral PA c/w pulmonary HTN (no discrete lesions).  - V/Q scan 7/25  low prob for PE. Matched segmental defect apical segment of RUL.

## 2018-08-01 NOTE — SUBJECTIVE & OBJECTIVE
Interval History:     Continuous Infusions:   treprostinil (REMODULIN) infusion 19 ng/kg/min (08/01/18 0013)    veletri/remodulin cassette      veletri/remodulin tubing       Scheduled Meds:   allopurinol  300 mg Oral Daily    aspirin  81 mg Oral Daily    atorvastatin  40 mg Oral Daily    heparin (porcine)  5,000 Units Subcutaneous Q8H    pantoprazole  40 mg Oral Daily    sodium chloride 0.9%  3 mL Intravenous Q8H    sotalol  120 mg Oral Q12H     PRN Meds:acetaminophen, calcium carbonate, loperamide, ondansetron, ondansetron, oxyCODONE    Review of patient's allergies indicates:   Allergen Reactions    Clindamycin Shortness Of Breath    Penicillins Rash     Objective:     Vital Signs (Most Recent):  Temp: 97.9 °F (36.6 °C) (08/01/18 1107)  Pulse: 69 (08/01/18 1131)  Resp: 15 (08/01/18 1107)  BP: 116/62 (08/01/18 1107)  SpO2: 96 % (08/01/18 1107) Vital Signs (24h Range):  Temp:  [97.4 °F (36.3 °C)-98.3 °F (36.8 °C)] 97.9 °F (36.6 °C)  Pulse:  [69-75] 69  Resp:  [15-18] 15  SpO2:  [90 %-96 %] 96 %  BP: (100-132)/(56-72) 116/62     Patient Vitals for the past 72 hrs (Last 3 readings):   Weight   08/01/18 0500 74.3 kg (163 lb 12.8 oz)   07/31/18 0400 74.5 kg (164 lb 3.2 oz)   07/30/18 0600 74.5 kg (164 lb 3.9 oz)     Body mass index is 24.19 kg/m².      Intake/Output Summary (Last 24 hours) at 08/01/18 1227  Last data filed at 08/01/18 0800   Gross per 24 hour   Intake              960 ml   Output              200 ml   Net              760 ml       Hemodynamic Parameters:       Telemetry:     Physical Exam    Significant Labs:  CBC:    Recent Labs  Lab 07/30/18  0915 07/31/18  0433 08/01/18  0410   WBC 6.29 7.36 7.34   RBC 5.47 5.02 5.25   HGB 17.7 16.4 16.9   HCT 50.6 46.7 48.4    187 209   MCV 93 93 92   MCH 32.4* 32.7* 32.2*   MCHC 35.0 35.1 34.9     BNP:    Recent Labs  Lab 07/27/18  0530 07/29/18  0524   * 158*     CMP:    Recent Labs  Lab 07/30/18  0915 07/31/18  0433 08/01/18  0410   GLU  101 92 89   CALCIUM 10.3 9.9 9.7   ALBUMIN 4.0 3.9  3.9 3.9   PROT 7.5 7.0  7.0 7.0    135* 135*   K 4.9 3.9 4.1   CO2 29 26 25    101 101   BUN 20 19 20   CREATININE 0.9 1.0 1.0   ALKPHOS 129 124  124 120   ALT 27 40  40 59*   AST 33 48*  48* 65*   BILITOT 2.7* 2.6*  2.6* 3.0*      Coagulation:   No results for input(s): PT, INR, APTT in the last 168 hours.  LDH:  No results for input(s): LDH in the last 72 hours.  Microbiology:  Microbiology Results (last 7 days)     ** No results found for the last 168 hours. **          I have reviewed all pertinent labs within the past 24 hours.      Estimated Creatinine Clearance: 61.9 mL/min (based on SCr of 1 mg/dL).    Diagnostic Results:

## 2018-08-01 NOTE — PROGRESS NOTES
Pt complaining of intolerable headache unrelieved by tylenol + 5mg oxycodone PO. No other adverse affects noted. MD notified + coming to assess pt at bedside. VSS, refer to flowsheet. Will continue to monitor.

## 2018-08-01 NOTE — HOSPITAL COURSE
Patient was started lasix 40mg IV push BID for two days with no significant diuresis. He was started on Remodulin at 2ng and underwent medication use education before d/c. He had severe headache at 20ng dose hence we decreased it back to 19ng on d/c to be titrated up later during clinic visit with time and if necessary. A Rubin catheter was placed before d/c for use. He has know CAD to mid LAD and RCA going by cardiac cath done on 7/20/2018 of which there is no plan for invasive correction due to comordities. He is advised to have optimal medica therapy and currently taking Lipitor 40mg daily and ASA. He also has know hx of PAF as stated in HPI of which he has declined anticoagulation and currently on rate control with Sotalol. It was noted that his Total bili was consistently elevated even after several days on Remodulin and diuresis.  He was advised to fu with PCP for further evaluation and possibly adjust Lipitor dose in future if necessary. He denied hx of Hepatitis or drug use. He will have lab draw in one week and also come back to HTS clinic in 1-2 weeks (sheduling done by PH coordinators)

## 2018-08-01 NOTE — ASSESSMENT & PLAN NOTE
- Patient diagnosed with multivessel CAD by [facility] 7/20 as detailed above. No interventions done.  - Poor surgical candidate given his advanced pulmonary hypertension and chronic lung disease.   - Discussed with Dr. Dex Lomas. He would require ECMO for high risk PCI(s).   - Continue ASA, statin.

## 2018-08-01 NOTE — ASSESSMENT & PLAN NOTE
- Continue supplemental O2 at home to keep sPO2 > 90 (on 4L ATC at home).  - currently at 2LNC at rest and with activities. Will continue the same after dc  - PFT's 6/21 showed normal spirometry; diffusion capacity severely decreased.  - V/Q negative for PE

## 2018-08-01 NOTE — PLAN OF CARE
Problem: Patient Care Overview  Goal: Plan of Care Review  Outcome: Ongoing (interventions implemented as appropriate)  Patient aaox4. Bed kept locked, lowest position with 2x side rails up while in bed. nonslip footwear when out of bed. Ambulates independently. Call bell and personal items within reach. Family at bedside attentive to patient's needs. Left FA PIVs cdi; removed per order, catheter intack. Cardiac diet. remodulin infusing at 19ng/kg/min, DW 75kg. On 2L NC O2 90-95%. remodulin education with acrnaso. Changed to home pump and home concentration. All discharge orders explained and questions answered. Discharged home  To care of son.

## 2018-08-01 NOTE — DISCHARGE SUMMARY
[facility]  Heart Transplant  Discharge Summary      Patient Name: Greg Nolasco  MRN: [id number]  Admission Date: 7/23/2018  Hospital Length of Stay: 9 days  Discharge Date and Time: 08/01/2018 3:17 PM  Attending Physician: Deepika att. providers found   Discharging Provider: Surya Massey MD  Primary Care Provider: Pablo Lorenzo MD     HPI: Greg Nolasco is a 76 yo M with multivessel CAD (by [facility] 7/20/2018), severe PH, PAF (on sotalol), COPD with chronic hypoxic respiratory failure (on 4 L home O2) who is admitted for management of acute on chronic right sided heart failure exacerbation and PH.    Patient notes progressive exertional dyspnea since the beginning of this year, acutely worsened over the past 3 weeks. His symptoms have been associated with 2 pillow orthopnea, PND, and ankle edema.     He was initially referred to [facility] for PH evaluation from [facility] and was seen by Dr. Dale on 7/12 who recommend L and R heart caths. He was also seen by Dr. Bajwa of  for his PAF who advised continuation of his long term tx with home sotalol 120 BID and recommended AC ([id number]), which patient declined.      The patient's last 2D echo was notable for preserved EF, estimated PASP 62, R to L shunt on bubble study c/w PFO, moderately enlarged RV with mod depressed function. A CT chest showed centrilobular emphysema and bilateral lower lobe reticulation but not c/w diffuse ILD.    He underwent LHC/RHC on 7/20 which showed multivessel CAD (80% mLAD, 80% D1, 95% dOM1, 95%RCA). RHC with RA 12, RV 94, PA 94/38/65, PW 10; CO 3.2, CI 1.6; R->L shunting across PFO. He was planned for a follow up visit but has had worsening dyspnea over last 3 weeks and was advised to present for admission.      6mw 7/12/18  Distance: 183 meters  Oxygen sats: 90% to 66%  BP: 145/75 to 171/85  HR 69 to 85 bpm     TTE 7/20/18   1 - Concentric remodeling.     2 - Normal left ventricular systolic function (EF  60-65%).     3 - Right ventricular enlargement with moderately depressed systolic function.     4 - Impaired LV relaxation, normal LAP (grade 1 diastolic dysfunction).     5 - Biatrial enlargement.     6 - Mild tricuspid regurgitation.     7 - Pulmonary hypertension. The estimated PA systolic pressure is 62 mmHg.     8 - Intermediate central venous pressure.     9 - Small pericardial effusion apically (there is shaggy material seen along the visceral pericardium at the RV apex).        Procedure(s) (LRB):  PERMCATH INSERTION-TUNNELED CVC (N/A)     Hospital Course: Patient was started lasix 40mg IV push BID for two days with no significant diuresis. He was started on Remodulin at 2ng and underwent medication use education before d/c. He had severe headache at 20ng dose hence we decreased it back to 19ng on d/c to be titrated up later during clinic visit with time and if necessary. A Rubin catheter was placed before d/c for use. He has know CAD to mid LAD and RCA going by cardiac cath done on 7/20/2018 of which there is no plan for invasive correction due to comordities. He is advised to have optimal medica therapy and currently taking Lipitor 40mg daily and ASA. He also has know hx of PAF as stated in HPI of which he has declined anticoagulation and currently on rate control with Sotalol. It was noted that his Total bili was consistently elevated even after several days on Remodulin and diuresis.  He was advised to fu with PCP for further evaluation and possibly adjust Lipitor dose in future if necessary. He denied hx of Hepatitis or drug use. He will have lab draw in one week and also come back to HTS clinic in 1-2 weeks (sheduling done by PH coordinators)    Consults         Status Ordering Provider     Inpatient consult to Cardiology  Once     Provider:  (Not yet assigned)    Completed YARELIS MCCLELLAN     Inpatient consult to Interventional Cardiology  Once     Provider:  (Not yet assigned)    Completed  VU TIJERINA          Significant Diagnostic Studies: Labs:   CMP   Recent Labs  Lab 07/31/18  0433 08/01/18  0410   * 135*   K 3.9 4.1    101   CO2 26 25   GLU 92 89   BUN 19 20   CREATININE 1.0 1.0   CALCIUM 9.9 9.7   PROT 7.0  7.0 7.0   ALBUMIN 3.9  3.9 3.9   BILITOT 2.6*  2.6* 3.0*   ALKPHOS 124  124 120   AST 48*  48* 65*   ALT 40  40 59*   ANIONGAP 8 9   ESTGFRAFRICA >60.0 >60.0   EGFRNONAA >60.0 >60.0       Pending Diagnostic Studies:     None        Final Active Diagnoses:    Diagnosis Date Noted POA    PRINCIPAL PROBLEM:  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 [I27.29] 07/24/2018 Yes    Right to left cardiac shunt [I28.0] 07/24/2018 Yes    Chronic hypoxemic respiratory failure [J96.11] 07/12/2018 Yes    Paroxysmal atrial fibrillation [I48.0] 01/29/2018 Yes    Coronary artery disease involving native coronary artery of native heart without angina pectoris [I25.10] 01/29/2018 Yes      Problems Resolved During this Admission:    Diagnosis Date Noted Date Resolved POA    Hyperbilirubinemia [E80.6] 07/25/2018 08/01/2018 No    Shortness of breath [R06.02] 07/23/2018 07/24/2018 Yes      Discharged Condition: good    Disposition: Home or Self Care    Follow Up: with HTS as scheduled    Patient Instructions:     Diet Cardiac     Notify your health care provider if you experience any of the following:  temperature >100.4     Notify your health care provider if you experience any of the following:  persistent nausea and vomiting or diarrhea     Activity as tolerated       Medications:  Reconciled Home Medications:      Medication List      START taking these medications    sodium chloride 0.9% SolP 100 mL with treprostinil 1 mg/mL Soln 3,000,000 ng  Inject 1,425 ng/min into the vein continuous.        CHANGE how you take these medications    atorvastatin 40 MG tablet  Commonly known as:  LIPITOR  Take 1 tablet (40 mg total) by mouth once daily.  Start taking on:   8/2/2018  What changed:  · medication strength  · how much to take  · how to take this  · when to take this     furosemide 20 MG tablet  Commonly known as:  LASIX  Take 1 tablet (20 mg total) by mouth every other day.  What changed:  when to take this        CONTINUE taking these medications    allopurinol 300 MG tablet  Commonly known as:  ZYLOPRIM  Take 300 mg by mouth.     ALPRAZolam 1 MG tablet  Commonly known as:  XANAX  0.5 mg.     aspirin 81 MG EC tablet  Commonly known as:  ECOTRIN  Take 81 mg by mouth once daily.     melatonin 3 mg Tab  Take 1 capsule by mouth.     MULTI VITAMIN ORAL  Take 1 tablet by mouth.     OXYGEN-AIR DELIVERY SYSTEMS MISC  by Misc.(Non-Drug; Combo Route) route.     RABEprazole 20 mg tablet  Commonly known as:  ACIPHEX  Take 20 mg by mouth.     sotalol 120 MG Tab  Commonly known as:  BETAPACE  Take 120 mg by mouth.     umeclidinium-vilanterol 62.5-25 mcg/actuation Dsdv  Inhale 1 puff into the lungs.          Discussed D/C plan with Dr. Uma Massey MD  Heart Transplant  [facility]

## 2018-08-01 NOTE — PROGRESS NOTES
Discharge    Pt presents in room alone, aaox4 with open affect. Pt states in agreement with plan to discharge to home today with no needs. Pt awaiting further education on Remodulin before discharging. Pt's son in house but currently out of room and will transport. Pt reports coping well and denies further needs, questions, concerns at this time and none indicated. Providing psychosocial and counseling support, education, resources, assistance and discharge planning as indicated. SW remains available.

## 2018-08-06 ENCOUNTER — TELEPHONE (OUTPATIENT)
Dept: TRANSPLANT | Facility: CLINIC | Age: 78
End: 2018-08-06

## 2018-08-06 DIAGNOSIS — R52 PAIN: Primary | ICD-10-CM

## 2018-08-06 RX ORDER — GABAPENTIN 100 MG/1
100 CAPSULE ORAL 3 TIMES DAILY
Qty: 90 CAPSULE | Refills: 11 | Status: SHIPPED | OUTPATIENT
Start: 2018-08-06 | End: 2018-08-09 | Stop reason: SDUPTHER

## 2018-08-06 NOTE — TELEPHONE ENCOUNTER
"Patient called to report side effects while on Remodulin. He notes that the headache has eased and his breathing is better, but now he has "bad" leg pains. They woke him in the middle of the night and it is making it hard to walk. Discussed that this is a common side effect of IV Dusty. Will ask MD for medication to help. Confirmed preferred pharmacy.   "

## 2018-08-06 NOTE — TELEPHONE ENCOUNTER
----- Message from PRANAY Daniels, RN sent at 8/6/2018  8:55 AM CDT -----  Contact: Pt       ----- Message -----  From: Jessica Morales  Sent: 8/6/2018   8:50 AM  To: Abebe LOPEZ Staff    Pt was calling to speak with Dr about medication side effects.    Pt would like a call back at [phone].    Thank you

## 2018-08-09 ENCOUNTER — DOCUMENTATION ONLY (OUTPATIENT)
Dept: TRANSPLANT | Facility: CLINIC | Age: 78
End: 2018-08-09

## 2018-08-09 ENCOUNTER — TELEPHONE (OUTPATIENT)
Dept: TRANSPLANT | Facility: CLINIC | Age: 78
End: 2018-08-09

## 2018-08-09 DIAGNOSIS — R52 PAIN: ICD-10-CM

## 2018-08-09 RX ORDER — TRAMADOL HYDROCHLORIDE 50 MG/1
50 TABLET ORAL EVERY 6 HOURS PRN
Qty: 60 TABLET | Refills: 2 | Status: SHIPPED | OUTPATIENT
Start: 2018-08-09 | End: 2018-08-19

## 2018-08-09 RX ORDER — GABAPENTIN 300 MG/1
300 CAPSULE ORAL NIGHTLY
Qty: 30 CAPSULE | Refills: 11 | Status: SHIPPED | OUTPATIENT
Start: 2018-08-09 | End: 2018-08-13

## 2018-08-09 RX ORDER — GABAPENTIN 100 MG/1
200 CAPSULE ORAL 2 TIMES DAILY
Qty: 120 CAPSULE | Refills: 11 | Status: SHIPPED | OUTPATIENT
Start: 2018-08-09 | End: 2018-08-13

## 2018-08-09 NOTE — TELEPHONE ENCOUNTER
Patient called to report that his legs/feet/ankles are still hurting after taking the Gabapentin since Monday. He says, that it's hard for him to stand at the bathroom sink to brush his teeth and feels that the pain has gotten worse. Patient has not increased his Remodulin since leaving the hospital.  Notified Dr. Lomas. Will increase Gabapentin and add Tramadol, PRN. Asked patient to let us know if this helps. Confirmed appointment for Monday, August 13th.

## 2018-08-10 ENCOUNTER — TELEPHONE (OUTPATIENT)
Dept: TRANSPLANT | Facility: CLINIC | Age: 78
End: 2018-08-10

## 2018-08-13 ENCOUNTER — OFFICE VISIT (OUTPATIENT)
Dept: TRANSPLANT | Facility: CLINIC | Age: 78
End: 2018-08-13
Payer: MEDICARE

## 2018-08-13 ENCOUNTER — HOSPITAL ENCOUNTER (OUTPATIENT)
Dept: PULMONOLOGY | Facility: CLINIC | Age: 78
Discharge: HOME | End: 2018-08-13
Payer: MEDICARE

## 2018-08-13 VITALS — HEIGHT: 69 IN | WEIGHT: 168 LBS | BODY MASS INDEX: 24.88 KG/M2

## 2018-08-13 VITALS
OXYGEN SATURATION: 92 % | HEIGHT: 69 IN | HEART RATE: 72 BPM | WEIGHT: 168 LBS | SYSTOLIC BLOOD PRESSURE: 104 MMHG | DIASTOLIC BLOOD PRESSURE: 62 MMHG | BODY MASS INDEX: 24.88 KG/M2

## 2018-08-13 DIAGNOSIS — J96.11 CHRONIC HYPOXEMIC RESPIRATORY FAILURE: ICD-10-CM

## 2018-08-13 DIAGNOSIS — I50.810 RIGHT HEART FAILURE DUE TO PULMONARY HYPERTENSION: Primary | ICD-10-CM

## 2018-08-13 DIAGNOSIS — R52 PAIN: ICD-10-CM

## 2018-08-13 DIAGNOSIS — G47.33 OSA (OBSTRUCTIVE SLEEP APNEA): ICD-10-CM

## 2018-08-13 DIAGNOSIS — I27.9 CHRONIC PULMONARY HEART DISEASE: ICD-10-CM

## 2018-08-13 DIAGNOSIS — I27.29 RIGHT HEART FAILURE DUE TO PULMONARY HYPERTENSION: Primary | ICD-10-CM

## 2018-08-13 PROCEDURE — 99213 OFFICE O/P EST LOW 20 MIN: CPT | Mod: PBBFAC,25 | Performed by: INTERNAL MEDICINE

## 2018-08-13 PROCEDURE — 94618 PULMONARY STRESS TESTING: CPT | Mod: 26,S$PBB,, | Performed by: INTERNAL MEDICINE

## 2018-08-13 PROCEDURE — 99214 OFFICE O/P EST MOD 30 MIN: CPT | Mod: S$PBB,,, | Performed by: INTERNAL MEDICINE

## 2018-08-13 PROCEDURE — 99999 PR PBB SHADOW E&M-EST. PATIENT-LVL III: CPT | Mod: PBBFAC,,, | Performed by: INTERNAL MEDICINE

## 2018-08-13 PROCEDURE — 94618 PULMONARY STRESS TESTING: CPT | Mod: PBBFAC | Performed by: INTERNAL MEDICINE

## 2018-08-13 RX ORDER — GABAPENTIN 300 MG/1
300 CAPSULE ORAL 3 TIMES DAILY
Qty: 90 CAPSULE | Refills: 11 | Status: SHIPPED | OUTPATIENT
Start: 2018-08-13 | End: 2018-08-28

## 2018-08-13 NOTE — ASSESSMENT & PLAN NOTE
Will need a copy of sleep study   Will get noctural oxygen testing at night to see if noctural oxygen helps

## 2018-08-13 NOTE — PROGRESS NOTES
"Subjective:    Patient ID:  Greg Nolasco is a 77 y.o. male who presents for follow-up of Pulmonary Hypertension.    HPI   multivessel CAD (by [facility] 7/20/2018), severe PH, PAF (on sotalol), COPD with chronic hypoxic respiratory failure (on 4 L home O2) who was started on IV remodulin in late July 2018 for PAH            Today continues to improve as he is able to get out in yard without oxygen  No edema (uses lasix 20 QOD)  Biggest limiting factor is lower extremity joint pains (knees / feet / ankles)  Joint pain "tolerably" on gabapentin.                Six Minute Walk Test:  Preliminary results near normal distance of 304 m which is significant improvement compared to July 2018 distance of 182 m       Review of Systems   Constitution: Negative for decreased appetite, weight gain and weight loss.   Cardiovascular: Negative for chest pain, dyspnea on exertion, leg swelling, near-syncope, orthopnea and palpitations.   Respiratory: Negative for cough and shortness of breath.    Musculoskeletal: Negative for myalgias.   Gastrointestinal: Negative for jaundice.        Objective:  /62 (BP Location: Right arm, Patient Position: Sitting, BP Method: Medium (Automatic))   Pulse 72   Ht 5' 9" (1.753 m)   Wt 76.2 kg (167 lb 15.9 oz)   SpO2 (!) 92%   BMI 24.81 kg/m²        Physical Exam   Constitutional: He is oriented to person, place, and time. He appears well-developed and well-nourished.   HENT:   Head: Normocephalic and atraumatic.   Right Ear: External ear normal.   Left Ear: External ear normal.   Nose: Nose normal.   Mouth/Throat: Oropharynx is clear and moist.   Eyes: Conjunctivae and EOM are normal. Pupils are equal, round, and reactive to light.   Neck: Normal range of motion. Neck supple. JVD (JVP < 5) present. No hepatojugular reflux present. No tracheal deviation present. No thyromegaly present.   Cardiovascular: Exam reveals gallop and S3 (soft / right sided ). Exam reveals no friction rub.   No " murmur heard.  Pulmonary/Chest: No respiratory distress. He has no wheezes. He has no rales. He exhibits no tenderness.   Abdominal: He exhibits no distension and no mass. There is no tenderness. There is no rebound and no guarding.   Genitourinary:   Genitourinary Comments: deferred   Musculoskeletal: He exhibits no edema or tenderness.   Lymphadenopathy:     He has no cervical adenopathy.   Neurological: He is alert and oriented to person, place, and time. He has normal reflexes. He displays normal reflexes. No cranial nerve deficit. He exhibits normal muscle tone. Coordination normal.   Skin: No rash noted. No erythema. No pallor.   Psychiatric: He has a normal mood and affect. His behavior is normal. Judgment and thought content normal.   Vitals reviewed.          Chemistry        Component Value Date/Time     08/13/2018 0854    K 4.6 08/13/2018 0854     08/13/2018 0854    CO2 29 08/13/2018 0854    BUN 13 08/13/2018 0854    CREATININE 0.9 08/13/2018 0854    GLU 84 08/13/2018 0854        Component Value Date/Time    CALCIUM 9.8 08/13/2018 0854    ALKPHOS 133 08/13/2018 0854    AST 21 08/13/2018 0854    ALT 21 08/13/2018 0854    BILITOT 1.6 (H) 08/13/2018 0854    ESTGFRAFRICA >60.0 08/13/2018 0854    EGFRNONAA >60.0 08/13/2018 0854            Magnesium   Date Value Ref Range Status   08/13/2018 2.2 1.6 - 2.6 mg/dL Final       Lab Results   Component Value Date    WBC 6.61 08/13/2018    HGB 16.1 08/13/2018    HCT 46.5 08/13/2018    MCV 92 08/13/2018     08/13/2018       Lab Results   Component Value Date    INR 1.1 07/23/2018       BNP   Date Value Ref Range Status   08/13/2018 211 (H) 0 - 99 pg/mL Final     Comment:     Values of less than 100 pg/ml are consistent with non-CHF populations.   07/29/2018 158 (H) 0 - 99 pg/mL Final     Comment:     Values of less than 100 pg/ml are consistent with non-CHF populations.   07/27/2018 203 (H) 0 - 99 pg/mL Final     Comment:     Values of less than 100  pg/ml are consistent with non-CHF populations.               Assessment:       1. 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 Pain    3. BARBARA (obstructive sleep apnea)    4. Chronic hypoxemic respiratory failure        WHO Group 1  Functional Class 3A     Plan:     BARBARA (obstructive sleep apnea)  Will need a copy of sleep study   Will get noctural oxygen testing at night to see if noctural oxygen helps    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  Will increase remodulin every other cartiridge change    RHC /echo in OCt 2018 (hoping for a remodulin dose in 30's)        Follow-up in about 1 month (around 9/13/2018).  Orders Placed This Encounter    Comprehensive metabolic panel    Brain natriuretic peptide    Stress test, pulmonary    gabapentin (NEURONTIN) 300 MG capsule

## 2018-08-13 NOTE — ASSESSMENT & PLAN NOTE
Will increase remodulin every other cartiridge change    RHC /echo in OCt 2018 (hoping for a remodulin dose in 30's)

## 2018-08-13 NOTE — PROCEDURES
Greg Nolasco is a 77 y.o.  male patient, who presents for a 6 minute walk test ordered by MD Augustin.  The diagnosis is Pulmonary Hypertension.  The patient's BMI is 24.9 kg/m2.  Predicted distance (lower limit of normal) is 312.93 meters.      Test Results:    The test was completed without stopping.   The total time walked was 360 seconds.  During walking, the patient reported:  Dyspnea. The patient used no assistive devices  during testing.     08/13/2018---------Distance: 304.8 meters (1000 feet)     O2 Sat % Supplemental Oxygen Heart Rate Blood Pressure Lenard Scale   Pre-exercise  (Resting) 92 % Room Air 72 bpm 117/58 mmHg 0   During Exercise 74 % Room Air 91 bpm 125/70 mmHg 1   Post-exercise  (Recovery) 90 % Room Air  75 bpm   mmHg       Recovery Time: 298 seconds    Performing nurse/tech: Nirav MUKHERJEE      PREVIOUS STUDY:   The patient had a previous study.  07/12/2018 ----------Distance:  182.88 meters (600 feet)       O2 Sat % Supplemental Oxygen Heart Rate Blood Pressure Lenard Scale   Pre-exercise  (Resting) 82 % Room Air 69 bpm 145/75 mmHg 0   Pre-exercise  (Resting) 90 % 4 L/M  69 bpm  145/75 mmHg 0    During Exercise 66 %  4 L/M  85 bpm  171/85 mmHg 4    Post-exercise    89 %  4 L/M  71 bpm  169/88 mmHg           CLINICAL INTERPRETATION:  Six minute walk distance is 304.8 meters (1000 feet) with very light dyspnea.  During exercise, there was significant desaturation while breathing room air.  Both blood pressure and heart rate remained stable with walking.  The patient did not report non-pulmonary symptoms during exercise.  Since the previous study in July 2018, exercise capacity is significantly improved.  Based upon age and body mass index, exercise capacity is less than predicted.

## 2018-08-16 DIAGNOSIS — Z91.89 AT RISK FOR CORONARY ARTERY DISEASE: ICD-10-CM

## 2018-08-21 ENCOUNTER — TELEPHONE (OUTPATIENT)
Dept: TRANSPLANT | Facility: CLINIC | Age: 78
End: 2018-08-21

## 2018-08-21 NOTE — TELEPHONE ENCOUNTER
"Mylene Talley RN ([facility])    "I met with Mr. Nolasco and his daughter 8/17 afternoon. They are doing well with Remodulin management, and understand Dr. Barbour titration orders and how to follow the dosing sheet. He continues to complain of leg and foot discomfort. He describes it in upper legs, behind knees, calf muscles and worse in feet. He said his hips no longer hurt. He said his pain sets about noon then continues to increase until around 4 pm when he has to take an pain pill he has from an old prescription, I believe its Tramadol. He said that eases him, and he is able to go to sleep and sleeps through the night.  He started the Gabapentin increase Wednesday afternoon, and said the pain onset started later in Friday. I encouraged him to be patient and give the med time to work.  He is anxious to increase his Remodulin dose and was to go to dose of 20 ng/kg/min with cassette change Friday after I left.  Naomie, his daughter, is competent with CVL dressing change. I do not plan to make additional routine visits. Let me know if I can be of additional assistance. He was a kj."        "

## 2018-08-27 ENCOUNTER — TELEPHONE (OUTPATIENT)
Dept: TRANSPLANT | Facility: CLINIC | Age: 78
End: 2018-08-27

## 2018-08-27 NOTE — TELEPHONE ENCOUNTER
Patient called to report that he is due to increase his remodulin today to 22 ng/kg/min. He notes that he has only had a slight headache and one episode of diarrhea controlled by OTC meds. He does not have any swelling in his extremities. Reports that his appetite has almost returned to normal. Mr. Nolasco continues to have trouble with leg pain. He reports that the pain stops his activity before he gets SOB. It is behind his knees and bottom of his feet and occurs after he gets up in the morning until late evening. Pain only subsides when he is in bed. He is currently taking Gabapentin 300 mg, TID and PRN Tramadol. He reports that this only dulls the pain slightly.    Reassured patient and Notified MD.

## 2018-08-28 ENCOUNTER — TELEPHONE (OUTPATIENT)
Dept: TRANSPLANT | Facility: CLINIC | Age: 78
End: 2018-08-28

## 2018-08-28 RX ORDER — PREGABALIN 50 MG/1
50 CAPSULE ORAL 3 TIMES DAILY
Qty: 90 CAPSULE | Refills: 6 | Status: SHIPPED | OUTPATIENT
Start: 2018-08-28 | End: 2018-09-14

## 2018-08-28 NOTE — TELEPHONE ENCOUNTER
"Contacted patient about medication change. Per Dr. Lomas, patient is to switch from Gabapentin to Lyrica. RX has been called into patient's pharmacy. Patient verbalized understanding.   He states that he increased his remodulin yesterday and woke up feeling better than he had in a week. Feels like he can breathe a "little better." His activity is still hampered by his leg pain. Asked patient to call the office after taking Lyrica to report any changes in symptoms or new concerning symptoms.  "

## 2018-09-06 ENCOUNTER — TELEPHONE (OUTPATIENT)
Dept: TRANSPLANT | Facility: CLINIC | Age: 78
End: 2018-09-06

## 2018-09-06 DIAGNOSIS — I73.9 PERIPHERAL ARTERIAL DISEASE: Primary | ICD-10-CM

## 2018-09-06 NOTE — TELEPHONE ENCOUNTER
"Clarified symptoms with Mr. Nolasco. States that he does not have any pain at night, "as soon as I hit the bed, the pain goes away and does not wake me up." He reports the pain starts as soon as he gets up in the morning.   Per Dr. Lomas, increase Lyrica dose to 2 tablets in the morning. Will contact Dr. Joseph Lomas for possible look at PAD.  "

## 2018-09-06 NOTE — TELEPHONE ENCOUNTER
"Patient reports taking Lyrica for 8 days with no relief from leg pain. Continues to have pain from his "knees to his ankles" and "feet burning and stinging." He is increasing his remodulin two times a week and feels his breathing is better, but his legs are debilitating. Took one "old pain pill " (Hydrocodone) that took away the pain temporarily. He has only taken Tramadol occasionally. Advised patient that he can take Tramadol 1 tab, every 6 hours for pain as needed.  Patient is also worried about the cost of Lyrica as it was more than his usual copay. He reports cost of $53.    Will discuss with Dr. Lomas.  "

## 2018-09-14 ENCOUNTER — TELEPHONE (OUTPATIENT)
Dept: TRANSPLANT | Facility: CLINIC | Age: 78
End: 2018-09-14

## 2018-09-14 RX ORDER — HYDROCODONE BITARTRATE AND ACETAMINOPHEN 5; 325 MG/1; MG/1
1 TABLET ORAL EVERY 6 HOURS PRN
Qty: 28 TABLET | Refills: 0 | Status: SHIPPED | OUTPATIENT
Start: 2018-09-14 | End: 2018-09-28

## 2018-09-14 NOTE — PROGRESS NOTES
Two months of Intractable leg pain not responsive to tramadol / lyrica / gabapentin   Low risk for abuse (scoring system used / opoid monitoring site reviewed - last RX 4/26/18 15 tablets prior to leg pain)  Will give two weeks of BID NORCO

## 2018-09-14 NOTE — TELEPHONE ENCOUNTER
"Patient called to report that the Lyrica is not providing any relief for his leg pain. He decided to stop taking it so he would not have to worry about side effects since it is not helping. Reports the same pain in his legs, bilat, and foot "burning" bilat. Mr. Nolasco is scheduled to see Dr. Joseph Lomas and have an U/S done of his lower extremities. Dr. RUBEN Lomas agreed to a two week Rx for Norco to help with symptoms until his appointments here. Plan going forward will be pending results of testing.  Patient is agreeable to all. He increased his remodulin today to 26 ng/kg/min (see flowsheets). No additional SE's noted.   "

## 2018-09-25 ENCOUNTER — DOCUMENTATION ONLY (OUTPATIENT)
Dept: TRANSPLANT | Facility: CLINIC | Age: 78
End: 2018-09-25

## 2018-09-25 ENCOUNTER — OFFICE VISIT (OUTPATIENT)
Dept: TRANSPLANT | Facility: CLINIC | Age: 78
End: 2018-09-25
Payer: MEDICARE

## 2018-09-25 ENCOUNTER — HOSPITAL ENCOUNTER (OUTPATIENT)
Dept: PULMONOLOGY | Facility: CLINIC | Age: 78
Discharge: HOME | End: 2018-09-25
Payer: MEDICARE

## 2018-09-25 ENCOUNTER — HOSPITAL ENCOUNTER (OUTPATIENT)
Dept: RADIOLOGY | Facility: HOSPITAL | Age: 78
Discharge: HOME | End: 2018-09-25
Attending: INTERNAL MEDICINE
Payer: MEDICARE

## 2018-09-25 ENCOUNTER — CLINICAL SUPPORT (OUTPATIENT)
Dept: CARDIOLOGY | Facility: CLINIC | Age: 78
End: 2018-09-25
Attending: INTERNAL MEDICINE
Payer: MEDICARE

## 2018-09-25 VITALS
WEIGHT: 158.94 LBS | SYSTOLIC BLOOD PRESSURE: 104 MMHG | DIASTOLIC BLOOD PRESSURE: 65 MMHG | HEART RATE: 76 BPM | HEIGHT: 69 IN | BODY MASS INDEX: 23.54 KG/M2 | OXYGEN SATURATION: 91 %

## 2018-09-25 VITALS — HEIGHT: 69 IN | BODY MASS INDEX: 25.18 KG/M2 | WEIGHT: 170 LBS

## 2018-09-25 DIAGNOSIS — G47.33 OSA (OBSTRUCTIVE SLEEP APNEA): ICD-10-CM

## 2018-09-25 DIAGNOSIS — I27.9 CHRONIC PULMONARY HEART DISEASE: ICD-10-CM

## 2018-09-25 DIAGNOSIS — Z91.89 AT RISK FOR CORONARY ARTERY DISEASE: ICD-10-CM

## 2018-09-25 DIAGNOSIS — I27.29 RIGHT HEART FAILURE DUE TO PULMONARY HYPERTENSION: ICD-10-CM

## 2018-09-25 DIAGNOSIS — I73.9 PERIPHERAL ARTERIAL DISEASE: ICD-10-CM

## 2018-09-25 DIAGNOSIS — R06.00 DYSPNEA AND RESPIRATORY ABNORMALITY: ICD-10-CM

## 2018-09-25 DIAGNOSIS — I50.810 RIGHT HEART FAILURE DUE TO PULMONARY HYPERTENSION: ICD-10-CM

## 2018-09-25 DIAGNOSIS — R06.89 DYSPNEA AND RESPIRATORY ABNORMALITY: ICD-10-CM

## 2018-09-25 DIAGNOSIS — R93.89 ABNORMAL CHEST CT: Primary | ICD-10-CM

## 2018-09-25 DIAGNOSIS — J96.11 CHRONIC HYPOXEMIC RESPIRATORY FAILURE: ICD-10-CM

## 2018-09-25 PROCEDURE — 93925 LOWER EXTREMITY STUDY: CPT | Mod: PBBFAC | Performed by: INTERNAL MEDICINE

## 2018-09-25 PROCEDURE — 99214 OFFICE O/P EST MOD 30 MIN: CPT | Mod: S$PBB,,, | Performed by: PHYSICIAN ASSISTANT

## 2018-09-25 PROCEDURE — 99213 OFFICE O/P EST LOW 20 MIN: CPT | Mod: PBBFAC,25 | Performed by: PHYSICIAN ASSISTANT

## 2018-09-25 PROCEDURE — 25500020 PHARM REV CODE 255: Performed by: INTERNAL MEDICINE

## 2018-09-25 PROCEDURE — 94618 PULMONARY STRESS TESTING: CPT | Mod: PBBFAC | Performed by: INTERNAL MEDICINE

## 2018-09-25 PROCEDURE — 75561 CARDIAC MRI FOR MORPH W/DYE: CPT | Mod: 26,,, | Performed by: RADIOLOGY

## 2018-09-25 PROCEDURE — 99999 PR PBB SHADOW E&M-EST. PATIENT-LVL III: CPT | Mod: PBBFAC,,, | Performed by: PHYSICIAN ASSISTANT

## 2018-09-25 PROCEDURE — 94618 PULMONARY STRESS TESTING: CPT | Mod: 26,S$PBB,, | Performed by: INTERNAL MEDICINE

## 2018-09-25 PROCEDURE — 75561 CARDIAC MRI FOR MORPH W/DYE: CPT | Mod: TC

## 2018-09-25 PROCEDURE — A9577 INJ MULTIHANCE: HCPCS | Performed by: INTERNAL MEDICINE

## 2018-09-25 RX ADMIN — GADOBENATE DIMEGLUMINE 20 ML: 529 INJECTION, SOLUTION INTRAVENOUS at 02:09

## 2018-09-26 NOTE — PROCEDURES
Greg oNlasco is a 77 y.o.  male patient, who presents for a 6 minute walk test ordered by MD Abebe.  The diagnosis is Pre Heart Transplant, Pulmonary Hypertension.  The patient's BMI is 25.2 kg/m2.  Predicted distance (lower limit of normal) is 311.25 meters.      Test Results:    The test was completed without stopping.    The total time walked was 360 seconds.  During walking, the patient reported:  Dyspnea, Leg pain. The patient used no assistive devices  during testing.     09/25/2018---------Distance: 304.8 meters (1000 feet)     O2 Sat % Supplemental Oxygen Heart Rate Blood Pressure Lenard Scale   Pre-exercise  (Resting) 91 % Room Air 74 bpm 137/75 mmHg 0   During Exercise 71 % Room Air 100 bpm 160/80 mmHg 3   Post-exercise  (Recovery) 91 % Room Air  80 bpm 144/82 mmHg       Recovery Time: 300 seconds    Performing nurse/tech: Estopinal RRT      PREVIOUS STUDY:   The patient had a previous study.  08/13/2018---------Distance: 304.8 meters (1000 feet)       O2 Sat % Supplemental Oxygen Heart Rate Blood Pressure Lenard Scale   Pre-exercise  (Resting) 92 % Room Air 72 bpm 117/58 mmHg 0   During Exercise 74 % Room Air 91 bpm 125/70 mmHg 1   Post-exercise  (Recovery) 90 % Room Air  75 bpm   mmHg          CLINICAL INTERPRETATION:  Six minute walk distance is 304.8 meters (1000 feet) with moderate dyspnea.  During exercise, there was significant desaturation while breathing room air.  Both blood pressure and heart rate increased significantly with walking.  This may represent a hypertensive and a tachycardic response to exercise.  The patient reported non-pulmonary symptoms during exercise.  The patient may benefit from using supplemental oxygen during exertion.  No previous study performed.   Since the previous study in August 2018, exercise tolerance is unchanged.  Based upon age and body mass index, exercise capacity is less than predicted.

## 2018-09-28 ENCOUNTER — TELEPHONE (OUTPATIENT)
Dept: TRANSPLANT | Facility: CLINIC | Age: 78
End: 2018-09-28

## 2018-10-01 NOTE — PROGRESS NOTES
"Subjective:     HPI:  Mr. Nolasco is a 77 y.o. year old White male who has presents for PH follow up. Follows with Dr. Lomas currently on remodulin IV 32 ng. Has an history of multivessel CAD (by [facility] 7/20/2018), severe PH, PAF (on sotalol), COPD with chronic hypoxic respiratory failure (on 4 L home O2) who was started on IV remodulin in late July 2018 for PAH. Since initiation of remodulin he has felt well from CV standpoint. Main issue is bilateral, LE leg pain, not worse with exertion, burning in nature. He was given lyrica by Dr. Lomas after lasy visit, says neurontin did not help. The lyrica was stopped after this also did not relieve the pain. He has been taking percocet now at night. He does not associate it with the medication or when he increases it at home. Had US LE today without evidence of stenosis. Also has cardiac MRI pending         Past Medical History:   Diagnosis Date    Chronic hypoxemic respiratory failure     Coronary artery disease     BARBARA (obstructive sleep apnea)      Past Surgical History:   Procedure Laterality Date    ANGIOPLASTY  1991    BACK SURGERY      HERNIA REPAIR      KNEE SURGERY      SHOULDER SURGERY      SINUS SURGERY         Review of Systems   Constitution: Negative for chills, decreased appetite, diaphoresis and weight gain.   Cardiovascular: Negative for chest pain, cyanosis, dyspnea on exertion, irregular heartbeat and paroxysmal nocturnal dyspnea.   Respiratory: Negative for cough, shortness of breath and snoring.    Hematologic/Lymphatic: Negative for adenopathy.   Musculoskeletal: Positive for back pain and joint pain.   Gastrointestinal: Negative for nausea and vomiting.   Psychiatric/Behavioral: Negative for altered mental status and depression.       Objective:   Blood pressure 104/65, pulse 76, height 5' 9" (1.753 m), weight 72.1 kg (158 lb 15.2 oz), SpO2 (!) 91 %.body mass index is 23.47 kg/m².  Physical Exam   Constitutional: He appears well-developed and " well-nourished.   HENT:   Head: Normocephalic.   Eyes: Pupils are equal, round, and reactive to light.   Neck: Normal range of motion. No JVD present.   Cardiovascular: Normal rate and regular rhythm. Exam reveals no friction rub.   No murmur heard.  Pulmonary/Chest: Effort normal. No respiratory distress. He has no wheezes.   Abdominal: Soft. He exhibits no distension.   Musculoskeletal: Normal range of motion. He exhibits no edema.   Neurological: He is alert. No cranial nerve deficit.   Skin: He is not diaphoretic.       Labs:    Chemistry        Component Value Date/Time     09/25/2018 1053    K 4.1 09/25/2018 1053     09/25/2018 1053    CO2 25 09/25/2018 1053    BUN 11 09/25/2018 1053    CREATININE 0.9 09/25/2018 1053    GLU 85 09/25/2018 1053        Component Value Date/Time    CALCIUM 9.8 09/25/2018 1053    ALKPHOS 147 (H) 09/25/2018 1053    AST 22 09/25/2018 1053    ALT 20 09/25/2018 1053    BILITOT 2.5 (H) 09/25/2018 1053    ESTGFRAFRICA >60.0 09/25/2018 1053    EGFRNONAA >60.0 09/25/2018 1053          Magnesium   Date Value Ref Range Status   08/13/2018 2.2 1.6 - 2.6 mg/dL Final     Lab Results   Component Value Date    WBC 6.61 08/13/2018    HGB 16.1 08/13/2018    HCT 46.5 08/13/2018     08/13/2018     Lab Results   Component Value Date    INR 1.1 07/23/2018     BNP   Date Value Ref Range Status   09/25/2018 213 (H) 0 - 99 pg/mL Final     Comment:     Values of less than 100 pg/ml are consistent with non-CHF populations.   08/13/2018 211 (H) 0 - 99 pg/mL Final     Comment:     Values of less than 100 pg/ml are consistent with non-CHF populations.   07/29/2018 158 (H) 0 - 99 pg/mL Final     Comment:     Values of less than 100 pg/ml are consistent with non-CHF populations.     No results found for: LDH  No results found for this or any previous visit.  No results found for this or any previous visit.    Assessment:      1. Abnormal chest CT    2. Chronic hypoxemic respiratory failure     3. Chronic pulmonary heart disease    4. Dyspnea and respiratory abnormality    5. BARBARA (obstructive sleep apnea)    6. 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  Hold off on increasing remodulin for now with excessive bilateral LE pain (although seems unrelated to drug)  RTC to see Dr. Lomas in 6 weeks  Patient is now NYHA III  Recommend 2 gram sodium restriction and 1500cc fluid restriction.  Encourage physical activity with graded exercise program.  Requested patient to weigh themselves daily, and to notify us if their weight increases by more than 3 lbs in 1 day or 5 lbs in 1 week.

## 2018-10-04 ENCOUNTER — TELEPHONE (OUTPATIENT)
Dept: TRANSPLANT | Facility: CLINIC | Age: 78
End: 2018-10-04

## 2018-10-04 NOTE — TELEPHONE ENCOUNTER
"Mr. Nolasco calling for results of MRI. PH Coordinator will follow-up with Dr. Lomas and call patient back this afternoon.   Patient states that his leg pain is the same, unchanged even when increasing Remodulin. Endorses taking "an occasional Tramadol for pain. Advise patient that he can take Tramadol every 6 hours if needed, up to 3 times daily. Educated patient on taking pain medication before pain becomes severe. Patient will try this regimen and see if it helps.   Notified MD of all.    "

## 2018-10-11 ENCOUNTER — TELEPHONE (OUTPATIENT)
Dept: TRANSPLANT | Facility: CLINIC | Age: 78
End: 2018-10-11

## 2018-10-11 RX ORDER — ONDANSETRON HYDROCHLORIDE 8 MG/1
8 TABLET, FILM COATED ORAL EVERY 8 HOURS PRN
Qty: 90 TABLET | Refills: 0 | Status: SHIPPED | OUTPATIENT
Start: 2018-10-11 | End: 2018-12-20

## 2018-10-11 RX ORDER — DIPHENOXYLATE HYDROCHLORIDE AND ATROPINE SULFATE 2.5; .025 MG/1; MG/1
1 TABLET ORAL 4 TIMES DAILY PRN
Qty: 120 TABLET | Refills: 0 | Status: SHIPPED | OUTPATIENT
Start: 2018-10-11 | End: 2018-10-21

## 2018-10-11 NOTE — TELEPHONE ENCOUNTER
"Follow up with patient, he states that he has had "two bad days." He has been feeling very nauseous, continued leg pain, diarrhea  and no appetite. He has tried OTC Immodium for the diarrhea. Increased his remodulin to goal dose on Monday. Tried taking Tramadol TID for 3 days but it did not help and made him "groggy." Mr. Nolasco feels like his leg pain is of most concern.     Notified Dr. Lomas. Will send in rx for zofran and lomotil. Will also transition patient from Remodulin to Veletri.   Sent referral for Veletri to [facility].  "

## 2018-10-12 ENCOUNTER — DOCUMENTATION ONLY (OUTPATIENT)
Dept: TRANSPLANT | Facility: CLINIC | Age: 78
End: 2018-10-12

## 2018-10-12 NOTE — PLAN OF CARE
Mr. Nolasco not tolerating IV Remodulin 2/2 intractable leg pain. Will transition patient from IV Remodulin to IV Veletri pending insurance approval.

## 2018-10-12 NOTE — PROGRESS NOTES
Based on MR Would recommend medical management If SOB does not improve or angina occurs would consider getting PET stress Will ask nursing to call pt

## 2018-10-16 ENCOUNTER — TELEPHONE (OUTPATIENT)
Dept: TRANSPLANT | Facility: CLINIC | Age: 78
End: 2018-10-16

## 2018-10-16 NOTE — TELEPHONE ENCOUNTER
"F/U - Patient reports that his diarrhea has subsided with the addition of Lomotil. His nausea is better but still there. He has not been able to get the Zofran, however. Advised patient to call his insurance as the pharmacy is showing that this medication was filled somewhere else. Our system shows the Rx was sent to [facility]. Mr. Nolasco is maintaining at 40 ng/kg/min. He says that the pain in the top of his legs is better but he still has pain behind his knees to his ankles and feet. Instructed patient to restart Gabapentin, per Dr. Lomas.  Advised Mr. Nolasco that Dr. Lomas reviewed his MRI and medical management is the best course at this time. He has not experienced any chest pain.   In the course of our conversation, Mr. Nolasco asked when he could stop the IV medication and switch to an oral medication. He expressed growing tired of managing the pump and the mixing. He states that he does not sleep well because he worries about getting tangled in the tubing. He states that I thought the doctor said that I could switch at some point. He does endorse that his SOB is better, but not really able to tell because he is not walking as much due to leg pain. He states that I thought the doctor said that I could switch at some point.  Reassured Mr. Nolasco. Let him know that he has done very well with managing everything. He states that he is "just trying to do my part." Advised patient that his concerns would be relayed to Dr. Lomas.  "

## 2018-10-19 RX ORDER — GABAPENTIN 300 MG/1
300 CAPSULE ORAL 3 TIMES DAILY
Qty: 90 CAPSULE | Refills: 11 | Status: SHIPPED | OUTPATIENT
Start: 2018-10-19 | End: 2018-12-20

## 2018-10-19 RX ORDER — TRAMADOL HYDROCHLORIDE 50 MG/1
50 TABLET ORAL EVERY 6 HOURS PRN
COMMUNITY
End: 2018-12-20

## 2018-10-22 DIAGNOSIS — Z79.01 ANTICOAGULATION ADEQUATE: Primary | ICD-10-CM

## 2018-10-29 ENCOUNTER — TELEPHONE (OUTPATIENT)
Dept: TRANSPLANT | Facility: CLINIC | Age: 78
End: 2018-10-29

## 2018-10-29 NOTE — TELEPHONE ENCOUNTER
----- Message from Ernesto Pringle sent at 10/27/2018 10:18 AM CDT -----  Contact: pt   Pt would like a call back regarding rescheduling appointment for his procedure having transportation issues      Pt can be reached at  [phone]

## 2018-10-30 DIAGNOSIS — I27.29 RIGHT HEART FAILURE DUE TO PULMONARY HYPERTENSION: Primary | ICD-10-CM

## 2018-10-30 DIAGNOSIS — R06.89 OTHER ABNORMALITIES OF BREATHING: ICD-10-CM

## 2018-10-30 DIAGNOSIS — I50.810 RIGHT HEART FAILURE DUE TO PULMONARY HYPERTENSION: Primary | ICD-10-CM

## 2018-10-31 ENCOUNTER — HOSPITAL ENCOUNTER (OUTPATIENT)
Facility: HOSPITAL | Age: 78
Discharge: HOME | End: 2018-10-31
Attending: INTERNAL MEDICINE | Admitting: INTERNAL MEDICINE
Payer: MEDICARE

## 2018-10-31 ENCOUNTER — TELEPHONE (OUTPATIENT)
Dept: TRANSPLANT | Facility: CLINIC | Age: 78
End: 2018-10-31

## 2018-10-31 DIAGNOSIS — E80.6 HYPERBILIRUBINEMIA: Primary | ICD-10-CM

## 2018-10-31 PROCEDURE — 25000003 PHARM REV CODE 250

## 2018-10-31 PROCEDURE — 93451 RIGHT HEART CATH: CPT

## 2018-10-31 PROCEDURE — 93451 RIGHT HEART CATH: CPT | Mod: 26,,, | Performed by: INTERNAL MEDICINE

## 2018-10-31 PROCEDURE — C1894 INTRO/SHEATH, NON-LASER: HCPCS

## 2018-10-31 NOTE — DISCHARGE INSTRUCTIONS

## 2018-10-31 NOTE — PROGRESS NOTES
Will consult hepatology for elevated t seth  Will get PET stress test later this year / early next year to look for ischemic

## 2018-10-31 NOTE — H&P
Subjective:      Greg Nolasco is a 77 y.o. male with a who needs RHC for evaluation of cardiac hemodynamics.  Currently able to lay flat.      Past Medical History:   Diagnosis Date    Chronic hypoxemic respiratory failure     Coronary artery disease     BARBARA (obstructive sleep apnea)      Past Surgical History:   Procedure Laterality Date    ANGIOPLASTY      BACK SURGERY      HERNIA REPAIR      KNEE SURGERY      SHOULDER SURGERY      SINUS SURGERY       Social History     Socioeconomic History    Marital status:      Spouse name: Not on file    Number of children: Not on file    Years of education: Not on file    Highest education level: Not on file   Social Needs    Financial resource strain: Not on file    Food insecurity - worry: Not on file    Food insecurity - inability: Not on file    Transportation needs - medical: Not on file    Transportation needs - non-medical: Not on file   Occupational History    Not on file   Tobacco Use    Smoking status: Former Smoker     Last attempt to quit:      Years since quittin.8    Smokeless tobacco: Current User   Substance and Sexual Activity    Alcohol use: Yes    Drug use: No    Sexual activity: Not on file   Other Topics Concern    Not on file   Social History Narrative    Not on file     No family history on file.        Other significant clinical information:  Review of patient's allergies indicates:   Allergen Reactions    Clindamycin Shortness Of Breath    Penicillins Rash     No current facility-administered medications on file prior to encounter.      Current Outpatient Medications on File Prior to Encounter   Medication Sig    allopurinol (ZYLOPRIM) 300 MG tablet Take 300 mg by mouth.    ALPRAZolam (XANAX) 1 MG tablet 0.5 mg.     aspirin (ECOTRIN) 81 MG EC tablet Take 81 mg by mouth once daily.    atorvastatin (LIPITOR) 40 MG tablet Take 1 tablet (40 mg total) by mouth once daily.    furosemide (LASIX) 20  MG tablet Take 1 tablet (20 mg total) by mouth every other day.    gabapentin (NEURONTIN) 300 MG capsule Take 1 capsule (300 mg total) by mouth 3 (three) times daily.    influenza (FLUZONE HIGH-DOSE) 180 mcg/0.5 mL vaccine Inject into the muscle by [facility].    melatonin 3 mg Tab Take 1 capsule by mouth.    multivit-minerals/ferrous fum (MULTI VITAMIN ORAL) Take 1 tablet by mouth.    ondansetron (ZOFRAN) 8 MG tablet Take 1 tablet (8 mg total) by mouth every 8 (eight) hours as needed for Nausea.    OXYGEN-AIR DELIVERY SYSTEMS MISC by [facility]Non-Drug; Combo Route) route.    RABEprazole (ACIPHEX) 20 mg tablet Take 20 mg by mouth.    sodium chloride 0.9% SolP 100 mL with treprostinil 1 mg/mL Soln 3,000,000 ng Inject 1,425 ng/min into the vein continuous.    sotalol (BETAPACE) 120 MG Tab Take 120 mg by mouth.    traMADol (ULTRAM) 50 mg tablet Take 50 mg by mouth every 6 (six) hours as needed for Pain.    umeclidinium-vilanterol 62.5-25 mcg/actuation DsDv Inhale 1 puff into the lungs.            Objective:      Physical Exam      General Appearance:    Alert, cooperative, no distress, appears stated age   Head:    Normocephalic, without obvious abnormality, atraumatic   Eyes:    PERRL, conjunctiva/corneas clear, EOM's intact, fundi     benign, both eyes        Ears:    Normal TM's and external ear canals, both ears   Nose:   Nares normal, septum midline, mucosa normal, no drainage    or sinus tenderness   Throat:   Lips, mucosa, and tongue normal; teeth and gums normal   Neck:   Supple, symmetrical, trachea midline, no adenopathy;        thyroid:  No enlargement/tenderness/nodules; no carotid    bruit or JVD   Back:     Symmetric, no curvature, ROM normal, no CVA tenderness   Lungs:     Clear to auscultation bilaterally, respirations unlabored   Chest wall:    No tenderness or deformity   Heart:    Regular rate and rhythm, S1 and S2 normal, no murmur, rub   or gallop   Abdomen:     Soft, non-tender, bowel sounds  active all four quadrants,     no masses, no organomegaly   Genitalia:    Normal male without lesion, discharge or tenderness   Rectal:    Normal tone, normal prostate, no masses or tenderness;    guaiac negative stool   Extremities:   Extremities normal, atraumatic, no cyanosis or edema   Pulses:   2+ and symmetric all extremities   Skin:   Skin color, texture, turgor normal, no rashes or lesions   Lymph nodes:   Cervical, supraclavicular, and axillary nodes normal   Neurologic:   CNII-XII intact. Normal strength, sensation and reflexes       throughout         Lab Review   Lab Results   Component Value Date    WBC 5.95 10/31/2018    HGB 16.6 10/31/2018    HCT 49.0 10/31/2018    MCV 94 10/31/2018     10/31/2018     Lab Results   Component Value Date    INR 1.1 10/31/2018    INR 1.1 07/23/2018           Assessment:       Plan:     I have explained the risks, benefits, and alternatives of the procedure in detail.  The patient expresses understanding and all questions have been answered.  The patient agrees to the proceed as planned.  RHC  via RIJ   Micropuncture access needle will be used to minimize bleeding risk.

## 2018-10-31 NOTE — DISCHARGE SUMMARY
Admit 10/31/2018  Discharge  10/31/2018  Discharge / Principle diagnosis pulmonary hypertension.    Discharge attending Robert Lomas MD  Hospital course.  Came in for RHC. Tolerated procedure well (see full results in cardiac procedure section).  Results discussed with patient / caregiver.   Discharge condition / disposition Good / home   discharge activity activity as tolerated    Discharge diet diet as tolerated / unchanged from admission  Discharge medication   No current facility-administered medications on file prior to encounter.      Current Outpatient Medications on File Prior to Encounter   Medication Sig    allopurinol (ZYLOPRIM) 300 MG tablet Take 300 mg by mouth.    ALPRAZolam (XANAX) 1 MG tablet 0.5 mg.     aspirin (ECOTRIN) 81 MG EC tablet Take 81 mg by mouth once daily.    atorvastatin (LIPITOR) 40 MG tablet Take 1 tablet (40 mg total) by mouth once daily.    furosemide (LASIX) 20 MG tablet Take 1 tablet (20 mg total) by mouth every other day.    gabapentin (NEURONTIN) 300 MG capsule Take 1 capsule (300 mg total) by mouth 3 (three) times daily.    influenza (FLUZONE HIGH-DOSE) 180 mcg/0.5 mL vaccine Inject into the muscle by [facility].    melatonin 3 mg Tab Take 1 capsule by mouth.    multivit-minerals/ferrous fum (MULTI VITAMIN ORAL) Take 1 tablet by mouth.    ondansetron (ZOFRAN) 8 MG tablet Take 1 tablet (8 mg total) by mouth every 8 (eight) hours as needed for Nausea.    OXYGEN-AIR DELIVERY SYSTEMS MISC [place] [facility]Non-Drug; Combo Route) route.    RABEprazole (ACIPHEX) 20 mg tablet Take 20 mg by mouth.    sodium chloride 0.9% SolP 100 mL with treprostinil 1 mg/mL Soln 3,000,000 ng Inject 1,425 ng/min into the vein continuous.    sotalol (BETAPACE) 120 MG Tab Take 120 mg by mouth.    traMADol (ULTRAM) 50 mg tablet Take 50 mg by mouth every 6 (six) hours as needed for Pain.    umeclidinium-vilanterol 62.5-25 mcg/actuation DsDv Inhale 1 puff into the lungs.     Followup in clinic as scheduled  to discuss transition off IV prostacyclin

## 2018-11-01 ENCOUNTER — DOCUMENTATION ONLY (OUTPATIENT)
Dept: TRANSPLANT | Facility: CLINIC | Age: 78
End: 2018-11-01

## 2018-11-01 NOTE — NURSING
Pt records reviewed.   Pt will be referred to Hepatology.    Initial referral received  from the [facility].   Referring Provider/diagnosis  ROBERT LOWE Provider: Robert Lowe MD   Diagnosis: Hyperbilirubinemia            Referral letter sent to patient.

## 2018-11-01 NOTE — LETTER
November 1, 2018    Greg Nolasco  [address]  [place] [address]      Dear Greg Nolasco:    Your doctor has referred you to the [facility]. You will be contacted by our office and an initial appointment will then be scheduled for you.    We look forward to seeing you soon. If you have any further questions, please contact us at [phone].       Sincerely,        [facility]   [address] [place] [id number]  [phone]

## 2018-11-09 ENCOUNTER — TELEPHONE (OUTPATIENT)
Dept: TRANSPLANT | Facility: CLINIC | Age: 78
End: 2018-11-09

## 2018-11-23 ENCOUNTER — TELEPHONE (OUTPATIENT)
Dept: TRANSPLANT | Facility: CLINIC | Age: 78
End: 2018-11-23

## 2018-11-23 NOTE — TELEPHONE ENCOUNTER
"Patients reports becoming more short of breath since decreasing remodulin to 20 ng and taking Uptravi 400 mcg. He does note reduction in side effects including leg pains, headache, nausea. Mr. Nolasco states that yesterday was a "bad day" and he had trouble sleeping. He has what he calls "panic attacks" where his muscles ache and he wakes up abruptly. He does not wear his oxygen overnight and only uses it after he exerts himself. Reinforced need for patient to wear oxygen with activity. Will get an overnight pulse ox test because patient may need to wear it at night.  Notified Dr. Ruffin.   Per instructions, patient should remain at 20 ng/kg/min and increase to 600 mcg, BID today instead of Sunday. He will not decrease until MD directs him to do so. Patient verbalized understanding.  "

## 2018-11-26 ENCOUNTER — TELEPHONE (OUTPATIENT)
Dept: TRANSPLANT | Facility: CLINIC | Age: 78
End: 2018-11-26

## 2018-11-26 NOTE — TELEPHONE ENCOUNTER
Correspondence from [facility], which was forwarded to Dr Lomas for review:    Mr. Nolasco called and spoke to one of our pharmacists this morning.  He reports that he is very short of breath and has increased ankle and feet swelling.  He also reports difficulty sleeping.  He increased his Uptravi to 600mcg twice daily on 11/23 and is on about 20.8ng/kg/min of Remodulin.  He reported that the shortness of breath has gotten worse since he increased his Uptravi.  He reported he spoke to Rachel on Friday and she advised him to increase his Uptravi but not change his Remodulin.  He advised he has not been able to reach Rachel today.  I wanted to make sure you were aware of this report.  Please let me know if anything further is needed for this patient.

## 2018-11-26 NOTE — TELEPHONE ENCOUNTER
"Correspondence from [facility]:    Mr. Nolasco called and spoke to one of our pharmacists this morning.  He reports that he is very short of breath and has increased ankle and feet swelling.  He also reports difficulty sleeping.  He increased his Uptravi to 600mcg twice daily on 11/23 and is on about 20.8ng/kg/min of Remodulin.  He reported that the shortness of breath has gotten worse since he increased his Uptravi.  He reported he spoke to Rachel on Friday and she advised him to increase his Uptravi but not change his Remodulin.  He advised he has not been able to reach Rachel today.  I wanted to make sure you were aware of this report.  Please let me know if anything further is needed for this patient.      --------------------  Spoke w/pt. Per Dr Lomas, pt should increase lasix to 40mg QD. Pt may be seen on Wednesday in clinic by Dr Lomas, if no better today/tomorrow.     Pt reports that "ever since right heart cath, I haven't taken any lasix because Dr Lomas told me not to take it anymore." Pt reports ankle edema and SOB at rest. Pt states he sat in chair for 2 hours last evening, and oxygen saturation never went above 78% at rest, with oxygen on (4 L). Pt reports baseline is 85%. Pt reports oxygen saturation dropped to 46% after ambulating from bathroom to bedroom. He does report that leg/foot pain are improved, as well as nausea and HA (had much more of this with higher dose remodulin).     Pt reports he took one lasix today, already. He will take second tab and then will take two tomorrow AM (40mg). This RN will call to check on pt, and if no better, will have pt come to ER. Pt encouraged to come to ER today, if he continues to feel increasingly short of breath/no better. Pt verbalized understanding/agreement of all. Will f/u tomorrow AM.       "

## 2018-11-27 ENCOUNTER — TELEPHONE (OUTPATIENT)
Dept: TRANSPLANT | Facility: CLINIC | Age: 78
End: 2018-11-27

## 2018-11-27 RX ORDER — SPIRONOLACTONE 25 MG/1
25 TABLET ORAL DAILY
Qty: 30 TABLET | Refills: 11
Start: 2018-11-27 | End: 2019-01-02 | Stop reason: SINTOL

## 2018-11-27 NOTE — TELEPHONE ENCOUNTER
"Spoke w/patient, who endorses feeling "a little bit better today than yesterday." Pt reports approximately 5 hours of sleep last night, which he hasn't had without interruption in quite some time. Pt reports he took 40mg lasix early this AM, (in addition to the 40 he took yesterday) and has been "peeing all morning." Ankles still swollen, but less so. Does not feel sick enough to make visit here tomorrow, especially considering how far he has to drive. Information provided to Dr Lomas. Will contact pt again with feedback, once received.   "

## 2018-11-27 NOTE — TELEPHONE ENCOUNTER
"----- Message from Robert Lomas MD sent at 11/27/2018 12:20 PM CST -----  Want to add aldactone 25 daily and keep him on lasix 40 daily til Thursday   We can check labs on Thursdays or weds (either is fine)  ----- Message -----  From: PRANAY Daniels, RN  Sent: 11/27/2018  11:01 AM  To: Robert Lomas MD, Carol Wolf, JENNIFER    Spoke w/patient, who endorses feeling "a little bit better today than yesterday." Pt reports he took 40mg lasix early this AM, and has been "peeing all morning." Ankles still swollen, but less so. Does not feel sick enough to make visit here tomorrow, especially considering how far he has to drive.    Want to check labs tomorrow and see about continuing lasix 40mg QD/possibly adding KCL ?     "

## 2018-11-27 NOTE — TELEPHONE ENCOUNTER
Spoke w/pt. He will  aldactone tomorrow and start it tomorrow AM. He will continue lasix 40mg through Thursday. He will have labs drawn at [facility] Thursday morning. We will review results and determine if pt should continue lasix 40mg QD after Thursday. Pt repeated all instructions. He will call w/questions, concerns, or if worsening shortness of breath.

## 2018-11-29 ENCOUNTER — DOCUMENTATION ONLY (OUTPATIENT)
Dept: TRANSPLANT | Facility: CLINIC | Age: 78
End: 2018-11-29

## 2018-11-29 ENCOUNTER — TELEPHONE (OUTPATIENT)
Dept: TRANSPLANT | Facility: CLINIC | Age: 78
End: 2018-11-29

## 2018-11-29 LAB
BNP (B-TYPE NATRIURETIC PEP): 712
EXT BUN: 10
EXT CALCIUM: 9.2
EXT CHLORIDE: 106
EXT CREATININE: 0.97 MG/DL
EXT GLUCOSE: 82
EXT POTASSIUM: 3.8
EXT SODIUM: 141 MMOL/L

## 2018-11-29 NOTE — TELEPHONE ENCOUNTER
----- Message from Robert Lomas MD sent at 11/29/2018  2:17 PM CST -----  Lets start the aldactone today and give a few days  Want to increase his lasix to 80 for the weekend  IF he is better by Monday we can try to continue to wean the remodulin   Probably will need to see him in Dec   ----- Message -----  From: PRANAY Daniels, RN  Sent: 11/29/2018  11:43 AM  To: Robert Lomas MD    Ok, so apparently he hadn't started the aldactone yet, but will today. I asked him to get labs again next week, Thursday at the latest.   He thinks more fluid is off (ankles are smaller), but SOB about the same.   He is also asking what you'd like him to do w/ Uptravi and Remodulin. He is at Remodulin 20ng and Uptravi 600mcg BID.   ----- Message -----  From: Robert Lomas MD  Sent: 11/29/2018  11:03 AM  To: PRANAY Daniels, RN    K is 3.8 so he can stay on aldactone 25 lasix 40 qd unless u have some concerns   Can we find out how he is doing with his volume status?    ----- Message -----  From: PRANAY Daniels, RN  Sent: 11/29/2018  10:26 AM  To: Robert Lomas MD    BNP not back yet. Should pt continue aldactone 25mg QD and lasix 40mg QD?

## 2018-11-29 NOTE — TELEPHONE ENCOUNTER
Spoke w/patient. He will do the following:    Increase lasix to 80 mg QD or 40 BID for Friday, Saturday, and Sunday. Return to 40mg QD on Monday.    Keep Uptravi at 600mcg PO BID and Remodulin at 20ng/kg/min until Monday. We will call him Monday to see how he is doing. If he is continuing to feel better, will resume wean on Remodulin and Uptravi increase. Will clarify with Dr Lomas if patient should only decrease Remodulin, or if she should also increase Uptravi.

## 2018-11-30 RX ORDER — FUROSEMIDE 20 MG/1
40 TABLET ORAL DAILY
Qty: 60 TABLET | Refills: 3 | Status: ON HOLD
Start: 2018-11-30 | End: 2019-01-15 | Stop reason: SDUPTHER

## 2018-11-30 NOTE — PROGRESS NOTES
Ok to stick to original plan of stopping remodulin and stick to uptravi 600 BID next week if he is doing ok jez

## 2018-12-03 ENCOUNTER — TELEPHONE (OUTPATIENT)
Dept: TRANSPLANT | Facility: CLINIC | Age: 78
End: 2018-12-03

## 2018-12-03 NOTE — TELEPHONE ENCOUNTER
F/U with Mr. Nolasco. For Fri, Sat, and Sun, patient took 80 mg Lasix and 25 mg Spironolactone daily. His Uptravi dose remained at 600 mg, BID and Remodulin at 15 ng/kg/min. Patient reports that the fluid has come off and his breathing has improved. He states that his O2 has increased from 77% at rest to mid 80's. He does not sound SOB on the phone. Advised patient to take Lasix 40 mg daily, starting today and continue other medications as instructed. Patient is to have labs drawn on Thursday. Pending results and how patient is feeling he will increase Uptravi and decrease Remodulin. Notified [facility] who will send a dosing sheet to patient and review instructions.  Reinforced need for patient to wear his oxygen around the clock. Patient verbalized understanding of all.

## 2018-12-12 ENCOUNTER — TELEPHONE (OUTPATIENT)
Dept: TRANSPLANT | Facility: CLINIC | Age: 78
End: 2018-12-12

## 2018-12-12 NOTE — TELEPHONE ENCOUNTER
F/U - Mr. Nolasco reports feeling good. His appetite has improved and all side effects have subsided (no joint pain or tingling). He feels like his breathing is better though he still gets tired if he does something too strenuous. He will decrease his Remodulin to 5 ng/kg/min and increase his Uptravi to 1000 mcg, BID on Saturday.   Will speak to MD about removing the Rubin Catheter on 12/20. Patient also wants to revisit having stents placed for his coronary blockages.  No other concerns at this time.

## 2018-12-13 ENCOUNTER — TELEPHONE (OUTPATIENT)
Dept: TRANSPLANT | Facility: CLINIC | Age: 78
End: 2018-12-13

## 2018-12-19 ENCOUNTER — TELEPHONE (OUTPATIENT)
Dept: TRANSPLANT | Facility: CLINIC | Age: 78
End: 2018-12-19

## 2018-12-20 ENCOUNTER — HOSPITAL ENCOUNTER (OUTPATIENT)
Facility: HOSPITAL | Age: 78
Discharge: HOME | End: 2018-12-20
Attending: INTERNAL MEDICINE | Admitting: INTERNAL MEDICINE
Payer: MEDICARE

## 2018-12-20 ENCOUNTER — INITIAL CONSULT (OUTPATIENT)
Dept: CARDIOLOGY | Facility: CLINIC | Age: 78
End: 2018-12-20
Payer: MEDICARE

## 2018-12-20 VITALS
HEIGHT: 69 IN | DIASTOLIC BLOOD PRESSURE: 66 MMHG | SYSTOLIC BLOOD PRESSURE: 102 MMHG | BODY MASS INDEX: 21.98 KG/M2 | HEART RATE: 78 BPM | WEIGHT: 148.38 LBS | OXYGEN SATURATION: 89 %

## 2018-12-20 DIAGNOSIS — I48.0 PAROXYSMAL ATRIAL FIBRILLATION: ICD-10-CM

## 2018-12-20 DIAGNOSIS — I25.10 CORONARY ARTERY DISEASE INVOLVING NATIVE CORONARY ARTERY OF NATIVE HEART WITHOUT ANGINA PECTORIS: ICD-10-CM

## 2018-12-20 DIAGNOSIS — I25.10 CAD IN NATIVE ARTERY: Primary | ICD-10-CM

## 2018-12-20 DIAGNOSIS — I50.810 RIGHT HEART FAILURE DUE TO PULMONARY HYPERTENSION: ICD-10-CM

## 2018-12-20 DIAGNOSIS — I27.20 PULMONARY HTN: ICD-10-CM

## 2018-12-20 DIAGNOSIS — I27.29 RIGHT HEART FAILURE DUE TO PULMONARY HYPERTENSION: ICD-10-CM

## 2018-12-20 DIAGNOSIS — J96.11 CHRONIC HYPOXEMIC RESPIRATORY FAILURE: ICD-10-CM

## 2018-12-20 DIAGNOSIS — I27.9 CHRONIC PULMONARY HEART DISEASE: ICD-10-CM

## 2018-12-20 DIAGNOSIS — E78.00 PURE HYPERCHOLESTEROLEMIA: ICD-10-CM

## 2018-12-20 PROCEDURE — 25000003 PHARM REV CODE 250: Performed by: INTERNAL MEDICINE

## 2018-12-20 PROCEDURE — 99214 OFFICE O/P EST MOD 30 MIN: CPT | Mod: PBBFAC | Performed by: INTERNAL MEDICINE

## 2018-12-20 PROCEDURE — 36589 REMOVAL TUNNELED CV CATH: CPT | Mod: ,,, | Performed by: INTERNAL MEDICINE

## 2018-12-20 PROCEDURE — 36589 REMOVAL TUNNELED CV CATH: CPT

## 2018-12-20 PROCEDURE — 99213 OFFICE O/P EST LOW 20 MIN: CPT | Mod: S$PBB,,, | Performed by: INTERNAL MEDICINE

## 2018-12-20 PROCEDURE — 99999 PR PBB SHADOW E&M-EST. PATIENT-LVL IV: CPT | Mod: PBBFAC,,, | Performed by: INTERNAL MEDICINE

## 2018-12-20 RX ORDER — CLOPIDOGREL BISULFATE 75 MG/1
75 TABLET ORAL DAILY
Qty: 30 TABLET | Refills: 11 | Status: SHIPPED | OUTPATIENT
Start: 2018-12-20 | End: 2019-02-01

## 2018-12-20 RX ORDER — LIDOCAINE HCL/EPINEPHRINE/PF 2%-1:200K
VIAL (ML) INJECTION
Status: DISCONTINUED | OUTPATIENT
Start: 2018-12-20 | End: 2018-12-20 | Stop reason: HOSPADM

## 2018-12-20 RX ORDER — SELEXIPAG 200 UG/1
1600 TABLET, COATED ORAL 2 TIMES DAILY
Status: ON HOLD | COMMUNITY
Start: 2018-12-05 | End: 2019-01-15 | Stop reason: HOSPADM

## 2018-12-20 RX ORDER — SODIUM CHLORIDE 9 MG/ML
3 INJECTION, SOLUTION INTRAVENOUS CONTINUOUS
Status: CANCELLED | OUTPATIENT
Start: 2018-12-20 | End: 2018-12-20

## 2018-12-20 RX ORDER — DIPHENHYDRAMINE HCL 25 MG
50 CAPSULE ORAL ONCE
Status: CANCELLED | OUTPATIENT
Start: 2018-12-20 | End: 2018-12-20

## 2018-12-20 NOTE — LETTER
December 24, 2018      Robert Lomas MD  [address]  [facility] [address]           [facility]  [address]  [facility] [address]  Phone: [phone]          Patient: Greg Nolasco   MR Number: [id number]   YOB: 1940   Date of Visit: 12/20/2018       Dear Dr. Robert Lomas:    Thank you for referring Greg Nolasco to me for evaluation. Attached you will find relevant portions of my assessment and plan of care.    If you have questions, please do not hesitate to call me. I look forward to following Greg Nolasco along with you.    Sincerely,    Dex Lomas MD    Enclosure  CC:  No Recipients    If you would like to receive this communication electronically, please contact [email or web address] or [phone] to request more information on [employer] access.    For providers and/or their staff who would like to refer a patient to [facility], please contact us through our one-stop-shop provider referral line, [facility] [place], at [phone].    If you feel you have received this communication in error or would no longer like to receive these types of communications, please e-mail [email or web address]

## 2018-12-20 NOTE — PROGRESS NOTES
Subjective:    Patient ID:  Greg Nolasco is a 78 y.o. male who presents for evaluation of No chief complaint on file.    Referring Physician: Dr ELSA Lomas    HPI    Mr Nolasco returns to [facility] for evaluation of CAD. He was last evaluated by us in July when coronary angiogram showed multivessel disease. RHC showed increased RV pressures and PVR. He was noted to be shunting across his PFO from right to left.   He returns today for discussion about multivessel PCI. He reports no angina (since stopping his walking program), palpitations or diaphoresis. Denies leg edema, PND or pillow orthopnea. He used to walk 2-3 miles/day until recently when started to develop angina. This prompted his coronary angiogram. He denies palpitations, syncope, or near syncope.    He has medical history significant for multivessel CAD (by [facility] 7/20/2018), severe PH, PAF (on sotalol), COPD with chronic hypoxic respiratory failure (on 4 L home O2). He was remodulin IV but given side effects including leg pain. He is currently uptravi for PAH.     His BP is well controlled; he takes spirinolactone and lasix.   No current lipid panel on Epic. He is taking lipitor 40 mg po daily.     Past Medical History:   Diagnosis Date    Chronic hypoxemic respiratory failure     Coronary artery disease     BARBARA (obstructive sleep apnea)      Past Surgical History:   Procedure Laterality Date    ANGIOPLASTY  1991    BACK SURGERY      HERNIA REPAIR      KNEE SURGERY      REMOVAL, CATHETER, CENTRAL VENOUS, TUNNELED N/A 12/20/2018    Performed by Walt Smith MD at [facility] CATH LAB    RIGHT HEART CATH Right 10/31/2018    Performed by Robert Lomas MD at [facility] CATH LAB    SHOULDER SURGERY      SINUS SURGERY       Current Outpatient Medications on File Prior to Visit   Medication Sig Dispense Refill    allopurinol (ZYLOPRIM) 300 MG tablet Take 300 mg by mouth.      aspirin (ECOTRIN) 81 MG EC tablet Take 81 mg by mouth once daily.       atorvastatin (LIPITOR) 40 MG tablet Take 1 tablet (40 mg total) by mouth once daily. 90 tablet 3    furosemide (LASIX) 20 MG tablet Take 2 tablets (40 mg total) by mouth once daily. 60 tablet 3    melatonin 3 mg Tab Take 1 capsule by mouth.      multivit-minerals/ferrous fum (MULTI VITAMIN ORAL) Take 1 tablet by mouth.      OXYGEN-AIR DELIVERY SYSTEMS MISC by [facility].(Non-Drug; Combo Route) route.      RABEprazole (ACIPHEX) 20 mg tablet Take 20 mg by mouth.      selexipag 800 mcg Tab Take 200 mcg by mouth 2 (two) times daily. 200 mcg, BID, PO x1 week, increase by 200 mcg,BID, weekly, to highest tolerated dose to 1600 mcg.       sotalol (BETAPACE) 120 MG Tab Take 120 mg by mouth.      spironolactone (ALDACTONE) 25 MG tablet Take 1 tablet (25 mg total) by mouth once daily. 30 tablet 11    UPTRAVI 200 mcg Tab       ALPRAZolam (XANAX) 1 MG tablet 0.5 mg.       influenza (FLUZONE HIGH-DOSE) 180 mcg/0.5 mL vaccine Inject into the muscle by [facility]. 0.5 mL 0    sodium chloride 0.9% SolP 100 mL with treprostinil 1 mg/mL Soln 3,000,000 ng Inject 1,425 ng/min into the vein continuous. 1 Act 0     No current facility-administered medications on file prior to visit.      .alelr  Social History     Tobacco Use    Smoking status: Former Smoker     Last attempt to quit:      Years since quittin.9    Smokeless tobacco: Current User     Types: Snuff   Substance Use Topics    Alcohol use: No     Frequency: Never    Drug use: No     History reviewed. No pertinent family history.    Review of Systems   Constitution: Negative.   HENT: Negative.    Eyes: Negative.    Cardiovascular: Positive for chest pain and dyspnea on exertion. Negative for claudication, cyanosis, irregular heartbeat, leg swelling, near-syncope, orthopnea, palpitations, paroxysmal nocturnal dyspnea and syncope.   Respiratory: Positive for shortness of breath. Negative for cough, hemoptysis, sleep disturbances due to breathing, snoring, sputum  "production and wheezing.    Endocrine: Negative.    Gastrointestinal: Negative.    Genitourinary: Negative.         Objective:  Vitals:    12/20/18 1308 12/20/18 1310   BP: 109/66 102/66   BP Location: Left arm Right arm   Patient Position: Sitting Sitting   BP Method: Large (Automatic) Large (Automatic)   Pulse: 80 78   SpO2: (!) 89%    Weight: 67.3 kg (148 lb 5.9 oz)    Height: 5' 9" (1.753 m)          Physical Exam   Constitutional: He is oriented to person, place, and time. He appears well-developed and well-nourished.   HENT:   Head: Normocephalic and atraumatic.   Eyes: Conjunctivae are normal.   Neck: Neck supple. No JVD present. No thyromegaly present.   Cardiovascular: Normal rate, regular rhythm, normal heart sounds and intact distal pulses. Exam reveals no gallop and no friction rub.   No murmur heard.  Pulmonary/Chest: Effort normal.   Abdominal: Soft. Bowel sounds are normal. He exhibits no distension. There is no tenderness.   Neurological: He is oriented to person, place, and time.   Skin: Skin is warm.   Nursing note and vitals reviewed.        Assessment:       1. CAD in native artery    2. Coronary artery disease involving native coronary artery of native heart without angina pectoris    3. Right to left cardiac shunt    4. 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 Pure hypercholesterolemia         Plan:       Coronary artery disease involving native coronary artery of native heart without angina pectoris  -LHC, coronary angiogram and multivessel PCI via R CFA  -IVF at 3 ml/hour  -Consented for VA ECMO during PCI given advanced pulmonary hypertension and chronic hypoxia  -Patient is a DAT candidate  -Instructed to take plavix before the procedure but hold off until he is seen by hepatology and might need biopsy   -The risks, benefits & alternatives of the procedure were explained to the patient.    -The risks of coronary angiography include but are not limited to:  Bleeding, " infection, heart rhythm abnormalities, allergic reactions, kidney injury, stroke and death.    -Should stenting be indicated, the patient has agreed to dual anti-platelet therapy for 1-consecutive year with a drug-eluting stent and a minimum of 1-month with the use of a bare metal stent.    -The risks of moderate sedation include hypotension, respiratory depression, arrhythmias, bronchospasm, & death.    -Informed consent was obtained & the patient is agreeable to proceed with the procedure.          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  All of the patient's questions were answered.

## 2018-12-20 NOTE — PROCEDURES
DATE OF PROCEDURE: 12/20/18    PROCEDURE TYPE: Removal of right Internal Jugular Vein tunneled garcía catheter.     INDICATION: stopped IV remodulin     PROCEDURE NOTE: After informed consent was obtained, the area of Mr. Nolasco's catheter and right chest/neck were sterilely prepped and draped in usual fashion. Anesthesia was obtained with 2% lidocaine with epinephrine, and the subcutaneous cuff was blunt dissected free. The catheter was then removed in total, and a compression dressing was applied to the exit site. Mr. Nolasco tolerated the procedure well. There were no immediate complications. Estimated blood loss was less than 1 mL. No sedation was given.

## 2018-12-20 NOTE — ASSESSMENT & PLAN NOTE
-LHC, coronary angiogram and multivessel PCI via R CFA  -IVF at 3 ml/hour  -Consented for VA ECMO during PCI given advanced pulmonary hypertension and chronic hypoxia  -Patient is a DAT candidate  -Instructed to take plavix before the procedure but hold off until he is seen by hepatology and might need biopsy   -The risks, benefits & alternatives of the procedure were explained to the patient.    -The risks of coronary angiography include but are not limited to:  Bleeding, infection, heart rhythm abnormalities, allergic reactions, kidney injury, stroke and death.    -Should stenting be indicated, the patient has agreed to dual anti-platelet therapy for 1-consecutive year with a drug-eluting stent and a minimum of 1-month with the use of a bare metal stent.    -The risks of moderate sedation include hypotension, respiratory depression, arrhythmias, bronchospasm, & death.    -Informed consent was obtained & the patient is agreeable to proceed with the procedure.

## 2018-12-21 RX ORDER — DIPHENOXYLATE HYDROCHLORIDE AND ATROPINE SULFATE 2.5; .025 MG/1; MG/1
1 TABLET ORAL 4 TIMES DAILY PRN
Qty: 120 TABLET | Refills: 3 | Status: SHIPPED | OUTPATIENT
Start: 2018-12-21 | End: 2018-12-31

## 2019-01-02 ENCOUNTER — TELEPHONE (OUTPATIENT)
Dept: TRANSPLANT | Facility: CLINIC | Age: 79
End: 2019-01-02

## 2019-01-02 NOTE — TELEPHONE ENCOUNTER
"Mr. Nolasco called to report that he has been experiencing more shortness of breath since transitioning from IV Remodulin to Uptravi. He is also waking up at night with a "funny" feeling and having to catch his breath. He also reports lower than normal blood pressures which he attributes to the Aldactone. The pressures are running in the 80/60's. He believes that maybe why he is feeling tired a lot. Mr. Nolasco also strongly believes that having stents placed will fix everything and hopes that Dr. GILMA Lomas is going forward with this.   PH Coordinator will discuss patient report with Dr. RUBEN Lomas. Confirmed appt with Dr. RUBEN Lomas on 1/8.  "

## 2019-01-04 RX ORDER — POTASSIUM CHLORIDE 20 MEQ/1
20 TABLET, EXTENDED RELEASE ORAL ONCE
Qty: 30 TABLET | Refills: 11 | Status: SHIPPED | OUTPATIENT
Start: 2019-01-04 | End: 2019-01-04

## 2019-01-08 ENCOUNTER — HOSPITAL ENCOUNTER (INPATIENT)
Facility: HOSPITAL | Age: 79
LOS: 7 days | Discharge: HOME HEALTH | DRG: 189 | End: 2019-01-15
Attending: EMERGENCY MEDICINE | Admitting: INTERNAL MEDICINE
Payer: MEDICARE

## 2019-01-08 ENCOUNTER — HOSPITAL ENCOUNTER (OUTPATIENT)
Dept: PULMONOLOGY | Facility: CLINIC | Age: 79
Discharge: HOME | DRG: 189 | End: 2019-01-08
Payer: MEDICARE

## 2019-01-08 ENCOUNTER — OFFICE VISIT (OUTPATIENT)
Dept: HEPATOLOGY | Facility: CLINIC | Age: 79
DRG: 189 | End: 2019-01-08
Payer: MEDICARE

## 2019-01-08 VITALS
TEMPERATURE: 98 F | DIASTOLIC BLOOD PRESSURE: 65 MMHG | RESPIRATION RATE: 18 BRPM | OXYGEN SATURATION: 87 % | SYSTOLIC BLOOD PRESSURE: 99 MMHG | BODY MASS INDEX: 22.56 KG/M2 | HEART RATE: 73 BPM | WEIGHT: 152.31 LBS | HEIGHT: 69 IN

## 2019-01-08 VITALS — BODY MASS INDEX: 22.81 KG/M2 | WEIGHT: 154 LBS | HEIGHT: 69 IN

## 2019-01-08 DIAGNOSIS — R79.89 ELEVATED LFTS: ICD-10-CM

## 2019-01-08 DIAGNOSIS — I48.0 PAROXYSMAL ATRIAL FIBRILLATION: ICD-10-CM

## 2019-01-08 DIAGNOSIS — I25.10 CORONARY ARTERY DISEASE INVOLVING NATIVE CORONARY ARTERY OF NATIVE HEART WITHOUT ANGINA PECTORIS: ICD-10-CM

## 2019-01-08 DIAGNOSIS — I27.29 RIGHT HEART FAILURE DUE TO PULMONARY HYPERTENSION: ICD-10-CM

## 2019-01-08 DIAGNOSIS — I95.9 HYPOTENSION: ICD-10-CM

## 2019-01-08 DIAGNOSIS — I95.2 HYPOTENSION DUE TO DRUGS: ICD-10-CM

## 2019-01-08 DIAGNOSIS — E80.6 HYPERBILIRUBINEMIA: Primary | ICD-10-CM

## 2019-01-08 DIAGNOSIS — R06.03 RESPIRATORY DISTRESS: ICD-10-CM

## 2019-01-08 DIAGNOSIS — I50.810 RIGHT HEART FAILURE DUE TO PULMONARY HYPERTENSION: ICD-10-CM

## 2019-01-08 DIAGNOSIS — I27.20 PHT (PULMONARY HYPERTENSION): ICD-10-CM

## 2019-01-08 DIAGNOSIS — J96.21 ACUTE ON CHRONIC RESPIRATORY FAILURE WITH HYPOXIA: ICD-10-CM

## 2019-01-08 DIAGNOSIS — I27.9 CHRONIC PULMONARY HEART DISEASE: ICD-10-CM

## 2019-01-08 PROCEDURE — 99285 EMERGENCY DEPT VISIT HI MDM: CPT | Mod: ,,, | Performed by: EMERGENCY MEDICINE

## 2019-01-08 PROCEDURE — 99285 EMERGENCY DEPT VISIT HI MDM: CPT | Mod: 25,27

## 2019-01-08 PROCEDURE — 27100092 HC HIGH FLOW DELIVERY CANNULA

## 2019-01-08 PROCEDURE — 27100171 HC OXYGEN HIGH FLOW UP TO 24 HOURS

## 2019-01-08 PROCEDURE — A4216 STERILE WATER/SALINE, 10 ML: HCPCS | Performed by: NURSE PRACTITIONER

## 2019-01-08 PROCEDURE — 99214 OFFICE O/P EST MOD 30 MIN: CPT | Mod: S$PBB,,, | Performed by: NURSE PRACTITIONER

## 2019-01-08 PROCEDURE — 99214 PR OFFICE/OUTPT VISIT, EST, LEVL IV, 30-39 MIN: ICD-10-PCS | Mod: S$PBB,,, | Performed by: NURSE PRACTITIONER

## 2019-01-08 PROCEDURE — 99999 PR PBB SHADOW E&M-EST. PATIENT-LVL IV: CPT | Mod: PBBFAC,,, | Performed by: NURSE PRACTITIONER

## 2019-01-08 PROCEDURE — 25000003 PHARM REV CODE 250: Performed by: STUDENT IN AN ORGANIZED HEALTH CARE EDUCATION/TRAINING PROGRAM

## 2019-01-08 PROCEDURE — 63600175 PHARM REV CODE 636 W HCPCS: Performed by: NURSE PRACTITIONER

## 2019-01-08 PROCEDURE — 99285 PR EMERGENCY DEPT VISIT,LEVEL V: ICD-10-PCS | Mod: ,,, | Performed by: EMERGENCY MEDICINE

## 2019-01-08 PROCEDURE — 99214 OFFICE O/P EST MOD 30 MIN: CPT | Mod: PBBFAC,25 | Performed by: NURSE PRACTITIONER

## 2019-01-08 PROCEDURE — 99223 1ST HOSP IP/OBS HIGH 75: CPT | Mod: AI,,, | Performed by: INTERNAL MEDICINE

## 2019-01-08 PROCEDURE — 20000000 HC ICU ROOM

## 2019-01-08 PROCEDURE — 99223 PR INITIAL HOSPITAL CARE,LEVL III: ICD-10-PCS | Mod: AI,,, | Performed by: INTERNAL MEDICINE

## 2019-01-08 PROCEDURE — 99999 PR PBB SHADOW E&M-EST. PATIENT-LVL IV: ICD-10-PCS | Mod: PBBFAC,,, | Performed by: NURSE PRACTITIONER

## 2019-01-08 PROCEDURE — 25000003 PHARM REV CODE 250: Performed by: NURSE PRACTITIONER

## 2019-01-08 PROCEDURE — 94618 PULMONARY STRESS TESTING: CPT | Mod: 26,S$PBB,, | Performed by: INTERNAL MEDICINE

## 2019-01-08 PROCEDURE — 94618 PULMONARY STRESS TESTING: ICD-10-PCS | Mod: 26,S$PBB,, | Performed by: INTERNAL MEDICINE

## 2019-01-08 PROCEDURE — 94618 PULMONARY STRESS TESTING: CPT | Mod: PBBFAC | Performed by: INTERNAL MEDICINE

## 2019-01-08 RX ORDER — PANTOPRAZOLE SODIUM 40 MG/1
40 TABLET, DELAYED RELEASE ORAL DAILY
Status: DISCONTINUED | OUTPATIENT
Start: 2019-01-09 | End: 2019-01-08

## 2019-01-08 RX ORDER — SOTALOL HYDROCHLORIDE 120 MG/1
120 TABLET ORAL DAILY
Status: DISCONTINUED | OUTPATIENT
Start: 2019-01-09 | End: 2019-01-13

## 2019-01-08 RX ORDER — FUROSEMIDE 10 MG/ML
80 INJECTION INTRAMUSCULAR; INTRAVENOUS 2 TIMES DAILY
Status: DISCONTINUED | OUTPATIENT
Start: 2019-01-08 | End: 2019-01-10

## 2019-01-08 RX ORDER — SODIUM CHLORIDE 0.9 % (FLUSH) 0.9 %
3 SYRINGE (ML) INJECTION EVERY 8 HOURS
Status: DISCONTINUED | OUTPATIENT
Start: 2019-01-08 | End: 2019-01-15 | Stop reason: HOSPADM

## 2019-01-08 RX ORDER — POTASSIUM CHLORIDE 750 MG/1
10 CAPSULE, EXTENDED RELEASE ORAL ONCE
Refills: 11 | COMMUNITY
Start: 2019-01-04 | End: 2020-03-05

## 2019-01-08 RX ORDER — HEPARIN SODIUM 5000 [USP'U]/ML
5000 INJECTION, SOLUTION INTRAVENOUS; SUBCUTANEOUS EVERY 8 HOURS
Status: DISCONTINUED | OUTPATIENT
Start: 2019-01-08 | End: 2019-01-15 | Stop reason: HOSPADM

## 2019-01-08 RX ORDER — ALPRAZOLAM 0.5 MG/1
0.5 TABLET ORAL 3 TIMES DAILY PRN
Status: DISCONTINUED | OUTPATIENT
Start: 2019-01-08 | End: 2019-01-15 | Stop reason: HOSPADM

## 2019-01-08 RX ORDER — POTASSIUM CHLORIDE 750 MG/1
10 CAPSULE, EXTENDED RELEASE ORAL 2 TIMES DAILY
Status: DISCONTINUED | OUTPATIENT
Start: 2019-01-08 | End: 2019-01-09

## 2019-01-08 RX ORDER — CLOPIDOGREL BISULFATE 75 MG/1
75 TABLET ORAL DAILY
Status: DISCONTINUED | OUTPATIENT
Start: 2019-01-09 | End: 2019-01-15 | Stop reason: HOSPADM

## 2019-01-08 RX ORDER — ALLOPURINOL 100 MG/1
300 TABLET ORAL DAILY
Status: DISCONTINUED | OUTPATIENT
Start: 2019-01-09 | End: 2019-01-15 | Stop reason: HOSPADM

## 2019-01-08 RX ORDER — ATORVASTATIN CALCIUM 20 MG/1
40 TABLET, FILM COATED ORAL DAILY
Status: DISCONTINUED | OUTPATIENT
Start: 2019-01-09 | End: 2019-01-15 | Stop reason: HOSPADM

## 2019-01-08 RX ORDER — PANTOPRAZOLE SODIUM 20 MG/1
20 TABLET, DELAYED RELEASE ORAL 2 TIMES DAILY
Status: DISCONTINUED | OUTPATIENT
Start: 2019-01-08 | End: 2019-01-11

## 2019-01-08 RX ORDER — ASPIRIN 81 MG/1
81 TABLET ORAL DAILY
Status: DISCONTINUED | OUTPATIENT
Start: 2019-01-09 | End: 2019-01-15 | Stop reason: HOSPADM

## 2019-01-08 RX ADMIN — POTASSIUM CHLORIDE 10 MEQ: 750 CAPSULE, EXTENDED RELEASE ORAL at 09:01

## 2019-01-08 RX ADMIN — Medication 3 ML: at 11:01

## 2019-01-08 RX ADMIN — FUROSEMIDE 80 MG: 10 INJECTION, SOLUTION INTRAVENOUS at 05:01

## 2019-01-08 RX ADMIN — HEPARIN SODIUM 5000 UNITS: 5000 INJECTION, SOLUTION INTRAVENOUS; SUBCUTANEOUS at 09:01

## 2019-01-08 RX ADMIN — PANTOPRAZOLE SODIUM 20 MG: 20 TABLET, DELAYED RELEASE ORAL at 11:01

## 2019-01-08 NOTE — PROGRESS NOTES
[facility] Visit New Patient Note    REFERRING PROVIDER: Dr. Robert Lomas    CHIEF COMPLAINT: Hyperbilirubinemia      HPI: This is a 78 y.o. White male referred for evaluation of hyperbilirubinemia. Noted history of pulmonary hypertension (followed by Dr. Lomas), previously on remodulin but now on uptravi. He has CAD with plans for upcoming stent placement. Using O2 PRN at home, having more SOB recently.         He denies any previously known liver disease or abnormal serologies. No identifiable risk factors for HCV/HBV. No family history of liver disease. No personal history of autoimmune disease. No herbal supplements or concerning medications. Has not had any alcohol since July 2018.       Has noticed some mild yellowing of his eyes. No dark urine or light stools. Denies any s/s GI bleeding or hepatic encephalopathy. No abdominal distention or signs of ascites. Has intermittent mild lower extremity edema, though well controlled on low dose lasix.     Review of available labs:  - Transaminases normal   - Alk Phos mildly elevated (140-150)  - Synthetic liver function: TBili elevated since 7/2018 (1.5-3.0), previously fractioned and noted to be mainly indirect, albumin normal   - Platelets normal, 170-200s         Review of patient's allergies indicates:   Allergen Reactions    Clindamycin Shortness Of Breath    Penicillins Rash        Current Outpatient Medications on File Prior to Visit   Medication Sig Dispense Refill    allopurinol (ZYLOPRIM) 300 MG tablet Take 300 mg by mouth.      aspirin (ECOTRIN) 81 MG EC tablet Take 81 mg by mouth once daily.      atorvastatin (LIPITOR) 40 MG tablet Take 1 tablet (40 mg total) by mouth once daily. 90 tablet 3    furosemide (LASIX) 20 MG tablet Take 2 tablets (40 mg total) by mouth once daily. 60 tablet 3    melatonin 3 mg Tab Take 1 capsule by mouth daily as needed.       multivit-minerals/ferrous fum (MULTI VITAMIN ORAL) Take 1 tablet by mouth.       multivitamin with minerals tablet Take 1 tablet by mouth once daily.      potassium chloride (MICRO-K) 10 MEQ CpSR Take 10 mEq by mouth 2 (two) times daily.  11    RABEprazole (ACIPHEX) 20 mg tablet Take 20 mg by mouth once daily.       selexipag 800 mcg Tab Take 200 mcg by mouth 2 (two) times daily. 200 mcg, BID, PO x1 week, increase by 200 mcg,BID, weekly, to highest tolerated dose to 1600 mcg.       sotalol (BETAPACE) 120 MG Tab Take 120 mg by mouth once daily.       UPTRAVI 200 mcg Tab Take 1,600 mcg by mouth 2 (two) times daily.       ALPRAZolam (XANAX) 1 MG tablet 0.5 mg as needed.       clopidogrel (PLAVIX) 75 mg tablet Take 1 tablet (75 mg total) by mouth once daily. Take four pills in the night before the procedure and one pill in the day of the procedure. 30 tablet 11    influenza (FLUZONE HIGH-DOSE) 180 mcg/0.5 mL vaccine Inject into the muscle by [facility]. 0.5 mL 0    OXYGEN-AIR DELIVERY SYSTEMS MISC by [facility].(Non-Drug; Combo Route) route.      sodium chloride 0.9% SolP 100 mL with treprostinil 1 mg/mL Soln 3,000,000 ng Inject 1,425 ng/min into the vein continuous. 1 Act 0     No current facility-administered medications on file prior to visit.          PMHX:  has a past medical history of Chronic hypoxemic respiratory failure, Coronary artery disease, BARBARA (obstructive sleep apnea), and Pulmonary hypertension.    PSHX:  has a past surgical history that includes Hernia repair; Knee surgery; Shoulder surgery; Back surgery; Sinus surgery; Angioplasty (); right heart catheterization (Right, 10/31/2018); and removal of tunneled central venous catheter (cvc) (N/A, 2018).    FAMILY HISTORY: Negative for liver disease, reviewed in EPIC.    SOCIAL HISTORY:   Social History     Tobacco Use   Smoking Status Former Smoker    Last attempt to quit:     Years since quittin.0   Smokeless Tobacco Current User    Types: Snuff       Social History     Substance and Sexual Activity   Alcohol  "Use No    Frequency: Never    Comment: none since July 2018       Social History     Substance and Sexual Activity   Drug Use No         ROS:   GENERAL: Denies fever, chills, weight loss/gain, fatigue  HEENT: Denies headaches, dizziness, vision/hearing changes  CARDIOVASCULAR: Denies chest pain, palpitations, or edema  RESPIRATORY: Denies dyspnea, cough  GI: Denies abdominal pain, rectal bleeding, nausea, vomiting. No change in bowel pattern or color  : Denies dysuria, hematuria   SKIN: Denies rash, itching   NEURO: Denies confusion, memory loss, or mood changes  PSYCH: Denies depression or anxiety  HEME/LYMPH: Denies easy bruising or bleeding  MSK: Denies joint pain or myalgia.     PHYSICAL EXAM:   Friendly White male, in no acute distress; alert and oriented to person, place and time  VITALS: BP 99/65 (BP Location: Left arm, Patient Position: Sitting, BP Method: Medium (Automatic))   Pulse 73   Temp 98.3 °F (36.8 °C) (Oral)   Resp 18   Ht 5' 9" (1.753 m)   Wt 69.1 kg (152 lb 5.4 oz)   SpO2 (!) 87%   BMI 22.50 kg/m²   HENT: Normocephalic, without obvious abnormality. Oral mucosa pink and moist.   EYES: Sclerae anicteric.   NECK: Supple. No masses or cervical adenopathy.  CARDIOVASCULAR: Regular rate and rhythm. No murmurs.  RESPIRATORY: Normal respiratory effort. Breath sounds diminished. No wheezes or crackles.  GI: Soft, non-tender, non-distended. No palpable hepatosplenomegaly. No masses palpable. No ascites.  EXTREMITIES:  No clubbing, cyanosis or edema.  SKIN: Warm and dry. No jaundice. No rashes noted to exposed skin. No telangectasias noted. No palmar erythema.  NEURO:  Normal gait. No asterixis.  PSYCH:  Memory intact. Thought and speech pattern appropriate. Behavior normal. No depression or anxiety noted.      LABS:  Lab Results   Component Value Date    WBC 6.83 01/08/2019    HGB 17.1 01/08/2019    HCT 51.8 01/08/2019     01/08/2019     01/08/2019    K 4.7 01/08/2019    CREATININE " 1.2 01/08/2019    ALT 17 01/08/2019    AST 20 01/08/2019    ALKPHOS 140 (H) 01/08/2019    BILITOT 3.1 (H) 01/08/2019    ALBUMIN 4.0 01/08/2019    INR 1.1 10/31/2018         No results found for: HEPAIGG    No results found for: HEPBSAG  No results found for: HEPBCAB  No results found for: HEPBSAB  No results found for: HEPCAB       ASSESSMENT / PLAN:  78 y.o. White male with:    1. Hyperbilirubinemia and elevated alk phos - Likely due to congestive hepatopathy   -- Labs today: fractionate TBili again, check viral hepatitis serologies   -- Abdominal US  -- Will not attempt Fibroscan as results will be falsely elevated given PH / hepatic congestion (BNP > 1000 today)   -- Liver biopsy not indicated; no signs of advanced liver disease or portal HTN      2. Pulmonary hypertension  3. CAD        Orders Placed This Encounter   Procedures    US Abdomen Complete    Hepatitis B surface antigen    Hepatitis B core antibody, total    Hepatitis C antibody    Bilirubin, direct       Return to clinic pending results      Thank you for allowing me to participate in the care of [facility]     Patient was seen in collaboration with Dr. Castro

## 2019-01-08 NOTE — Clinical Note
The PA catheter is repositioned to the main pulmonary artery. Hemodynamics performed. Cardiac output obtained at 4 L/min. Oxygen saturation obtained at 67%.

## 2019-01-08 NOTE — PROCEDURES
Greg Nolasco is a 78 y.o.  male patient, who presents for a 6 minute walk test ordered by MD Augustin.  The diagnosis is Pulmonary Hypertension.  The patient's BMI is 22.8 kg/m2.  Predicted distance (lower limit of normal) is 317.77 meters.      Test Results:    The test was completed with stops.  The patient stopped 1 times for a total of 45 seconds.  The total time walked was 315 seconds.  During walking, the patient reported:  Dyspnea. The patient used a wheelchair and supplemental oxygen during testing.     01/08/2019---------Distance: 182.88 meters (600 feet)     O2 Sat % Supplemental Oxygen Heart Rate Blood Pressure Lenard Scale   Pre-exercise  (Resting) 92 % Other (see comments)(8LN/C) 72 bpm 114/76 mmHg 3   During Exercise 74 % Other (see comments)(8L N/C) 86 bpm 122/86 mmHg 5-6   Post-exercise  (Recovery) 86 % Other (see comments)(8L N/C)  70 bpm   mmHg       Recovery Time: 1200 seconds    Performing nurse/tech: [employer]op[employer] RRT      PREVIOUS STUDY:   The patient had a previous study.  09/25/2018---------Distance: 304.8 meters (1000 feet)       O2 Sat % Supplemental Oxygen Heart Rate Blood Pressure Lenard Scale   Pre-exercise  (Resting) 91 % Room Air 74 bpm 137/75 mmHg 0   During Exercise 71 % Room Air 100 bpm 160/80 mmHg 3   Post-exercise  (Recovery) 91 % Room Air  80 bpm 144/82 mmHg          CLINICAL INTERPRETATION:  Six minute walk distance is 182.88 meters (600 feet) with heavy dyspnea.  During exercise, there was significant desaturation while breathing supplemental oxygen.  Both blood pressure and heart rate remained stable with walking.  The patient did not report non-pulmonary symptoms during exercise.  Significant exercise impairment is likely due to desaturation.  Since the previous study in September 2018, exercise capacity is significantly worse.  Based upon age and body mass index, exercise capacity is less than predicted.

## 2019-01-08 NOTE — SUBJECTIVE & OBJECTIVE
Past Medical History:   Diagnosis Date    Chronic hypoxemic respiratory failure     Coronary artery disease     BARBARA (obstructive sleep apnea)     Pulmonary hypertension        Past Surgical History:   Procedure Laterality Date    ANGIOPLASTY  1991    BACK SURGERY      HERNIA REPAIR      KNEE SURGERY      REMOVAL, CATHETER, CENTRAL VENOUS, TUNNELED N/A 12/20/2018    Performed by Walt Smith MD at [facility] CATH LAB    RIGHT HEART CATH Right 10/31/2018    Performed by Robert Lomas MD at [facility] CATH LAB    SHOULDER SURGERY      SINUS SURGERY         Review of patient's allergies indicates:   Allergen Reactions    Clindamycin Shortness Of Breath    Penicillins Rash       Current Facility-Administered Medications   Medication    [START ON 1/9/2019] allopurinol tablet 300 mg    ALPRAZolam tablet 0.5 mg    [START ON 1/9/2019] aspirin EC tablet 81 mg    [START ON 1/9/2019] atorvastatin tablet 40 mg    [START ON 1/9/2019] clopidogrel tablet 75 mg    furosemide injection 80 mg    heparin (porcine) injection 5,000 Units    NON FORMULARY MEDICATION 1,000 mcg    [START ON 1/9/2019] pantoprazole EC tablet 40 mg    potassium chloride CR capsule 10 mEq    sodium chloride 0.9% flush 3 mL    [START ON 1/9/2019] sotalol tablet 120 mg     Current Outpatient Medications   Medication Sig    allopurinol (ZYLOPRIM) 300 MG tablet Take 300 mg by mouth.    ALPRAZolam (XANAX) 1 MG tablet 0.5 mg as needed.     aspirin (ECOTRIN) 81 MG EC tablet Take 81 mg by mouth once daily.    atorvastatin (LIPITOR) 40 MG tablet Take 1 tablet (40 mg total) by mouth once daily.    clopidogrel (PLAVIX) 75 mg tablet Take 1 tablet (75 mg total) by mouth once daily. Take four pills in the night before the procedure and one pill in the day of the procedure.    furosemide (LASIX) 20 MG tablet Take 2 tablets (40 mg total) by mouth once daily.    influenza (FLUZONE HIGH-DOSE) 180 mcg/0.5 mL vaccine Inject into the muscle by [facility].     melatonin 3 mg Tab Take 1 capsule by mouth daily as needed.     multivit-minerals/ferrous fum (MULTI VITAMIN ORAL) Take 1 tablet by mouth.    multivitamin with minerals tablet Take 1 tablet by mouth once daily.    OXYGEN-AIR DELIVERY SYSTEMS MISC by [facility]Non-Drug; Combo Route) route.    potassium chloride (MICRO-K) 10 MEQ CpSR Take 10 mEq by mouth 2 (two) times daily.    RABEprazole (ACIPHEX) 20 mg tablet Take 20 mg by mouth once daily.     selexipag 800 mcg Tab Take 200 mcg by mouth 2 (two) times daily. 200 mcg, BID, PO x1 week, increase by 200 mcg,BID, weekly, to highest tolerated dose to 1600 mcg.     sodium chloride 0.9% SolP 100 mL with treprostinil 1 mg/mL Soln 3,000,000 ng Inject 1,425 ng/min into the vein continuous.    sotalol (BETAPACE) 120 MG Tab Take 120 mg by mouth once daily.     UPTRAVI 200 mcg Tab Take 1,600 mcg by mouth 2 (two) times daily.      Family History     None        Tobacco Use    Smoking status: Former Smoker     Last attempt to quit:      Years since quittin.0    Smokeless tobacco: Current User     Types: Snuff   Substance and Sexual Activity    Alcohol use: No     Frequency: Never     Comment: none since 2018    Drug use: No    Sexual activity: Not on file     Review of Systems   Constitutional: Negative.    HENT: Negative.    Eyes: Negative.    Respiratory: Positive for shortness of breath.    Cardiovascular: Negative.    Gastrointestinal: Negative.    Genitourinary: Negative.    Neurological: Negative.    Psychiatric/Behavioral: Negative.      Objective:     Vital Signs (Most Recent):  Temp: 97.4 °F (36.3 °C) (19 1536)  Pulse: 68 (19 1632)  Resp: (!) 24 (19 1536)  BP: 115/68 (19 1632)  SpO2: 97 % (19 1632) Vital Signs (24h Range):  Temp:  [97.4 °F (36.3 °C)-98.3 °F (36.8 °C)] 97.4 °F (36.3 °C)  Pulse:  [68-77] 68  Resp:  [18-24] 24  SpO2:  [80 %-98 %] 97 %  BP: ()/(65-73) 115/68     Patient Vitals for the past 72 hrs  (Last 3 readings):   Weight   01/08/19 1536 69.9 kg (154 lb)     Body mass index is 22.74 kg/m².    No intake or output data in the 24 hours ending 01/08/19 1639    Physical Exam   Constitutional: He is oriented to person, place, and time. He appears well-developed and well-nourished.   HENT:   Head: Normocephalic.   Eyes: Pupils are equal, round, and reactive to light.   Neck: Normal range of motion. Neck supple.   Cardiovascular: Normal rate and regular rhythm.   Pulmonary/Chest: Effort normal and breath sounds normal.   Abdominal: Soft. Bowel sounds are normal.   Musculoskeletal: Normal range of motion.   Neurological: He is alert and oriented to person, place, and time.   Skin: Skin is warm and dry.   Psychiatric: He has a normal mood and affect. His behavior is normal.   Nursing note and vitals reviewed.      Significant Labs:  CBC:  Recent Labs   Lab 01/08/19  1245   WBC 6.83   RBC 5.35   HGB 17.1   HCT 51.8      MCV 97   MCH 32.0*   MCHC 33.0     BNP:  Recent Labs   Lab 01/08/19  1245   BNP 1,037*     CMP:  Recent Labs   Lab 01/08/19  1245   GLU 95   CALCIUM 9.9   ALBUMIN 4.0   PROT 7.4      K 4.7   CO2 26      BUN 21   CREATININE 1.2   ALKPHOS 140*   ALT 17   AST 20   BILITOT 3.1*      Coagulation:   No results for input(s): PT, INR, APTT in the last 168 hours.  LDH:  No results for input(s): LDH in the last 72 hours.  Microbiology:  Microbiology Results (last 7 days)     ** No results found for the last 168 hours. **          I have reviewed all pertinent labs within the past 24 hours.    Diagnostic Results:  I have reviewed and interpreted all pertinent imaging results/findings within the past 24 hours.

## 2019-01-08 NOTE — LETTER
January 8, 2019      Robert Lomas MD  [address] [facility] [address]           [facility] - Hepatology  [address]  [place] [address]  Phone: [phone]  Fax: [phone]          Patient: Greg Nolasco   MR Number: [id number]   YOB: 1940   Date of Visit: 1/8/2019       Dear Dr. Robert Lomas:    Thank you for referring Greg Nolasco to me for evaluation. Attached you will find relevant portions of my assessment and plan of care.    If you have questions, please do not hesitate to call me. I look forward to following Greg Nolasco along with you.    Sincerely,    Gena Rene, NIXON    Enclosure  CC:  No Recipients    If you would like to receive this communication electronically, please contact [email or web address] or [phone] to request more information on [employer] Link access.    For providers and/or their staff who would like to refer a patient to [facility], please contact us through our one-stop-shop provider referral line, [facility], at [phone].    If you feel you have received this communication in error or would no longer like to receive these types of communications, please e-mail [email or web address]

## 2019-01-08 NOTE — PROGRESS NOTES
Pt. on comfort flow 30 lpm 100%; will titrate as tolerated; non rebreather at bedside for use; will continue to monitor.

## 2019-01-08 NOTE — ED NOTES
"The patient was brought to the ER today after having a 6 min walk test and his oxygen saturation dropped. The patient has severe pulmonary hypertension and is on oxygen at home. pts family member states pt's "normal" oxygen saturation is 84-87%.  "

## 2019-01-08 NOTE — H&P
[facility]  Heart Transplant  H&P    Patient Name: Greg Nolasco  MRN: [id number]  Admission Date: 1/8/2019  Attending Physician: Merary Garcia MD  Primary Care Provider: Pablo Lorenzo MD  Principal Problem:Respiratory distress    Subjective:     History of Present Illness:  78 y.o. year old White male  with multivessel CAD (by [facility] 7/20/2018), severe PH, PAF (on sotalol), COPD with chronic hypoxic respiratory failure (on 4 L home O2) who was started on IV remodulin in late July 2018 for PAH and was since weaned off in December who was sent to ER from clinic secondary to desaturation during 6min walk. He states that he was weaned off of Remodulin and switched to Selexipag secondary to SE with Remodulin. He states that he has been having more dyspnea since then. Pt currently lying on stretcher with nrb mask on. He states that he feels better. Sat 98% on monitor. Daughter at bedside.     Past Medical History:   Diagnosis Date    Chronic hypoxemic respiratory failure     Coronary artery disease     BARBARA (obstructive sleep apnea)     Pulmonary hypertension        Past Surgical History:   Procedure Laterality Date    ANGIOPLASTY  1991    BACK SURGERY      HERNIA REPAIR      KNEE SURGERY      REMOVAL, CATHETER, CENTRAL VENOUS, TUNNELED N/A 12/20/2018    [facility] CATH LAB    RIGHT HEART CATH Right 10/31/2018    Per[facility] by [facility] at [facility] CATH LAB    SHOULDER SURGERY      SINUS SURGERY         Review of patient's allergies indicates:   Allergen Reactions    Clindamycin Shortness Of Breath    Penicillins Rash       Current Facility-Administered Medications   Medication    [START ON 1/9/2019] allopurinol tablet 300 mg    ALPRAZolam tablet 0.5 mg    [START ON 1/9/2019] aspirin EC tablet 81 mg    [START ON 1/9/2019] atorvastatin tablet 40 mg    [START ON 1/9/2019] clopidogrel tablet 75 mg    furosemide injection 80 mg    heparin  (porcine) injection 5,000 Units    NON FORMULARY MEDICATION 1,000 mcg    [START ON 1/9/2019] pantoprazole EC tablet 40 mg    potassium chloride CR capsule 10 mEq    sodium chloride 0.9% flush 3 mL    [START ON 1/9/2019] sotalol tablet 120 mg     Current Outpatient Medications   Medication Sig    allopurinol (ZYLOPRIM) 300 MG tablet Take 300 mg by mouth.    ALPRAZolam (XANAX) 1 MG tablet 0.5 mg as needed.     aspirin (ECOTRIN) 81 MG EC tablet Take 81 mg by mouth once daily.    atorvastatin (LIPITOR) 40 MG tablet Take 1 tablet (40 mg total) by mouth once daily.    clopidogrel (PLAVIX) 75 mg tablet Take 1 tablet (75 mg total) by mouth once daily. Take four pills in the night before the procedure and one pill in the day of the procedure.    furosemide (LASIX) 20 MG tablet Take 2 tablets (40 mg total) by mouth once daily.    influenza (FLUZONE HIGH-DOSE) 180 mcg/0.5 mL vaccine Inject into the muscle by [facility].    melatonin 3 mg Tab Take 1 capsule by mouth daily as needed.     multivit-minerals/ferrous fum (MULTI VITAMIN ORAL) Take 1 tablet by mouth.    multivitamin with minerals tablet Take 1 tablet by mouth once daily.    OXYGEN-AIR DELIVERY SYSTEMS MISC by Misc.(Non-Drug; Combo Route) route.    potassium chloride (MICRO-K) 10 MEQ CpSR Take 10 mEq by mouth 2 (two) times daily.    RABEprazole (ACIPHEX) 20 mg tablet Take 20 mg by mouth once daily.     selexipag 800 mcg Tab Take 200 mcg by mouth 2 (two) times daily. 200 mcg, BID, PO x1 week, increase by 200 mcg,BID, weekly, to highest tolerated dose to 1600 mcg.     sodium chloride 0.9% SolP 100 mL with treprostinil 1 mg/mL Soln 3,000,000 ng Inject 1,425 ng/min into the vein continuous.    sotalol (BETAPACE) 120 MG Tab Take 120 mg by mouth once daily.     UPTRAVI 200 mcg Tab Take 1,600 mcg by mouth 2 (two) times daily.      Family History     None        Tobacco Use    Smoking status: Former Smoker     Last attempt to quit: 1991     Years since  quittin.0    Smokeless tobacco: Current User     Types: Snuff   Substance and Sexual Activity    Alcohol use: No     Frequency: Never     Comment: none since 2018    Drug use: No    Sexual activity: Not on file     Review of Systems   Constitutional: Negative.    HENT: Negative.    Eyes: Negative.    Respiratory: Positive for shortness of breath.    Cardiovascular: Negative.    Gastrointestinal: Negative.    Genitourinary: Negative.    Neurological: Negative.    Psychiatric/Behavioral: Negative.      Objective:     Vital Signs (Most Recent):  Temp: 97.4 °F (36.3 °C) (19 153)  Pulse: 68 (19 1632)  Resp: (!) 24 (19 153)  BP: 115/68 (19 1632)  SpO2: 97 % (19 163) Vital Signs (24h Range):  Temp:  [97.4 °F (36.3 °C)-98.3 °F (36.8 °C)] 97.4 °F (36.3 °C)  Pulse:  [68-77] 68  Resp:  [18-24] 24  SpO2:  [80 %-98 %] 97 %  BP: ()/(65-73) 115/68     Patient Vitals for the past 72 hrs (Last 3 readings):   Weight   19 69.9 kg (154 lb)     Body mass index is 22.74 kg/m².    No intake or output data in the 24 hours ending 19 1639    Physical Exam   Constitutional: He is oriented to person, place, and time. He appears well-developed and well-nourished.   HENT:   Head: Normocephalic.   Eyes: Pupils are equal, round, and reactive to light.   Neck: Normal range of motion. Neck supple.   Cardiovascular: Normal rate and regular rhythm.   Pulmonary/Chest: Effort normal and breath sounds normal.   Abdominal: Soft. Bowel sounds are normal.   Musculoskeletal: Normal range of motion.   Neurological: He is alert and oriented to person, place, and time.   Skin: Skin is warm and dry.   Psychiatric: He has a normal mood and affect. His behavior is normal.   Nursing note and vitals reviewed.      Significant Labs:  CBC:  Recent Labs   Lab 19  1245   WBC 6.83   RBC 5.35   HGB 17.1   HCT 51.8      MCV 97   MCH 32.0*   MCHC 33.0     BNP:  Recent Labs   Lab  01/08/19  1245   BNP 1,037*     CMP:  Recent Labs   Lab 01/08/19  1245   GLU 95   CALCIUM 9.9   ALBUMIN 4.0   PROT 7.4      K 4.7   CO2 26      BUN 21   CREATININE 1.2   ALKPHOS 140*   ALT 17   AST 20   BILITOT 3.1*      Coagulation:   No results for input(s): PT, INR, APTT in the last 168 hours.  LDH:  No results for input(s): LDH in the last 72 hours.  Microbiology:  Microbiology Results (last 7 days)     ** No results found for the last 168 hours. **          I have reviewed all pertinent labs within the past 24 hours.    Diagnostic Results:  I have reviewed and interpreted all pertinent imaging results/findings within the past 24 hours.    Assessment/Plan:     * Respiratory distress    -Admit to ICU overnight.  -Gentle diuresis with IVP Lasix  -Wean O2 for Sat>/= 88%.     Paroxysmal atrial fibrillation    -Continue Sotalol.  -Pt declined anticoagulation in the past.     Coronary artery disease involving native coronary artery of native heart without angina pectoris    -Continue ASA/statin/clopidogrel.      Pulmonary hypertension    -Pt recently weaned off of remodulin secondary to SE(leg pain).  -Continue Selixipag for now.  -Will discuss alternative therapies vs goals of care.       Ab Carney, NP  Heart Transplant  [facility]

## 2019-01-08 NOTE — ED PROVIDER NOTES
Encounter Date: 2019    SCRIBE #1 NOTE: I, Liyah Carbajal, am scribing for, and in the presence of,  Dr. Hamilton. I have scribed the following portions of the note - Other sections scribed: HPI, ROS, PE.       History     Chief Complaint   Patient presents with    Shortness of Breath     from pul clinic low puls ox     Time patient was seen by the provider: 3:51 PM      The patient is a 78 y.o. male with history of CAD and pulmonary HTN presents to the ED with a complaint of SOB. Patient sent down from Pulmonary clinic after becoming very SOB and hypoxic during his 6 minute walk test. Daughter report 2 weeks ago he was switched from IV medication to pills and that he has been having issues with breathing since then. Patient reports SOB that he states is improving. He is able to speak in full sentences.      The history is provided by the patient and medical records.     Review of patient's allergies indicates:   Allergen Reactions    Clindamycin Shortness Of Breath    Penicillins Rash     Past Medical History:   Diagnosis Date    Chronic hypoxemic respiratory failure     Coronary artery disease     BARBARA (obstructive sleep apnea)     Pulmonary hypertension      Past Surgical History:   Procedure Laterality Date    ANGIOPLASTY      BACK SURGERY      HERNIA REPAIR      KNEE SURGERY      REMOVAL, CATHETER, CENTRAL VENOUS, TUNNELED N/A 2018    [facility] by [facility] CATH LAB    RIGHT HEART CATH Right 10/31/2018    Performed by Robert [facility] MD at [facility] CATH LAB    SHOULDER SURGERY      SINUS SURGERY       Family History   Problem Relation Age of Onset    Cirrhosis Neg Hx      Social History     Tobacco Use    Smoking status: Former Smoker     Last attempt to quit:      Years since quittin.0    Smokeless tobacco: Current User     Types: Snuff   Substance Use Topics    Alcohol use: No     Frequency: Never     Comment: none since 2018    Drug use: No      Review of Systems   Constitutional: Negative for fever.   HENT: Negative for sore throat.    Respiratory: Positive for shortness of breath.    Cardiovascular: Negative for chest pain.   Gastrointestinal: Negative for nausea.   Genitourinary: Negative for dysuria.   Musculoskeletal: Negative for back pain.   Skin: Negative for rash.   Neurological: Negative for weakness.   Hematological: Does not bruise/bleed easily.       Physical Exam     Initial Vitals [01/08/19 1536]   BP Pulse Resp Temp SpO2   123/73 77 (!) 24 97.4 °F (36.3 °C) (!) 80 %      MAP       --         Physical Exam    Nursing note and vitals reviewed.  Constitutional: He appears well-developed and well-nourished. He is not diaphoretic. No distress.   Patient reports being cold   HENT:   Head: Normocephalic and atraumatic.   Mouth/Throat: Oropharynx is clear and moist.   Eyes: Conjunctivae and EOM are normal. Pupils are equal, round, and reactive to light.   Neck: Normal range of motion. Neck supple. No JVD present.   Cardiovascular: Normal rate, regular rhythm and normal heart sounds.   No murmur heard.  Pulmonary/Chest: He has no wheezes. He has no rhonchi. He has no rales.   Tachypneic, labored breathing   Abdominal: Soft. Bowel sounds are normal. He exhibits no distension and no mass. There is no tenderness. There is no rebound and no guarding.   Musculoskeletal: Normal range of motion. He exhibits no edema or tenderness.   Neurological: He is alert and oriented to person, place, and time. He has normal strength. No cranial nerve deficit or sensory deficit.   Skin: Skin is warm and dry. No rash noted.   Psychiatric: He has a normal mood and affect.         ED Course   Procedures  Labs Reviewed - No data to display  EKG Readings: (Independently Interpreted)   normal sinus rhythm at a rate of 78, nonspecific T wave abnormalities, no evidence of ischemia        Imaging Results    None          Medical Decision Making:   History:   Old Medical  Records: I decided to obtain old medical records.  Initial Assessment:   78 y.o. male with pulmonary HTN, became acutely SOB during provacative test today. Patient on non rebreather, saturation has improved to mid 80-90%. Patient appears and reports feeling comfortable. Discussed with Dr. Lomas and Cardiology fellow, patient will be admitted.            Scribe Attestation:   Scribe #1: I performed the above scribed service and the documentation accurately describes the services I performed. I attest to the accuracy of the note.               Clinical Impression:   The encounter diagnosis was Respiratory distress.      Disposition:   Disposition: Admitted                        Ruth Ann Hamilton MD  01/08/19 5632

## 2019-01-08 NOTE — HPI
78 y.o. year old White male with multivessel CAD (by [facility] 7/20/2018), severe PH, PAF (on sotalol), COPD with chronic hypoxic respiratory failure (on 4 L home O2) who was started on IV remodulin in late July 2018 for PAH and was since weaned off in December who was sent to ER from clinic secondary to desaturation during 6min walk. He states that he was weaned off of Remodulin and switched to Selexipag secondary to SE with Remodulin. He states that he has been having more dyspnea since then.

## 2019-01-08 NOTE — ED NOTES
LOC: The patient is awake, alert and aware of environment with an appropriate affect, the patient is oriented x 3 and speaking appropriately.  APPEARANCE: Patient sitting up in ER stretcher, appears to be in mild distress, patient is clean and well groomed. Pt c/o being cold. Given warm blankets.  SKIN: The skin is warm and dry, color consistent with ethnicity, patient has normal skin turgor and moist mucus membranes, skin intact, no breakdown noted.  MUSKULOSKELETAL: Patient moving all extremities well, no obvious swelling or deformities noted.  RESPIRATORY: Airway is open and patent, respirations are spontaneous, pt has on non rebreather mask, tachypnea, pt denies feeling SOB, states he feels the same as he normally feels.  CARDIAC: Patient has a normal rate.  ABDOMEN: Soft and non tender to palpation, no distention noted.  NEUROLOGIC: Eyes open spontaneously, behavior appropriate to situation, follows commands, facial expression symmetrical.

## 2019-01-08 NOTE — PROGRESS NOTES
I have personally performed a face to face diagnostic evaluation on this patient. I have reviewed and agree with today's findings and the care plan outlined by Gena Rene NP with following comments:    Mr. Nolasco is a 78 year old gentleman who was referred for elevated ALP and bilirubin. He has severe pulmonary arterial hypertension, has been on Uptravi. His last RHC showed PA: 64/20 (38) and . His today's BNP is > 1000. His abnormal liver chemistry tests are likely secondary to hepatic congestion. Non-invasive liver stiffness measurement such as Vibration-controlled Tansient Elastography (VCTE) or MR elastography would yield high liver stiffness measurement due to hepatic congestion. I do not think that liver biopsy is indicated in this patient. He does not have any signs of advanced liver disease or portal hypertension. Agree with checking US liver and serologies for the sake of thoroughness in work up. Agree with continuing treatment of PAH.      The patient will return to Gena Rene NP  for follow-up.     Erik Castro MD   Hepatology  [facility]

## 2019-01-08 NOTE — ASSESSMENT & PLAN NOTE
-Pt recently weaned off of remodulin secondary to SE(leg pain).  -Continue Selixipag for now.  -Will discuss alternative therapies vs goals of care.

## 2019-01-09 PROBLEM — J96.21 ACUTE ON CHRONIC RESPIRATORY FAILURE WITH HYPOXIA: Status: ACTIVE | Noted: 2019-01-08

## 2019-01-09 LAB
ALBUMIN SERPL BCP-MCNC: 3.2 G/DL
ALP SERPL-CCNC: 114 U/L
ALT SERPL W/O P-5'-P-CCNC: 17 U/L
ANION GAP SERPL CALC-SCNC: 10 MMOL/L
ANION GAP SERPL CALC-SCNC: 8 MMOL/L
ASCENDING AORTA: 3.01 CM
AST SERPL-CCNC: 20 U/L
AV INDEX (PROSTH): 0.51
AV MEAN GRADIENT: 4.15 MMHG
AV PEAK GRADIENT: 7.18 MMHG
AV VALVE AREA: 1.8 CM2
BASOPHILS # BLD AUTO: 0.07 K/UL
BASOPHILS NFR BLD: 0.9 %
BILIRUB SERPL-MCNC: 2.5 MG/DL
BSA FOR ECHO PROCEDURE: 1.85 M2
BUN SERPL-MCNC: 18 MG/DL
BUN SERPL-MCNC: 21 MG/DL
CALCIUM SERPL-MCNC: 8 MG/DL
CALCIUM SERPL-MCNC: 9.5 MG/DL
CHLORIDE SERPL-SCNC: 104 MMOL/L
CHLORIDE SERPL-SCNC: 112 MMOL/L
CO2 SERPL-SCNC: 20 MMOL/L
CO2 SERPL-SCNC: 24 MMOL/L
CREAT SERPL-MCNC: 0.9 MG/DL
CREAT SERPL-MCNC: 1.2 MG/DL
CV ECHO LV RWT: 0.46 CM
DIFFERENTIAL METHOD: ABNORMAL
DOP CALC AO PEAK VEL: 1.34 M/S
DOP CALC AO VTI: 25.89 CM
DOP CALC LVOT AREA: 3.53 CM2
DOP CALC LVOT DIAMETER: 2.12 CM
DOP CALC LVOT STROKE VOLUME: 46.71 CM3
DOP CALCLVOT PEAK VEL VTI: 13.24 CM
ECHO LV POSTERIOR WALL: 0.8 CM (ref 0.6–1.1)
EOSINOPHIL # BLD AUTO: 0.3 K/UL
EOSINOPHIL NFR BLD: 4.4 %
ERYTHROCYTE [DISTWIDTH] IN BLOOD BY AUTOMATED COUNT: 15.7 %
EST. GFR  (AFRICAN AMERICAN): >60 ML/MIN/1.73 M^2
EST. GFR  (AFRICAN AMERICAN): >60 ML/MIN/1.73 M^2
EST. GFR  (NON AFRICAN AMERICAN): 57.6 ML/MIN/1.73 M^2
EST. GFR  (NON AFRICAN AMERICAN): >60 ML/MIN/1.73 M^2
FRACTIONAL SHORTENING: 38 % (ref 28–44)
GLUCOSE SERPL-MCNC: 102 MG/DL
GLUCOSE SERPL-MCNC: 80 MG/DL
HCT VFR BLD AUTO: 45.4 %
HGB BLD-MCNC: 14.8 G/DL
IMM GRANULOCYTES # BLD AUTO: 0.02 K/UL
IMM GRANULOCYTES NFR BLD AUTO: 0.3 %
INTERVENTRICULAR SEPTUM: 0.91 CM (ref 0.6–1.1)
LA MAJOR: 6.03 CM
LA MINOR: 5.22 CM
LA WIDTH: 3.85 CM
LEFT ATRIUM SIZE: 4.9 CM
LEFT ATRIUM VOLUME INDEX: 48.3 ML/M2
LEFT ATRIUM VOLUME: 89.73 CM3
LEFT INTERNAL DIMENSION IN SYSTOLE: 2.15 CM (ref 2.1–4)
LEFT VENTRICLE DIASTOLIC VOLUME INDEX: 27.17 ML/M2
LEFT VENTRICLE DIASTOLIC VOLUME: 50.46 ML
LEFT VENTRICLE MASS INDEX: 44.3 G/M2
LEFT VENTRICLE SYSTOLIC VOLUME INDEX: 8.2 ML/M2
LEFT VENTRICLE SYSTOLIC VOLUME: 15.29 ML
LEFT VENTRICULAR INTERNAL DIMENSION IN DIASTOLE: 3.49 CM (ref 3.5–6)
LEFT VENTRICULAR MASS: 82.22 G
LYMPHOCYTES # BLD AUTO: 2 K/UL
LYMPHOCYTES NFR BLD: 26.2 %
MAGNESIUM SERPL-MCNC: 1.9 MG/DL
MAGNESIUM SERPL-MCNC: 2.3 MG/DL
MCH RBC QN AUTO: 31 PG
MCHC RBC AUTO-ENTMCNC: 32.6 G/DL
MCV RBC AUTO: 95 FL
MONOCYTES # BLD AUTO: 0.8 K/UL
MONOCYTES NFR BLD: 10.2 %
NEUTROPHILS # BLD AUTO: 4.3 K/UL
NEUTROPHILS NFR BLD: 58 %
NRBC BLD-RTO: 0 /100 WBC
PISA TR MAX VEL: 3.1 M/S
PLATELET # BLD AUTO: 165 K/UL
PMV BLD AUTO: 9.2 FL
POTASSIUM SERPL-SCNC: 3.6 MMOL/L
POTASSIUM SERPL-SCNC: 4.3 MMOL/L
PROT SERPL-MCNC: 5.8 G/DL
RA MAJOR: 6.38 CM
RA PRESSURE: 3 MMHG
RA WIDTH: 5.5 CM
RBC # BLD AUTO: 4.77 M/UL
RIGHT VENTRICULAR END-DIASTOLIC DIMENSION: 5.9 CM
SINUS: 3.63 CM
SODIUM SERPL-SCNC: 138 MMOL/L
SODIUM SERPL-SCNC: 140 MMOL/L
STJ: 3.11 CM
TDI LATERAL: 0.1
TDI SEPTAL: 0.06
TDI: 0.08
TR MAX PG: 38.44 MMHG
TRICUSPID ANNULAR PLANE SYSTOLIC EXCURSION: 1.72 CM
TV REST PULMONARY ARTERY PRESSURE: 41 MMHG
WBC # BLD AUTO: 7.43 K/UL

## 2019-01-09 PROCEDURE — 83735 ASSAY OF MAGNESIUM: CPT | Mod: 91

## 2019-01-09 PROCEDURE — 27100171 HC OXYGEN HIGH FLOW UP TO 24 HOURS

## 2019-01-09 PROCEDURE — 20000000 HC ICU ROOM

## 2019-01-09 PROCEDURE — 25000003 PHARM REV CODE 250: Performed by: STUDENT IN AN ORGANIZED HEALTH CARE EDUCATION/TRAINING PROGRAM

## 2019-01-09 PROCEDURE — 63600175 PHARM REV CODE 636 W HCPCS: Performed by: NURSE PRACTITIONER

## 2019-01-09 PROCEDURE — A4216 STERILE WATER/SALINE, 10 ML: HCPCS | Performed by: NURSE PRACTITIONER

## 2019-01-09 PROCEDURE — 25000003 PHARM REV CODE 250: Performed by: PHYSICIAN ASSISTANT

## 2019-01-09 PROCEDURE — 80053 COMPREHEN METABOLIC PANEL: CPT

## 2019-01-09 PROCEDURE — 99233 PR SUBSEQUENT HOSPITAL CARE,LEVL III: ICD-10-PCS | Mod: ,,, | Performed by: INTERNAL MEDICINE

## 2019-01-09 PROCEDURE — 85025 COMPLETE CBC W/AUTO DIFF WBC: CPT

## 2019-01-09 PROCEDURE — 99233 SBSQ HOSP IP/OBS HIGH 50: CPT | Mod: ,,, | Performed by: INTERNAL MEDICINE

## 2019-01-09 PROCEDURE — 80048 BASIC METABOLIC PNL TOTAL CA: CPT

## 2019-01-09 PROCEDURE — 27100092 HC HIGH FLOW DELIVERY CANNULA

## 2019-01-09 PROCEDURE — 25000003 PHARM REV CODE 250: Performed by: NURSE PRACTITIONER

## 2019-01-09 PROCEDURE — 83735 ASSAY OF MAGNESIUM: CPT

## 2019-01-09 RX ORDER — ACETAMINOPHEN 325 MG/1
650 TABLET ORAL ONCE
Status: COMPLETED | OUTPATIENT
Start: 2019-01-09 | End: 2019-01-09

## 2019-01-09 RX ORDER — POTASSIUM CHLORIDE 20 MEQ/1
40 TABLET, EXTENDED RELEASE ORAL ONCE
Status: COMPLETED | OUTPATIENT
Start: 2019-01-09 | End: 2019-01-09

## 2019-01-09 RX ORDER — LANOLIN ALCOHOL/MO/W.PET/CERES
800 CREAM (GRAM) TOPICAL ONCE
Status: COMPLETED | OUTPATIENT
Start: 2019-01-09 | End: 2019-01-09

## 2019-01-09 RX ORDER — LANOLIN ALCOHOL/MO/W.PET/CERES
400 CREAM (GRAM) TOPICAL 2 TIMES DAILY
Status: DISCONTINUED | OUTPATIENT
Start: 2019-01-09 | End: 2019-01-15 | Stop reason: HOSPADM

## 2019-01-09 RX ORDER — POTASSIUM CHLORIDE 750 MG/1
40 CAPSULE, EXTENDED RELEASE ORAL 2 TIMES DAILY
Status: DISCONTINUED | OUTPATIENT
Start: 2019-01-09 | End: 2019-01-10

## 2019-01-09 RX ADMIN — POTASSIUM CHLORIDE 40 MEQ: 1500 TABLET, EXTENDED RELEASE ORAL at 05:01

## 2019-01-09 RX ADMIN — ATORVASTATIN CALCIUM 40 MG: 20 TABLET, FILM COATED ORAL at 08:01

## 2019-01-09 RX ADMIN — MAGNESIUM OXIDE TAB 400 MG (241.3 MG ELEMENTAL MG) 400 MG: 400 (241.3 MG) TAB at 08:01

## 2019-01-09 RX ADMIN — PANTOPRAZOLE SODIUM 20 MG: 20 TABLET, DELAYED RELEASE ORAL at 08:01

## 2019-01-09 RX ADMIN — Medication 3 ML: at 01:01

## 2019-01-09 RX ADMIN — HEPARIN SODIUM 5000 UNITS: 5000 INJECTION, SOLUTION INTRAVENOUS; SUBCUTANEOUS at 05:01

## 2019-01-09 RX ADMIN — ACETAMINOPHEN 650 MG: 325 TABLET, FILM COATED ORAL at 07:01

## 2019-01-09 RX ADMIN — ALLOPURINOL 300 MG: 100 TABLET ORAL at 08:01

## 2019-01-09 RX ADMIN — MAGNESIUM OXIDE TAB 400 MG (241.3 MG ELEMENTAL MG) 800 MG: 400 (241.3 MG) TAB at 05:01

## 2019-01-09 RX ADMIN — CLOPIDOGREL 75 MG: 75 TABLET, FILM COATED ORAL at 08:01

## 2019-01-09 RX ADMIN — Medication 3 ML: at 10:01

## 2019-01-09 RX ADMIN — FUROSEMIDE 80 MG: 10 INJECTION, SOLUTION INTRAVENOUS at 08:01

## 2019-01-09 RX ADMIN — POTASSIUM CHLORIDE 40 MEQ: 750 CAPSULE, EXTENDED RELEASE ORAL at 08:01

## 2019-01-09 RX ADMIN — ASPIRIN 81 MG: 81 TABLET, COATED ORAL at 08:01

## 2019-01-09 RX ADMIN — FUROSEMIDE 80 MG: 10 INJECTION, SOLUTION INTRAVENOUS at 05:01

## 2019-01-09 RX ADMIN — HEPARIN SODIUM 5000 UNITS: 5000 INJECTION, SOLUTION INTRAVENOUS; SUBCUTANEOUS at 01:01

## 2019-01-09 RX ADMIN — MAGNESIUM OXIDE TAB 400 MG (241.3 MG ELEMENTAL MG) 400 MG: 400 (241.3 MG) TAB at 09:01

## 2019-01-09 RX ADMIN — HEPARIN SODIUM 5000 UNITS: 5000 INJECTION, SOLUTION INTRAVENOUS; SUBCUTANEOUS at 10:01

## 2019-01-09 RX ADMIN — Medication 3 ML: at 05:01

## 2019-01-09 RX ADMIN — SOTALOL HYDROCHLORIDE 120 MG: 120 TABLET ORAL at 08:01

## 2019-01-09 NOTE — ASSESSMENT & PLAN NOTE
-Continue ASA/statin/clopidogrel.   -Dr. Lomas with [facility] is planning for high risk PCI for multivessel CAD

## 2019-01-09 NOTE — NURSING
Patient identified by 2 identifiers.   Allergies reviewed.  18 g IV in place to Lt AC.  Bubble study explained to patient, patient verbalized understanding.  Bubble performed X 1, with Valsalva.  Pt tolerated procedure well.

## 2019-01-09 NOTE — ASSESSMENT & PLAN NOTE
-Initially diagnosed with severe PH (In 7/2018, PA pressure by RHC 94/38, mean 65) which appeared out of proportion to his underlying chronic lung disease   -Pt recently weaned off of IV remodulin secondary to SE(leg pain).  -Continue Selixipag 1600 BID for now.  -TTE with bubble study today and possibly RHC tomorrow  -Will discuss alternative therapies vs goals of care.

## 2019-01-09 NOTE — SIGNIFICANT EVENT
Asked by nursing staff to clarify patient's selexipag order.     Patient takes selexipag 1600 mcg BID prior to admission. Currently ordered at a lower dose of 1000 mcg BID.     He is admitted for acute on chronic respiratory failure and PH. Admission and clinic notes do not specify rationale for lowering his dose of selexipag. Will continue with current orders for this evening and clarify with day shift in the morning.       Viky Lyon MD   Cardiology Fellow, PGY-4      Discussed with Dr German.

## 2019-01-09 NOTE — NURSING
[facility] Pt said hes taking 1600mcg BID. Awaiting MD response as she needs to clarify from dayteam if there is a certain reason of 1000mcg BID dose ordered.   WCTM

## 2019-01-09 NOTE — PLAN OF CARE
Problem: Adult Inpatient Plan of Care  Goal: Plan of Care Review  Outcome: Ongoing (interventions implemented as appropriate)  No acute events this shift. VSS. Pt progressing towards goals as tolerated. Echo done today. Awaiting R heart cath to determine if he will stay on currant Pulmonary hypertension meds. Weaning down O2 without any issues. POC updated with Mr. Nolasco and his daughter. Verbalized understanding. Will continue to monitor closely.

## 2019-01-09 NOTE — PROGRESS NOTES
Admit Note     Met with patient and daughter to assess needs. Patient is a 78 y.o.  male, admitted for raspatory distress.   Per chart the pt takes Uptravi for pulmonary hypertension.     Patient admitted from emergency on 1/8/2019 .  At this time, patient presents as alert and oriented x 4, good eye contact, calm and communicative.  At this time, patients caregiver presents as alert and oriented x 4 and communicative.    Household/Family Systems     Patient resides alone at  [address] [facility] [place] [address]  3 hours from [facility]      Mailing address:     [address]  [place] [address].        Support system includes son and daughter.    Patient does not have dependents that are need of being cared for.     Patients primary caregiver is self.   Pt's cell:  [phone]  Aiden Escamillarop: (son, lives 20 miles from pt in [place], [place] and drives) [phone]  Carly Rodríguez Gaurav: (daughter, lives 20 miles from pt in [place], [place]) [phone]    During admission, patient's caregiver plans to stay in patient's room.  Confirmed patient and patients caregivers do have access to reliable transportation.    Cognitive Status/Learning     Patient reports reading ability as 12th grade and states patient does not have difficulty with reading, writing, seeing, hearing, comprehension, learning and memory.  Patient reports patient learns best by hands on .     Needed: No.   Highest education level: High School (9-12) or GED    Vocation/Disability   .  Working for Income: No  If no, reason not working: Patient Choice - Retired  Patient is retired from the [employer]    Adherence     Patient reports a high level of adherence to patients health care regimen. Pt reports he is on a regular diet.    Adherence counseling and education provided. Patient verbalizes understanding.    Substance Use    Patient reports the following substance usage.    Tobacco: Pt reports he is using chewing tobacco.   Pt reports he uses pouches.  There are 15 pouches in a can and the pt uses 2 cans a week.  The pt used to smoke (for 36 years) but quit in .  Alcohol: the pt reports he likes to drink beer and daiquiris, but is not a heavy drinker.  Pt reports his plan to get a daiquiri when discharged.   Illicit Drugs/Non-prescribed Medications: none, patient denies any use.  Patient states clear understanding of the potential impact of substance use.  Substance abstinence/cessation counseling, education and resources provided and reviewed.     Services Utilizing/ADLS    Infusion Service: Prior to admission, patient utilizing? no Pt  was on remodulin via [facility], however this was D/C in December. Pt reports his hypertension medication is by mouth.   Home Health: Prior to admission, patient utilizing? no Pt reports the [facility] nurses visited to teach dressing changes and his daughter learned same.   DME: Prior to admission, yes Pt has a home 02 set up with concentrator and portable 02.  The pt reports his 02 is in his truck in the parking garage.  The pt also has a shower chair.  Pt's 02 is from  [facility] in [place].   Pulmonary/Cardiac Rehab: Prior to admission, no  Dialysis:  Prior to admission, no  Transplant Specialty Pharmacy:  Prior to admission, no.    Prior to admission, patient reports patient was mostly  independent with ADLS.  Pt reports he needs to use the shower chair due to shortness of breath while taking a shower.  Pt does drive.  Patient reports patient is not able to care for self at this time due to compromised medical condition (as documented in medical record) and physical weakness..  Patient indicates a willingness to care for self once medically cleared to do so.    Insurance/Medications    Insured by   Payor/Plan Subscr  Sex Relation Sub. Ins. ID Effective Group Num   1.  FOR CHANDLER LOZOYA 1940 Male  [id number]                                    [address]   2. MEDICARE -  ME* CHANDLER BALL 1940 Male  [id number] [birth year]                                    [address]      Primary Insurance (for UNOS reporting): Private Insurance-- for life  Secondary Insurance (for UNOS reporting): Public Insurance - Medicare FFS (Fee For Service)    Patient reports patient is able to obtain and afford medications at this time and at time of discharge.  Pt reports he does not get any [facility] from [facility] and medications are from [facility].     Living Will/Healthcare Power of     Patient states patient does not have a LW and/or HCPA.   provided education regarding LW and HCPA and the completion of forms.    Coping/Mental Health    Patient is coping adequately with the aid of  family members.   Patient denies mental health difficulties.   Worker provided general support.     Discharge Planning    At time of discharge, patient plans to return to patient's home under the care of self and children as needed.  Patients daughter will transport patient.  Per rounds today, expected discharge date has not been medically determined at this time. Patient and patients caregiver  verbalize understanding and are involved in treatment planning and discharge process.    Additional Concerns    Patient's caretaker denies additional needs and/or concerns at this time. Patient is being followed for needs, education, resources, information, emotional support, supportive counseling, and for supportive and skilled discharge plan of care.  providing ongoing psychosocial support, education, resources and d/c planning as needed.  SW remains available. Patient's caregiver verbalizes understanding and agreement with information reviewed,  availability and how to access available resources as needed. Patient denies additional needs and/or concerns at this time. Patient verbalizes understanding and agreement with information reviewed, social work availability,  and how to access available resources as needed.

## 2019-01-09 NOTE — SUBJECTIVE & OBJECTIVE
Interval History: Still feeling SOB. Currently on O2, comfort flow. O2 sat 89-90%    Continuous Infusions:  Scheduled Meds:   allopurinol  300 mg Oral Daily    aspirin  81 mg Oral Daily    atorvastatin  40 mg Oral Daily    clopidogrel  75 mg Oral Daily    furosemide  80 mg Intravenous BID    heparin (porcine)  5,000 Units Subcutaneous Q8H    magnesium oxide  400 mg Oral BID    pantoprazole  20 mg Oral BID    potassium chloride  40 mEq Oral BID    selexipag  1,600 mcg Oral BID    sodium chloride 0.9%  3 mL Intravenous Q8H    sotalol  120 mg Oral Daily     PRN Meds:ALPRAZolam    Review of patient's allergies indicates:   Allergen Reactions    Clindamycin Shortness Of Breath    Penicillins Rash     Objective:     Vital Signs (Most Recent):  Temp: 98.2 °F (36.8 °C) (01/09/19 1200)  Pulse: 68 (01/09/19 1300)  Resp: 16 (01/09/19 1300)  BP: 97/62 (01/09/19 1300)  SpO2: (!) 93 % (01/09/19 1300) Vital Signs (24h Range):  Temp:  [96.8 °F (36 °C)-98.2 °F (36.8 °C)] 98.2 °F (36.8 °C)  Pulse:  [64-83] 68  Resp:  [9-34] 16  SpO2:  [77 %-99 %] 93 %  BP: ()/(44-75) 97/62     Patient Vitals for the past 72 hrs (Last 3 readings):   Weight   01/09/19 0329 69.3 kg (152 lb 12.5 oz)   01/08/19 1630 69.2 kg (152 lb 8.9 oz)   01/08/19 1536 69.9 kg (154 lb)     Body mass index is 22.56 kg/m².      Intake/Output Summary (Last 24 hours) at 1/9/2019 1341  Last data filed at 1/9/2019 1300  Gross per 24 hour   Intake 930 ml   Output 3655 ml   Net -2725 ml       Hemodynamic Parameters:       Telemetry: Reviewed    Physical Exam   Constitutional: He is oriented to person, place, and time.   HENT:   Cyanotic lips   Neck: Normal range of motion. Neck supple. JVD present.   Cardiovascular: Normal rate and regular rhythm. Exam reveals no gallop and no friction rub.   No murmur heard.  Pulmonary/Chest: Effort normal and breath sounds normal. He has no wheezes. He has no rales.   Abdominal: Soft. Bowel sounds are normal. There is no  tenderness.   Musculoskeletal: He exhibits no edema.   Neurological: He is alert and oriented to person, place, and time.   Skin: Skin is warm and dry. There is cyanosis.       Significant Labs:  CBC:  Recent Labs   Lab 01/08/19  1245 01/09/19  0328   WBC 6.83 7.43   RBC 5.35 4.77   HGB 17.1 14.8   HCT 51.8 45.4    165   MCV 97 95   MCH 32.0* 31.0   MCHC 33.0 32.6     BNP:  Recent Labs   Lab 01/08/19  1245   BNP 1,037*     CMP:  Recent Labs   Lab 01/08/19  1245 01/09/19  0328   GLU 95 80   CALCIUM 9.9 8.0*   ALBUMIN 4.0 3.2*   PROT 7.4 5.8*    140   K 4.7 3.6   CO2 26 20*    112*   BUN 21 18   CREATININE 1.2 0.9   ALKPHOS 140* 114   ALT 17 17   AST 20 20   BILITOT 3.1* 2.5*      Coagulation:   No results for input(s): PT, INR, APTT in the last 168 hours.  LDH:  No results for input(s): LDH in the last 72 hours.  Microbiology:  Microbiology Results (last 7 days)     ** No results found for the last 168 hours. **          I have reviewed all pertinent labs within the past 24 hours.    Estimated Creatinine Clearance: 66.3 mL/min (based on SCr of 0.9 mg/dL).    Diagnostic Results:  I have reviewed all pertinent imaging results/findings within the past 24 hours.

## 2019-01-09 NOTE — NURSING
Patient admitted from ED 1705, stable on NRB in ED. Following transfer from stretcher to ICU bed patient unable to recover on NRB, in low 80's. Called and spoke with RT, RT to bedside placed on comfort flow. See flow sheets. Pt able to tolerate 70% 28L at rest but then desaturated with dinner- back to 30L 80%. SP02 goal >88%.     Pt AAox4, PERRY, lives independently. Pt HR 70-90 BP WNL. Lungs diminished throughout. Last BM this morning. 80mg IV lasix administered, urinal at bedside. Patient educated on importance of remaining in bed until he can maintain oxygen saturations. Preventative foam dressings in place, waffle mattress in place. Pt's daughter at bedside, supportive of patient and participating.     Dr. Lyon to bedside for admission, plan tonight for IV lasix and monitoring oxygen requirements. POC discussed with patient and daughter at bedside.

## 2019-01-09 NOTE — PLAN OF CARE
Problem: Adult Inpatient Plan of Care  Goal: Plan of Care Review  Outcome: Ongoing (interventions implemented as appropriate)  No acute events within the shift. Pt GCS 15, OX4. VSS. Remains on comfort flow at 80%/22lpm, titrating to keep sats 88-92%. Pt noted still of SOB on exertion and  would desat to upper 70's when standing to urinate,  sats will bounce back  immediately when back in bed.  Informed of daughter and pt of  fluid limitation 1.5L/Day and compliant. UO adequate per urinal. No BM. Potassium and Magnesium replaced.   POC reviewed with pt and daughter Carly. All concerns and questions addressed. WCTM

## 2019-01-09 NOTE — PROGRESS NOTES
[facility]  Heart Transplant  Progress Note    Patient Name: Greg Nolasco  MRN: [id number]  Admission Date: 1/8/2019  Hospital Length of Stay: 1 days  Attending Physician: Merary Garcia MD  Primary Care Provider: Pablo Lorenzo MD  Principal Problem:Acute on chronic respiratory failure with hypoxia    Subjective:     Interval History: Still feeling SOB. Currently on O2, comfort flow. O2 sat 89-90%    Continuous Infusions:  Scheduled Meds:   allopurinol  300 mg Oral Daily    aspirin  81 mg Oral Daily    atorvastatin  40 mg Oral Daily    clopidogrel  75 mg Oral Daily    furosemide  80 mg Intravenous BID    heparin (porcine)  5,000 Units Subcutaneous Q8H    magnesium oxide  400 mg Oral BID    pantoprazole  20 mg Oral BID    potassium chloride  40 mEq Oral BID    selexipag  1,600 mcg Oral BID    sodium chloride 0.9%  3 mL Intravenous Q8H    sotalol  120 mg Oral Daily     PRN Meds:ALPRAZolam    Review of patient's allergies indicates:   Allergen Reactions    Clindamycin Shortness Of Breath    Penicillins Rash     Objective:     Vital Signs (Most Recent):  Temp: 98.2 °F (36.8 °C) (01/09/19 1200)  Pulse: 68 (01/09/19 1300)  Resp: 16 (01/09/19 1300)  BP: 97/62 (01/09/19 1300)  SpO2: (!) 93 % (01/09/19 1300) Vital Signs (24h Range):  Temp:  [96.8 °F (36 °C)-98.2 °F (36.8 °C)] 98.2 °F (36.8 °C)  Pulse:  [64-83] 68  Resp:  [9-34] 16  SpO2:  [77 %-99 %] 93 %  BP: ()/(44-75) 97/62     Patient Vitals for the past 72 hrs (Last 3 readings):   Weight   01/09/19 0329 69.3 kg (152 lb 12.5 oz)   01/08/19 1630 69.2 kg (152 lb 8.9 oz)   01/08/19 1536 69.9 kg (154 lb)     Body mass index is 22.56 kg/m².      Intake/Output Summary (Last 24 hours) at 1/9/2019 1341  Last data filed at 1/9/2019 1300  Gross per 24 hour   Intake 930 ml   Output 3655 ml   Net -2725 ml       Hemodynamic Parameters:       Telemetry: Reviewed    Physical Exam   Constitutional: He is oriented to person, place, and time.    HENT:   Cyanotic lips   Neck: Normal range of motion. Neck supple. JVD present.   Cardiovascular: Normal rate and regular rhythm. Exam reveals no gallop and no friction rub.   No murmur heard.  Pulmonary/Chest: Effort normal and breath sounds normal. He has no wheezes. He has no rales.   Abdominal: Soft. Bowel sounds are normal. There is no tenderness.   Musculoskeletal: He exhibits no edema.   Neurological: He is alert and oriented to person, place, and time.   Skin: Skin is warm and dry. There is cyanosis.       Significant Labs:  CBC:  Recent Labs   Lab 01/08/19  1245 01/09/19  0328   WBC 6.83 7.43   RBC 5.35 4.77   HGB 17.1 14.8   HCT 51.8 45.4    165   MCV 97 95   MCH 32.0* 31.0   MCHC 33.0 32.6     BNP:  Recent Labs   Lab 01/08/19  1245   BNP 1,037*     CMP:  Recent Labs   Lab 01/08/19  1245 01/09/19  0328   GLU 95 80   CALCIUM 9.9 8.0*   ALBUMIN 4.0 3.2*   PROT 7.4 5.8*    140   K 4.7 3.6   CO2 26 20*    112*   BUN 21 18   CREATININE 1.2 0.9   ALKPHOS 140* 114   ALT 17 17   AST 20 20   BILITOT 3.1* 2.5*      Coagulation:   No results for input(s): PT, INR, APTT in the last 168 hours.  LDH:  No results for input(s): LDH in the last 72 hours.  Microbiology:  Microbiology Results (last 7 days)     ** No results found for the last 168 hours. **          I have reviewed all pertinent labs within the past 24 hours.    Estimated Creatinine Clearance: 66.3 mL/min (based on SCr of 0.9 mg/dL).    Diagnostic Results:  I have reviewed all pertinent imaging results/findings within the past 24 hours.    Assessment and Plan:     78 y.o. year old White male  with multivessel CAD (by [facility] 7/20/2018), severe PH, PAF (on sotalol), COPD with chronic hypoxic respiratory failure (on 4 L home O2) who was started on IV remodulin in late July 2018 for PAH and was since weaned off in December who was sent to ER from clinic secondary to desaturation during 6min walk. He states that he was weaned off of Remodulin and  switched to Selexipag secondary to SE with Remodulin. He states that he has been having more dyspnea since then.     * Acute on chronic respiratory failure with hypoxia    -Possible in setting of worsening PH and RV Failure  -Gentle diuresis with IVP Lasix  -Wean O2 for Sat>/= 88%.     Pulmonary hypertension    -Initially diagnosed with severe PH (In 7/2018, PA pressure by RHC 94/38, mean 65) which appeared out of proportion to his underlying chronic lung disease   -Pt recently weaned off of IV remodulin secondary to SE(leg pain).  -Continue Selixipag 1600 BID for now.  -TTE with bubble study today and possibly RHC tomorrow  -Will discuss alternative therapies vs goals of care.     Coronary artery disease involving native coronary artery of native heart without angina pectoris    -Continue ASA/statin/clopidogrel.   -Dr. Lomas with [facility] is planning for high risk PCI for multivessel CAD     Paroxysmal atrial fibrillation    -Continue Sotalol.  -Pt declined anticoagulation in the past.         Stacey Moses PA-C  Heart Transplant  [facility]

## 2019-01-09 NOTE — ASSESSMENT & PLAN NOTE
-Possible in setting of worsening PH and RV Failure  -Gentle diuresis with IVP Lasix  -Wean O2 for Sat>/= 88%.

## 2019-01-10 LAB
ALBUMIN SERPL BCP-MCNC: 3.7 G/DL
ALP SERPL-CCNC: 135 U/L
ALT SERPL W/O P-5'-P-CCNC: 17 U/L
ANION GAP SERPL CALC-SCNC: 12 MMOL/L
AST SERPL-CCNC: 21 U/L
BASOPHILS # BLD AUTO: 0.07 K/UL
BASOPHILS NFR BLD: 0.9 %
BILIRUB SERPL-MCNC: 3.3 MG/DL
BNP SERPL-MCNC: 333 PG/ML
BUN SERPL-MCNC: 26 MG/DL
CALCIUM SERPL-MCNC: 9.4 MG/DL
CHLORIDE SERPL-SCNC: 104 MMOL/L
CO2 SERPL-SCNC: 23 MMOL/L
CREAT SERPL-MCNC: 1.3 MG/DL
DIFFERENTIAL METHOD: ABNORMAL
EOSINOPHIL # BLD AUTO: 0.4 K/UL
EOSINOPHIL NFR BLD: 4.6 %
ERYTHROCYTE [DISTWIDTH] IN BLOOD BY AUTOMATED COUNT: 15.6 %
EST. GFR  (AFRICAN AMERICAN): >60 ML/MIN/1.73 M^2
EST. GFR  (NON AFRICAN AMERICAN): 52.3 ML/MIN/1.73 M^2
GLUCOSE SERPL-MCNC: 97 MG/DL
HCT VFR BLD AUTO: 49.9 %
HGB BLD-MCNC: 16.6 G/DL
IMM GRANULOCYTES # BLD AUTO: 0.02 K/UL
IMM GRANULOCYTES NFR BLD AUTO: 0.2 %
INR PPP: 1.1
LACTATE SERPL-SCNC: 1.1 MMOL/L
LYMPHOCYTES # BLD AUTO: 2.1 K/UL
LYMPHOCYTES NFR BLD: 25.6 %
MAGNESIUM SERPL-MCNC: 2.4 MG/DL
MCH RBC QN AUTO: 31.3 PG
MCHC RBC AUTO-ENTMCNC: 33.3 G/DL
MCV RBC AUTO: 94 FL
MONOCYTES # BLD AUTO: 0.8 K/UL
MONOCYTES NFR BLD: 10.2 %
NEUTROPHILS # BLD AUTO: 4.7 K/UL
NEUTROPHILS NFR BLD: 58.5 %
NRBC BLD-RTO: 0 /100 WBC
PLATELET # BLD AUTO: 169 K/UL
PMV BLD AUTO: 8.9 FL
POTASSIUM SERPL-SCNC: 4.3 MMOL/L
PROT SERPL-MCNC: 6.9 G/DL
PROTHROMBIN TIME: 11 SEC
RBC # BLD AUTO: 5.3 M/UL
SODIUM SERPL-SCNC: 139 MMOL/L
WBC # BLD AUTO: 8.02 K/UL

## 2019-01-10 PROCEDURE — 93451 RIGHT HEART CATH: CPT | Performed by: INTERNAL MEDICINE

## 2019-01-10 PROCEDURE — A4216 STERILE WATER/SALINE, 10 ML: HCPCS | Performed by: NURSE PRACTITIONER

## 2019-01-10 PROCEDURE — 80053 COMPREHEN METABOLIC PANEL: CPT

## 2019-01-10 PROCEDURE — 85025 COMPLETE CBC W/AUTO DIFF WBC: CPT

## 2019-01-10 PROCEDURE — 99291 PR CRITICAL CARE, E/M 30-74 MINUTES: ICD-10-PCS | Mod: ,,, | Performed by: PHYSICIAN ASSISTANT

## 2019-01-10 PROCEDURE — 93451 PR RIGHT HEART CATH O2 SATURATION & CARDIAC OUTPUT: ICD-10-PCS | Mod: 26,,, | Performed by: INTERNAL MEDICINE

## 2019-01-10 PROCEDURE — 20000000 HC ICU ROOM

## 2019-01-10 PROCEDURE — 99233 SBSQ HOSP IP/OBS HIGH 50: CPT | Mod: ,,, | Performed by: INTERNAL MEDICINE

## 2019-01-10 PROCEDURE — 25000003 PHARM REV CODE 250: Performed by: NURSE PRACTITIONER

## 2019-01-10 PROCEDURE — 25000003 PHARM REV CODE 250: Performed by: PHYSICIAN ASSISTANT

## 2019-01-10 PROCEDURE — 99233 PR SUBSEQUENT HOSPITAL CARE,LEVL III: ICD-10-PCS | Mod: ,,, | Performed by: INTERNAL MEDICINE

## 2019-01-10 PROCEDURE — 94761 N-INVAS EAR/PLS OXIMETRY MLT: CPT

## 2019-01-10 PROCEDURE — 83605 ASSAY OF LACTIC ACID: CPT

## 2019-01-10 PROCEDURE — 93451 RIGHT HEART CATH: CPT | Mod: 26,,, | Performed by: INTERNAL MEDICINE

## 2019-01-10 PROCEDURE — C1894 INTRO/SHEATH, NON-LASER: HCPCS | Performed by: INTERNAL MEDICINE

## 2019-01-10 PROCEDURE — 25000003 PHARM REV CODE 250: Performed by: STUDENT IN AN ORGANIZED HEALTH CARE EDUCATION/TRAINING PROGRAM

## 2019-01-10 PROCEDURE — 99291 CRITICAL CARE FIRST HOUR: CPT | Mod: ,,, | Performed by: PHYSICIAN ASSISTANT

## 2019-01-10 PROCEDURE — 85610 PROTHROMBIN TIME: CPT

## 2019-01-10 PROCEDURE — C1751 CATH, INF, PER/CENT/MIDLINE: HCPCS | Performed by: INTERNAL MEDICINE

## 2019-01-10 PROCEDURE — 83735 ASSAY OF MAGNESIUM: CPT

## 2019-01-10 PROCEDURE — 63600175 PHARM REV CODE 636 W HCPCS: Performed by: NURSE PRACTITIONER

## 2019-01-10 PROCEDURE — 27100092 HC HIGH FLOW DELIVERY CANNULA

## 2019-01-10 PROCEDURE — 27100171 HC OXYGEN HIGH FLOW UP TO 24 HOURS

## 2019-01-10 PROCEDURE — 83880 ASSAY OF NATRIURETIC PEPTIDE: CPT

## 2019-01-10 RX ORDER — ACETAMINOPHEN 325 MG/1
TABLET ORAL
Status: DISPENSED
Start: 2019-01-10 | End: 2019-01-11

## 2019-01-10 RX ORDER — SODIUM CHLORIDE 9 MG/ML
INJECTION, SOLUTION INTRAVENOUS CONTINUOUS
Status: ACTIVE | OUTPATIENT
Start: 2019-01-10 | End: 2019-01-10

## 2019-01-10 RX ORDER — ACETAMINOPHEN 325 MG/1
650 TABLET ORAL EVERY 6 HOURS PRN
Status: DISCONTINUED | OUTPATIENT
Start: 2019-01-10 | End: 2019-01-15 | Stop reason: HOSPADM

## 2019-01-10 RX ADMIN — Medication 3 ML: at 09:01

## 2019-01-10 RX ADMIN — ALLOPURINOL 300 MG: 100 TABLET ORAL at 08:01

## 2019-01-10 RX ADMIN — MAGNESIUM OXIDE TAB 400 MG (241.3 MG ELEMENTAL MG) 400 MG: 400 (241.3 MG) TAB at 08:01

## 2019-01-10 RX ADMIN — Medication 3 ML: at 06:01

## 2019-01-10 RX ADMIN — ACETAMINOPHEN 650 MG: 325 TABLET, FILM COATED ORAL at 03:01

## 2019-01-10 RX ADMIN — SOTALOL HYDROCHLORIDE 120 MG: 120 TABLET ORAL at 08:01

## 2019-01-10 RX ADMIN — HEPARIN SODIUM 5000 UNITS: 5000 INJECTION, SOLUTION INTRAVENOUS; SUBCUTANEOUS at 02:01

## 2019-01-10 RX ADMIN — ACETAMINOPHEN 650 MG: 325 TABLET, FILM COATED ORAL at 09:01

## 2019-01-10 RX ADMIN — CLOPIDOGREL 75 MG: 75 TABLET, FILM COATED ORAL at 08:01

## 2019-01-10 RX ADMIN — ASPIRIN 81 MG: 81 TABLET, COATED ORAL at 08:01

## 2019-01-10 RX ADMIN — SODIUM CHLORIDE: 0.9 INJECTION, SOLUTION INTRAVENOUS at 08:01

## 2019-01-10 RX ADMIN — HEPARIN SODIUM 5000 UNITS: 5000 INJECTION, SOLUTION INTRAVENOUS; SUBCUTANEOUS at 09:01

## 2019-01-10 RX ADMIN — POTASSIUM CHLORIDE 40 MEQ: 750 CAPSULE, EXTENDED RELEASE ORAL at 08:01

## 2019-01-10 RX ADMIN — PANTOPRAZOLE SODIUM 20 MG: 20 TABLET, DELAYED RELEASE ORAL at 09:01

## 2019-01-10 RX ADMIN — SODIUM CHLORIDE: 0.9 INJECTION, SOLUTION INTRAVENOUS at 03:01

## 2019-01-10 RX ADMIN — ATORVASTATIN CALCIUM 40 MG: 20 TABLET, FILM COATED ORAL at 08:01

## 2019-01-10 RX ADMIN — MAGNESIUM OXIDE TAB 400 MG (241.3 MG ELEMENTAL MG) 400 MG: 400 (241.3 MG) TAB at 09:01

## 2019-01-10 RX ADMIN — HEPARIN SODIUM 5000 UNITS: 5000 INJECTION, SOLUTION INTRAVENOUS; SUBCUTANEOUS at 06:01

## 2019-01-10 RX ADMIN — PANTOPRAZOLE SODIUM 20 MG: 20 TABLET, DELAYED RELEASE ORAL at 08:01

## 2019-01-10 NOTE — ASSESSMENT & PLAN NOTE
-Initially diagnosed with severe PH (In 7/2018, PA pressure by RHC 94/38, mean 65) which appeared out of proportion to his underlying chronic lung disease   -Pt recently weaned off of IV remodulin secondary to SE(leg pain).  -Continue Selixipag 1600 BID for now.  -TTE with bubble study yesterday- neg for shunt  -RHC today  -Will discuss alternative therapies vs goals of care after RHC.

## 2019-01-10 NOTE — SIGNIFICANT EVENT
Notified by nursing staff regarding drop in SBP to high 70s. Patient initially asymptomatic but then developed lightheadedness / dizziness. HR 60s.    Advised nursing to place patient in reverse trendelenburg.     By time of my evaluation patient's SBP improved to mid 90s. Feeling better. Bedside echo demonstrates IVC ~1.1-1.2 cm in diameter, collapsible >50% on sniff suggesting CVP ~ 3 cm.     Being diuresed with Lasix 80 mg IV BID. Net negative ~4 L since admission. Remains on PTA Selexipag.     Will hold morning dose of IV Lasix. Encourage PO intake. Bedrest for now. F/U morning labs (pending). Patient and nursing report he is scheduled for RHC in the AM.      Viky Lyon MD   Cardiology Fellow, PGY-4      Discussed with Dr Araujo.

## 2019-01-10 NOTE — BRIEF OP NOTE
Date of admit to cath lab: 1/10/2019      Date of discharge from cath lab: 1/10/2019      Principal diagnosis: PH    Discharge attending physician: Miguelina Ruffin MD    Hospital Course/Outcome of the treatment, procedures or surgery: Pt admitted for RHC.   See CVIS/cath lab procedure report in EPIC  for full report of today's procedure.    Disposition of the case (d/c disposition): inpatient bed    Discharge Medication List: see med card    Plan for follow up care, diet, activity level: F/U as scheduled. Resume low Na diet.  Activity as tolerated    Condition on discharge from Cath lab: Stable.

## 2019-01-10 NOTE — PLAN OF CARE
Problem: Adult Inpatient Plan of Care  Goal: Plan of Care Review  Outcome: Ongoing (interventions implemented as appropriate)  No acute events noted within the shift. Pt GCS 15, OX4. HR NSR on 60-80's.  SBP went down to 70's and MAP 50's  and pt felt dizzy, was on the bed at the time. Informed Dr. Lyon as pt noted to be symptomatic. MD  advised to do trendelenburg, SBP went up to 90's and MAP 60's , but felt even more dizzy. MD came to evaluate at bedside. Was advised to hold am Lasix this am until right heart cath results and encouraged PO intake. BP SBP now at 90's and MAP 70's. Pls see flowsheet for further details.  Pt remains on comfort flow , titrating to keep sats above 88%, now at 50%/15lpm, no SOB noted.  Had 1x BM per commode.Prevented sacral foam applied.  Urinated 1x with 700 ml clear yellow UO per urinal.Afebrile. Team planning for right heart cath.Plan of care reviewed with pt . All concerns and questions addressed. WCTM

## 2019-01-10 NOTE — H&P
History of Present Illness:   78 y.o. year old White male with multivessel CAD (by [facility] 7/20/2018), severe PH, PAF (on sotalol), COPD with chronic hypoxic respiratory failure (on 4 L home O2) who was started on IV remodulin in late July 2018 for PAH and was since weaned off in December who was admitted with acute on chronic resp failure and now here for repeat RHC            Past Medical History:   Diagnosis Date    Chronic hypoxemic respiratory failure     Coronary artery disease     BARBARA (obstructive sleep apnea)     Pulmonary hypertension            Past Surgical History:   Procedure Laterality Date    ANGIOPLASTY  1991    BACK SURGERY      HERNIA REPAIR      KNEE SURGERY      REMOVAL, CATHETER, CENTRAL VENOUS, TUNNELED N/A 12/20/2018    Performed by Walt [facility]    RIGHT HEART CATH Right 10/31/2018    Performed by Robret Lomas [facility] at [facility]    SHOULDER SURGERY      SINUS SURGERY            Review of patient's allergies indicates:   Allergen Reactions    Clindamycin Shortness Of Breath    Penicillins Rash         Current Facility-Administered Medications   Medication    [START ON 1/9/2019] allopurinol tablet 300 mg    ALPRAZolam tablet 0.5 mg    [START ON 1/9/2019] aspirin EC tablet 81 mg    [START ON 1/9/2019] atorvastatin tablet 40 mg    [START ON 1/9/2019] clopidogrel tablet 75 mg    furosemide injection 80 mg    heparin (porcine) injection 5,000 Units    NON FORMULARY MEDICATION 1,000 mcg    [START ON 1/9/2019] pantoprazole EC tablet 40 mg    potassium chloride CR capsule 10 mEq    sodium chloride 0.9% flush 3 mL    [START ON 1/9/2019] sotalol tablet 120 mg          Current Outpatient Medications   Medication Sig    allopurinol (ZYLOPRIM) 300 MG tablet Take 300 mg by mouth.    ALPRAZolam (XANAX) 1 MG tablet 0.5 mg as needed.     aspirin (ECOTRIN) 81 MG EC tablet Take 81 mg by mouth once daily.    atorvastatin (LIPITOR) 40 MG tablet Take 1 tablet  (40 mg total) by mouth once daily.    clopidogrel (PLAVIX) 75 mg tablet Take 1 tablet (75 mg total) by mouth once daily. Take four pills in the night before the procedure and one pill in the day of the procedure.    furosemide (LASIX) 20 MG tablet Take 2 tablets (40 mg total) by mouth once daily.    influenza (FLUZONE HIGH-DOSE) 180 mcg/0.5 mL vaccine Inject into the muscle by [facility].    melatonin 3 mg Tab Take 1 capsule by mouth daily as needed.     multivit-minerals/ferrous fum (MULTI VITAMIN ORAL) Take 1 tablet by mouth.    multivitamin with minerals tablet Take 1 tablet by mouth once daily.    OXYGEN-AIR DELIVERY SYSTEMS MISC by Misc.(Non-Drug; Combo Route) route.    potassium chloride (MICRO-K) 10 MEQ CpSR Take 10 mEq by mouth 2 (two) times daily.    RABEprazole (ACIPHEX) 20 mg tablet Take 20 mg by mouth once daily.     selexipag 800 mcg Tab Take 200 mcg by mouth 2 (two) times daily. 200 mcg, BID, PO x1 week, increase by 200 mcg,BID, weekly, to highest tolerated dose to 1600 mcg.     sodium chloride 0.9% SolP 100 mL with treprostinil 1 mg/mL Soln 3,000,000 ng Inject 1,425 ng/min into the vein continuous.    sotalol (BETAPACE) 120 MG Tab Take 120 mg by mouth once daily.     UPTRAVI 200 mcg Tab Take 1,600 mcg by mouth 2 (two) times daily.          Family History       None                Tobacco Use    Smoking status: Former Smoker     Last attempt to quit:      Years since quittin.0    Smokeless tobacco: Current User     Types: Snuff   Substance and Sexual Activity    Alcohol use: No     Frequency: Never     Comment: none since 2018    Drug use: No    Sexual activity: Not on file   Review of Systems   Constitutional: Negative.   HENT: Negative.   Eyes: Negative.   Respiratory: Positive for shortness of breath.   Cardiovascular: Negative.   Gastrointestinal: Negative.   Genitourinary: Negative.   Neurological: Negative.   Psychiatric/Behavioral: Negative.   Objective:       Physical  Exam   Constitutional: He is oriented to person, place, and time. He appears well-developed and well-nourished.   HENT:   Head: Normocephalic.   Eyes: Pupils are equal, round, and reactive to light.   Neck: Normal range of motion. Neck supple.   Cardiovascular: Normal rate and regular rhythm.   Pulmonary/Chest: Effort normal and breath sounds normal.   Abdominal: Soft. Bowel sounds are normal.   Musculoskeletal: Normal range of motion.   Neurological: He is alert and oriented to person, place, and time.   Skin: Skin is warm and dry.   Psychiatric: He has a normal mood and affect. His behavior is normal.   Nursing note and vitals reviewed.   Significant Labs:     Lab Results   Component Value Date     (H) 01/10/2019     01/10/2019    K 4.3 01/10/2019    MG 2.4 01/10/2019     01/10/2019    CO2 23 01/10/2019    BUN 26 (H) 01/10/2019    CREATININE 1.3 01/10/2019    GLU 97 01/10/2019    AST 21 01/10/2019    ALT 17 01/10/2019    ALBUMIN 3.7 01/10/2019    PROT 6.9 01/10/2019    BILITOT 3.3 (H) 01/10/2019       Magnesium   Date Value Ref Range Status   01/10/2019 2.4 1.6 - 2.6 mg/dL Final       Lab Results   Component Value Date    WBC 8.02 01/10/2019    HGB 16.6 01/10/2019    HCT 49.9 01/10/2019    MCV 94 01/10/2019     01/10/2019       Lab Results   Component Value Date    INR 1.1 01/10/2019    INR 1.1 10/31/2018    INR 1.1 07/23/2018       BNP   Date Value Ref Range Status   01/10/2019 333 (H) 0 - 99 pg/mL Final     Comment:     Values of less than 100 pg/ml are consistent with non-CHF populations.   01/08/2019 1,037 (H) 0 - 99 pg/mL Final     Comment:     Values of less than 100 pg/ml are consistent with non-CHF populations.   10/31/2018 399 (H) 0 - 99 pg/mL Final     Comment:     Values of less than 100 pg/ml are consistent with non-CHF populations.       No results found for: LDH               Assessment/Plan:     RHC R IJ, 7 Fr sheath with local lidocaine, micropuncture kit and US  guidance.    I have explained the risks, benefits and alternatives of the procedure in detail. The patient voices understanding and all questions have been answered,. The patient agrees to proceed as planned.

## 2019-01-10 NOTE — ASSESSMENT & PLAN NOTE
-Possible in setting of worsening PH, volume overload, and RV Failure  -D/C IV Lasix. Net neg 3.7L today. Hypotensive overnight anf appears dry this am. Gentle IVFs this am   -Wean O2 for Sat>/= 88%.

## 2019-01-10 NOTE — SUBJECTIVE & OBJECTIVE
Interval History: Feeling dizzy this am. Breathing has improved    Continuous Infusions:   sodium chloride 0.9% 75 mL/hr at 01/10/19 1000     Scheduled Meds:   allopurinol  300 mg Oral Daily    aspirin  81 mg Oral Daily    atorvastatin  40 mg Oral Daily    clopidogrel  75 mg Oral Daily    heparin (porcine)  5,000 Units Subcutaneous Q8H    magnesium oxide  400 mg Oral BID    pantoprazole  20 mg Oral BID    potassium chloride  40 mEq Oral BID    selexipag  1,600 mcg Oral BID    sodium chloride 0.9%  3 mL Intravenous Q8H    sotalol  120 mg Oral Daily     PRN Meds:ALPRAZolam    Review of patient's allergies indicates:   Allergen Reactions    Clindamycin Shortness Of Breath    Penicillins Rash     Objective:     Vital Signs (Most Recent):  Temp: 97.7 °F (36.5 °C) (01/10/19 0700)  Pulse: 67 (01/10/19 1130)  Resp: 19 (01/10/19 1130)  BP: (!) 94/56 (01/10/19 1130)  SpO2: (!) 91 % (01/10/19 1130) Vital Signs (24h Range):  Temp:  [97.4 °F (36.3 °C)-98.6 °F (37 °C)] 97.7 °F (36.5 °C)  Pulse:  [62-74] 67  Resp:  [8-27] 19  SpO2:  [87 %-97 %] 91 %  BP: ()/(44-63) 94/56     Patient Vitals for the past 72 hrs (Last 3 readings):   Weight   01/10/19 0400 68.4 kg (150 lb 12.7 oz)   01/09/19 1530 68.9 kg (152 lb)   01/09/19 0329 69.3 kg (152 lb 12.5 oz)     Body mass index is 21.64 kg/m².      Intake/Output Summary (Last 24 hours) at 1/10/2019 1143  Last data filed at 1/10/2019 1000  Gross per 24 hour   Intake 1300 ml   Output 2295 ml   Net -995 ml       Hemodynamic Parameters:       Telemetry: Reviewed    Physical Exam   Constitutional: He is oriented to person, place, and time.   HENT:   On O2 via NC   Neck: Normal range of motion. Neck supple. No JVD present.   Cardiovascular: Normal rate and regular rhythm. Exam reveals no gallop and no friction rub.   No murmur heard.  Pulmonary/Chest: Effort normal and breath sounds normal. He has no wheezes. He has no rales.   Abdominal: Soft. Bowel sounds are normal. There  is no tenderness.   Musculoskeletal: He exhibits no edema.   Neurological: He is alert and oriented to person, place, and time.   Skin: Skin is warm and dry.       Significant Labs:  CBC:  Recent Labs   Lab 01/08/19  1245 01/09/19  0328 01/10/19  0230   WBC 6.83 7.43 8.02   RBC 5.35 4.77 5.30   HGB 17.1 14.8 16.6   HCT 51.8 45.4 49.9    165 169   MCV 97 95 94   MCH 32.0* 31.0 31.3*   MCHC 33.0 32.6 33.3     BNP:  Recent Labs   Lab 01/08/19  1245 01/10/19  0858   BNP 1,037* 333*     CMP:  Recent Labs   Lab 01/08/19  1245 01/09/19  0328 01/09/19  1452 01/10/19  0230   GLU 95 80 102 97   CALCIUM 9.9 8.0* 9.5 9.4   ALBUMIN 4.0 3.2*  --  3.7   PROT 7.4 5.8*  --  6.9    140 138 139   K 4.7 3.6 4.3 4.3   CO2 26 20* 24 23    112* 104 104   BUN 21 18 21 26*   CREATININE 1.2 0.9 1.2 1.3   ALKPHOS 140* 114  --  135   ALT 17 17  --  17   AST 20 20  --  21   BILITOT 3.1* 2.5*  --  3.3*      Coagulation:   Recent Labs   Lab 01/10/19  0858   INR 1.1     LDH:  No results for input(s): LDH in the last 72 hours.  Microbiology:  Microbiology Results (last 7 days)     ** No results found for the last 168 hours. **          I have reviewed all pertinent labs within the past 24 hours.    Estimated Creatinine Clearance: 45.3 mL/min (based on SCr of 1.3 mg/dL).    Diagnostic Results:  I have reviewed all pertinent imaging results/findings within the past 24 hours.

## 2019-01-10 NOTE — PROGRESS NOTES
[facility]  Heart Transplant  Progress Note    Patient Name: Greg Nolasco  MRN: [id number]  Admission Date: 1/8/2019  Hospital Length of Stay: 2 days  Attending Physician: Merary Garcia MD  Primary Care Provider: Pablo Lorenzo MD  Principal Problem:Acute on chronic respiratory failure with hypoxia    Subjective:     Interval History: Feeling dizzy this am. Breathing has improved    Continuous Infusions:   sodium chloride 0.9% 75 mL/hr at 01/10/19 1000     Scheduled Meds:   allopurinol  300 mg Oral Daily    aspirin  81 mg Oral Daily    atorvastatin  40 mg Oral Daily    clopidogrel  75 mg Oral Daily    heparin (porcine)  5,000 Units Subcutaneous Q8H    magnesium oxide  400 mg Oral BID    pantoprazole  20 mg Oral BID    potassium chloride  40 mEq Oral BID    selexipag  1,600 mcg Oral BID    sodium chloride 0.9%  3 mL Intravenous Q8H    sotalol  120 mg Oral Daily     PRN Meds:ALPRAZolam    Review of patient's allergies indicates:   Allergen Reactions    Clindamycin Shortness Of Breath    Penicillins Rash     Objective:     Vital Signs (Most Recent):  Temp: 97.7 °F (36.5 °C) (01/10/19 0700)  Pulse: 67 (01/10/19 1130)  Resp: 19 (01/10/19 1130)  BP: (!) 94/56 (01/10/19 1130)  SpO2: (!) 91 % (01/10/19 1130) Vital Signs (24h Range):  Temp:  [97.4 °F (36.3 °C)-98.6 °F (37 °C)] 97.7 °F (36.5 °C)  Pulse:  [62-74] 67  Resp:  [8-27] 19  SpO2:  [87 %-97 %] 91 %  BP: ()/(44-63) 94/56     Patient Vitals for the past 72 hrs (Last 3 readings):   Weight   01/10/19 0400 68.4 kg (150 lb 12.7 oz)   01/09/19 1530 68.9 kg (152 lb)   01/09/19 0329 69.3 kg (152 lb 12.5 oz)     Body mass index is 21.64 kg/m².      Intake/Output Summary (Last 24 hours) at 1/10/2019 1143  Last data filed at 1/10/2019 1000  Gross per 24 hour   Intake 1300 ml   Output 2295 ml   Net -995 ml       Hemodynamic Parameters:       Telemetry: Reviewed    Physical Exam   Constitutional: He is oriented to person, place, and  time.   HENT:   On O2 via NC   Neck: Normal range of motion. Neck supple. No JVD present.   Cardiovascular: Normal rate and regular rhythm. Exam reveals no gallop and no friction rub.   No murmur heard.  Pulmonary/Chest: Effort normal and breath sounds normal. He has no wheezes. He has no rales.   Abdominal: Soft. Bowel sounds are normal. There is no tenderness.   Musculoskeletal: He exhibits no edema.   Neurological: He is alert and oriented to person, place, and time.   Skin: Skin is warm and dry.       Significant Labs:  CBC:  Recent Labs   Lab 01/08/19  1245 01/09/19  0328 01/10/19  0230   WBC 6.83 7.43 8.02   RBC 5.35 4.77 5.30   HGB 17.1 14.8 16.6   HCT 51.8 45.4 49.9    165 169   MCV 97 95 94   MCH 32.0* 31.0 31.3*   MCHC 33.0 32.6 33.3     BNP:  Recent Labs   Lab 01/08/19  1245 01/10/19  0858   BNP 1,037* 333*     CMP:  Recent Labs   Lab 01/08/19  1245 01/09/19  0328 01/09/19  1452 01/10/19  0230   GLU 95 80 102 97   CALCIUM 9.9 8.0* 9.5 9.4   ALBUMIN 4.0 3.2*  --  3.7   PROT 7.4 5.8*  --  6.9    140 138 139   K 4.7 3.6 4.3 4.3   CO2 26 20* 24 23    112* 104 104   BUN 21 18 21 26*   CREATININE 1.2 0.9 1.2 1.3   ALKPHOS 140* 114  --  135   ALT 17 17  --  17   AST 20 20  --  21   BILITOT 3.1* 2.5*  --  3.3*      Coagulation:   Recent Labs   Lab 01/10/19  0858   INR 1.1     LDH:  No results for input(s): LDH in the last 72 hours.  Microbiology:  Microbiology Results (last 7 days)     ** No results found for the last 168 hours. **          I have reviewed all pertinent labs within the past 24 hours.    Estimated Creatinine Clearance: 45.3 mL/min (based on SCr of 1.3 mg/dL).    Diagnostic Results:  I have reviewed all pertinent imaging results/findings within the past 24 hours.    Assessment and Plan:     78 y.o. year old White male  with multivessel CAD (by [facility] 7/20/2018), severe PH, PAF (on sotalol), COPD with chronic hypoxic respiratory failure (on 4 L home O2) who was started on IV  remodulin in late July 2018 for PAH and was since weaned off in December who was sent to ER from clinic secondary to desaturation during 6min walk. He states that he was weaned off of Remodulin and switched to Selexipag secondary to SE with Remodulin. He states that he has been having more dyspnea since then.     * Acute on chronic respiratory failure with hypoxia    -Possible in setting of worsening PH, volume overload, and RV Failure  -D/C IV Lasix. Net neg 3.7L today. Hypotensive overnight anf appears dry this am. Gentle IVFs this am   -Wean O2 for Sat>/= 88%.     Pulmonary hypertension    -Initially diagnosed with severe PH (In 7/2018, PA pressure by RHC 94/38, mean 65) which appeared out of proportion to his underlying chronic lung disease   -Pt recently weaned off of IV remodulin secondary to SE(leg pain).  -Continue Selixipag 1600 BID for now.  -TTE with bubble study yesterday- neg for shunt  -RHC today  -Will discuss alternative therapies vs goals of care after RHC.     Coronary artery disease involving native coronary artery of native heart without angina pectoris    -Continue ASA/statin/clopidogrel.   -Dr. Lomas with [facility] is planning for high risk PCI for multivessel CAD     Paroxysmal atrial fibrillation    -Continue Sotalol.  -Pt declined anticoagulation in the past.       Uninterrupted Critical Care/Counseling Time (not including procedures): 40 minutes      Stacey Moses PA-C  Heart Transplant  [facility]

## 2019-01-11 LAB
ALBUMIN SERPL BCP-MCNC: 3.7 G/DL
ALP SERPL-CCNC: 139 U/L
ALT SERPL W/O P-5'-P-CCNC: 14 U/L
ANION GAP SERPL CALC-SCNC: 9 MMOL/L
AST SERPL-CCNC: 19 U/L
BASOPHILS # BLD AUTO: 0.07 K/UL
BASOPHILS NFR BLD: 1.1 %
BILIRUB SERPL-MCNC: 2.8 MG/DL
BUN SERPL-MCNC: 19 MG/DL
CALCIUM SERPL-MCNC: 9.6 MG/DL
CHLORIDE SERPL-SCNC: 102 MMOL/L
CO2 SERPL-SCNC: 22 MMOL/L
CREAT SERPL-MCNC: 0.8 MG/DL
DIFFERENTIAL METHOD: ABNORMAL
EOSINOPHIL # BLD AUTO: 0.3 K/UL
EOSINOPHIL NFR BLD: 5.3 %
ERYTHROCYTE [DISTWIDTH] IN BLOOD BY AUTOMATED COUNT: 15.3 %
EST. GFR  (AFRICAN AMERICAN): >60 ML/MIN/1.73 M^2
EST. GFR  (NON AFRICAN AMERICAN): >60 ML/MIN/1.73 M^2
GLUCOSE SERPL-MCNC: 87 MG/DL
HCT VFR BLD AUTO: 49.1 %
HGB BLD-MCNC: 16.3 G/DL
IMM GRANULOCYTES # BLD AUTO: 0.01 K/UL
IMM GRANULOCYTES NFR BLD AUTO: 0.2 %
LYMPHOCYTES # BLD AUTO: 1.6 K/UL
LYMPHOCYTES NFR BLD: 26.1 %
MAGNESIUM SERPL-MCNC: 2.3 MG/DL
MCH RBC QN AUTO: 31.3 PG
MCHC RBC AUTO-ENTMCNC: 33.2 G/DL
MCV RBC AUTO: 94 FL
MONOCYTES # BLD AUTO: 0.5 K/UL
MONOCYTES NFR BLD: 8.6 %
NEUTROPHILS # BLD AUTO: 3.7 K/UL
NEUTROPHILS NFR BLD: 58.7 %
NRBC BLD-RTO: 0 /100 WBC
PLATELET # BLD AUTO: 157 K/UL
PMV BLD AUTO: 9.4 FL
POTASSIUM SERPL-SCNC: 3.9 MMOL/L
PROT SERPL-MCNC: 6.7 G/DL
RBC # BLD AUTO: 5.2 M/UL
SODIUM SERPL-SCNC: 133 MMOL/L
WBC # BLD AUTO: 6.28 K/UL

## 2019-01-11 PROCEDURE — 25000003 PHARM REV CODE 250: Performed by: INTERNAL MEDICINE

## 2019-01-11 PROCEDURE — 63600175 PHARM REV CODE 636 W HCPCS: Performed by: NURSE PRACTITIONER

## 2019-01-11 PROCEDURE — 99233 PR SUBSEQUENT HOSPITAL CARE,LEVL III: ICD-10-PCS | Mod: ,,, | Performed by: INTERNAL MEDICINE

## 2019-01-11 PROCEDURE — 25000003 PHARM REV CODE 250: Performed by: NURSE PRACTITIONER

## 2019-01-11 PROCEDURE — 27100171 HC OXYGEN HIGH FLOW UP TO 24 HOURS

## 2019-01-11 PROCEDURE — A4216 STERILE WATER/SALINE, 10 ML: HCPCS | Performed by: NURSE PRACTITIONER

## 2019-01-11 PROCEDURE — 25000003 PHARM REV CODE 250: Performed by: PHYSICIAN ASSISTANT

## 2019-01-11 PROCEDURE — 27100092 HC HIGH FLOW DELIVERY CANNULA

## 2019-01-11 PROCEDURE — 25000003 PHARM REV CODE 250: Performed by: STUDENT IN AN ORGANIZED HEALTH CARE EDUCATION/TRAINING PROGRAM

## 2019-01-11 PROCEDURE — 83735 ASSAY OF MAGNESIUM: CPT

## 2019-01-11 PROCEDURE — 99233 SBSQ HOSP IP/OBS HIGH 50: CPT | Mod: ,,, | Performed by: INTERNAL MEDICINE

## 2019-01-11 PROCEDURE — 85025 COMPLETE CBC W/AUTO DIFF WBC: CPT

## 2019-01-11 PROCEDURE — 20600001 HC STEP DOWN PRIVATE ROOM

## 2019-01-11 PROCEDURE — 80053 COMPREHEN METABOLIC PANEL: CPT

## 2019-01-11 PROCEDURE — 94761 N-INVAS EAR/PLS OXIMETRY MLT: CPT

## 2019-01-11 PROCEDURE — 99900035 HC TECH TIME PER 15 MIN (STAT)

## 2019-01-11 RX ORDER — CALCIUM CARBONATE 200(500)MG
1000 TABLET,CHEWABLE ORAL 3 TIMES DAILY PRN
Status: DISCONTINUED | OUTPATIENT
Start: 2019-01-11 | End: 2019-01-15 | Stop reason: HOSPADM

## 2019-01-11 RX ORDER — POTASSIUM CHLORIDE 20 MEQ/1
20 TABLET, EXTENDED RELEASE ORAL ONCE
Status: COMPLETED | OUTPATIENT
Start: 2019-01-11 | End: 2019-01-11

## 2019-01-11 RX ORDER — FAMOTIDINE 20 MG/1
40 TABLET, FILM COATED ORAL 2 TIMES DAILY
Status: DISCONTINUED | OUTPATIENT
Start: 2019-01-11 | End: 2019-01-15 | Stop reason: HOSPADM

## 2019-01-11 RX ADMIN — ALLOPURINOL 300 MG: 100 TABLET ORAL at 09:01

## 2019-01-11 RX ADMIN — MAGNESIUM OXIDE TAB 400 MG (241.3 MG ELEMENTAL MG) 400 MG: 400 (241.3 MG) TAB at 09:01

## 2019-01-11 RX ADMIN — PANTOPRAZOLE SODIUM 20 MG: 20 TABLET, DELAYED RELEASE ORAL at 09:01

## 2019-01-11 RX ADMIN — HEPARIN SODIUM 5000 UNITS: 5000 INJECTION, SOLUTION INTRAVENOUS; SUBCUTANEOUS at 06:01

## 2019-01-11 RX ADMIN — HEPARIN SODIUM 5000 UNITS: 5000 INJECTION, SOLUTION INTRAVENOUS; SUBCUTANEOUS at 09:01

## 2019-01-11 RX ADMIN — HEPARIN SODIUM 5000 UNITS: 5000 INJECTION, SOLUTION INTRAVENOUS; SUBCUTANEOUS at 02:01

## 2019-01-11 RX ADMIN — Medication 3 ML: at 06:01

## 2019-01-11 RX ADMIN — ASPIRIN 81 MG: 81 TABLET, COATED ORAL at 09:01

## 2019-01-11 RX ADMIN — SOTALOL HYDROCHLORIDE 120 MG: 120 TABLET ORAL at 09:01

## 2019-01-11 RX ADMIN — ATORVASTATIN CALCIUM 40 MG: 20 TABLET, FILM COATED ORAL at 09:01

## 2019-01-11 RX ADMIN — CLOPIDOGREL 75 MG: 75 TABLET, FILM COATED ORAL at 09:01

## 2019-01-11 RX ADMIN — ALPRAZOLAM 0.5 MG: 0.5 TABLET ORAL at 09:01

## 2019-01-11 RX ADMIN — Medication 3 ML: at 10:01

## 2019-01-11 RX ADMIN — POTASSIUM CHLORIDE 20 MEQ: 1500 TABLET, EXTENDED RELEASE ORAL at 06:01

## 2019-01-11 RX ADMIN — CALCIUM CARBONATE (ANTACID) CHEW TAB 500 MG 1000 MG: 500 CHEW TAB at 01:01

## 2019-01-11 RX ADMIN — FAMOTIDINE 40 MG: 20 TABLET ORAL at 02:01

## 2019-01-11 RX ADMIN — FAMOTIDINE 40 MG: 20 TABLET ORAL at 09:01

## 2019-01-11 NOTE — PROGRESS NOTES
[facility]  Heart Transplant  Progress Note    Patient Name: Greg Nolasco  MRN: [id number]  Admission Date: 1/8/2019  Hospital Length of Stay: 3 days  Attending Physician: Merary Garcia MD  Primary Care Provider: Pablo Lorenzo MD  Principal Problem:Acute on chronic respiratory failure with hypoxia    Subjective:     Interval History: No complaints this am. Feels about the same as yesterday. Currently on HFNC 15L 50%.     Scheduled Meds:   allopurinol  300 mg Oral Daily    aspirin  81 mg Oral Daily    atorvastatin  40 mg Oral Daily    clopidogrel  75 mg Oral Daily    heparin (porcine)  5,000 Units Subcutaneous Q8H    magnesium oxide  400 mg Oral BID    pantoprazole  20 mg Oral BID    selexipag  800 mcg Oral BID    sodium chloride 0.9%  3 mL Intravenous Q8H    sotalol  120 mg Oral Daily     PRN Meds:acetaminophen, ALPRAZolam    Review of patient's allergies indicates:   Allergen Reactions    Clindamycin Shortness Of Breath    Penicillins Rash     Objective:     Vital Signs (Most Recent):  Temp: 97.4 °F (36.3 °C) (01/11/19 0730)  Pulse: 71 (01/11/19 1000)  Resp: 15 (01/11/19 1000)  BP: 111/71 (01/11/19 1000)  SpO2: (!) 93 % (01/11/19 1000) Vital Signs (24h Range):  Temp:  [97.4 °F (36.3 °C)-97.6 °F (36.4 °C)] 97.4 °F (36.3 °C)  Pulse:  [63-74] 71  Resp:  [10-29] 15  SpO2:  [88 %-94 %] 93 %  BP: ()/(51-71) 111/71     Patient Vitals for the past 72 hrs (Last 3 readings):   Weight   01/11/19 0400 68.1 kg (150 lb 2.1 oz)   01/10/19 0400 68.4 kg (150 lb 12.7 oz)   01/09/19 1530 68.9 kg (152 lb)     Body mass index is 21.54 kg/m².      Intake/Output Summary (Last 24 hours) at 1/11/2019 1106  Last data filed at 1/11/2019 1100  Gross per 24 hour   Intake 567.5 ml   Output 1510 ml   Net -942.5 ml        Telemetry: Reviewed    Physical Exam   Constitutional: He is oriented to person, place, and time.   HENT:   On O2 via HFNC   Neck: Normal range of motion. Neck supple. No JVD present.    Cardiovascular: Normal rate and regular rhythm. Exam reveals no gallop and no friction rub.   No murmur heard.  Pulmonary/Chest: Effort normal and breath sounds normal. He has no wheezes. He has no rales.   Abdominal: Soft. Bowel sounds are normal. There is no tenderness.   Musculoskeletal: He exhibits no edema.   Neurological: He is alert and oriented to person, place, and time.   Skin: Skin is warm and dry.     Significant Labs:  CBC:  Recent Labs   Lab 01/09/19  0328 01/10/19  0230 01/11/19  0330   WBC 7.43 8.02 6.28   RBC 4.77 5.30 5.20   HGB 14.8 16.6 16.3   HCT 45.4 49.9 49.1    169 157   MCV 95 94 94   MCH 31.0 31.3* 31.3*   MCHC 32.6 33.3 33.2     BNP:  Recent Labs   Lab 01/08/19  1245 01/10/19  0858   BNP 1,037* 333*     CMP:  Recent Labs   Lab 01/09/19  0328 01/09/19  1452 01/10/19  0230 01/11/19  0330   GLU 80 102 97 87   CALCIUM 8.0* 9.5 9.4 9.6   ALBUMIN 3.2*  --  3.7 3.7   PROT 5.8*  --  6.9 6.7    138 139 133*   K 3.6 4.3 4.3 3.9   CO2 20* 24 23 22*   * 104 104 102   BUN 18 21 26* 19   CREATININE 0.9 1.2 1.3 0.8   ALKPHOS 114  --  135 139*   ALT 17  --  17 14   AST 20  --  21 19   BILITOT 2.5*  --  3.3* 2.8*      Coagulation:   Recent Labs   Lab 01/10/19  0858   INR 1.1     LDH:  No results for input(s): LDH in the last 72 hours.  Microbiology:  Microbiology Results (last 7 days)     ** No results found for the last 168 hours. **        I have reviewed all pertinent labs within the past 24 hours.    Estimated Creatinine Clearance: 73.3 mL/min (based on SCr of 0.8 mg/dL).    Diagnostic Results:  I have reviewed all pertinent imaging results/findings within the past 24 hours.    Assessment and Plan:     78 y.o. year old White male  with multivessel CAD (by [facility] 7/20/2018), severe PH, PAF (on sotalol), COPD with chronic hypoxic respiratory failure (on 4 L home O2) who was started on IV remodulin in late July 2018 for PAH and was since weaned off in December who was sent to ER from  clinic secondary to desaturation during 6min walk. He states that he was weaned off of Remodulin and switched to Selexipag secondary to SE with Remodulin. He states that he has been having more dyspnea since then.     * Acute on chronic respiratory failure with hypoxia    -Possible in setting of worsening PH, volume overload, and RV Failure.  -Currently 15L HFNC 50% FiO2.    -Wean O2 for Sat>/= 88%.     PHT (pulmonary hypertension)    -Initially diagnosed with severe PH (In 7/2018, PA pressure by RHC 94/38, mean 65) which appeared out of proportion to his underlying chronic lung disease.  -Pt recently weaned off of IV remodulin secondary to SE(leg pain, n/v/d, headaches).  -RHC 1/10: RA: 3/8/4 RV: 75/0/4 PA: 80/30/47 PWP: 10/9/9 . CO 3.87 by Nael. CI 2.1 L/min/m2. PVR: 9.8 LEONARD.  -TTE with bubble study 1/9- neg for shunt  -Long talk with pt/daughter about current situation and poor prognosis moving forward. Outlined limited options and the fact that he may need to start shifting his thinking to goals of care. Pt/daughter seemed to understand but want to try an additional oral drug. We outlined that it may not be possible to start adempas while admitted, but will try to obtain medication.   -On Selixipag 1600 BID. Will decrease to 800 BID due to concern for shunting.   -Transfer to T[facility].     Paroxysmal atrial fibrillation    -Continue Sotalol.  -Pt declined anticoagulation in the past.     Coronary artery disease involving native coronary artery of native heart without angina pectoris    -Continue ASA/statin/clopidogrel.   -Dr. Lomas with [facility] is planning for high risk PCI for multivessel CAD         Ab Carney, NP  Heart Transplant  [facility]

## 2019-01-11 NOTE — PHYSICIAN QUERY
PT Name: Greg Nolasco  MR #: [id number]    Physician Query Form - CardioPulmonary Clarification      CDS/: Barbara Morse               Contact information:[email or web address]     This form is a permanent document in the medical record.    Query Date: January 11, 2019    By submitting this query, we are merely seeking further clarification of documentation. Please utilize your independent clinical judgment when addressing the question(s) below.    The Medical record contains the following:   Indicators   Supporting Clinical Findings Location in Medical Record   x Pulmonary Hypertension documented Pulmonary hypertension  Heart transplant pn 1-9   x Acute/Chronic Illness Acute on chronic respiratory failure with hypoxia   COPD   Heart transplant pn 1-9    Echo and/or Heart Cath Findings      BiPAP/Intubation/Supplemental O2      SOB, POPE, Fatigue, Dizziness, LE Edema, Cyanosis, Chest Pain, Respiratory Distress, Hypoxia, etc.     x Treatment         Medication Pt recently weaned off of IV remodulin   Continue Selixipag 1600 BID for now Heart transplant pn 1-9   x Other Pulmonary hypertension    -Initially diagnosed with severe PH (In 7/2018, PA pressure by RHC 94/38, mean 65) which appeared out of proportion to his underlying chronic lung disease   -Pt recently weaned off of IV remodulin secondary to SE(leg pain).   -Continue Selixipag 1600 BID for now.   -TTE with bubble study today and possibly RHC tomorrow   -Will discuss alternative therapies vs goals of care.     Heart transplant pn 1-9   Provider, please specify the type of pulmonary hypertension:  [   ] Group 3:  Pulmonary Hypertension due to Lung Disease     [   ] Group 5:  Pulmonary Hypertension due to other, multifactorial, or unclear mechanisms     [   ] Pulmonary Hypertension, unspecified     [  x ] Other Cardiopulmonary Condition (please specify): group 1 PH     [   ] Clinically Undetermined         Please document in your  progress notes daily for the duration of treatment, until resolved, and include in your discharge summary.

## 2019-01-11 NOTE — ASSESSMENT & PLAN NOTE
-Possible in setting of worsening PH, volume overload, and RV Failure.  -Currently 15L HFNC 50% FiO2.    -Wean O2 for Sat>/= 88%.

## 2019-01-11 NOTE — PLAN OF CARE
Problem: Adult Inpatient Plan of Care  Goal: Plan of Care Review    No acute events throughout day. See vital signs and assessments in flowsheets. See below for updates on today's progress.     Pulmonary: SpO2 88-90%, titrated down to 10lpm with 50% comfort flow. Lungs diminished in lower lobes. RR 22-30.    Cardiovascular: R heart cath completed in afternoon. NSR 60's-70's, MAP 63-65 in AM, started fluids 75/hour with MAP 65-70. Pulses 2+.     Neurological: AOx4, no focal neuro deficit    Gastrointestinal: one BM, eating food from home.    Genitourinary: Voiding using urinal            Patient progressing towards goals as tolerated, plan of care communicated and reviewed with Greg Nolasco and family. All concerns addressed. Will continue to monitor.

## 2019-01-11 NOTE — SUBJECTIVE & OBJECTIVE
Interval History: No complaints this am. Feels about the same as yesterday. Currently on HFNC 15L 50%.     Scheduled Meds:   allopurinol  300 mg Oral Daily    aspirin  81 mg Oral Daily    atorvastatin  40 mg Oral Daily    clopidogrel  75 mg Oral Daily    heparin (porcine)  5,000 Units Subcutaneous Q8H    magnesium oxide  400 mg Oral BID    pantoprazole  20 mg Oral BID    selexipag  800 mcg Oral BID    sodium chloride 0.9%  3 mL Intravenous Q8H    sotalol  120 mg Oral Daily     PRN Meds:acetaminophen, ALPRAZolam    Review of patient's allergies indicates:   Allergen Reactions    Clindamycin Shortness Of Breath    Penicillins Rash     Objective:     Vital Signs (Most Recent):  Temp: 97.4 °F (36.3 °C) (01/11/19 0730)  Pulse: 71 (01/11/19 1000)  Resp: 15 (01/11/19 1000)  BP: 111/71 (01/11/19 1000)  SpO2: (!) 93 % (01/11/19 1000) Vital Signs (24h Range):  Temp:  [97.4 °F (36.3 °C)-97.6 °F (36.4 °C)] 97.4 °F (36.3 °C)  Pulse:  [63-74] 71  Resp:  [10-29] 15  SpO2:  [88 %-94 %] 93 %  BP: ()/(51-71) 111/71     Patient Vitals for the past 72 hrs (Last 3 readings):   Weight   01/11/19 0400 68.1 kg (150 lb 2.1 oz)   01/10/19 0400 68.4 kg (150 lb 12.7 oz)   01/09/19 1530 68.9 kg (152 lb)     Body mass index is 21.54 kg/m².      Intake/Output Summary (Last 24 hours) at 1/11/2019 1106  Last data filed at 1/11/2019 1100  Gross per 24 hour   Intake 567.5 ml   Output 1510 ml   Net -942.5 ml        Telemetry: Reviewed    Physical Exam   Constitutional: He is oriented to person, place, and time.   HENT:   On O2 via HFNC   Neck: Normal range of motion. Neck supple. No JVD present.   Cardiovascular: Normal rate and regular rhythm. Exam reveals no gallop and no friction rub.   No murmur heard.  Pulmonary/Chest: Effort normal and breath sounds normal. He has no wheezes. He has no rales.   Abdominal: Soft. Bowel sounds are normal. There is no tenderness.   Musculoskeletal: He exhibits no edema.   Neurological: He is alert  and oriented to person, place, and time.   Skin: Skin is warm and dry.     Significant Labs:  CBC:  Recent Labs   Lab 01/09/19  0328 01/10/19  0230 01/11/19  0330   WBC 7.43 8.02 6.28   RBC 4.77 5.30 5.20   HGB 14.8 16.6 16.3   HCT 45.4 49.9 49.1    169 157   MCV 95 94 94   MCH 31.0 31.3* 31.3*   MCHC 32.6 33.3 33.2     BNP:  Recent Labs   Lab 01/08/19  1245 01/10/19  0858   BNP 1,037* 333*     CMP:  Recent Labs   Lab 01/09/19  0328 01/09/19  1452 01/10/19  0230 01/11/19  0330   GLU 80 102 97 87   CALCIUM 8.0* 9.5 9.4 9.6   ALBUMIN 3.2*  --  3.7 3.7   PROT 5.8*  --  6.9 6.7    138 139 133*   K 3.6 4.3 4.3 3.9   CO2 20* 24 23 22*   * 104 104 102   BUN 18 21 26* 19   CREATININE 0.9 1.2 1.3 0.8   ALKPHOS 114  --  135 139*   ALT 17  --  17 14   AST 20  --  21 19   BILITOT 2.5*  --  3.3* 2.8*      Coagulation:   Recent Labs   Lab 01/10/19  0858   INR 1.1     LDH:  No results for input(s): LDH in the last 72 hours.  Microbiology:  Microbiology Results (last 7 days)     ** No results found for the last 168 hours. **        I have reviewed all pertinent labs within the past 24 hours.    Estimated Creatinine Clearance: 73.3 mL/min (based on SCr of 0.8 mg/dL).    Diagnostic Results:  I have reviewed all pertinent imaging results/findings within the past 24 hours.

## 2019-01-11 NOTE — PLAN OF CARE
Problem: Adult Inpatient Plan of Care  Goal: Plan of Care Review  Outcome: Ongoing (interventions implemented as appropriate)  Pt AAO x4 throughout shift. Pt stepped down from [facility] this afternoon. Pt free from falls and injury. Home medication transferred with patient and dose explained to this RN. Pt awaiting medication arrival from specialty mail order pharmacy. Pt complained of indigestion - PRN tums ordered and given. Pt saturating well on 15L 50% comfort flow O2.

## 2019-01-11 NOTE — PLAN OF CARE
Problem: Adult Inpatient Plan of Care  Goal: Plan of Care Review  Outcome: Ongoing (interventions implemented as appropriate)  See vital signs and assessments in flowsheets. See below for updates on today's progress.     Pulmonary: on Comfort flow of 10 with 50% FiO2 but had to increase flow to 15 as not achieving goal SpO2 of 88%. Thereafter, O2 Saturation > 90%. Pt's not breathless, no use of accessory muscles noted.    Cardiovascular: NSR 65-75 bpm with occasional PVC's. MAP > 65 mmHg mostly. Afebrile    Neurological: AAOx4 ; PERRLA    Gastrointestinal: No bowel movement overnight    Genitourinary: voids spontaneously; adequate urine output    Endocrine: no BG monitoring ordered    Integumentary/Other: bruised but no breaks/sore.    Infusions: none    Patient progressing towards goals as tolerated, plan of care communicated and reviewed with Greg Nolasco and his spouse. All concerns addressed. Will continue to monitor.

## 2019-01-11 NOTE — ASSESSMENT & PLAN NOTE
-Initially diagnosed with severe PH (In 7/2018, PA pressure by RHC 94/38, mean 65) which appeared out of proportion to his underlying chronic lung disease.  -Pt recently weaned off of IV remodulin secondary to SE(leg pain, n/v/d, headaches).  -RHC 1/10: RA: 3/8/4 RV: 75/0/4 PA: 80/30/47 PWP: 10/9/9 . CO 3.87 by Nael. CI 2.1 L/min/m2. PVR: 9.8 LEONARD.  -TTE with bubble study 1/9- neg for shunt  -Long talk with pt/daughter about current situation and poor prognosis moving forward. Outlined limited options and the fact that he may need to start shifting his thinking to goals of care. Pt/daughter seemed to understand but want to try an additional oral drug. We outlined that it may not be possible to start adempas while admitted, but will try to obtain medication.   -On Selixipag 1600 BID. Will decrease to 800 BID due to concern for shunting.   -Transfer to TSU.

## 2019-01-11 NOTE — PHYSICIAN QUERY
PT Name: Greg Nolasco  MR #: [id number]     Physician Query Form - Documentation Clarification      CDS/: Barbara Morse               Contact information:[email or web address]    This form is a permanent document in the medical record.     Query Date: January 11, 2019    By submitting this query, we are merely seeking further clarification of documentation. Please utilize your independent clinical judgment when addressing the question(s) below.    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       Heart transplant pn 1-9                                                                                Doctor, Please specify diagnosis or diagnoses associated with above clinical findings.    Please further specify the RV Failure.    Provider Use Only        [     ] Acute      [ x  ] Acute on chronic      [     ]  Chronic      [     ]  Other:please specify ___________________                                                                                                                     [   ] Clinically Undetermined

## 2019-01-11 NOTE — PLAN OF CARE
Problem: Adult Inpatient Plan of Care  Goal: Plan of Care Review  Outcome: Ongoing (interventions implemented as appropriate)  POC reviewed with patient at bedside at 1000. Both patient and spouse acknowledged understanding of POC. Patient has remained free of falls, injuries and skin breakdown for the duration of the shift. VSS. No acute distress noted. No complaints of pain. Plans: Patient to transfer to TSU with Comfortflow. Selexipag dose reduced to 800 mcg BID. Will continue to monitor and update as needed.

## 2019-01-12 LAB
ALBUMIN SERPL BCP-MCNC: 3.5 G/DL
ALLENS TEST: ABNORMAL
ALP SERPL-CCNC: 133 U/L
ALT SERPL W/O P-5'-P-CCNC: 16 U/L
ANION GAP SERPL CALC-SCNC: 9 MMOL/L
AST SERPL-CCNC: 20 U/L
BASOPHILS # BLD AUTO: 0.08 K/UL
BASOPHILS NFR BLD: 1.1 %
BILIRUB SERPL-MCNC: 2.5 MG/DL
BUN SERPL-MCNC: 13 MG/DL
CALCIUM SERPL-MCNC: 9.5 MG/DL
CHLORIDE SERPL-SCNC: 105 MMOL/L
CO2 SERPL-SCNC: 23 MMOL/L
CREAT SERPL-MCNC: 0.9 MG/DL
DELSYS: ABNORMAL
DIFFERENTIAL METHOD: ABNORMAL
EOSINOPHIL # BLD AUTO: 0.4 K/UL
EOSINOPHIL NFR BLD: 4.9 %
ERYTHROCYTE [DISTWIDTH] IN BLOOD BY AUTOMATED COUNT: 15.4 %
EST. GFR  (AFRICAN AMERICAN): >60 ML/MIN/1.73 M^2
EST. GFR  (NON AFRICAN AMERICAN): >60 ML/MIN/1.73 M^2
GLUCOSE SERPL-MCNC: 84 MG/DL
HCO3 UR-SCNC: 20.4 MMOL/L (ref 24–28)
HCT VFR BLD AUTO: 48.1 %
HGB BLD-MCNC: 15.8 G/DL
IMM GRANULOCYTES # BLD AUTO: 0.01 K/UL
IMM GRANULOCYTES NFR BLD AUTO: 0.1 %
LYMPHOCYTES # BLD AUTO: 1.9 K/UL
LYMPHOCYTES NFR BLD: 26.4 %
MAGNESIUM SERPL-MCNC: 2.3 MG/DL
MCH RBC QN AUTO: 31.2 PG
MCHC RBC AUTO-ENTMCNC: 32.8 G/DL
MCV RBC AUTO: 95 FL
MONOCYTES # BLD AUTO: 0.8 K/UL
MONOCYTES NFR BLD: 11 %
NEUTROPHILS # BLD AUTO: 4.1 K/UL
NEUTROPHILS NFR BLD: 56.5 %
NRBC BLD-RTO: 0 /100 WBC
PCO2 BLDA: 30.3 MMHG (ref 35–45)
PH SMN: 7.43 [PH] (ref 7.35–7.45)
PLATELET # BLD AUTO: 168 K/UL
PMV BLD AUTO: 9.3 FL
PO2 BLDA: 72 MMHG (ref 80–100)
POC BE: -4 MMOL/L
POC SATURATED O2: 95 % (ref 95–100)
POC TCO2: 21 MMOL/L (ref 23–27)
POTASSIUM SERPL-SCNC: 4 MMOL/L
PROT SERPL-MCNC: 6.5 G/DL
RBC # BLD AUTO: 5.06 M/UL
SAMPLE: ABNORMAL
SITE: ABNORMAL
SODIUM SERPL-SCNC: 137 MMOL/L
WBC # BLD AUTO: 7.17 K/UL

## 2019-01-12 PROCEDURE — 36415 COLL VENOUS BLD VENIPUNCTURE: CPT

## 2019-01-12 PROCEDURE — 25000003 PHARM REV CODE 250: Performed by: PHYSICIAN ASSISTANT

## 2019-01-12 PROCEDURE — G8979 MOBILITY GOAL STATUS: HCPCS | Mod: CH

## 2019-01-12 PROCEDURE — 99233 PR SUBSEQUENT HOSPITAL CARE,LEVL III: ICD-10-PCS | Mod: ,,, | Performed by: INTERNAL MEDICINE

## 2019-01-12 PROCEDURE — 99900035 HC TECH TIME PER 15 MIN (STAT)

## 2019-01-12 PROCEDURE — 97161 PT EVAL LOW COMPLEX 20 MIN: CPT

## 2019-01-12 PROCEDURE — 27100092 HC HIGH FLOW DELIVERY CANNULA

## 2019-01-12 PROCEDURE — 99233 SBSQ HOSP IP/OBS HIGH 50: CPT | Mod: ,,, | Performed by: INTERNAL MEDICINE

## 2019-01-12 PROCEDURE — 36600 WITHDRAWAL OF ARTERIAL BLOOD: CPT

## 2019-01-12 PROCEDURE — 27100171 HC OXYGEN HIGH FLOW UP TO 24 HOURS

## 2019-01-12 PROCEDURE — 25000003 PHARM REV CODE 250: Performed by: NURSE PRACTITIONER

## 2019-01-12 PROCEDURE — 83735 ASSAY OF MAGNESIUM: CPT

## 2019-01-12 PROCEDURE — 20600001 HC STEP DOWN PRIVATE ROOM

## 2019-01-12 PROCEDURE — 97166 OT EVAL MOD COMPLEX 45 MIN: CPT

## 2019-01-12 PROCEDURE — 82803 BLOOD GASES ANY COMBINATION: CPT

## 2019-01-12 PROCEDURE — G8980 MOBILITY D/C STATUS: HCPCS | Mod: CH

## 2019-01-12 PROCEDURE — 80053 COMPREHEN METABOLIC PANEL: CPT

## 2019-01-12 PROCEDURE — G8978 MOBILITY CURRENT STATUS: HCPCS | Mod: CH

## 2019-01-12 PROCEDURE — 85025 COMPLETE CBC W/AUTO DIFF WBC: CPT

## 2019-01-12 PROCEDURE — 94761 N-INVAS EAR/PLS OXIMETRY MLT: CPT

## 2019-01-12 RX ADMIN — ALLOPURINOL 300 MG: 100 TABLET ORAL at 08:01

## 2019-01-12 RX ADMIN — ALPRAZOLAM 0.5 MG: 0.5 TABLET ORAL at 08:01

## 2019-01-12 RX ADMIN — MAGNESIUM OXIDE TAB 400 MG (241.3 MG ELEMENTAL MG) 400 MG: 400 (241.3 MG) TAB at 08:01

## 2019-01-12 RX ADMIN — CLOPIDOGREL 75 MG: 75 TABLET, FILM COATED ORAL at 08:01

## 2019-01-12 RX ADMIN — SOTALOL HYDROCHLORIDE 120 MG: 120 TABLET ORAL at 08:01

## 2019-01-12 RX ADMIN — ATORVASTATIN CALCIUM 40 MG: 20 TABLET, FILM COATED ORAL at 08:01

## 2019-01-12 RX ADMIN — FAMOTIDINE 40 MG: 20 TABLET ORAL at 08:01

## 2019-01-12 RX ADMIN — ASPIRIN 81 MG: 81 TABLET, COATED ORAL at 08:01

## 2019-01-12 NOTE — PLAN OF CARE
Problem: Physical Therapy Goal  Goal: Physical Therapy Goal  Outcome: Outcome(s) achieved Date Met: 01/12/19  Patient at this time is at their functional baseline and does not require skilled acute PT services at this time. Please re consult PT if pt has change in functional status.   Raheel Pelletier PT, DPT  1/12/2019  Pager: [phone]

## 2019-01-12 NOTE — SUBJECTIVE & OBJECTIVE
Interval History: Doing well today able to go to bathroom without O2, weaning HF NC from 60 to 45% maintaining sats >88%, ABG obtained and normal.    Continuous Infusions:  Scheduled Meds:   allopurinol  300 mg Oral Daily    aspirin  81 mg Oral Daily    atorvastatin  40 mg Oral Daily    clopidogrel  75 mg Oral Daily    famotidine  40 mg Oral BID    heparin (porcine)  5,000 Units Subcutaneous Q8H    magnesium oxide  400 mg Oral BID    selexipag  800 mcg Oral BID    sodium chloride 0.9%  3 mL Intravenous Q8H    sotalol  120 mg Oral Daily     PRN Meds:acetaminophen, ALPRAZolam, calcium carbonate    Review of patient's allergies indicates:   Allergen Reactions    Clindamycin Shortness Of Breath    Penicillins Rash     Objective:     Vital Signs (Most Recent):  Temp: 97.8 °F (36.6 °C) (01/12/19 1227)  Pulse: 73 (01/12/19 1131)  Resp: 16 (01/12/19 0919)  BP: (!) 82/61 (01/12/19 0919)  SpO2: (!) 92 % (01/12/19 0919) Vital Signs (24h Range):  Temp:  [97.8 °F (36.6 °C)-98.7 °F (37.1 °C)] 97.8 °F (36.6 °C)  Pulse:  [64-80] 73  Resp:  [12-18] 16  SpO2:  [86 %-95 %] 92 %  BP: (78-91)/(50-67) 82/61     Patient Vitals for the past 72 hrs (Last 3 readings):   Weight   01/11/19 0400 68.1 kg (150 lb 2.1 oz)   01/10/19 0400 68.4 kg (150 lb 12.7 oz)   01/09/19 1530 68.9 kg (152 lb)     Body mass index is 21.54 kg/m².      Intake/Output Summary (Last 24 hours) at 1/12/2019 1254  Last data filed at 1/11/2019 1800  Gross per 24 hour   Intake 960 ml   Output 0 ml   Net 960 ml       Hemodynamic Parameters:       Telemetry: No events    Physical Exam   Constitutional: He is oriented to person, place, and time.   HENT:   On O2 via HFNC   Neck: Normal range of motion. Neck supple. No JVD present.   Cardiovascular: Normal rate and regular rhythm. Exam reveals no gallop and no friction rub.   No murmur heard.  Pulmonary/Chest: Effort normal and breath sounds normal. He has no wheezes. He has no rales.   Abdominal: Soft. Bowel sounds  are normal. There is no tenderness.   Musculoskeletal: He exhibits no edema.   Neurological: He is alert and oriented to person, place, and time.   Skin: Skin is warm and dry.       Significant Labs:  CBC:  Recent Labs   Lab 01/10/19  0230 01/11/19  0330 01/12/19  0530   WBC 8.02 6.28 7.17   RBC 5.30 5.20 5.06   HGB 16.6 16.3 15.8   HCT 49.9 49.1 48.1    157 168   MCV 94 94 95   MCH 31.3* 31.3* 31.2*   MCHC 33.3 33.2 32.8     BNP:  Recent Labs   Lab 01/08/19  1245 01/10/19  0858   BNP 1,037* 333*     CMP:  Recent Labs   Lab 01/10/19  0230 01/11/19  0330 01/12/19  0530   GLU 97 87 84   CALCIUM 9.4 9.6 9.5   ALBUMIN 3.7 3.7 3.5   PROT 6.9 6.7 6.5    133* 137   K 4.3 3.9 4.0   CO2 23 22* 23    102 105   BUN 26* 19 13   CREATININE 1.3 0.8 0.9   ALKPHOS 135 139* 133   ALT 17 14 16   AST 21 19 20   BILITOT 3.3* 2.8* 2.5*      Coagulation:   Recent Labs   Lab 01/10/19  0858   INR 1.1     LDH:  No results for input(s): LDH in the last 72 hours.  Microbiology:  Microbiology Results (last 7 days)     ** No results found for the last 168 hours. **          I have reviewed all pertinent labs within the past 24 hours.    Estimated Creatinine Clearance: 65.2 mL/min (based on SCr of 0.9 mg/dL).    Diagnostic Results:  I have reviewed and interpreted all pertinent imaging results/findings within the past 24 hours.

## 2019-01-12 NOTE — PT/OT/SLP EVAL
Physical Therapy Evaluation and Discharge Note    Patient Name:  Greg Nolasco   MRN:  [id number]    Recommendations:     Discharge Recommendations:  (home)   Discharge Equipment Recommendations: none   Barriers to discharge: None    Assessment:     Greg Nolasco is a 78 y.o. male admitted with a medical diagnosis of Acute on chronic respiratory failure with hypoxia. .  At this time, patient is functioning at their prior level of function and does not require further acute PT services.     Recent Surgery: Procedure(s) (LRB):  INSERTION, CATHETER, RIGHT HEART (Right) 2 Days Post-Op    Plan:     During this hospitalization, patient does not require further acute PT services.  Please re-consult if situation changes.      Subjective     Chief Complaint: pt complains that he wants to shave and that he wants to ambulate in room without O2 on; pt edu on comfort flow and importance of aherance  Patient/Family Comments/goals: to return home and wean off of comfort flow; shave  Pain/Comfort:  · Pain Rating 1: 0/10  · Pain Rating Post-Intervention 1: 0/10  · Pain Rating Post-Intervention 2: 0/10    Patients cultural, spiritual, Quaker conflicts given the current situation: no    Living Environment:  Per OT chart:   Living Environment: Patient lives alone in [facility], [facility], t/s combo, using oxygen as needed.  Previous level of function: Independent, driving and using PRN   Equipment Used at home:  none  Assistance upon Discharge: None        Objective:     Communicated with RN  prior to session.  Patient found supine upon PT entry to room found with: oxygen, telemetry(comfort flow)     General Precautions: Standard, (comfort flow; fall)   Orthopedic Precautions:N/A   Braces: N/A     Exams:  · Cognitive Exam:  Patient is oriented to Person, Place, Time and Situation  · Fine Motor Coordination: -       Intact  · Gross Motor Coordination:  WFL  · Postural Exam:  Patient presented with the following abnormalities: -        Rounded shoulders  · Sensation: -       Intact  · Skin Integrity/Edema:      · -       Skin integrity: Visible skin intact, Thin and Dry  · RLE ROM: WFL  · RLE Strength: WFL  · LLE ROM: WFL  · LLE Strength: WFL    Functional Mobility:  · Bed Mobility:  Rolling Left:  independence  · Supine to Sit: independence  · Transfers:  Sit to Stand:  independence with no AD  · Bed to Chair: independence with  no AD  using  Step Transfer  · Gait: x 15 feet ( 2 trials) limited by comfort flow attached to wall; ( I) no sway or LOB noted  · Balance: ( I) for gait    AM-PAC 6 CLICK MOBILITY  Total Score:24       Therapeutic Activities and Exercises:       Patient education  · Patient educated on the role of PT and POC  · Patient educated on importance  activity while in the hosptial per tolerance for improved endurance and to limit deconditioning   · Patient educated on safe transfers with nursing as appropriate  · Patient educated on energy conservation, pursed lip breathing  · Patient educated on proper transfer mechanics and safety  · All of patients questions were answered within the scope of PT        AM-PAC 6 CLICK MOBILITY  Total Score:24     Patient left up in chair with all lines intact, call button in reach, OT  notified and OT present.    GOALS:   Multidisciplinary Problems     Physical Therapy Goals     Not on file          Multidisciplinary Problems (Resolved)        Problem: Physical Therapy Goal    Goal Priority Disciplines Outcome Goal Variances Interventions   Physical Therapy Goal   (Resolved)     PT, PT/OT Outcome(s) achieved                     History:     Past Medical History:   Diagnosis Date    Chronic hypoxemic respiratory failure     Coronary artery disease     BARBARA (obstructive sleep apnea)     Pulmonary hypertension        Past Surgical History:   Procedure Laterality Date    ANGIOPLASTY  1991    BACK SURGERY      HERNIA REPAIR      KNEE SURGERY      REMOVAL, CATHETER, CENTRAL VENOUS, TUNNELED  N/A 12/20/2018    [facility]formed by [facility] at [facility] CATH LAB    RIGHT HEART CATH Right 10/31/2018    Performed by Robert [facility]omas [facility] at [facility] CATH LAB    SHOULDER SURGERY      SINUS SURGERY         Clinical Decision Making:     History  Co-morbidities and personal factors that may impact the plan of care Examination  Body Structures and Functions, activity limitations and participation restrictions that may impact the plan of care Clinical Presentation   Decision Making/ Complexity Score   Co-morbidities:   [] Time since onset of injury / illness / exacerbation  [] Status of current condition  []Patient's cognitive status and safety concerns    [] Multiple Medical Problems (see med hx)  Personal Factors:   [] Patient's age  [x] Prior Level of function   [] Patient's home situation (environment and family support)  [] Patient's level of motivation  [] Expected progression of patient      HISTORY:(criteria)    [x] 48245 - no personal factors/history    [x] 54874 - has 1-2 personal factor/comorbidity     [] 98920 - has >3 personal factor/comorbidity     Body Regions:  [] Objective examination findings  [] Head     []  Neck  [] Trunk   [] Upper Extremity  [] Lower Extremity    Body Systems:  [] For communication ability, affect, cognition, language, and learning style: the assessment of the ability to make needs known, consciousness, orientation (person, place, and time), expected emotional /behavioral responses, and learning preferences (eg, learning barriers, education  needs)  [] For the neuromuscular system: a general assessment of gross coordinated movement (eg, balance, gait, locomotion, transfers, and transitions) and motor function  (motor control and motor learning)  [] For the musculoskeletal system: the assessment of gross symmetry, gross range of motion, gross strength, height, and weight  [] For the integumentary system: the assessment of pliability(texture), presence of scar formation, skin  color, and skin integrity  [x] For cardiovascular/pulmonary system: the assessment of heart rate, respiratory rate, blood pressure, and edema     Activity limitations:    [] Patient's cognitive status and saf ety concerns          [] Status of current condition      [] Weight bearing restriction  [] Cardiopulmunary Restriction    Participation Restrictions:   [] Goals and goal agreement with the patient     [] Rehab potential (prognosis) and probable outcome      Examination of Body System: (criteria)    [x] 21668 - addressing 1-2 elements    [] 85543 - addressing a total of 3 or more elements     [] 61061 -  Addressing a total of 4 or more elements         Clinical Presentation: (criteria)  Stable - 54274     On examination of body system using standardized tests and measures patient presents with 1-2 elements from any of the following: body structures and functions, activity limitations, and/or participation restrictions.  Leading to a clinical presentation that is considered stable and/or uncomplicated                              Clinical Decision Making  (Eval Complexity):  Low- 04375     Time Tracking:     PT Received On: 01/12/19  PT Start Time: 1026     PT Stop Time: 1037  PT Total Time (min): 11 min     Billable Minutes: Evaluation 11 min      Raheel Pelletier, PT  01/12/2019

## 2019-01-12 NOTE — PT/OT/SLP EVAL
Occupational Therapy   Evaluation and Discharge Note    Name: Greg Nolasco  MRN: [id number]  Admitting Diagnosis:  Acute on chronic respiratory failure with hypoxia 2 Days Post-Op    Recommendations:     Discharge Recommendations:  Home, no further therapy needs   Discharge Equipment Recommendations:  none  Barriers to discharge:  None    Assessment:     Greg Nolasco is a 78 y.o. male with a medical diagnosis of Acute on chronic respiratory failure with hypoxia. At this time, patient is functioning at their prior level of function and does not require further acute OT services.     Plan:     During this hospitalization, patient does not require further acute OT services.  Please re-consult if situation changes.    · Plan of Care Reviewed with: patient    Subjective     Chief Complaint: Return home  Patient/Family Comments/goals: Return home    Occupational Profile:  Living Environment: Patient lives alone in [facility], [facility], t/s combo, using oxygen as needed.  Previous level of function: Independent  Equipment Used at home:  none  Assistance upon Discharge: None    Pain/Comfort:  · Pain Rating 1: 0/10    Patients cultural, spiritual, Yazidism conflicts given the current situation: no    Objective:     Communicated with: Nursing prior to session.  Patient found HOB elevated with telemetry, pulse ox (continuous)(Comfort flow via NC) upon OT entry to room.    General Precautions: Standard, High O2 needs    Occupational Performance:    Bed Mobility:    · Patient completed all bed mobility with modified independence    Functional Mobility/Transfers:  · Functional Mobility: Patient completed functional mobility within room for household distance due to length of comfort flow connection with modified independence    Activities of Daily Living:  · Patient completed ADL's with modified independence, per nursing clearance - patient completed shaving of face, therapist managed the O2 lines, vitals stable through  "out    Cognitive/Visual Perceptual:  Cognitive/Psychosocial Skills:     -       Oriented to: Person, Place, Time and Situation   -       Follows Commands/attention:Follows multistep  commands  -       Mood/Affect/Coping skills/emotional control: Appropriate to situation  Visual/Perceptual:      -Intact with use of glasses    Physical Exam:  Postural examination/scapula alignment:    -       No postural abnormalities identified  Upper Extremity Range of Motion:     -       Right Upper Extremity: WFL  -       Left Upper Extremity: WFL  Upper Extremity Strength:    -       Right Upper Extremity: WFL  -       Left Upper Extremity: WFL   Strength:    -       Right Upper Extremity: WFL  -       Left Upper Extremity: WFL    AMPAC 6 Click ADL:  AMPAC Total Score: 24    Treatment & Education:  Evaluation completed, patient educated on roles/goals of OT and POC. Patient educated on safety awareness, breathing techniques, and energy conservation. Patient left seated in bedside chair, all lines intact, call bell within reach, nursing notified.  Education:    Patient left up in chair with all lines intact, call button in reach and nursing notified    GOALS:   Multidisciplinary Problems     Occupational Therapy Goals     Not on file                History:     Past Medical History:   Diagnosis Date    Chronic hypoxemic respiratory failure     Coronary artery disease     BARBARA (obstructive sleep apnea)     Pulmonary hypertension        Past Surgical History:   Procedure Laterality Date    ANGIOPLASTY      BACK SURGERY      HERNIA REPAIR      KNEE SURGERY      REMOVAL, CATHETER, CENTRAL VENOUS, TUNNELED N/A 2018    Performed by Walt Smith MD at [facility] CATH LAB    RIGHT HEART CATH Right 10/31/2018    Performed by Robert Lomas MD at [facility] CATH LAB    SHOULDER SURGERY      SINUS SURGERY         Clinical Decision Makin.  OT Mod:  "Pt evaluation falls under moderate complexity for evaluation " "coding due to identification of 3-5 performance deficits noted as stated above. Eval required Min/Mod assistance to complete on this date and detailed assessment(s) were utilized. Moreover, an expanded review of history and occupational profile obtained with additional review of cognitive, physical and psychosocial hx."     Time Tracking:     OT Date of Treatment: 01/12/19  OT Start Time: 1025  OT Stop Time: 1053  OT Total Time (min): 28 min    Billable Minutes:Evaluation 10  Self Care/Home Management 18    Brea Aiken OT  1/12/2019    "

## 2019-01-12 NOTE — PROGRESS NOTES
[facility]  Heart Transplant  Progress Note    Patient Name: Greg Nolasco  MRN: [id number]  Admission Date: 1/8/2019  Hospital Length of Stay: 4 days  Attending Physician: Merary Garcia MD  Primary Care Provider: Pablo Lorenzo MD  Principal Problem:Acute on chronic respiratory failure with hypoxia    Subjective:     Interval History: Doing well today able to go to bathroom without O2, weaning HF NC from 60 to 45% maintaining sats >88%, ABG obtained and normal.    Continuous Infusions:  Scheduled Meds:   allopurinol  300 mg Oral Daily    aspirin  81 mg Oral Daily    atorvastatin  40 mg Oral Daily    clopidogrel  75 mg Oral Daily    famotidine  40 mg Oral BID    heparin (porcine)  5,000 Units Subcutaneous Q8H    magnesium oxide  400 mg Oral BID    selexipag  800 mcg Oral BID    sodium chloride 0.9%  3 mL Intravenous Q8H    sotalol  120 mg Oral Daily     PRN Meds:acetaminophen, ALPRAZolam, calcium carbonate    Review of patient's allergies indicates:   Allergen Reactions    Clindamycin Shortness Of Breath    Penicillins Rash     Objective:     Vital Signs (Most Recent):  Temp: 97.8 °F (36.6 °C) (01/12/19 1227)  Pulse: 73 (01/12/19 1131)  Resp: 16 (01/12/19 0919)  BP: (!) 82/61 (01/12/19 0919)  SpO2: (!) 92 % (01/12/19 0919) Vital Signs (24h Range):  Temp:  [97.8 °F (36.6 °C)-98.7 °F (37.1 °C)] 97.8 °F (36.6 °C)  Pulse:  [64-80] 73  Resp:  [12-18] 16  SpO2:  [86 %-95 %] 92 %  BP: (78-91)/(50-67) 82/61     Patient Vitals for the past 72 hrs (Last 3 readings):   Weight   01/11/19 0400 68.1 kg (150 lb 2.1 oz)   01/10/19 0400 68.4 kg (150 lb 12.7 oz)   01/09/19 1530 68.9 kg (152 lb)     Body mass index is 21.54 kg/m².      Intake/Output Summary (Last 24 hours) at 1/12/2019 1254  Last data filed at 1/11/2019 1800  Gross per 24 hour   Intake 960 ml   Output 0 ml   Net 960 ml       Hemodynamic Parameters:       Telemetry: No events    Physical Exam   Constitutional: He is oriented to  person, place, and time.   HENT:   On O2 via HFNC   Neck: Normal range of motion. Neck supple. No JVD present.   Cardiovascular: Normal rate and regular rhythm. Exam reveals no gallop and no friction rub.   No murmur heard.  Pulmonary/Chest: Effort normal and breath sounds normal. He has no wheezes. He has no rales.   Abdominal: Soft. Bowel sounds are normal. There is no tenderness.   Musculoskeletal: He exhibits no edema.   Neurological: He is alert and oriented to person, place, and time.   Skin: Skin is warm and dry.       Significant Labs:  CBC:  Recent Labs   Lab 01/10/19  0230 01/11/19  0330 01/12/19  0530   WBC 8.02 6.28 7.17   RBC 5.30 5.20 5.06   HGB 16.6 16.3 15.8   HCT 49.9 49.1 48.1    157 168   MCV 94 94 95   MCH 31.3* 31.3* 31.2*   MCHC 33.3 33.2 32.8     BNP:  Recent Labs   Lab 01/08/19  1245 01/10/19  0858   BNP 1,037* 333*     CMP:  Recent Labs   Lab 01/10/19  0230 01/11/19  0330 01/12/19  0530   GLU 97 87 84   CALCIUM 9.4 9.6 9.5   ALBUMIN 3.7 3.7 3.5   PROT 6.9 6.7 6.5    133* 137   K 4.3 3.9 4.0   CO2 23 22* 23    102 105   BUN 26* 19 13   CREATININE 1.3 0.8 0.9   ALKPHOS 135 139* 133   ALT 17 14 16   AST 21 19 20   BILITOT 3.3* 2.8* 2.5*      Coagulation:   Recent Labs   Lab 01/10/19  0858   INR 1.1     LDH:  No results for input(s): LDH in the last 72 hours.  Microbiology:  Microbiology Results (last 7 days)     ** No results found for the last 168 hours. **          I have reviewed all pertinent labs within the past 24 hours.    Estimated Creatinine Clearance: 65.2 mL/min (based on SCr of 0.9 mg/dL).    Diagnostic Results:  I have reviewed and interpreted all pertinent imaging results/findings within the past 24 hours.    Assessment and Plan:     78 y.o. year old White male  with multivessel CAD (by [facility] 7/20/2018), severe PH, PAF (on sotalol), COPD with chronic hypoxic respiratory failure (on 4 L home O2) who was started on IV remodulin in late July 2018 for PAH and was  since weaned off in December who was sent to ER from clinic secondary to desaturation during 6min walk. He states that he was weaned off of Remodulin and switched to Selexipag secondary to SE with Remodulin. He states that he has been having more dyspnea since then.     * Acute on chronic respiratory failure with hypoxia    -Possible in setting of worsening PH, volume overload, and RV Failure.  -Currently 15L HFNC 45% FiO2.    -Wean O2 for Sat>/= 88%.     PHT (pulmonary hypertension)    -Initially diagnosed with severe PH (In 7/2018, PA pressure by RHC 94/38, mean 65) which appeared out of proportion to his underlying chronic lung disease.  -Pt recently weaned off of IV remodulin secondary to SE(leg pain, n/v/d, headaches).  -RHC 1/10: RA: 3/8/4 RV: 75/0/4 PA: 80/30/47 PWP: 10/9/9 . CO 3.87 by Nael. CI 2.1 L/min/m2. PVR: 9.8 LEONARD.  -TTE with bubble study 1/9- neg for shunt  -Long talk with pt/daughter about current situation and poor prognosis moving forward. Outlined limited options and the fact that he may need to start shifting his thinking to goals of care. Pt/daughter seemed to understand but want to try an additional oral drug. We outlined that it may not be possible to start adempas while admitted, but will try to obtain medication.   -On  800 BID due to concern for shunting.        Paroxysmal atrial fibrillation    -Continue Sotalol.  -Pt declined anticoagulation in the past.     Coronary artery disease involving native coronary artery of native heart without angina pectoris    -Continue ASA/statin/clopidogrel.   -Dr. Lomas with [facility] is planning for high risk PCI for multivessel CAD         Mario Ramirez MD  Heart Transplant  [facility]

## 2019-01-12 NOTE — PROGRESS NOTES
Notified Dr. Lyon. Pt's O2  on comfort flow 15L FiO2 50% sat 84%, BP 86/64 (73), HR 69. Pt sleeping, no signs of distress noted. Per MD order keep greater than 88%, increase FiO2 to 70%.    Respiratory notified.

## 2019-01-12 NOTE — PLAN OF CARE
Problem: Occupational Therapy Goal  Goal: Occupational Therapy Goal  Outcome: Outcome(s) achieved Date Met: 01/12/19  Evaluation completed, patient at baseline for ADL's and functional mobility. No further acute OT needs at this time.    Brea Aiken, OT  1/12/19

## 2019-01-12 NOTE — PLAN OF CARE
Problem: Adult Inpatient Plan of Care  Goal: Plan of Care Review  Outcome: Ongoing (interventions implemented as appropriate)  Pt AAO x4 throughout shift. O2 titrated to 15L 35% FiO2. Pt tolerated well with O2 sats remaining 88 or above. Pt up ad layne throughout shift. Wife at bedside. Pt worked with PT and OT this AM.

## 2019-01-12 NOTE — ASSESSMENT & PLAN NOTE
-Initially diagnosed with severe PH (In 7/2018, PA pressure by RHC 94/38, mean 65) which appeared out of proportion to his underlying chronic lung disease.  -Pt recently weaned off of IV remodulin secondary to SE(leg pain, n/v/d, headaches).  -RHC 1/10: RA: 3/8/4 RV: 75/0/4 PA: 80/30/47 PWP: 10/9/9 . CO 3.87 by Nael. CI 2.1 L/min/m2. PVR: 9.8 LEONARD.  -TTE with bubble study 1/9- neg for shunt  -Long talk with pt/daughter about current situation and poor prognosis moving forward. Outlined limited options and the fact that he may need to start shifting his thinking to goals of care. Pt/daughter seemed to understand but want to try an additional oral drug. We outlined that it may not be possible to start adempas while admitted, but will try to obtain medication.   -On  800 BID due to concern for shunting.

## 2019-01-12 NOTE — ASSESSMENT & PLAN NOTE
-Possible in setting of worsening PH, volume overload, and RV Failure.  -Currently 15L HFNC 45% FiO2.    -Wean O2 for Sat>/= 88%.

## 2019-01-13 LAB
ALBUMIN SERPL BCP-MCNC: 3.4 G/DL
ALP SERPL-CCNC: 138 U/L
ALT SERPL W/O P-5'-P-CCNC: 35 U/L
ANION GAP SERPL CALC-SCNC: 10 MMOL/L
AST SERPL-CCNC: 39 U/L
BASOPHILS # BLD AUTO: 0.05 K/UL
BASOPHILS NFR BLD: 0.7 %
BILIRUB SERPL-MCNC: 2 MG/DL
BUN SERPL-MCNC: 14 MG/DL
CALCIUM SERPL-MCNC: 9.8 MG/DL
CHLORIDE SERPL-SCNC: 105 MMOL/L
CO2 SERPL-SCNC: 22 MMOL/L
CREAT SERPL-MCNC: 0.9 MG/DL
DIFFERENTIAL METHOD: ABNORMAL
EOSINOPHIL # BLD AUTO: 0.3 K/UL
EOSINOPHIL NFR BLD: 4.4 %
ERYTHROCYTE [DISTWIDTH] IN BLOOD BY AUTOMATED COUNT: 15.1 %
EST. GFR  (AFRICAN AMERICAN): >60 ML/MIN/1.73 M^2
EST. GFR  (NON AFRICAN AMERICAN): >60 ML/MIN/1.73 M^2
GLUCOSE SERPL-MCNC: 88 MG/DL
HCT VFR BLD AUTO: 49.5 %
HGB BLD-MCNC: 16.8 G/DL
IMM GRANULOCYTES # BLD AUTO: 0.02 K/UL
IMM GRANULOCYTES NFR BLD AUTO: 0.3 %
LYMPHOCYTES # BLD AUTO: 1.8 K/UL
LYMPHOCYTES NFR BLD: 26 %
MAGNESIUM SERPL-MCNC: 2.3 MG/DL
MCH RBC QN AUTO: 32.4 PG
MCHC RBC AUTO-ENTMCNC: 33.9 G/DL
MCV RBC AUTO: 96 FL
MONOCYTES # BLD AUTO: 0.8 K/UL
MONOCYTES NFR BLD: 11.9 %
NEUTROPHILS # BLD AUTO: 4 K/UL
NEUTROPHILS NFR BLD: 56.7 %
NRBC BLD-RTO: 0 /100 WBC
PLATELET # BLD AUTO: 175 K/UL
PMV BLD AUTO: 9 FL
POTASSIUM SERPL-SCNC: 3.9 MMOL/L
PROT SERPL-MCNC: 6.8 G/DL
RBC # BLD AUTO: 5.18 M/UL
SODIUM SERPL-SCNC: 137 MMOL/L
WBC # BLD AUTO: 7 K/UL

## 2019-01-13 PROCEDURE — 85025 COMPLETE CBC W/AUTO DIFF WBC: CPT

## 2019-01-13 PROCEDURE — 94761 N-INVAS EAR/PLS OXIMETRY MLT: CPT

## 2019-01-13 PROCEDURE — 99233 PR SUBSEQUENT HOSPITAL CARE,LEVL III: ICD-10-PCS | Mod: ,,, | Performed by: INTERNAL MEDICINE

## 2019-01-13 PROCEDURE — 25000003 PHARM REV CODE 250: Performed by: PHYSICIAN ASSISTANT

## 2019-01-13 PROCEDURE — 80053 COMPREHEN METABOLIC PANEL: CPT

## 2019-01-13 PROCEDURE — 20600001 HC STEP DOWN PRIVATE ROOM

## 2019-01-13 PROCEDURE — 93010 ELECTROCARDIOGRAM REPORT: CPT | Mod: ,,, | Performed by: INTERNAL MEDICINE

## 2019-01-13 PROCEDURE — 27100171 HC OXYGEN HIGH FLOW UP TO 24 HOURS

## 2019-01-13 PROCEDURE — 83735 ASSAY OF MAGNESIUM: CPT

## 2019-01-13 PROCEDURE — 99233 SBSQ HOSP IP/OBS HIGH 50: CPT | Mod: ,,, | Performed by: INTERNAL MEDICINE

## 2019-01-13 PROCEDURE — 93010 EKG 12-LEAD: ICD-10-PCS | Mod: ,,, | Performed by: INTERNAL MEDICINE

## 2019-01-13 PROCEDURE — 36415 COLL VENOUS BLD VENIPUNCTURE: CPT

## 2019-01-13 PROCEDURE — 93005 ELECTROCARDIOGRAM TRACING: CPT

## 2019-01-13 PROCEDURE — 25000003 PHARM REV CODE 250: Performed by: NURSE PRACTITIONER

## 2019-01-13 RX ORDER — SOTALOL HYDROCHLORIDE 80 MG/1
80 TABLET ORAL DAILY
Status: DISCONTINUED | OUTPATIENT
Start: 2019-01-14 | End: 2019-01-14

## 2019-01-13 RX ADMIN — FAMOTIDINE 40 MG: 20 TABLET ORAL at 08:01

## 2019-01-13 RX ADMIN — MAGNESIUM OXIDE TAB 400 MG (241.3 MG ELEMENTAL MG) 400 MG: 400 (241.3 MG) TAB at 08:01

## 2019-01-13 RX ADMIN — ALLOPURINOL 300 MG: 100 TABLET ORAL at 08:01

## 2019-01-13 RX ADMIN — ALPRAZOLAM 0.5 MG: 0.5 TABLET ORAL at 08:01

## 2019-01-13 RX ADMIN — ATORVASTATIN CALCIUM 40 MG: 20 TABLET, FILM COATED ORAL at 08:01

## 2019-01-13 RX ADMIN — CLOPIDOGREL 75 MG: 75 TABLET, FILM COATED ORAL at 08:01

## 2019-01-13 RX ADMIN — ASPIRIN 81 MG: 81 TABLET, COATED ORAL at 08:01

## 2019-01-13 NOTE — PROGRESS NOTES
[facility]  Heart Transplant  Progress Note    Patient Name: Greg Nolasco  MRN: [id number]  Admission Date: 1/8/2019  Hospital Length of Stay: 5 days  Attending Physician: Merary Garcia MD  Primary Care Provider: Pablo Lorenzo MD  Principal Problem:Acute on chronic respiratory failure with hypoxia    Subjective:     Interval History: No problems, able to wean oxygen down, Keeping sats 88-92%,  Discussed social work helping with getting Uptravi delivered. BP borderline today, sotalol reduced, goal MAP <55 to hold sotalol or if asymptomatic.    Continuous Infusions:  Scheduled Meds:   allopurinol  300 mg Oral Daily    aspirin  81 mg Oral Daily    atorvastatin  40 mg Oral Daily    clopidogrel  75 mg Oral Daily    famotidine  40 mg Oral BID    heparin (porcine)  5,000 Units Subcutaneous Q8H    magnesium oxide  400 mg Oral BID    selexipag  800 mcg Oral BID    sodium chloride 0.9%  3 mL Intravenous Q8H    [START ON 1/14/2019] sotalol  80 mg Oral Daily     PRN Meds:acetaminophen, ALPRAZolam, calcium carbonate    Review of patient's allergies indicates:   Allergen Reactions    Clindamycin Shortness Of Breath    Penicillins Rash     Objective:     Vital Signs (Most Recent):  Temp: 97.4 °F (36.3 °C) (01/13/19 1516)  Pulse: 75 (01/13/19 1133)  Resp: 15 (01/13/19 0732)  BP: (!) 76/53 (01/13/19 0732)  SpO2: (!) 91 % (01/13/19 0905) Vital Signs (24h Range):  Temp:  [97.4 °F (36.3 °C)-98.7 °F (37.1 °C)] 97.4 °F (36.3 °C)  Pulse:  [68-89] 75  Resp:  [15-38] 15  SpO2:  [84 %-94 %] 91 %  BP: ()/(53-76) 76/53     Patient Vitals for the past 72 hrs (Last 3 readings):   Weight   01/13/19 0608 62.5 kg (137 lb 12.6 oz)   01/11/19 0400 68.1 kg (150 lb 2.1 oz)     Body mass index is 19.77 kg/m².      Intake/Output Summary (Last 24 hours) at 1/13/2019 1525  Last data filed at 1/13/2019 0608  Gross per 24 hour   Intake 1100 ml   Output 0 ml   Net 1100 ml       Hemodynamic Parameters:        Telemetry: No events, NSR    Physical Exam   Constitutional: He is oriented to person, place, and time.   HENT:   On O2 via HFNC   Neck: Normal range of motion. Neck supple. No JVD present.   Cardiovascular: Normal rate and regular rhythm. Exam reveals no gallop and no friction rub.   No murmur heard.  Pulmonary/Chest: Effort normal and breath sounds normal. He has no wheezes. He has no rales.   Abdominal: Soft. Bowel sounds are normal. There is no tenderness.   Musculoskeletal: He exhibits no edema.   Neurological: He is alert and oriented to person, place, and time.   Skin: Skin is warm and dry.       Significant Labs:  CBC:  Recent Labs   Lab 01/11/19  0330 01/12/19  0530 01/13/19  0435   WBC 6.28 7.17 7.00   RBC 5.20 5.06 5.18   HGB 16.3 15.8 16.8   HCT 49.1 48.1 49.5    168 175   MCV 94 95 96   MCH 31.3* 31.2* 32.4*   MCHC 33.2 32.8 33.9     BNP:  Recent Labs   Lab 01/08/19  1245 01/10/19  0858   BNP 1,037* 333*     CMP:  Recent Labs   Lab 01/11/19  0330 01/12/19  0530 01/13/19  0435   GLU 87 84 88   CALCIUM 9.6 9.5 9.8   ALBUMIN 3.7 3.5 3.4*   PROT 6.7 6.5 6.8   * 137 137   K 3.9 4.0 3.9   CO2 22* 23 22*    105 105   BUN 19 13 14   CREATININE 0.8 0.9 0.9   ALKPHOS 139* 133 138*   ALT 14 16 35   AST 19 20 39   BILITOT 2.8* 2.5* 2.0*      Coagulation:   Recent Labs   Lab 01/10/19  0858   INR 1.1     LDH:  No results for input(s): LDH in the last 72 hours.  Microbiology:  Microbiology Results (last 7 days)     ** No results found for the last 168 hours. **          I have reviewed all pertinent labs within the past 24 hours.    Estimated Creatinine Clearance: 59.8 mL/min (based on SCr of 0.9 mg/dL).    Diagnostic Results:  I have reviewed and interpreted all pertinent imaging results/findings within the past 24 hours.    Assessment and Plan:     78 y.o. year old White male  with multivessel CAD (by [facility] 7/20/2018), severe PH, PAF (on sotalol), COPD with chronic hypoxic respiratory failure  (on 4 L home O2) who was started on IV remodulin in late July 2018 for PAH and was since weaned off in December who was sent to ER from clinic secondary to desaturation during 6min walk. He states that he was weaned off of Remodulin and switched to Selexipag secondary to SE with Remodulin. He states that he has been having more dyspnea since then.     * Acute on chronic respiratory failure with hypoxia    -Possible in setting of worsening PH, volume overload, and RV Failure.  -Currently 15L HFNC 45% FiO2.    -Wean O2 for Sat>/= 88%.     PHT (pulmonary hypertension)    -Initially diagnosed with severe PH (In 7/2018, PA pressure by RHC 94/38, mean 65) which appeared out of proportion to his underlying chronic lung disease.  -Pt recently weaned off of IV remodulin secondary to SE(leg pain, n/v/d, headaches).  -RHC 1/10: RA: 3/8/4 RV: 75/0/4 PA: 80/30/47 PWP: 10/9/9 . CO 3.87 by Nael. CI 2.1 L/min/m2. PVR: 9.8 LEONARD.  -TTE with bubble study 1/9- neg for shunt  -Long talk with pt/daughter about current situation and poor prognosis moving forward. Outlined limited options and the fact that he may need to start shifting his thinking to goals of care. Pt/daughter seemed to understand but want to try an additional oral drug. We outlined that it may not be possible to start adempas while admitted, but will try to obtain medication.   -On  800 BID due to concern for shunting.   - Social work will help getting Uptravi       Paroxysmal atrial fibrillation    -Reduced dose to 80 mg Sotalol  - In NSR  - Only hold if MAP <55 or if symptomatic.  -Pt declined anticoagulation in the past.     Coronary artery disease involving native coronary artery of native heart without angina pectoris    -Continue ASA/statin/clopidogrel.   -Dr. Lomas with [facility] is planning for high risk PCI for multivessel CAD         Mario Ramirez MD  Heart Transplant  [facility]

## 2019-01-13 NOTE — NURSING
Pt BP 77/54 Map of 64 HR 72. Dr Ramirez with HTS paged and notified. Per MD order EKG ordered and AM dose of Sotalol to be held.

## 2019-01-13 NOTE — PLAN OF CARE
Problem: Adult Inpatient Plan of Care  Goal: Plan of Care Review  Outcome: Ongoing (interventions implemented as appropriate)  Pt is AAOx4 in bed wearing non-skid footwear, bed in low/locked position and with call bell within reach. Pt reminded to use call bell to call for assistance, pt verbalizes understanding. Wife at bedside. Pt is afebrile at this time. Proper hand hygiene performed before and after pt care activities. O2 titrated to maintain SpO2 >88%. Currently patient is on comfort flow 15L 30-40%. Titrating down as tolerated. Denies any pain or discomfort at this time.

## 2019-01-13 NOTE — ASSESSMENT & PLAN NOTE
-Reduced dose to 80 mg Sotalol  - In NSR  - Only hold if MAP <55 or if symptomatic.  -Pt declined anticoagulation in the past.

## 2019-01-13 NOTE — PLAN OF CARE
Pt AAO x4 throughout shift. O2 titrated to 10L 30% FiO2. SpO2 ~88% with some minor desats when patient moves but pt recovers sats with rest. Sotalol held per MD order for hypotension. Goal to keep MAP at or above 55. Pt free from falls and injury throughout shift.

## 2019-01-13 NOTE — SUBJECTIVE & OBJECTIVE
Interval History: No problems, able to wean oxygen down, Keeping sats 88-92%,  Discussed social work helping with getting Uptravi delivered. BP borderline today, sotalol reduced, goal MAP <55 to hold sotalol or if asymptomatic.    Continuous Infusions:  Scheduled Meds:   allopurinol  300 mg Oral Daily    aspirin  81 mg Oral Daily    atorvastatin  40 mg Oral Daily    clopidogrel  75 mg Oral Daily    famotidine  40 mg Oral BID    heparin (porcine)  5,000 Units Subcutaneous Q8H    magnesium oxide  400 mg Oral BID    selexipag  800 mcg Oral BID    sodium chloride 0.9%  3 mL Intravenous Q8H    [START ON 1/14/2019] sotalol  80 mg Oral Daily     PRN Meds:acetaminophen, ALPRAZolam, calcium carbonate    Review of patient's allergies indicates:   Allergen Reactions    Clindamycin Shortness Of Breath    Penicillins Rash     Objective:     Vital Signs (Most Recent):  Temp: 97.4 °F (36.3 °C) (01/13/19 1516)  Pulse: 75 (01/13/19 1133)  Resp: 15 (01/13/19 0732)  BP: (!) 76/53 (01/13/19 0732)  SpO2: (!) 91 % (01/13/19 0905) Vital Signs (24h Range):  Temp:  [97.4 °F (36.3 °C)-98.7 °F (37.1 °C)] 97.4 °F (36.3 °C)  Pulse:  [68-89] 75  Resp:  [15-38] 15  SpO2:  [84 %-94 %] 91 %  BP: ()/(53-76) 76/53     Patient Vitals for the past 72 hrs (Last 3 readings):   Weight   01/13/19 0608 62.5 kg (137 lb 12.6 oz)   01/11/19 0400 68.1 kg (150 lb 2.1 oz)     Body mass index is 19.77 kg/m².      Intake/Output Summary (Last 24 hours) at 1/13/2019 1525  Last data filed at 1/13/2019 0608  Gross per 24 hour   Intake 1100 ml   Output 0 ml   Net 1100 ml       Hemodynamic Parameters:       Telemetry: No events, NSR    Physical Exam   Constitutional: He is oriented to person, place, and time.   HENT:   On O2 via HFNC   Neck: Normal range of motion. Neck supple. No JVD present.   Cardiovascular: Normal rate and regular rhythm. Exam reveals no gallop and no friction rub.   No murmur heard.  Pulmonary/Chest: Effort normal and breath sounds  normal. He has no wheezes. He has no rales.   Abdominal: Soft. Bowel sounds are normal. There is no tenderness.   Musculoskeletal: He exhibits no edema.   Neurological: He is alert and oriented to person, place, and time.   Skin: Skin is warm and dry.       Significant Labs:  CBC:  Recent Labs   Lab 01/11/19  0330 01/12/19  0530 01/13/19  0435   WBC 6.28 7.17 7.00   RBC 5.20 5.06 5.18   HGB 16.3 15.8 16.8   HCT 49.1 48.1 49.5    168 175   MCV 94 95 96   MCH 31.3* 31.2* 32.4*   MCHC 33.2 32.8 33.9     BNP:  Recent Labs   Lab 01/08/19  1245 01/10/19  0858   BNP 1,037* 333*     CMP:  Recent Labs   Lab 01/11/19  0330 01/12/19  0530 01/13/19  0435   GLU 87 84 88   CALCIUM 9.6 9.5 9.8   ALBUMIN 3.7 3.5 3.4*   PROT 6.7 6.5 6.8   * 137 137   K 3.9 4.0 3.9   CO2 22* 23 22*    105 105   BUN 19 13 14   CREATININE 0.8 0.9 0.9   ALKPHOS 139* 133 138*   ALT 14 16 35   AST 19 20 39   BILITOT 2.8* 2.5* 2.0*      Coagulation:   Recent Labs   Lab 01/10/19  0858   INR 1.1     LDH:  No results for input(s): LDH in the last 72 hours.  Microbiology:  Microbiology Results (last 7 days)     ** No results found for the last 168 hours. **          I have reviewed all pertinent labs within the past 24 hours.    Estimated Creatinine Clearance: 59.8 mL/min (based on SCr of 0.9 mg/dL).    Diagnostic Results:  I have reviewed and interpreted all pertinent imaging results/findings within the past 24 hours.

## 2019-01-13 NOTE — ASSESSMENT & PLAN NOTE
-Initially diagnosed with severe PH (In 7/2018, PA pressure by RHC 94/38, mean 65) which appeared out of proportion to his underlying chronic lung disease.  -Pt recently weaned off of IV remodulin secondary to SE(leg pain, n/v/d, headaches).  -RHC 1/10: RA: 3/8/4 RV: 75/0/4 PA: 80/30/47 PWP: 10/9/9 . CO 3.87 by Nael. CI 2.1 L/min/m2. PVR: 9.8 LEONARD.  -TTE with bubble study 1/9- neg for shunt  -Long talk with pt/daughter about current situation and poor prognosis moving forward. Outlined limited options and the fact that he may need to start shifting his thinking to goals of care. Pt/daughter seemed to understand but want to try an additional oral drug. We outlined that it may not be possible to start adempas while admitted, but will try to obtain medication.   -On  800 BID due to concern for shunting.   - Social work will help getting Uptravi

## 2019-01-14 ENCOUNTER — TELEPHONE (OUTPATIENT)
Dept: TRANSPLANT | Facility: CLINIC | Age: 79
End: 2019-01-14

## 2019-01-14 LAB
ALBUMIN SERPL BCP-MCNC: 3.4 G/DL
ALP SERPL-CCNC: 132 U/L
ALT SERPL W/O P-5'-P-CCNC: 46 U/L
ANION GAP SERPL CALC-SCNC: 10 MMOL/L
AST SERPL-CCNC: 42 U/L
BASOPHILS # BLD AUTO: 0.08 K/UL
BASOPHILS NFR BLD: 1 %
BILIRUB SERPL-MCNC: 2.4 MG/DL
BUN SERPL-MCNC: 17 MG/DL
CALCIUM SERPL-MCNC: 9.4 MG/DL
CHLORIDE SERPL-SCNC: 104 MMOL/L
CO2 SERPL-SCNC: 22 MMOL/L
CREAT SERPL-MCNC: 0.9 MG/DL
DIFFERENTIAL METHOD: ABNORMAL
EOSINOPHIL # BLD AUTO: 0.4 K/UL
EOSINOPHIL NFR BLD: 4.4 %
ERYTHROCYTE [DISTWIDTH] IN BLOOD BY AUTOMATED COUNT: 15.3 %
EST. GFR  (AFRICAN AMERICAN): >60 ML/MIN/1.73 M^2
EST. GFR  (NON AFRICAN AMERICAN): >60 ML/MIN/1.73 M^2
GLUCOSE SERPL-MCNC: 101 MG/DL
HCT VFR BLD AUTO: 47.2 %
HGB BLD-MCNC: 16.3 G/DL
IMM GRANULOCYTES # BLD AUTO: 0.03 K/UL
IMM GRANULOCYTES NFR BLD AUTO: 0.4 %
LYMPHOCYTES # BLD AUTO: 1.9 K/UL
LYMPHOCYTES NFR BLD: 23.5 %
MAGNESIUM SERPL-MCNC: 2.1 MG/DL
MCH RBC QN AUTO: 31.9 PG
MCHC RBC AUTO-ENTMCNC: 34.5 G/DL
MCV RBC AUTO: 92 FL
MONOCYTES # BLD AUTO: 0.8 K/UL
MONOCYTES NFR BLD: 10.5 %
NEUTROPHILS # BLD AUTO: 4.7 K/UL
NEUTROPHILS NFR BLD: 60.2 %
NRBC BLD-RTO: 0 /100 WBC
PLATELET # BLD AUTO: 179 K/UL
PMV BLD AUTO: 9.4 FL
POTASSIUM SERPL-SCNC: 4 MMOL/L
PROT SERPL-MCNC: 6.6 G/DL
RBC # BLD AUTO: 5.11 M/UL
SODIUM SERPL-SCNC: 136 MMOL/L
WBC # BLD AUTO: 7.88 K/UL

## 2019-01-14 PROCEDURE — 63600175 PHARM REV CODE 636 W HCPCS: Performed by: NURSE PRACTITIONER

## 2019-01-14 PROCEDURE — 85025 COMPLETE CBC W/AUTO DIFF WBC: CPT

## 2019-01-14 PROCEDURE — 25000003 PHARM REV CODE 250: Performed by: STUDENT IN AN ORGANIZED HEALTH CARE EDUCATION/TRAINING PROGRAM

## 2019-01-14 PROCEDURE — 36415 COLL VENOUS BLD VENIPUNCTURE: CPT

## 2019-01-14 PROCEDURE — 25000003 PHARM REV CODE 250: Performed by: PHYSICIAN ASSISTANT

## 2019-01-14 PROCEDURE — 99232 PR SUBSEQUENT HOSPITAL CARE,LEVL II: ICD-10-PCS | Mod: ,,, | Performed by: INTERNAL MEDICINE

## 2019-01-14 PROCEDURE — 25000003 PHARM REV CODE 250: Performed by: NURSE PRACTITIONER

## 2019-01-14 PROCEDURE — 80053 COMPREHEN METABOLIC PANEL: CPT

## 2019-01-14 PROCEDURE — 99232 SBSQ HOSP IP/OBS MODERATE 35: CPT | Mod: ,,, | Performed by: INTERNAL MEDICINE

## 2019-01-14 PROCEDURE — 94761 N-INVAS EAR/PLS OXIMETRY MLT: CPT

## 2019-01-14 PROCEDURE — 83735 ASSAY OF MAGNESIUM: CPT

## 2019-01-14 PROCEDURE — 20600001 HC STEP DOWN PRIVATE ROOM

## 2019-01-14 RX ORDER — ALPRAZOLAM 1 MG/1
1 TABLET ORAL ONCE
Status: COMPLETED | OUTPATIENT
Start: 2019-01-14 | End: 2019-01-14

## 2019-01-14 RX ORDER — FUROSEMIDE 40 MG/1
40 TABLET ORAL DAILY
Status: DISCONTINUED | OUTPATIENT
Start: 2019-01-14 | End: 2019-01-15 | Stop reason: HOSPADM

## 2019-01-14 RX ADMIN — MAGNESIUM OXIDE TAB 400 MG (241.3 MG ELEMENTAL MG) 400 MG: 400 (241.3 MG) TAB at 07:01

## 2019-01-14 RX ADMIN — ASPIRIN 81 MG: 81 TABLET, COATED ORAL at 08:01

## 2019-01-14 RX ADMIN — FAMOTIDINE 40 MG: 20 TABLET ORAL at 08:01

## 2019-01-14 RX ADMIN — ALLOPURINOL 300 MG: 100 TABLET ORAL at 08:01

## 2019-01-14 RX ADMIN — ATORVASTATIN CALCIUM 40 MG: 20 TABLET, FILM COATED ORAL at 08:01

## 2019-01-14 RX ADMIN — FUROSEMIDE 40 MG: 40 TABLET ORAL at 02:01

## 2019-01-14 RX ADMIN — ALPRAZOLAM 1 MG: 1 TABLET ORAL at 07:01

## 2019-01-14 RX ADMIN — MAGNESIUM OXIDE TAB 400 MG (241.3 MG ELEMENTAL MG) 400 MG: 400 (241.3 MG) TAB at 08:01

## 2019-01-14 RX ADMIN — CLOPIDOGREL 75 MG: 75 TABLET, FILM COATED ORAL at 08:01

## 2019-01-14 RX ADMIN — FAMOTIDINE 40 MG: 20 TABLET ORAL at 07:01

## 2019-01-14 NOTE — ASSESSMENT & PLAN NOTE
- Continue ASA/statin/clopidogrel.   - Dr. Lomas with [facility] is planning for high risk PCI for multivessel CAD

## 2019-01-14 NOTE — ASSESSMENT & PLAN NOTE
- Initially diagnosed with severe PH (In 7/2018, PA pressure by RHC 94/38, mean 65) which appeared out of proportion to his underlying chronic lung disease.  - Pt recently weaned off of IV remodulin secondary to SE(leg pain, n/v/d, headaches).  - RHC 1/10: RA: 3/8/4 RV: 75/0/4 PA: 80/30/47 PWP: 10/9/9 . CO 3.87 by Nael. CI 2.1 L/min/m2. PVR: 9.8 LENOARD.  - TTE with bubble study 1/9- neg for shunt  - Long talk with pt/daughter about current situation and poor prognosis moving forward. Outlined limited options and the fact that he may need to start shifting his thinking to goals of care. Pt/daughter seemed to understand but want to try an additional oral drug. We outlined that it may not be possible to start adempas while admitted, but will try to obtain medication.   - On  800 BID of uptravi, patients meds ordered and being overnighted today and will arrive tomorrow per his son

## 2019-01-14 NOTE — PROGRESS NOTES
[facility]  Heart Transplant  Progress Note    Patient Name: Greg Nolasco  MRN: [id number]  Admission Date: 1/8/2019  Hospital Length of Stay: 6 days  Attending Physician: Merary Garcia MD  Primary Care Provider: Pablo Lorenzo MD  Principal Problem:Acute on chronic respiratory failure with hypoxia    Subjective:     Interval History: No complaints overnight. Denies NVD, SOB. On high flow NC this AM. Patients daughter called and will  Have uptravi overnighted to their house and patients son will bring it in tomorrow. Aim to start adempas in the hospital as well while weaning O2 (goal sats 88-92% on 4 L NC).    Scheduled Meds:   allopurinol  300 mg Oral Daily    aspirin  81 mg Oral Daily    atorvastatin  40 mg Oral Daily    clopidogrel  75 mg Oral Daily    famotidine  40 mg Oral BID    heparin (porcine)  5,000 Units Subcutaneous Q8H    magnesium oxide  400 mg Oral BID    selexipag  800 mcg Oral BID    sodium chloride 0.9%  3 mL Intravenous Q8H     PRN Meds:acetaminophen, ALPRAZolam, calcium carbonate    Review of patient's allergies indicates:   Allergen Reactions    Clindamycin Shortness Of Breath    Penicillins Rash     Objective:     Vital Signs (Most Recent):  Temp: 97.8 °F (36.6 °C) (01/14/19 1156)  Pulse: 89 (01/14/19 1108)  Resp: 17 (01/14/19 1105)  BP: 98/72 (01/14/19 1105)  SpO2: (!) 93 % (01/14/19 1105) Vital Signs (24h Range):  Temp:  [97.4 °F (36.3 °C)-97.8 °F (36.6 °C)] 97.8 °F (36.6 °C)  Pulse:  [78-91] 89  Resp:  [12-20] 17  SpO2:  [83 %-93 %] 93 %  BP: ()/(69-88) 98/72     Patient Vitals for the past 72 hrs (Last 3 readings):   Weight   01/14/19 0400 63 kg (138 lb 14.2 oz)   01/13/19 0608 62.5 kg (137 lb 12.6 oz)     Body mass index is 19.93 kg/m².      Intake/Output Summary (Last 24 hours) at 1/14/2019 1301  Last data filed at 1/14/2019 0354  Gross per 24 hour   Intake 960 ml   Output 0 ml   Net 960 ml     Hemodynamic Parameters:    Telemetry: No events,  NSR    Physical Exam   Constitutional: He is oriented to person, place, and time.   HENT:   On O2 via HFNC   Neck: Normal range of motion. Neck supple. No JVD present.   Cardiovascular: Normal rate and regular rhythm. Exam reveals no gallop and no friction rub.   No murmur heard.  Pulmonary/Chest: Effort normal and breath sounds normal. He has no wheezes. He has no rales.   Abdominal: Soft. Bowel sounds are normal. There is no tenderness.   Musculoskeletal: He exhibits no edema.   Neurological: He is alert and oriented to person, place, and time.   Skin: Skin is warm and dry.     Significant Labs:  CBC:  Recent Labs   Lab 01/12/19  0530 01/13/19  0435 01/14/19  0627   WBC 7.17 7.00 7.88   RBC 5.06 5.18 5.11   HGB 15.8 16.8 16.3   HCT 48.1 49.5 47.2    175 179   MCV 95 96 92   MCH 31.2* 32.4* 31.9*   MCHC 32.8 33.9 34.5     BNP:  Recent Labs   Lab 01/08/19  1245 01/10/19  0858   BNP 1,037* 333*     CMP:  Recent Labs   Lab 01/12/19  0530 01/13/19  0435 01/14/19  0627   GLU 84 88 101   CALCIUM 9.5 9.8 9.4   ALBUMIN 3.5 3.4* 3.4*   PROT 6.5 6.8 6.6    137 136   K 4.0 3.9 4.0   CO2 23 22* 22*    105 104   BUN 13 14 17   CREATININE 0.9 0.9 0.9   ALKPHOS 133 138* 132   ALT 16 35 46*   AST 20 39 42*   BILITOT 2.5* 2.0* 2.4*      Coagulation:   Recent Labs   Lab 01/10/19  0858   INR 1.1     LDH:  No results for input(s): LDH in the last 72 hours.  Microbiology:  Microbiology Results (last 7 days)     ** No results found for the last 168 hours. **          I have reviewed all pertinent labs within the past 24 hours.    Estimated Creatinine Clearance: 60.3 mL/min (based on SCr of 0.9 mg/dL).    Diagnostic Results:  I have reviewed and interpreted all pertinent imaging results/findings within the past 24 hours.    Assessment and Plan:     78 y.o. year old White male  with multivessel CAD (by [facility] 7/20/2018), severe PH, PAF (on sotalol), COPD with chronic hypoxic respiratory failure (on 4 L home O2) who was  started on IV remodulin in late July 2018 for PAH and was since weaned off in December who was sent to ER from clinic secondary to desaturation during 6min walk. He states that he was weaned off of Remodulin and switched to Selexipag secondary to SE with Remodulin. He states that he has been having more dyspnea since then.     * Acute on chronic respiratory failure with hypoxia    - Possible in setting of worsening PH, volume overload, and RV Failure.  - Currently 10L HFNC 30% FiO2--> change to high flow NC today and monitor    - Wean O2 for Sat>/= 88%  - was on 4-5 L NC at home     PHT (pulmonary hypertension)    - Initially diagnosed with severe PH (In 7/2018, PA pressure by RHC 94/38, mean 65) which appeared out of proportion to his underlying chronic lung disease.  - Pt recently weaned off of IV remodulin secondary to SE(leg pain, n/v/d, headaches).  - RHC 1/10: RA: 3/8/4 RV: 75/0/4 PA: 80/30/47 PWP: 10/9/9 . CO 3.87 by Nael. CI 2.1 L/min/m2. PVR: 9.8 LEONARD.  - TTE with bubble study 1/9- neg for shunt  - Long talk with pt/daughter about current situation and poor prognosis moving forward. Outlined limited options and the fact that he may need to start shifting his thinking to goals of care. Pt/daughter seemed to understand but want to try an additional oral drug. We outlined that it may not be possible to start adempas while admitted, but will try to obtain medication.   - On  800 BID of uptravi, patients meds ordered and being overnighted today and will arrive tomorrow per his son       Paroxysmal atrial fibrillation    - D/C sotolol in attempt to start adempas per Dr. Ruffin (was decreased and held this admit due to hypotension)  - In NSR  - Pt declined anticoagulation in the past.     Coronary artery disease involving native coronary artery of native heart without angina pectoris    - Continue ASA/statin/clopidogrel.   - Dr. Lomas with [facility] is planning for high risk PCI for multivessel CAD         Yesenia  JESSICA Ryan PA-C  Heart Transplant  [facility]

## 2019-01-14 NOTE — ASSESSMENT & PLAN NOTE
- Possible in setting of worsening PH, volume overload, and RV Failure.  - Currently 10L HFNC 30% FiO2--> change to high flow NC today and monitor    - Wean O2 for Sat>/= 88%  - was on 4-5 L NC at home

## 2019-01-14 NOTE — SUBJECTIVE & OBJECTIVE
Interval History: No complaints overnight. Denies NVD, SOB. On high flow NC this AM. Patients daughter called and will  Have uptravi overnighted to their house and patients son will bring it in tomorrow. Aim to start adempas in the hospital as well while weaning O2 (goal sats 88-92% on 4 L NC).    Scheduled Meds:   allopurinol  300 mg Oral Daily    aspirin  81 mg Oral Daily    atorvastatin  40 mg Oral Daily    clopidogrel  75 mg Oral Daily    famotidine  40 mg Oral BID    heparin (porcine)  5,000 Units Subcutaneous Q8H    magnesium oxide  400 mg Oral BID    selexipag  800 mcg Oral BID    sodium chloride 0.9%  3 mL Intravenous Q8H     PRN Meds:acetaminophen, ALPRAZolam, calcium carbonate    Review of patient's allergies indicates:   Allergen Reactions    Clindamycin Shortness Of Breath    Penicillins Rash     Objective:     Vital Signs (Most Recent):  Temp: 97.8 °F (36.6 °C) (01/14/19 1156)  Pulse: 89 (01/14/19 1108)  Resp: 17 (01/14/19 1105)  BP: 98/72 (01/14/19 1105)  SpO2: (!) 93 % (01/14/19 1105) Vital Signs (24h Range):  Temp:  [97.4 °F (36.3 °C)-97.8 °F (36.6 °C)] 97.8 °F (36.6 °C)  Pulse:  [78-91] 89  Resp:  [12-20] 17  SpO2:  [83 %-93 %] 93 %  BP: ()/(69-88) 98/72     Patient Vitals for the past 72 hrs (Last 3 readings):   Weight   01/14/19 0400 63 kg (138 lb 14.2 oz)   01/13/19 0608 62.5 kg (137 lb 12.6 oz)     Body mass index is 19.93 kg/m².      Intake/Output Summary (Last 24 hours) at 1/14/2019 1301  Last data filed at 1/14/2019 0354  Gross per 24 hour   Intake 960 ml   Output 0 ml   Net 960 ml     Hemodynamic Parameters:    Telemetry: No events, NSR    Physical Exam   Constitutional: He is oriented to person, place, and time.   HENT:   On O2 via HFNC   Neck: Normal range of motion. Neck supple. No JVD present.   Cardiovascular: Normal rate and regular rhythm. Exam reveals no gallop and no friction rub.   No murmur heard.  Pulmonary/Chest: Effort normal and breath sounds normal. He has no  wheezes. He has no rales.   Abdominal: Soft. Bowel sounds are normal. There is no tenderness.   Musculoskeletal: He exhibits no edema.   Neurological: He is alert and oriented to person, place, and time.   Skin: Skin is warm and dry.     Significant Labs:  CBC:  Recent Labs   Lab 01/12/19  0530 01/13/19  0435 01/14/19  0627   WBC 7.17 7.00 7.88   RBC 5.06 5.18 5.11   HGB 15.8 16.8 16.3   HCT 48.1 49.5 47.2    175 179   MCV 95 96 92   MCH 31.2* 32.4* 31.9*   MCHC 32.8 33.9 34.5     BNP:  Recent Labs   Lab 01/08/19  1245 01/10/19  0858   BNP 1,037* 333*     CMP:  Recent Labs   Lab 01/12/19  0530 01/13/19  0435 01/14/19  0627   GLU 84 88 101   CALCIUM 9.5 9.8 9.4   ALBUMIN 3.5 3.4* 3.4*   PROT 6.5 6.8 6.6    137 136   K 4.0 3.9 4.0   CO2 23 22* 22*    105 104   BUN 13 14 17   CREATININE 0.9 0.9 0.9   ALKPHOS 133 138* 132   ALT 16 35 46*   AST 20 39 42*   BILITOT 2.5* 2.0* 2.4*      Coagulation:   Recent Labs   Lab 01/10/19  0858   INR 1.1     LDH:  No results for input(s): LDH in the last 72 hours.  Microbiology:  Microbiology Results (last 7 days)     ** No results found for the last 168 hours. **          I have reviewed all pertinent labs within the past 24 hours.    Estimated Creatinine Clearance: 60.3 mL/min (based on SCr of 0.9 mg/dL).    Diagnostic Results:  I have reviewed and interpreted all pertinent imaging results/findings within the past 24 hours.

## 2019-01-14 NOTE — NURSING
Adempas Education - Spoke with patient and daughter in hospital room regarding initiation of Adempas. Discussed ongoing treatment plan for PH. Explained the process for applying for Adempas and the role of specialty pharmacy. Patient is familiar with specialty pharmacy.  Patient provided with Adempas Medication guide. Possible side effects of medication also discussed, including low BP, dizziness, indigestion, HA. Pt was urged to contact office (pt has direct phone number) should they have any SEs that are concerning whatsoever.   Confirmed that patient does not take any nitrates such as nitroglycerin. Noted that if he takes a heartburn medicine (antacid) that contains aluminum hydroxide or magnesium hydroxide, do not take it within 1 hour of taking Adempas.  Pt understands that a nurse will contact him for a visit to his home and he should not initiate Adempas without the nurse visit. Initital dose will be 1mg PO TID and titrate every two weeks as tolerated for goal dose of 2.5mg PO TID.   Patient and daughter verbalized understanding of all. All questions/concerns answered/addressed to patient satisfaction. Referral for Adempas sent to [facility].

## 2019-01-14 NOTE — PLAN OF CARE
Problem: Adult Inpatient Plan of Care  Goal: Plan of Care Review  Outcome: Ongoing (interventions implemented as appropriate)  Pt aao x 4. VSS. Bed in low locked pos call light within reach. Pt calls for assistance when needed. On O2 w/ 10L @ 30%, O2 sats stable. Pt ambulates and voids with independence. No c/o pain. Pt take Uptravi, pt supplied med.

## 2019-01-14 NOTE — ASSESSMENT & PLAN NOTE
- D/C sotolol in attempt to start adempas per Dr. Ruffin (was decreased and held this admit due to hypotension)  - In NSR  - Pt declined anticoagulation in the past.

## 2019-01-14 NOTE — NURSING
Notified RT that team has requested to swap patient to HFNC.  Patient advised on differences between CF & HFNC, amenable to try HF. Monitor Visi & patient tolerance

## 2019-01-15 VITALS
OXYGEN SATURATION: 90 % | TEMPERATURE: 97 F | RESPIRATION RATE: 20 BRPM | DIASTOLIC BLOOD PRESSURE: 70 MMHG | HEART RATE: 89 BPM | SYSTOLIC BLOOD PRESSURE: 93 MMHG | HEIGHT: 70 IN | BODY MASS INDEX: 21.62 KG/M2 | WEIGHT: 151 LBS

## 2019-01-15 LAB
ALBUMIN SERPL BCP-MCNC: 3.4 G/DL
ALP SERPL-CCNC: 143 U/L
ALT SERPL W/O P-5'-P-CCNC: 38 U/L
ANION GAP SERPL CALC-SCNC: 7 MMOL/L
AST SERPL-CCNC: 30 U/L
BASOPHILS # BLD AUTO: 0.07 K/UL
BASOPHILS NFR BLD: 0.9 %
BILIRUB SERPL-MCNC: 2.2 MG/DL
BUN SERPL-MCNC: 17 MG/DL
CALCIUM SERPL-MCNC: 9.8 MG/DL
CHLORIDE SERPL-SCNC: 102 MMOL/L
CO2 SERPL-SCNC: 29 MMOL/L
CREAT SERPL-MCNC: 1 MG/DL
DIFFERENTIAL METHOD: ABNORMAL
EOSINOPHIL # BLD AUTO: 0.3 K/UL
EOSINOPHIL NFR BLD: 4.5 %
ERYTHROCYTE [DISTWIDTH] IN BLOOD BY AUTOMATED COUNT: 15 %
EST. GFR  (AFRICAN AMERICAN): >60 ML/MIN/1.73 M^2
EST. GFR  (NON AFRICAN AMERICAN): >60 ML/MIN/1.73 M^2
GLUCOSE SERPL-MCNC: 91 MG/DL
HCT VFR BLD AUTO: 49.4 %
HGB BLD-MCNC: 16.7 G/DL
IMM GRANULOCYTES # BLD AUTO: 0.03 K/UL
IMM GRANULOCYTES NFR BLD AUTO: 0.4 %
LYMPHOCYTES # BLD AUTO: 1.7 K/UL
LYMPHOCYTES NFR BLD: 22.2 %
MAGNESIUM SERPL-MCNC: 2 MG/DL
MCH RBC QN AUTO: 32.3 PG
MCHC RBC AUTO-ENTMCNC: 33.8 G/DL
MCV RBC AUTO: 96 FL
MONOCYTES # BLD AUTO: 0.8 K/UL
MONOCYTES NFR BLD: 10.2 %
NEUTROPHILS # BLD AUTO: 4.7 K/UL
NEUTROPHILS NFR BLD: 61.8 %
NRBC BLD-RTO: 0 /100 WBC
PLATELET # BLD AUTO: 161 K/UL
PMV BLD AUTO: 9.2 FL
POTASSIUM SERPL-SCNC: 4.1 MMOL/L
PROT SERPL-MCNC: 6.6 G/DL
RBC # BLD AUTO: 5.17 M/UL
SODIUM SERPL-SCNC: 138 MMOL/L
WBC # BLD AUTO: 7.53 K/UL

## 2019-01-15 PROCEDURE — 25000003 PHARM REV CODE 250: Performed by: PHYSICIAN ASSISTANT

## 2019-01-15 PROCEDURE — 36415 COLL VENOUS BLD VENIPUNCTURE: CPT

## 2019-01-15 PROCEDURE — 80053 COMPREHEN METABOLIC PANEL: CPT

## 2019-01-15 PROCEDURE — 25000003 PHARM REV CODE 250: Performed by: NURSE PRACTITIONER

## 2019-01-15 PROCEDURE — 99238 HOSP IP/OBS DSCHRG MGMT 30/<: CPT | Mod: ,,, | Performed by: INTERNAL MEDICINE

## 2019-01-15 PROCEDURE — 83735 ASSAY OF MAGNESIUM: CPT

## 2019-01-15 PROCEDURE — 85025 COMPLETE CBC W/AUTO DIFF WBC: CPT

## 2019-01-15 PROCEDURE — 99238 PR HOSPITAL DISCHARGE DAY,<30 MIN: ICD-10-PCS | Mod: ,,, | Performed by: INTERNAL MEDICINE

## 2019-01-15 RX ORDER — FUROSEMIDE 20 MG/1
40 TABLET ORAL DAILY
Qty: 60 TABLET | Refills: 3 | Status: SHIPPED | OUTPATIENT
Start: 2019-01-15 | End: 2019-01-16

## 2019-01-15 RX ADMIN — ALLOPURINOL 300 MG: 100 TABLET ORAL at 08:01

## 2019-01-15 RX ADMIN — ATORVASTATIN CALCIUM 40 MG: 20 TABLET, FILM COATED ORAL at 08:01

## 2019-01-15 RX ADMIN — FAMOTIDINE 40 MG: 20 TABLET ORAL at 08:01

## 2019-01-15 RX ADMIN — CLOPIDOGREL 75 MG: 75 TABLET, FILM COATED ORAL at 08:01

## 2019-01-15 RX ADMIN — ASPIRIN 81 MG: 81 TABLET, COATED ORAL at 08:01

## 2019-01-15 RX ADMIN — FUROSEMIDE 40 MG: 40 TABLET ORAL at 08:01

## 2019-01-15 RX ADMIN — MAGNESIUM OXIDE TAB 400 MG (241.3 MG ELEMENTAL MG) 400 MG: 400 (241.3 MG) TAB at 08:01

## 2019-01-15 NOTE — PLAN OF CARE
Problem: Adult Inpatient Plan of Care  Goal: Plan of Care Review  Outcome: Ongoing (interventions implemented as appropriate)  Pt has call bell in reach, non slip socks on, and bedrails up x2. Pt has family at bedside. Pt on tele and visi. Pt on high flow nasal cannula at 4 liters breathing well. Pt encouraged to call nurse for help to bathroom.

## 2019-01-15 NOTE — NURSING
Page placed to HTS1 re: patient request to get something tonight to help him sleep.  States at home he takes half of 1 mg Xanax with good results, but in the hospital 0.5 mg tablet Xanax is not working.

## 2019-01-15 NOTE — SUBJECTIVE & OBJECTIVE
Interval History: No complaints overnight. Feels great, plan to d/c today and start adempas as an outpatient. Daughter at bedside.     Scheduled Meds:   allopurinol  300 mg Oral Daily    aspirin  81 mg Oral Daily    atorvastatin  40 mg Oral Daily    clopidogrel  75 mg Oral Daily    famotidine  40 mg Oral BID    furosemide  40 mg Oral Daily    heparin (porcine)  5,000 Units Subcutaneous Q8H    magnesium oxide  400 mg Oral BID    selexipag  800 mcg Oral BID    sodium chloride 0.9%  3 mL Intravenous Q8H     PRN Meds:acetaminophen, ALPRAZolam, calcium carbonate    Review of patient's allergies indicates:   Allergen Reactions    Clindamycin Shortness Of Breath    Penicillins Rash     Objective:     Vital Signs (Most Recent):  Temp: 97.4 °F (36.3 °C) (01/15/19 1132)  Pulse: 89 (01/15/19 1106)  Resp: 20 (01/15/19 0813)  BP: 93/70 (01/15/19 0813)  SpO2: (!) 90 % (01/15/19 0813) Vital Signs (24h Range):  Temp:  [97.2 °F (36.2 °C)-97.8 °F (36.6 °C)] 97.4 °F (36.3 °C)  Pulse:  [69-97] 89  Resp:  [8-24] 20  SpO2:  [88 %-93 %] 90 %  BP: ()/(67-77) 93/70     Patient Vitals for the past 72 hrs (Last 3 readings):   Weight   01/15/19 0400 68.5 kg (151 lb 0.2 oz)   01/14/19 0400 63 kg (138 lb 14.2 oz)   01/13/19 0608 62.5 kg (137 lb 12.6 oz)     Body mass index is 21.67 kg/m².      Intake/Output Summary (Last 24 hours) at 1/15/2019 1145  Last data filed at 1/15/2019 0500  Gross per 24 hour   Intake 240 ml   Output 0 ml   Net 240 ml     Hemodynamic Parameters:    Telemetry: No events, NSR    Physical Exam   Constitutional: He is oriented to person, place, and time.   HENT:   On NC   Neck: Normal range of motion. Neck supple. No JVD present.   Cardiovascular: Normal rate and regular rhythm. Exam reveals no gallop and no friction rub.   No murmur heard.  Pulmonary/Chest: Effort normal and breath sounds normal. He has no wheezes. He has no rales.   Abdominal: Soft. Bowel sounds are normal. There is no tenderness.    Musculoskeletal: He exhibits no edema.   Neurological: He is alert and oriented to person, place, and time.   Skin: Skin is warm and dry.     Significant Labs:  CBC:  Recent Labs   Lab 01/13/19  0435 01/14/19  0627 01/15/19  0619   WBC 7.00 7.88 7.53   RBC 5.18 5.11 5.17   HGB 16.8 16.3 16.7   HCT 49.5 47.2 49.4    179 161   MCV 96 92 96   MCH 32.4* 31.9* 32.3*   MCHC 33.9 34.5 33.8     BNP:  Recent Labs   Lab 01/08/19  1245 01/10/19  0858   BNP 1,037* 333*     CMP:  Recent Labs   Lab 01/13/19  0435 01/14/19  0627 01/15/19  0619   GLU 88 101 91   CALCIUM 9.8 9.4 9.8   ALBUMIN 3.4* 3.4* 3.4*   PROT 6.8 6.6 6.6    136 138   K 3.9 4.0 4.1   CO2 22* 22* 29    104 102   BUN 14 17 17   CREATININE 0.9 0.9 1.0   ALKPHOS 138* 132 143*   ALT 35 46* 38   AST 39 42* 30   BILITOT 2.0* 2.4* 2.2*      Coagulation:   Recent Labs   Lab 01/10/19  0858   INR 1.1     LDH:  No results for input(s): LDH in the last 72 hours.  Microbiology:  Microbiology Results (last 7 days)     ** No results found for the last 168 hours. **          I have reviewed all pertinent labs within the past 24 hours.    Estimated Creatinine Clearance: 59 mL/min (based on SCr of 1 mg/dL).    Diagnostic Results:  I have reviewed and interpreted all pertinent imaging results/findings within the past 24 hours.

## 2019-01-15 NOTE — DISCHARGE SUMMARY
[facility]  Heart Transplant  Discharge Summary      Patient Name: Greg Nolasco  MRN: [id number]  Admission Date: 1/8/2019  Hospital Length of Stay: 7 days  Discharge Date and Time: 01/15/2019 11:49 AM  Attending Physician: Merary Garcia MD   Discharging Provider: KAREN AranaC  Primary Care Provider: Pablo Lorenzo MD     HPI: 78 y.o. year old White male  with multivessel CAD (by [facility] 7/20/2018), severe PH, PAF (on sotalol), COPD with chronic hypoxic respiratory failure (on 4 L home O2) who was started on IV remodulin in late July 2018 for PAH and was since weaned off in December who was sent to ER from clinic secondary to desaturation during 6min walk. He states that he was weaned off of Remodulin and switched to Selexipag secondary to SE with Remodulin. He states that he has been having more dyspnea since then. Pt currently lying on stretcher with nrb mask on. He states that he feels better. Sat 98% on monitor. Daughter at bedside.     Procedure(s) (LRB):  INSERTION, CATHETER, RIGHT HEART (Right)     Hospital Course: Patient was admitted for acute hypoxic respiratory failure and Pulmonary HTN. He was admitted to ICU for symptomatic hypotension and closer monitoring. Hypotension was likely due to over diuresis and his lasix was held (RHC confirmed R 4) along with RV: 75/ 0/ 4 PA: 80/ 30/ 47 PWP: 10/ 9/ 9 . Cardiac output was 3.87 by Nael. Cardiac index is 2.1 L/min/m2. Pulmonary vascular resistance: 9.8 LEONARD. On uptravi 1600 mcg bid. It was dropped to 800 BID due to symptoms and possible shunting. TTE w/ bubble study was completed to ensure patient was not shunting which was negative. Goals of care discussion with Dr. Garcia, patient, and his family due to patients intolerance to IV therapy and worsening of symptoms with up titration (completed as an outpatient) of uptravi. Although his prognosis is not good from his PH, patient is not necessarily prepared to discuss this and he and  his family wanted to try to continue uptravi as well as initiate adempas if possible. His sotolol (paroxysmal a fib) was discontinued due to low blood pressure and need to initiate adempas. He was discharged on the below medication regimen (decreased uptravi) with plans to initiate adempas as an outpatient. He was also sent home on prior home dose of O2 (4L NC) and his saturations were at his baseline (86-93%). He will follow up as scheduled with PH clinic.    Significant Diagnostic Studies: Labs:   CMP   Recent Labs   Lab 01/14/19  0627 01/15/19  0619    138   K 4.0 4.1    102   CO2 22* 29    91   BUN 17 17   CREATININE 0.9 1.0   CALCIUM 9.4 9.8   PROT 6.6 6.6   ALBUMIN 3.4* 3.4*   BILITOT 2.4* 2.2*   ALKPHOS 132 143*   AST 42* 30   ALT 46* 38   ANIONGAP 10 7*   ESTGFRAFRICA >60.0 >60.0   EGFRNONAA >60.0 >60.0   , CBC   Recent Labs   Lab 01/14/19  0627 01/15/19  0619   WBC 7.88 7.53   HGB 16.3 16.7   HCT 47.2 49.4    161    and INR   Lab Results   Component Value Date    INR 1.1 01/10/2019    INR 1.1 10/31/2018    INR 1.1 07/23/2018       Cardiac Graphics: Echocardiogram:   Transthoracic echo (TTE) complete (Cupid Only):   Results for orders placed or performed during the hospital encounter of 01/08/19   Transthoracic echo (TTE) complete (Cupid Only)   Result Value Ref Range    BSA 1.85 m2    TDI SEPTAL 0.06     LA WIDTH 3.85 cm    TDI LATERAL 0.10     LVIDD 3.49 (A) 3.5 - 6.0 cm    IVS 0.91 0.6 - 1.1 cm    PW 0.80 0.6 - 1.1 cm    LVIDS 2.15 2.1 - 4.0 cm    FS 38 28 - 44 %    LA volume 89.73 cm3    Sinus 3.63 cm    STJ 3.11 cm    Ascending aorta 3.01 cm    LV mass 82.22 g    LA size 4.90 cm    RVDD 5.90 cm    TAPSE 1.72 cm    Left Ventricle Relative Wall Thickness 0.46 cm    AV mean gradient 4.15 mmHg    AV valve area 1.80 cm2    AV index (prosthetic) 0.51     Mean e' 0.08     LVOT diameter 2.12 cm    LVOT area 3.53 cm2    LVOT peak VTI 13.24 cm    Ao peak cresencio 1.34 m/s    Ao VTI 25.89 cm     LVOT stroke volume 46.71 cm3    AV peak gradient 7.18 mmHg    TR Max Jann 3.10 m/s    LV Systolic Volume 15.29 mL    LV Systolic Volume Index 8.2 mL/m2    LV Diastolic Volume 50.46 mL    LV Diastolic Volume Index 27.17 mL/m2    LA Volume Index 48.3 mL/m2    LV Mass Index 44.3 g/m2    RA Major Axis 6.38 cm    Left Atrium Minor Axis 5.22 cm    Left Atrium Major Axis 6.03 cm    Triscuspid Valve Regurgitation Peak Gradient 38.44 mmHg    RA Width 5.50 cm    Right Atrial Pressure (from IVC) 3 mmHg    TV rest pulmonary artery pressure 41 mmHg     RHC  · RA: 3/ 8/ 4 RV: 75/ 0/ 4 PA: 80/ 30/ 47 PWP: 10/ 9/ 9 . Cardiac output was 3.87 by Nael. Cardiac index is 2.1 L/min/m2. Pulmonary vascular resistance: 9.8 LEONARD. On uptravi 1600 mcg bid      Pending Diagnostic Studies:     None        Final Active Diagnoses:    Diagnosis Date Noted POA    PRINCIPAL PROBLEM:  Acute on chronic respiratory failure with hypoxia [J96.21] 01/08/2019 Yes    Hypotension [I95.9]  Yes    PHT (pulmonary hypertension) [I27.20] 01/08/2019 Yes    Paroxysmal atrial fibrillation [I48.0] 01/29/2018 Yes    Coronary artery disease involving native coronary artery of native heart without angina pectoris [I25.10] 01/29/2018 Yes      Problems Resolved During this Admission:      Discharged Condition: stable    Disposition: Home-Health Care Svc    Follow Up:  clinic    Patient Instructions:      Diet Cardiac     Notify your health care provider if you experience any of the following:  temperature >100.4     Notify your health care provider if you experience any of the following:  persistent nausea and vomiting or diarrhea     Notify your health care provider if you experience any of the following:  severe uncontrolled pain     Notify your health care provider if you experience any of the following:  redness, tenderness, or signs of infection (pain, swelling, redness, odor or green/yellow discharge around incision site)     Notify your health care provider  if you experience any of the following:  difficulty breathing or increased cough     Notify your health care provider if you experience any of the following:  severe persistent headache     Notify your health care provider if you experience any of the following:  worsening rash     Notify your health care provider if you experience any of the following:  persistent dizziness, light-headedness, or visual disturbances     Notify your health care provider if you experience any of the following:  increased confusion or weakness     Activity as tolerated     Medications:  Reconciled Home Medications:      Medication List      CHANGE how you take these medications    selexipag 800 mcg Tab  Take 800 mcg by mouth 2 (two) times daily.  What changed:    · how much to take  · additional instructions  · Another medication with the same name was removed. Continue taking this medication, and follow the directions you see here.        CONTINUE taking these medications    allopurinol 300 MG tablet  Commonly known as:  ZYLOPRIM  Take 300 mg by mouth.     ALPRAZolam 1 MG tablet  Commonly known as:  XANAX  0.5 mg as needed.     aspirin 81 MG EC tablet  Commonly known as:  ECOTRIN  Take 81 mg by mouth once daily.     atorvastatin 40 MG tablet  Commonly known as:  LIPITOR  Take 1 tablet (40 mg total) by mouth once daily.     clopidogrel 75 mg tablet  Commonly known as:  PLAVIX  Take 1 tablet (75 mg total) by mouth once daily. Take four pills in the night before the procedure and one pill in the day of the procedure.     furosemide 20 MG tablet  Commonly known as:  LASIX  Take 2 tablets (40 mg total) by mouth once daily.     melatonin 3 mg Tab  Take 1 capsule by mouth daily as needed.     MULTI VITAMIN ORAL  Take 1 tablet by mouth.     multivitamin with minerals tablet  Take 1 tablet by mouth once daily.     OXYGEN-AIR DELIVERY SYSTEMS MISC  by Misc.(Non-Drug; Combo Route) route.     potassium chloride 10 MEQ Cpsr  Commonly known as:   MICRO-K  Take 10 mEq by mouth 2 (two) times daily.     RABEprazole 20 mg tablet  Commonly known as:  ACIPHEX  Take 20 mg by mouth once daily.        STOP taking these medications    FLUZONE HIGH-DOSE 2018-19 (PF) 180 mcg/0.5 mL vaccine  Generic drug:  influenza     sodium chloride 0.9% SolP 100 mL with treprostinil 1 mg/mL Soln 3,000,000 ng     sotalol 120 MG Tab  Commonly known as:  BETAPACE            Yesenia Ryan PA-C  Heart Transplant  [facility]

## 2019-01-15 NOTE — PROGRESS NOTES
[facility]  Heart Transplant  Progress Note    Patient Name: Greg Nolasco  MRN: [id number]  Admission Date: 1/8/2019  Hospital Length of Stay: 7 days  Attending Physician: Merary Garcia MD  Primary Care Provider: Pablo Lorenzo MD  Principal Problem:Acute on chronic respiratory failure with hypoxia    Subjective:     Interval History: No complaints overnight. Feels great, plan to d/c today and start adempas as an outpatient. Daughter at bedside.     Scheduled Meds:   allopurinol  300 mg Oral Daily    aspirin  81 mg Oral Daily    atorvastatin  40 mg Oral Daily    clopidogrel  75 mg Oral Daily    famotidine  40 mg Oral BID    furosemide  40 mg Oral Daily    heparin (porcine)  5,000 Units Subcutaneous Q8H    magnesium oxide  400 mg Oral BID    selexipag  800 mcg Oral BID    sodium chloride 0.9%  3 mL Intravenous Q8H     PRN Meds:acetaminophen, ALPRAZolam, calcium carbonate    Review of patient's allergies indicates:   Allergen Reactions    Clindamycin Shortness Of Breath    Penicillins Rash     Objective:     Vital Signs (Most Recent):  Temp: 97.4 °F (36.3 °C) (01/15/19 1132)  Pulse: 89 (01/15/19 1106)  Resp: 20 (01/15/19 0813)  BP: 93/70 (01/15/19 0813)  SpO2: (!) 90 % (01/15/19 0813) Vital Signs (24h Range):  Temp:  [97.2 °F (36.2 °C)-97.8 °F (36.6 °C)] 97.4 °F (36.3 °C)  Pulse:  [69-97] 89  Resp:  [8-24] 20  SpO2:  [88 %-93 %] 90 %  BP: ()/(67-77) 93/70     Patient Vitals for the past 72 hrs (Last 3 readings):   Weight   01/15/19 0400 68.5 kg (151 lb 0.2 oz)   01/14/19 0400 63 kg (138 lb 14.2 oz)   01/13/19 0608 62.5 kg (137 lb 12.6 oz)     Body mass index is 21.67 kg/m².      Intake/Output Summary (Last 24 hours) at 1/15/2019 1145  Last data filed at 1/15/2019 0500  Gross per 24 hour   Intake 240 ml   Output 0 ml   Net 240 ml     Hemodynamic Parameters:    Telemetry: No events, NSR    Physical Exam   Constitutional: He is oriented to person, place, and time.   HENT:   On  NC   Neck: Normal range of motion. Neck supple. No JVD present.   Cardiovascular: Normal rate and regular rhythm. Exam reveals no gallop and no friction rub.   No murmur heard.  Pulmonary/Chest: Effort normal and breath sounds normal. He has no wheezes. He has no rales.   Abdominal: Soft. Bowel sounds are normal. There is no tenderness.   Musculoskeletal: He exhibits no edema.   Neurological: He is alert and oriented to person, place, and time.   Skin: Skin is warm and dry.     Significant Labs:  CBC:  Recent Labs   Lab 01/13/19  0435 01/14/19  0627 01/15/19  0619   WBC 7.00 7.88 7.53   RBC 5.18 5.11 5.17   HGB 16.8 16.3 16.7   HCT 49.5 47.2 49.4    179 161   MCV 96 92 96   MCH 32.4* 31.9* 32.3*   MCHC 33.9 34.5 33.8     BNP:  Recent Labs   Lab 01/08/19  1245 01/10/19  0858   BNP 1,037* 333*     CMP:  Recent Labs   Lab 01/13/19  0435 01/14/19  0627 01/15/19  0619   GLU 88 101 91   CALCIUM 9.8 9.4 9.8   ALBUMIN 3.4* 3.4* 3.4*   PROT 6.8 6.6 6.6    136 138   K 3.9 4.0 4.1   CO2 22* 22* 29    104 102   BUN 14 17 17   CREATININE 0.9 0.9 1.0   ALKPHOS 138* 132 143*   ALT 35 46* 38   AST 39 42* 30   BILITOT 2.0* 2.4* 2.2*      Coagulation:   Recent Labs   Lab 01/10/19  0858   INR 1.1     LDH:  No results for input(s): LDH in the last 72 hours.  Microbiology:  Microbiology Results (last 7 days)     ** No results found for the last 168 hours. **          I have reviewed all pertinent labs within the past 24 hours.    Estimated Creatinine Clearance: 59 mL/min (based on SCr of 1 mg/dL).    Diagnostic Results:  I have reviewed and interpreted all pertinent imaging results/findings within the past 24 hours.    Assessment and Plan:     78 y.o. year old White male  with multivessel CAD (by [facility] 7/20/2018), severe PH, PAF (on sotalol), COPD with chronic hypoxic respiratory failure (on 4 L home O2) who was started on IV remodulin in late July 2018 for PAH and was since weaned off in December who was sent to ER  from clinic secondary to desaturation during 6min walk. He states that he was weaned off of Remodulin and switched to Selexipag secondary to SE with Remodulin. He states that he has been having more dyspnea since then.     * Acute on chronic respiratory failure with hypoxia    - Possible in setting of worsening PH, volume overload, and RV Failure.  - Currently NC @ 4 L  - Wean O2 for Sat>/= 88%  - was on 4-5 L NC at home     PHT (pulmonary hypertension)    - Initially diagnosed with severe PH (In 7/2018, PA pressure by RHC 94/38, mean 65) which appeared out of proportion to his underlying chronic lung disease.  - Pt recently weaned off of IV remodulin secondary to SE(leg pain, n/v/d, headaches).  - RHC 1/10: RA: 3/8/4 RV: 75/0/4 PA: 80/30/47 PWP: 10/9/9 . CO 3.87 by Nael. CI 2.1 L/min/m2. PVR: 9.8 LEONARD.  - TTE with bubble study 1/9- neg for shunt  - Long talk with pt/daughter about current situation and poor prognosis moving forward. Outlined limited options and the fact that he may need to start shifting his thinking to goals of care. Pt/daughter seemed to understand but want to try an additional oral drug.   - On  800 BID of uptravi- will d/c on this dose and plan to initiate adempas 0.5 TID as an outpatient   - will take his blood pressure daily and call with any symptoms       Paroxysmal atrial fibrillation    - D/C sotolol in attempt to start adempas per Dr. Ruffin (was decreased and held this admit due to hypotension)  - In NSR  - Pt declined anticoagulation in the past.     Coronary artery disease involving native coronary artery of native heart without angina pectoris    - Continue ASA/statin/clopidogrel.   - Dr. Lomas with [facility] is planning for high risk PCI for multivessel CAD         WILLIAM Arana-C  Heart Transplant  [facility]

## 2019-01-15 NOTE — ASSESSMENT & PLAN NOTE
- Possible in setting of worsening PH, volume overload, and RV Failure.  - Currently NC @ 4 L  - Wean O2 for Sat>/= 88%  - was on 4-5 L NC at home

## 2019-01-15 NOTE — ASSESSMENT & PLAN NOTE
- Initially diagnosed with severe PH (In 7/2018, PA pressure by RHC 94/38, mean 65) which appeared out of proportion to his underlying chronic lung disease.  - Pt recently weaned off of IV remodulin secondary to SE(leg pain, n/v/d, headaches).  - RHC 1/10: RA: 3/8/4 RV: 75/0/4 PA: 80/30/47 PWP: 10/9/9 . CO 3.87 by Nael. CI 2.1 L/min/m2. PVR: 9.8 LEONARD.  - TTE with bubble study 1/9- neg for shunt  - Long talk with pt/daughter about current situation and poor prognosis moving forward. Outlined limited options and the fact that he may need to start shifting his thinking to goals of care. Pt/daughter seemed to understand but want to try an additional oral drug.   - On  800 BID of uptravi- will d/c on this dose and plan to initiate adempas 0.5 TID as an outpatient   - will take his blood pressure daily and call with any symptoms

## 2019-01-15 NOTE — PROGRESS NOTES
Discharge instructions given to and reviewed with patient and daughter. Reviewed heart failure instructions and medication changes - patient stated understanding. Wheelchair requested per escort.     Patient states his home oxygen is in the truck. Placed on 4L NC for transport to vehicle. Transported off unit via wheelchair.

## 2019-01-16 ENCOUNTER — TELEPHONE (OUTPATIENT)
Dept: TRANSPLANT | Facility: CLINIC | Age: 79
End: 2019-01-16

## 2019-01-16 RX ORDER — FUROSEMIDE 20 MG/1
40 TABLET ORAL DAILY
Qty: 180 TABLET | Refills: 3 | Status: SHIPPED | OUTPATIENT
Start: 2019-01-16 | End: 2019-03-01

## 2019-01-17 NOTE — PROGRESS NOTES
DISCHARGE    SW to pt's room for discharge.  Pt presents as lying in bed with dgtr at bedside.  Pt & dgtr present as aao x4, calm, cooperative, and asking and answering questions appropriately.  Pt verbalizes understanding and agreement with plan for d/c to home today.  Dgtr reports she will transport pt home today.  Dgtr reports they are still waiting for call from specialty pharmacy re: pt's home Adempas; PH nurse ELIANA Wolf aware.  Pt and dgtr deny any d/c needs, and none identified by medical team.  Pt reports he has portable O2 with him for drive home.  Pt reports coping adequately at this time, and denies any needs or concerns to SW.  SW providing ongoing psychosocial and counseling support, education, resources, assistance, and discharge planning as indicated.  SW remains available.

## 2019-01-21 ENCOUNTER — TELEPHONE (OUTPATIENT)
Dept: TRANSPLANT | Facility: CLINIC | Age: 79
End: 2019-01-21

## 2019-01-21 ENCOUNTER — TELEPHONE (OUTPATIENT)
Dept: HEPATOLOGY | Facility: CLINIC | Age: 79
End: 2019-01-21

## 2019-01-21 NOTE — TELEPHONE ENCOUNTER
----- Message from Gena Rene NP sent at 1/21/2019  8:44 AM CST -----  Please inform patient - Labs were negative for hepatitis B and C. Please reschedule abdominal ultrasound now that he is out of the hospital. Thanks.

## 2019-01-21 NOTE — TELEPHONE ENCOUNTER
Mailed results note below to patient.   Schedule his US the same time he is coming here on 2/1/19.

## 2019-01-21 NOTE — TELEPHONE ENCOUNTER
"Patient called with questions about his Adempas start. He has not received a call from [facility] to schedule an appt but does have the medication at home. Advised him that [facility] has what they need and is aware that he needs to be scheduled. Patient states that his SOB is worse. He is  Unable to go from room to room without "losing his breath." He is also waking at night with breathlessness. Mr. Nolasco endorses using his oxygen. He is currently taking 800 mcg Uptravi, BID. He does have 200 mcg tabs on hand and per MD he is to increase to Uptravi 1000 mcg, BID. Patient verbalized understanding and will take first dose tonight.   Also, patient is out of lasix and has not received shipment from [facility]. Coordinator called [facility] and local pharmacy to obtain an override for an emergency supply. [facility] states that medication was shipped on 1/20.  No other concerns at this time.  "

## 2019-02-01 ENCOUNTER — HOSPITAL ENCOUNTER (OUTPATIENT)
Dept: PULMONOLOGY | Facility: CLINIC | Age: 79
Discharge: HOME | End: 2019-02-01
Payer: MEDICARE

## 2019-02-01 ENCOUNTER — OFFICE VISIT (OUTPATIENT)
Dept: TRANSPLANT | Facility: CLINIC | Age: 79
End: 2019-02-01
Payer: MEDICARE

## 2019-02-01 ENCOUNTER — HOSPITAL ENCOUNTER (OUTPATIENT)
Dept: RADIOLOGY | Facility: HOSPITAL | Age: 79
Discharge: HOME | End: 2019-02-01
Attending: NURSE PRACTITIONER
Payer: MEDICARE

## 2019-02-01 VITALS — HEIGHT: 70 IN | WEIGHT: 150 LBS | BODY MASS INDEX: 21.47 KG/M2

## 2019-02-01 VITALS
BODY MASS INDEX: 21.49 KG/M2 | HEART RATE: 101 BPM | HEIGHT: 70 IN | OXYGEN SATURATION: 90 % | WEIGHT: 150.13 LBS | SYSTOLIC BLOOD PRESSURE: 106 MMHG | DIASTOLIC BLOOD PRESSURE: 60 MMHG

## 2019-02-01 DIAGNOSIS — I27.29 RIGHT HEART FAILURE DUE TO PULMONARY HYPERTENSION: Primary | ICD-10-CM

## 2019-02-01 DIAGNOSIS — I25.10 CORONARY ARTERY DISEASE INVOLVING NATIVE CORONARY ARTERY OF NATIVE HEART WITHOUT ANGINA PECTORIS: ICD-10-CM

## 2019-02-01 DIAGNOSIS — J43.2 CENTRILOBULAR EMPHYSEMA: ICD-10-CM

## 2019-02-01 DIAGNOSIS — E80.6 HYPERBILIRUBINEMIA: ICD-10-CM

## 2019-02-01 DIAGNOSIS — I48.0 PAROXYSMAL ATRIAL FIBRILLATION: ICD-10-CM

## 2019-02-01 DIAGNOSIS — J96.21 ACUTE ON CHRONIC RESPIRATORY FAILURE WITH HYPOXIA: ICD-10-CM

## 2019-02-01 DIAGNOSIS — I50.810 RIGHT HEART FAILURE DUE TO PULMONARY HYPERTENSION: Primary | ICD-10-CM

## 2019-02-01 DIAGNOSIS — I27.9 CHRONIC PULMONARY HEART DISEASE: ICD-10-CM

## 2019-02-01 PROCEDURE — 76700 US EXAM ABDOM COMPLETE: CPT | Mod: 26,,, | Performed by: RADIOLOGY

## 2019-02-01 PROCEDURE — 99999 PR PBB SHADOW E&M-EST. PATIENT-LVL III: ICD-10-PCS | Mod: PBBFAC,,, | Performed by: INTERNAL MEDICINE

## 2019-02-01 PROCEDURE — 99215 PR OFFICE/OUTPT VISIT, EST, LEVL V, 40-54 MIN: ICD-10-PCS | Mod: S$PBB,,, | Performed by: INTERNAL MEDICINE

## 2019-02-01 PROCEDURE — 99215 OFFICE O/P EST HI 40 MIN: CPT | Mod: S$PBB,,, | Performed by: INTERNAL MEDICINE

## 2019-02-01 PROCEDURE — 76700 US ABDOMEN COMPLETE: ICD-10-PCS | Mod: 26,,, | Performed by: RADIOLOGY

## 2019-02-01 PROCEDURE — 99213 OFFICE O/P EST LOW 20 MIN: CPT | Mod: PBBFAC,25 | Performed by: INTERNAL MEDICINE

## 2019-02-01 PROCEDURE — 76700 US EXAM ABDOM COMPLETE: CPT | Mod: TC

## 2019-02-01 PROCEDURE — 94618 PULMONARY STRESS TESTING: CPT | Mod: 26,S$PBB,, | Performed by: INTERNAL MEDICINE

## 2019-02-01 PROCEDURE — 94618 PULMONARY STRESS TESTING: CPT | Mod: PBBFAC | Performed by: INTERNAL MEDICINE

## 2019-02-01 PROCEDURE — 99999 PR PBB SHADOW E&M-EST. PATIENT-LVL III: CPT | Mod: PBBFAC,,, | Performed by: INTERNAL MEDICINE

## 2019-02-01 PROCEDURE — 94618 PULMONARY STRESS TESTING: ICD-10-PCS | Mod: 26,S$PBB,, | Performed by: INTERNAL MEDICINE

## 2019-02-01 RX ORDER — RIOCIGUAT 1 MG/1
1 TABLET, FILM COATED ORAL DAILY
COMMUNITY
Start: 2019-01-18 | End: 2019-02-25 | Stop reason: SINTOL

## 2019-02-01 NOTE — PATIENT INSTRUCTIONS
What Is Atrial Flutter/Atrial Fibrillation?      The heart has its own electrical system. This system makes the signals that start each heartbeat. The heartbeat begins in 1 of the 2 upper chambers of the heart (atria). A problem can make the atria beat faster than normal. The atria may beat fast but still evenly. This problem is called atrial flutter. If the atria beat very fast and also unevenly, it is called atrial fibrillation (AFib).  Causes of Atrial Flutter and Atrial Fibrillation  Causes of these problems can include:  · Previous heart attack  · High blood pressure  · Thyroid problems  In many cases, the cause is unknown.  When the Atria Beat Too Fast  The atria may beat fast only once in a while. This is called a paroxysmal heart rhythm problem. If they beat fast all the time, it is a chronic problem.  Atrial Flutter  With atrial flutter, electrical signals travel around and around inside the atria. These circling signals make the atria beat too fast:  · Atrial flutter can cause symptoms similar to AFib. It can also lead to the even faster, uneven rhythms of AFib.  Atrial Fibrillation (AFib)  With AFib, cells in the atria send extra electrical signals. These extra signals make the atria beat very fast. They also beat unevenly:  · The atria beat so fast and unevenly that they may quiver instead of edwin. If the atria dont contract, they dont move enough blood into the 2 lower chambers of the heart (ventricles). This can cause you to feel dizzy or weak.  · Blood that doesnt keep moving can pool and form clots in the atria. These clots can move into other parts of the body and cause serious problems such as a stroke.   Symptoms of Atrial Flutter and AFib  These symptoms include the following:  · Palpitations (a fluttering, fast heartbeat)  · Weakness or tiredness  · Shortness of breath  · Chest pain or tightness  · Dizziness or lightheadedness  · Fainting spells   Date Last Reviewed: 2/26/2014  ©  4224-7789 [employer]. [address] [place] [address], [place] [address]. All rights reserved. This information is not intended as a substitute for professional medical care. Always follow your healthcare professional's instructions.

## 2019-02-02 NOTE — PROCEDURES
Greg Nolasco is a 78 y.o.  male patient, who presents for a 6 minute walk test ordered by MD Augustin.  The diagnosis is Pulmonary Hypertension.  The patient's BMI is 21.6 kg/m2.  Predicted distance (lower limit of normal) is 324.5 meters.      Test Results:    The test was completed with stops.  The patient stopped 2 times for a total of 49 seconds.  The total time walked was 351 seconds.  During walking, the patient reported:  Dyspnea. The patient used no assistive devices  during testing.     02/01/2019---------Distance: 243.84 meters (800 feet)     O2 Sat % Supplemental Oxygen Heart Rate Blood Pressure Lenard Scale   Pre-exercise  (Resting) 90 % Room Air 107 bpm 117/67 mmHg 0.5   During Exercise 71 % Room Air 127 bpm 116/72 mmHg 4   Post-exercise  (Recovery) 89 % Room Air  108 bpm   mmHg       Recovery Time: 453 seconds    Performing nurse/tech: Andrew MUKHERJEE      PREVIOUS STUDY:   The patient had a previous study.    01/08/2019---------Distance: 182.88 meters (600 feet)       O2 Sat % Supplemental Oxygen Heart Rate Blood Pressure Lenard Scale   Pre-exercise  (Resting) 92 % Other (see comments)(8LN/C) 72 bpm 114/76 mmHg 3   During Exercise 74 % Other (see comments)(8L N/C) 86 bpm 122/86 mmHg 5-6   Post-exercise  (Recovery) 86 % Other (see comments)(8L N/C)  70 bpm   mmHg        CLINICAL INTERPRETATION:  Six minute walk distance is 243.84 meters (800 feet) with somewhat heavy dyspnea.  During exercise, there was significant desaturation while breathing room air.  Both blood pressure and heart rate remained stable with walking.  Tachycardia was present prior to exercise.  The patient did not report non-pulmonary symptoms during exercise.  Significant exercise impairment is likely due to desaturation.  The patient may benefit from using supplemental oxygen during exertion.  Since the previous study in January 2019, exercise capacity may be somewhat improved.  Based upon age and body mass index, exercise  capacity is less than predicted.

## 2019-02-02 NOTE — ASSESSMENT & PLAN NOTE
Suspect hypoxia with exertion will come and go depending on state of pulm HTN and how it effects RA pressures which lead to shunting

## 2019-02-02 NOTE — ASSESSMENT & PLAN NOTE
Currently in NSR   Agree with pt that if a fib recurs he could do poorly   Waiting on repeat PFT's and completion of titration of adempas Will initiate either amiodarone / sotolal for PAF prevention after next visit  Need to educate pt about risk of both in his case Suspect sotolal at lower dose or digoxin to improve RV function  will be safest choice

## 2019-02-02 NOTE — ASSESSMENT & PLAN NOTE
I am dubious that PCI will help his dyspnea Also made it clear to pt that it could be high risk   Will get PET stress based on my prior discussion with Joseph Lomas

## 2019-02-02 NOTE — PROGRESS NOTES
Subjective:    Patient ID:  Greg Nolasco is a 78 y.o. male who presents for post discharge  follow-up of Pulmonary Hypertension.    HPI  multivessel CAD (by [facility] 7/20/2018), severe PH, PAF (on sotalol), COPD with chronic hypoxic respiratory failure (had been home O2) who was started on IV remodulin in late July 2018 for PAH.  Intractible leg pains required transition to selexipag in late Dec 2018 (See my Oct 31 recommendation after his RHC)  Due to worsening hypoxia / SOB during a six minute walk in early Jan 2018, he was admitted.   Over a weeks time he improved and was discharged on a decreased dose of selexipag due to concerns about shunting Additionally his sotalol was stopped to prevention hypotension when adempas was started as an outpatient    Today reports feeling better since initiation of adempas  Has few episodes of asymptomatic hypotension (SBP < 100) since initiation of adempas.  He remains interested in PCI for his CAD along with treatment for his PAF    Jan 2019 [facility]   RA: 3/ 8/ 4   RV: 75/ 0/ 4   PA: 80/ 30/ 47   PWP: 10/ 9/ 9 .   Cardiac output was 3.87 by Nael. Cardiac index is 2.1 L/min/m2. Pulmonary vascular resistance: 9.8 LEONARD. On uptravi 1600 mcg bid    Six Minute Walk Test: 02/01/2019---------Distance: 243.84 meters (800 feet)  Compared to Jan 2019 test distance of 182 m (on uptravi alone) exercise capacity may be somewhat improved.    Pre-exercise  (Resting) 90 % Room Air 107 bpm 117/67 mmHg 0.5   During Exercise 71 % Room Air 127 bpm 116/72 mmHg 4   Post-exercise  (Recovery) 89 % Room Air  108 bpm   mmHg   The patient may benefit from using supplemental oxygen during   exertion.    Review of Systems   Constitution: Negative for decreased appetite, weight gain and weight loss.   Cardiovascular: Negative for chest pain, dyspnea on exertion, leg swelling, near-syncope, orthopnea and palpitations.   Respiratory: Positive for shortness of breath. Negative for cough.    Musculoskeletal: Negative  "for myalgias.   Gastrointestinal: Negative for jaundice.        Objective:       Physical Exam   Constitutional: He is oriented to person, place, and time. He appears well-developed and well-nourished. He is active. He is not intubated.   /60 (BP Location: Left arm, Patient Position: Sitting, BP Method: Medium (Automatic))   Pulse 101   Ht 5' 10" (1.778 m)   Wt 68.1 kg (150 lb 2.1 oz)   SpO2 (!) 90%   BMI 21.54 kg/m²      HENT:   Head: Normocephalic and atraumatic. Hair is normal.   Right Ear: External ear normal.   Left Ear: External ear normal.   Nose: Nose normal. No nasal deformity. No epistaxis.  No foreign bodies.   Mouth/Throat: Mucous membranes are normal. Mucous membranes are not cyanotic. No oropharyngeal exudate.   Eyes: Conjunctivae and EOM are normal. Pupils are equal, round, and reactive to light.   Neck: Neck supple. No hepatojugular reflux and no JVD present.   Cardiovascular: Normal rate, regular rhythm, normal heart sounds and normal pulses. Exam reveals no gallop.   Pulmonary/Chest: Effort normal and breath sounds normal. No apnea and no tachypnea. He is not intubated. No respiratory distress. He exhibits no tenderness.   Abdominal: Soft. Normal appearance and bowel sounds are normal. There is no tenderness. No hernia.   Musculoskeletal: Normal range of motion.   Neurological: He is alert and oriented to person, place, and time. He displays no seizure activity.   Skin: Skin is warm, dry and intact. No rash noted. No pallor.   Psychiatric: He has a normal mood and affect. His speech is normal and behavior is normal. Thought content normal. Cognition and memory are normal.           Chemistry        Component Value Date/Time     03/01/2019 1100    K 4.4 03/01/2019 1100     03/01/2019 1100    CO2 25 03/01/2019 1100    BUN 17 03/01/2019 1100    CREATININE 1.0 03/01/2019 1100    GLU 92 03/01/2019 1100        Component Value Date/Time    CALCIUM 9.5 03/01/2019 1100    ALKPHOS 221 " (H) 03/01/2019 1100    AST 18 03/01/2019 1100    ALT 13 03/01/2019 1100    BILITOT 1.8 (H) 03/01/2019 1100    ESTGFRAFRICA >60.0 03/01/2019 1100    EGFRNONAA >60.0 03/01/2019 1100            Magnesium   Date Value Ref Range Status   02/01/2019 2.5 1.6 - 2.6 mg/dL Final       Lab Results   Component Value Date    WBC 7.69 02/01/2019    HGB 17.0 02/01/2019    HCT 50.8 02/01/2019    MCV 95 02/01/2019     02/01/2019         BNP   Date Value Ref Range Status   03/01/2019 776 (H) 0 - 99 pg/mL Final     Comment:     Values of less than 100 pg/ml are consistent with non-CHF populations.   02/01/2019 262 (H) 0 - 99 pg/mL Final     Comment:     Values of less than 100 pg/ml are consistent with non-CHF populations.   01/10/2019 333 (H) 0 - 99 pg/mL Final     Comment:     Values of less than 100 pg/ml are consistent with non-CHF populations.         Assessment:       1. 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 Acute on chronic respiratory failure with hypoxia    3. Centrilobular emphysema    4. Paroxysmal atrial fibrillation    5. Right to left cardiac shunt    6. Coronary artery disease involving native coronary artery of native heart without angina pectoris        WHO Group 1   Functional Class 3B     Plan:   Acute on chronic respiratory failure with hypoxia  Based on hypoxia during exertion at six minute walk today, will benefit from continued use of supplemental oxygen    Centrilobular emphysema  Will get PFT's at next visit Worried that DLCO will be too low / getting worse which will preclude use of amiodarone  (last DLCO 40%)   Would like it to be improving on PH therapy    Paroxysmal atrial fibrillation  Currently in NSR   Agree with pt that if a fib recurs he could do poorly   Waiting on repeat PFT's and completion of titration of adempas Will initiate either amiodarone / sotolal for PAF prevention after next visit  Need to educate pt about risk of both in his case Suspect sotolal at lower dose or  digoxin to improve RV function  will be safest choice     Coronary artery disease involving native coronary artery of native heart without angina pectoris  I am dubious that PCI will help his dyspnea Also made it clear to pt that it could be high risk   Will get PET stress based on my prior discussion with Joseph Lomas    Right to left cardiac shunt  Suspect hypoxia with exertion will come and go depending on state of pulm HTN and how it effects RA pressures which lead to shunting     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  Increase adempas as tolerated - suspect he will be at 2 mg TID by the time he comes back in a month   Would like to rechallenge 1600 mg of uptravi later as I do not believe this is playing primarily role in any shunting issues   Continues conversation about goals of care Would like to establish code status Do not believe his prognosis is poor enough to consider hospice which is consistent with pt understanding Do not believe risk benefit of IV remodulin makes sense Could try IV velatri as rescue Maybe worth getting lung transplant opinion    Follow-up in about 1 month (around 3/1/2019) for medication titration.  Orders Placed This Encounter    Comprehensive metabolic panel    Brain natriuretic peptide    Cardiac PET Scan Stress    PFT w/o bronchodilator    Carbon monoxide diffusing capacity    Stress test, pulmonary

## 2019-02-02 NOTE — ASSESSMENT & PLAN NOTE
Based on hypoxia during exertion at six minute walk today, will benefit from continued use of supplemental oxygen

## 2019-02-02 NOTE — ASSESSMENT & PLAN NOTE
Will get PFT's at next visit Worried that DLCO will be too low / getting worse which will preclude use of amiodarone  (last DLCO 40%)   Would like it to be improving on PH therapy

## 2019-02-02 NOTE — ASSESSMENT & PLAN NOTE
Increase adempas as tolerated - suspect he will be at 2 mg TID by the time he comes back in a month   Would like to rechallenge 1600 mg of uptravi later as I do not believe this is playing primarily role in any shunting issues   Continues conversation about goals of care Would like to establish code status Do not believe his prognosis is poor enough to consider hospice which is consistent with pt understanding Do not believe risk benefit of IV remodulin makes sense Could try IV velatri as rescue Maybe worth getting lung transplant opinion

## 2019-02-05 ENCOUNTER — TELEPHONE (OUTPATIENT)
Dept: HEPATOLOGY | Facility: CLINIC | Age: 79
End: 2019-02-05

## 2019-02-05 NOTE — TELEPHONE ENCOUNTER
----- Message from Gena Rene NP sent at 2/5/2019  9:11 AM CST -----  Please inform patient - ultrasound is stable, no concerning findings in the liver. Gallstones present in the gallbladder. Cysts present on the kidneys (which should not cause any problems or require anything further).

## 2019-02-14 ENCOUNTER — TELEPHONE (OUTPATIENT)
Dept: TRANSPLANT | Facility: CLINIC | Age: 79
End: 2019-02-14

## 2019-02-14 DIAGNOSIS — K43.9 HERNIA OF ABDOMINAL WALL: Primary | ICD-10-CM

## 2019-02-14 NOTE — TELEPHONE ENCOUNTER
"Visit to increased from 1.5 mg to 2 mg Adempas -   Patient reports feeling good. Has some side effects but are manageable. He has not felt dizzy at all. Gets SOB but "recovers quickly" according to patient. Sometimes low energy. BP today is 88/44, taken several times by Mylene HUNTER. Patient's cuff shows low 90's over 60's. Mylene reports that patient appears very dry. No signs of edema.,    MD advised patient to stay at 1.5 mg dose.     F/U with patient. Per MD, he should decrease lasix dose to 20 mg daily from 40 mg, daily, if his systolic BP is under 100. Patient will take his BP in the mornings, daily.  Also, advised patient to visit his PCP as he has not had an appointment in awhile. Answered all questions.  "

## 2019-02-15 ENCOUNTER — TELEPHONE (OUTPATIENT)
Dept: GASTROENTEROLOGY | Facility: CLINIC | Age: 79
End: 2019-02-15

## 2019-02-15 NOTE — TELEPHONE ENCOUNTER
----- Message from Brea Rodriguez sent at 2/14/2019  5:48 PM CST -----   Hello,    Please see below. There's a referral in the system from Dr Lomas. Could you fit this pt in on 3/1 since [place]?    Thanks,  Brea  [id number]  ----- Message -----  From: Carol Wolf RN  Sent: 2/14/2019  11:03 AM  To: [facility] Heart Transplant Schedulers    Hi,  I just put in a referral for gastroenterology. Patient is trying to schedule something on 3/1 but I am not sure that is going to happen!    Thanks!  Rachel

## 2019-02-18 ENCOUNTER — TELEPHONE (OUTPATIENT)
Dept: TRANSPLANT | Facility: CLINIC | Age: 79
End: 2019-02-18

## 2019-02-22 ENCOUNTER — TELEPHONE (OUTPATIENT)
Dept: TRANSPLANT | Facility: CLINIC | Age: 79
End: 2019-02-22

## 2019-02-22 NOTE — TELEPHONE ENCOUNTER
"Patient states that he is still not feeling well. He is tired all the time and gets SOB of breath simply from walking from room to rom. He reports his BP today as 92/62 and . He says he wears his oxygen when he sleeps and when he is sitting down but says there is no use in wearing it when he moves around because "it does not help."    Notified MD who directed patient to increase Uptravi to 1200 mcg, BID. Patient will call on Monday with report. Advised patient to go to the ER if he continues to feel better or symptoms worsen over the weekend.  Contacted [facility] with new dose of Uptravi.  "

## 2019-02-25 ENCOUNTER — TELEPHONE (OUTPATIENT)
Dept: TRANSPLANT | Facility: CLINIC | Age: 79
End: 2019-02-25

## 2019-02-26 NOTE — TELEPHONE ENCOUNTER
F/U with Mr. Nolasco. He continues to have low BP (in the 80's systolic), feeling tired and cold and SOB with not much exertion. Per MD, he is to discontinue the Adempas but continue with Uptravi 1200 mcg, BID. Confirmed his appointments for Friday. Advised patient to pack a bag in case he needs to be hospitalized for further management. Also, reinforced need for patient to go to nearest ED or come here, if possible, should his symptoms become more concerning. Reassured patient and answered all questions.

## 2019-02-27 DIAGNOSIS — I27.29 RIGHT HEART FAILURE DUE TO PULMONARY HYPERTENSION: Primary | ICD-10-CM

## 2019-02-27 DIAGNOSIS — I50.810 RIGHT HEART FAILURE DUE TO PULMONARY HYPERTENSION: Primary | ICD-10-CM

## 2019-02-28 ENCOUNTER — TELEPHONE (OUTPATIENT)
Dept: CARDIOLOGY | Facility: CLINIC | Age: 79
End: 2019-02-28

## 2019-03-01 ENCOUNTER — HOSPITAL ENCOUNTER (OUTPATIENT)
Dept: PULMONOLOGY | Facility: CLINIC | Age: 79
Discharge: HOME | End: 2019-03-01
Payer: MEDICARE

## 2019-03-01 ENCOUNTER — OFFICE VISIT (OUTPATIENT)
Dept: TRANSPLANT | Facility: CLINIC | Age: 79
End: 2019-03-01
Payer: MEDICARE

## 2019-03-01 ENCOUNTER — CLINICAL SUPPORT (OUTPATIENT)
Dept: CARDIOLOGY | Facility: CLINIC | Age: 79
End: 2019-03-01
Attending: INTERNAL MEDICINE
Payer: MEDICARE

## 2019-03-01 VITALS — HEIGHT: 69 IN | BODY MASS INDEX: 22.66 KG/M2 | WEIGHT: 153 LBS

## 2019-03-01 VITALS — SYSTOLIC BLOOD PRESSURE: 93 MMHG | HEART RATE: 90 BPM | DIASTOLIC BLOOD PRESSURE: 54 MMHG

## 2019-03-01 VITALS
SYSTOLIC BLOOD PRESSURE: 106 MMHG | HEART RATE: 94 BPM | HEIGHT: 69 IN | OXYGEN SATURATION: 78 % | DIASTOLIC BLOOD PRESSURE: 67 MMHG | BODY MASS INDEX: 22.76 KG/M2 | WEIGHT: 153.69 LBS

## 2019-03-01 DIAGNOSIS — I27.29 RIGHT HEART FAILURE DUE TO PULMONARY HYPERTENSION: ICD-10-CM

## 2019-03-01 DIAGNOSIS — I50.810 RIGHT HEART FAILURE DUE TO PULMONARY HYPERTENSION: ICD-10-CM

## 2019-03-01 DIAGNOSIS — J96.11 CHRONIC HYPOXEMIC RESPIRATORY FAILURE: ICD-10-CM

## 2019-03-01 DIAGNOSIS — I27.9 CHRONIC PULMONARY HEART DISEASE: Primary | ICD-10-CM

## 2019-03-01 DIAGNOSIS — I25.10 CORONARY ARTERY DISEASE INVOLVING NATIVE CORONARY ARTERY OF NATIVE HEART WITHOUT ANGINA PECTORIS: ICD-10-CM

## 2019-03-01 LAB
CV STRESS BASE HR: 92 BPM
DIASTOLIC BLOOD PRESSURE: 68 MMHG
NUC REST DIASTOLIC VOLUME INDEX: 58
NUC REST EJECTION FRACTION: 90
NUC REST SYSTOLIC VOLUME INDEX: 6
NUC STRESS DIASTOLIC VOLUME INDEX: 43
NUC STRESS EJECTION FRACTION: 80 %
NUC STRESS SYSTOLIC VOLUME INDEX: 9
OHS CV CPX 85 PERCENT MAX PREDICTED HEART RATE MALE: 121
OHS CV CPX MAX PREDICTED HEART RATE: 142
OHS CV CPX PATIENT IS FEMALE: 0
OHS CV CPX PATIENT IS MALE: 1
OHS CV CPX PEAK DIASTOLIC BLOOD PRESSURE: 61 MMHG
OHS CV CPX PEAK HEAR RATE: 88 BPM
OHS CV CPX PEAK RATE PRESSURE PRODUCT: 8272
OHS CV CPX PEAK SYSTOLIC BLOOD PRESSURE: 94 MMHG
OHS CV CPX PERCENT MAX PREDICTED HEART RATE ACHIEVED: 62
OHS CV CPX RATE PRESSURE PRODUCT PRESENTING: 8556
PRE FEV1 FVC: 70
PRE FEV1 FVC: 73
PRE FEV1: 1.71
PRE FEV1: 3.68
PRE FVC: 2.33
PRE FVC: 5.26
PREDICTED FEV1 FVC: 78
PREDICTED FEV1 FVC: 80
PREDICTED FEV1: 2.86
PREDICTED FEV1: 2.88
PREDICTED FVC: 3.57
PREDICTED FVC: 3.67
SYSTOLIC BLOOD PRESSURE: 93 MMHG

## 2019-03-01 PROCEDURE — 94618 PULMONARY STRESS TESTING: ICD-10-PCS | Mod: 26,S$PBB,, | Performed by: INTERNAL MEDICINE

## 2019-03-01 PROCEDURE — 94010 BREATHING CAPACITY TEST: ICD-10-PCS | Mod: 26,S$PBB,, | Performed by: INTERNAL MEDICINE

## 2019-03-01 PROCEDURE — 94618 PULMONARY STRESS TESTING: CPT | Mod: PBBFAC | Performed by: INTERNAL MEDICINE

## 2019-03-01 PROCEDURE — 78492 PET STRESS (CUPID ONLY): ICD-10-PCS | Mod: 26,S$PBB,, | Performed by: INTERNAL MEDICINE

## 2019-03-01 PROCEDURE — 94729 DIFFUSING CAPACITY: CPT | Mod: 26,S$PBB,, | Performed by: INTERNAL MEDICINE

## 2019-03-01 PROCEDURE — 94618 PULMONARY STRESS TESTING: CPT | Mod: 26,S$PBB,, | Performed by: INTERNAL MEDICINE

## 2019-03-01 PROCEDURE — 99999 PR PBB SHADOW E&M-EST. PATIENT-LVL III: ICD-10-PCS | Mod: PBBFAC,,, | Performed by: INTERNAL MEDICINE

## 2019-03-01 PROCEDURE — 94729 DIFFUSING CAPACITY: CPT | Mod: PBBFAC | Performed by: INTERNAL MEDICINE

## 2019-03-01 PROCEDURE — 99999 PR PBB SHADOW E&M-EST. PATIENT-LVL I: CPT | Mod: PBBFAC,,,

## 2019-03-01 PROCEDURE — 99211 OFF/OP EST MAY X REQ PHY/QHP: CPT | Mod: PBBFAC,25,27

## 2019-03-01 PROCEDURE — 99213 OFFICE O/P EST LOW 20 MIN: CPT | Mod: PBBFAC | Performed by: INTERNAL MEDICINE

## 2019-03-01 PROCEDURE — 94729 PR C02/MEMBANE DIFFUSE CAPACITY: ICD-10-PCS | Mod: 26,S$PBB,, | Performed by: INTERNAL MEDICINE

## 2019-03-01 PROCEDURE — A9555 RB82 RUBIDIUM: HCPCS | Mod: PBBFAC

## 2019-03-01 PROCEDURE — 99214 PR OFFICE/OUTPT VISIT, EST, LEVL IV, 30-39 MIN: ICD-10-PCS | Mod: S$PBB,,, | Performed by: INTERNAL MEDICINE

## 2019-03-01 PROCEDURE — 94010 BREATHING CAPACITY TEST: CPT | Mod: 26,S$PBB,, | Performed by: INTERNAL MEDICINE

## 2019-03-01 PROCEDURE — 99214 OFFICE O/P EST MOD 30 MIN: CPT | Mod: S$PBB,,, | Performed by: INTERNAL MEDICINE

## 2019-03-01 PROCEDURE — 99999 PR PBB SHADOW E&M-EST. PATIENT-LVL III: CPT | Mod: PBBFAC,,, | Performed by: INTERNAL MEDICINE

## 2019-03-01 PROCEDURE — 94010 BREATHING CAPACITY TEST: CPT | Mod: PBBFAC | Performed by: INTERNAL MEDICINE

## 2019-03-01 PROCEDURE — 78492 MYOCRD IMG PET MLT RST&STRS: CPT | Mod: PBBFAC | Performed by: INTERNAL MEDICINE

## 2019-03-01 PROCEDURE — 99999 PR PBB SHADOW E&M-EST. PATIENT-LVL I: ICD-10-PCS | Mod: PBBFAC,,,

## 2019-03-01 RX ORDER — FUROSEMIDE 40 MG/1
40 TABLET ORAL DAILY
Qty: 30 TABLET | Refills: 12 | Status: SHIPPED | OUTPATIENT
Start: 2019-03-01 | End: 2020-03-05

## 2019-03-01 RX ORDER — SPIRONOLACTONE 25 MG/1
25 TABLET ORAL EVERY MORNING
Qty: 30 TABLET | Refills: 11 | Status: SHIPPED | OUTPATIENT
Start: 2019-03-01 | End: 2019-05-13 | Stop reason: SDUPTHER

## 2019-03-01 RX ORDER — DIPYRIDAMOLE 5 MG/ML
38.18 INJECTION INTRAVENOUS
Status: COMPLETED | OUTPATIENT
Start: 2019-03-01 | End: 2019-03-01

## 2019-03-01 RX ADMIN — DIPYRIDAMOLE 38.2 MG: 5 INJECTION INTRAVENOUS at 01:03

## 2019-03-02 NOTE — ASSESSMENT & PLAN NOTE
Would like to rechallenge 1600 mg of uptravi later as I do not believe this is playing primarily role in any shunting issues    Increase lasix to 40 daily along with adding aldactone 25 daily to help with noctural symptoms (check BMP in  5 to 7 days)   Can try to ERA once uptravi and volume status optimized   Continues conversation about goals of care Do not believe his prognosis is poor enough to consider hospice which is consistent with pt understanding Do not believe risk benefit of IV remodulin makes sense due to intractable side effects Could try IV velatri  Maybe worth getting lung transplant opinion

## 2019-03-02 NOTE — ASSESSMENT & PLAN NOTE
Continue supplemental O2 at home to keep sPO2 > 90 (on 6L ATC at home).  - PFT's today unchanged compared to 6/21 study

## 2019-03-02 NOTE — PROGRESS NOTES
Subjective:    Patient ID:  Greg Nolasco is a 78 y.o. male who presents for one month follow-up of Pulmonary Hypertension.    HPI  multivessel CAD (by [facility] 7/20/2018), severe PH, PAF (on sotalol), COPD with chronic hypoxic respiratory failure (had been home O2) who was started on IV remodulin in late July 2018 for PAH.  Intractible leg pains required transition to selexipag in late Dec 2018 (See my Oct 31 recommendation after his RHC)  Due to worsening hypoxia / SOB during a six minute walk in early Jan 2018, he was admitted.   Over a weeks time he improved and was discharged on a decreased dose of selexipag due to concerns about shunting Additionally his sotalol was stopped to prevention hypotension when adempas was started as an outpatient     Since his last visit his lasix was decreased to help titrate his adempas  Despite a lower dose of lasix his BP did not allow continuation of adempas.  He continues to have class 3B symptoms POPE when he walks from chair to exam table ([place] - class 2)  Feels SOB not improved by using oxygen as he has SOB last few nights despite using oxygen Thinks sleeping on a wedge makes it a little better.  Mlld edema with 1.6 kg since last visit Still not interested in restarting IV remodulin.      Six Minute Walk Test:  Desat from 94% to 88% with exertion despite 6 liter oxygen     PFT's (3/1/19) -  Normal spirometry. Diffusion capacity is severely decreased ( 8.2 l - 35% predicted ) No improvement compared to June 2018    Review of Systems   Constitution: Negative for decreased appetite, weight gain and weight loss.   Cardiovascular: Negative for chest pain, dyspnea on exertion, leg swelling, near-syncope, orthopnea and palpitations.   Respiratory: Negative for cough and shortness of breath.    Musculoskeletal: Negative for myalgias.   Gastrointestinal: Negative for jaundice.        Objective:      Physical Exam  "  Constitutional: He is oriented to person, place, and time. He appears well-developed and well-nourished. He is active. He is not intubated.   /67 (BP Location: Left arm, Patient Position: Sitting, BP Method: Medium (Automatic))   Pulse 94   Ht 5' 9" (1.753 m)   Wt 69.7 kg (153 lb 10.6 oz)   SpO2 (!) 78%   BMI 22.69 kg/m²      HENT:   Head: Normocephalic and atraumatic. Hair is normal.   Right Ear: External ear normal.   Left Ear: External ear normal.   Nose: Nose normal. No nasal deformity. No epistaxis.  No foreign bodies.   Mouth/Throat: Mucous membranes are normal. Mucous membranes are not cyanotic. No oropharyngeal exudate.   Eyes: Conjunctivae and EOM are normal. Pupils are equal, round, and reactive to light.   Neck: Neck supple. JVD (JVP 7 ) present. No hepatojugular reflux present.   Cardiovascular: Normal rate, regular rhythm and normal pulses. Exam reveals gallop and S3 (right sided ).   Pulmonary/Chest: Effort normal and breath sounds normal. No apnea and no tachypnea. He is not intubated. No respiratory distress. He exhibits no tenderness.   Abdominal: Soft. Normal appearance and bowel sounds are normal. There is no tenderness. No hernia.   Musculoskeletal: Normal range of motion.   Neurological: He is alert and oriented to person, place, and time. He displays no seizure activity.   Skin: Skin is warm, dry and intact. No rash noted. No pallor.   Psychiatric: He has a normal mood and affect. His speech is normal and behavior is normal. Thought content normal. Cognition and memory are normal.           Chemistry        Component Value Date/Time     03/01/2019 1100    K 4.4 03/01/2019 1100     03/01/2019 1100    CO2 25 03/01/2019 1100    BUN 17 03/01/2019 1100    CREATININE 1.0 03/01/2019 1100    GLU 92 03/01/2019 1100        Component Value Date/Time    CALCIUM 9.5 03/01/2019 1100    ALKPHOS 221 (H) 03/01/2019 1100    AST 18 03/01/2019 1100    ALT 13 03/01/2019 1100    BILITOT 1.8 " (H) 03/01/2019 1100    ESTGFRAFRICA >60.0 03/01/2019 1100    EGFRNONAA >60.0 03/01/2019 1100            Magnesium   Date Value Ref Range Status   02/01/2019 2.5 1.6 - 2.6 mg/dL Final       Lab Results   Component Value Date    WBC 7.69 02/01/2019    HGB 17.0 02/01/2019    HCT 50.8 02/01/2019    MCV 95 02/01/2019     02/01/2019       Lab Results   Component Value Date    INR 1.1 01/10/2019    INR 1.1 10/31/2018    INR 1.1 07/23/2018       BNP   Date Value Ref Range Status   03/01/2019 776 (H) 0 - 99 pg/mL Final     Comment:     Values of less than 100 pg/ml are consistent with non-CHF populations.   02/01/2019 262 (H) 0 - 99 pg/mL Final     Comment:     Values of less than 100 pg/ml are consistent with non-CHF populations.   01/10/2019 333 (H) 0 - 99 pg/mL Final     Comment:     Values of less than 100 pg/ml are consistent with non-CHF populations.             Assessment:       1. Chronic pulmonary heart disease    2. Chronic hypoxemic respiratory failure    3. Coronary artery disease involving native coronary artery of native heart without angina pectoris    4. 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  Orders Placed This Encounter    Comprehensive metabolic panel    Brain natriuretic peptide    CBC auto differential    furosemide (LASIX) 40 MG tablet    spironolactone (ALDACTONE) 25 MG tablet

## 2019-03-02 NOTE — ASSESSMENT & PLAN NOTE
Discussed today's cardiac PET stress results with Joseph Lomas - will defer to him to see if he would like to proceed with PCI

## 2019-03-04 ENCOUNTER — TELEPHONE (OUTPATIENT)
Dept: TRANSPLANT | Facility: CLINIC | Age: 79
End: 2019-03-04

## 2019-03-04 NOTE — PROCEDURES
Greg Nolasco is a 78 y.o.  male patient, who presents for a 6 minute walk test ordered by Miguelina Ruffin MD.  The diagnosis is Qualify for Oxygen, Pulmonary Hypertension.  The patient's BMI is 22.6 kg/m2. Predicted distance (lower limit of normal) is 318.89 meters.    Test Results:    The test was not completed.  The patient stopped 1 time for a total of 328 seconds.  The total time walked was 32 seconds.  During walking, the patient reported:  Dyspnea.  The patient used supplemental oxygen during testing.     03/01/2019---------Distance: 15.24 meters (50 feet)    Oxygen Qualification:     O2 Sat % Supplemental Oxygen Heart Rate Blood Pressure Lenard Scale   Pre-exercise  (Resting) 74 % Room Air 94 bpm 101/56 mmHg 1   Pre-exercise  (Resting) 93 % 6 L/M  89 bpm  101/56 mmHg 1    During Exercise   88 %  6 L/M  99 bpm  109/64 mmHg 2    Post-exercise   89 %  6 L/M  90 bpm        Recovery Time:  101 seconds    Performing nurse/tech:  Kanchan MUKHERJEE      PREVIOUS STUDY:   02/01/2019---------Distance: 243.84 meters (800 feet)       O2 Sat % Supplemental Oxygen Heart Rate Blood Pressure Lenard Scale   Pre-exercise  (Resting) 90 % Room Air 107 bpm 117/67 mmHg 0.5   During Exercise 71 % Room Air 127 bpm 116/72 mmHg 4   Post-exercise  (Recovery) 89 % Room Air  108 bpm   mmHg          CLINICAL INTERPRETATION:  Six minute walk distance is 15.24 meters (50 feet) with light dyspnea.  At rest, there was significant desaturation while breathing room air.  During exercise, there was significant desaturation while breathing supplemental oxygen.  Both blood pressure and heart rate remained stable with walking.  The patient did not report non-pulmonary symptoms during exercise.  Severe exercise impairment is likely due to desaturation and subjective symptoms.  The patient did not complete the study, walking 32 seconds of the 360 second test.  The patient may benefit from using supplemental oxygen.  Since the previous study in  February 2019, exercise capacity is significantly worse.  Based upon age and body mass index, exercise capacity is less than predicted.   Oxygen saturation did improve while breathing supplemental oxygen.

## 2019-03-04 NOTE — TELEPHONE ENCOUNTER
Mr. Nolasco needed clarification on his new medication. He was unsure whether he should take the Spironolactone and Lasix. Advised him to take both as directed. He endorses feeling a little better since doubling his lasix dose. Will start Spironolactone today. He will decrease his daily potassium to 10 meq. Labs will be scheduled in one week.  Mr. Nolasco reports his pressure remaining in the mid 80's to 90's systolic but does not have accompanying symptoms. Reinforced need for patient to change positions slowly and call if he should develop symptoms.  No other concerns at this time.

## 2019-03-15 ENCOUNTER — DOCUMENTATION ONLY (OUTPATIENT)
Dept: TRANSPLANT | Facility: CLINIC | Age: 79
End: 2019-03-15

## 2019-03-15 ENCOUNTER — TELEPHONE (OUTPATIENT)
Dept: TRANSPLANT | Facility: CLINIC | Age: 79
End: 2019-03-15

## 2019-03-15 LAB
EXT BUN: 23
EXT CALCIUM: 10.3
EXT CHLORIDE: 102
EXT CREATININE: 1.13 MG/DL
EXT GLUCOSE: 73
EXT POTASSIUM: 4.4
EXT SODIUM: 139 MMOL/L

## 2019-03-15 NOTE — TELEPHONE ENCOUNTER
Patient called to ask about his upcoming Colonoscopy. He also noted that he has lost 8lbs over the last 17 days despite the fact that he is eating a lot. His BP remains low 80's over 60's, but his breathing seems better on 1400 mcg Uptravi. He reports occasional leg muscle cramps at night.  Instructed Mr. Nolasco to have labs drawn today. He is unsure whether he can make it into town but will try. The orders have been sent.  Notified Dr. RUBEN Lomas of all. Will advise on colonscopy after MD review.

## 2019-03-20 ENCOUNTER — TELEPHONE (OUTPATIENT)
Dept: TRANSPLANT | Facility: CLINIC | Age: 79
End: 2019-03-20

## 2019-03-21 NOTE — TELEPHONE ENCOUNTER
"Reviewed lab results with Mr. Nolasco who is to continue medications as prescribed. Also, advised him that Dr. GILMA Lomas would like him to proceed with colonoscopy prior to decisions about coronary revascularization.  Mr. Nolasco reports that he has had a few good days. He finds that if he takes his Uptravi first thing in the morning he feels better and can "go" to around 1pm before losing energy. He reports getting a reading of 97% O2 at rest for the first time in a long time. His pressures stay in the high 80's to low 90's systolic and his weight has remained stable around 142-143. He does endorse feeling short of breath when he "bends over to pick something up and stands up." Cautioned patient on bending over. Advised patient to change positions slowly and notify us if he feels lightheaded or passes out.  Answered all questions.  "

## 2019-03-29 ENCOUNTER — TELEPHONE (OUTPATIENT)
Dept: TRANSPLANT | Facility: CLINIC | Age: 79
End: 2019-03-29

## 2019-03-29 NOTE — TELEPHONE ENCOUNTER
"F/U - Mr. Nolasco states that he has been feeling good the last several days. He says he always feels best in the morning. He starts to feel fatigued around 1 or 2 in the afternoon. He endorses taking his first dose of Uptravi at 4am and has moved his second dose up to around 3pm. He thinks he does not get the same "boost" in the evenings. Discussed that he is probably doing a lot more throughout the day because he feels better.  MD instructed patient to increase nighttime Uptravi dose to 1600 mcg for now. Advised patient to contact office with any concerns or issues.  Mr. Nolasco states that his colonoscopy is scheduled for next Wednesday. He will have the doctor send results to our office.   Confirmed appts for the 10th.  "

## 2019-04-03 ENCOUNTER — TELEPHONE (OUTPATIENT)
Dept: TRANSPLANT | Facility: CLINIC | Age: 79
End: 2019-04-03

## 2019-04-10 ENCOUNTER — HOSPITAL ENCOUNTER (OUTPATIENT)
Dept: PULMONOLOGY | Facility: CLINIC | Age: 79
Discharge: HOME | End: 2019-04-10
Payer: MEDICARE

## 2019-04-10 ENCOUNTER — INITIAL CONSULT (OUTPATIENT)
Dept: CARDIOLOGY | Facility: CLINIC | Age: 79
End: 2019-04-10
Payer: MEDICARE

## 2019-04-10 ENCOUNTER — PATIENT MESSAGE (OUTPATIENT)
Dept: TRANSPLANT | Facility: CLINIC | Age: 79
End: 2019-04-10

## 2019-04-10 ENCOUNTER — DOCUMENTATION ONLY (OUTPATIENT)
Dept: CARDIOLOGY | Facility: CLINIC | Age: 79
End: 2019-04-10

## 2019-04-10 ENCOUNTER — OFFICE VISIT (OUTPATIENT)
Dept: TRANSPLANT | Facility: CLINIC | Age: 79
End: 2019-04-10
Payer: MEDICARE

## 2019-04-10 VITALS — WEIGHT: 145 LBS | BODY MASS INDEX: 21.48 KG/M2 | HEIGHT: 69 IN

## 2019-04-10 VITALS
OXYGEN SATURATION: 96 % | SYSTOLIC BLOOD PRESSURE: 99 MMHG | WEIGHT: 147.5 LBS | BODY MASS INDEX: 21.84 KG/M2 | HEIGHT: 69 IN | DIASTOLIC BLOOD PRESSURE: 60 MMHG | HEART RATE: 92 BPM

## 2019-04-10 VITALS
WEIGHT: 146.63 LBS | HEIGHT: 69 IN | BODY MASS INDEX: 21.72 KG/M2 | DIASTOLIC BLOOD PRESSURE: 59 MMHG | SYSTOLIC BLOOD PRESSURE: 82 MMHG | OXYGEN SATURATION: 93 % | HEART RATE: 85 BPM

## 2019-04-10 DIAGNOSIS — I27.20 PHT (PULMONARY HYPERTENSION): ICD-10-CM

## 2019-04-10 DIAGNOSIS — I27.9 CHRONIC PULMONARY HEART DISEASE: ICD-10-CM

## 2019-04-10 DIAGNOSIS — J96.11 CHRONIC HYPOXEMIC RESPIRATORY FAILURE: ICD-10-CM

## 2019-04-10 DIAGNOSIS — G47.33 OSA (OBSTRUCTIVE SLEEP APNEA): ICD-10-CM

## 2019-04-10 DIAGNOSIS — R94.39 ABNORMAL CARDIOVASCULAR STRESS TEST: Primary | ICD-10-CM

## 2019-04-10 DIAGNOSIS — I27.20 PHT (PULMONARY HYPERTENSION): Primary | ICD-10-CM

## 2019-04-10 DIAGNOSIS — I25.10 CORONARY ARTERY DISEASE INVOLVING NATIVE CORONARY ARTERY OF NATIVE HEART WITHOUT ANGINA PECTORIS: ICD-10-CM

## 2019-04-10 DIAGNOSIS — E78.5 DYSLIPIDEMIA: ICD-10-CM

## 2019-04-10 DIAGNOSIS — I50.810 RIGHT HEART FAILURE DUE TO PULMONARY HYPERTENSION: ICD-10-CM

## 2019-04-10 DIAGNOSIS — I27.29 RIGHT HEART FAILURE DUE TO PULMONARY HYPERTENSION: ICD-10-CM

## 2019-04-10 PROBLEM — E78.00 PURE HYPERCHOLESTEROLEMIA: Status: RESOLVED | Noted: 2018-12-20 | Resolved: 2019-04-10

## 2019-04-10 PROCEDURE — 99213 OFFICE O/P EST LOW 20 MIN: CPT | Mod: PBBFAC,27,25 | Performed by: INTERNAL MEDICINE

## 2019-04-10 PROCEDURE — 99999 PR PBB SHADOW E&M-EST. PATIENT-LVL III: CPT | Mod: PBBFAC,,, | Performed by: INTERNAL MEDICINE

## 2019-04-10 PROCEDURE — 99999 PR PBB SHADOW E&M-EST. PATIENT-LVL III: ICD-10-PCS | Mod: PBBFAC,,, | Performed by: INTERNAL MEDICINE

## 2019-04-10 PROCEDURE — 99213 OFFICE O/P EST LOW 20 MIN: CPT | Mod: S$PBB,,, | Performed by: INTERNAL MEDICINE

## 2019-04-10 PROCEDURE — 99213 PR OFFICE/OUTPT VISIT, EST, LEVL III, 20-29 MIN: ICD-10-PCS | Mod: S$PBB,,, | Performed by: INTERNAL MEDICINE

## 2019-04-10 PROCEDURE — 94618 PULMONARY STRESS TESTING: CPT | Mod: 26,S$PBB,, | Performed by: INTERNAL MEDICINE

## 2019-04-10 PROCEDURE — 99213 OFFICE O/P EST LOW 20 MIN: CPT | Mod: PBBFAC | Performed by: INTERNAL MEDICINE

## 2019-04-10 PROCEDURE — 94618 PULMONARY STRESS TESTING: CPT | Mod: PBBFAC | Performed by: INTERNAL MEDICINE

## 2019-04-10 PROCEDURE — 94618 PULMONARY STRESS TESTING: ICD-10-PCS | Mod: 26,S$PBB,, | Performed by: INTERNAL MEDICINE

## 2019-04-10 RX ORDER — CLOPIDOGREL BISULFATE 75 MG/1
TABLET ORAL
Qty: 30 TABLET | Refills: 11 | Status: ON HOLD | OUTPATIENT
Start: 2019-04-10 | End: 2019-04-23 | Stop reason: HOSPADM

## 2019-04-10 RX ORDER — SODIUM CHLORIDE 9 MG/ML
3 INJECTION, SOLUTION INTRAVENOUS CONTINUOUS
Status: CANCELLED | OUTPATIENT
Start: 2019-04-10 | End: 2019-04-10

## 2019-04-10 RX ORDER — SELEXIPAG 1400 UG/1
1400 TABLET, COATED ORAL 2 TIMES DAILY
COMMUNITY
Start: 2019-03-05 | End: 2020-03-05 | Stop reason: DRUGHIGH

## 2019-04-10 RX ORDER — DIPHENHYDRAMINE HCL 25 MG
50 CAPSULE ORAL ONCE
Status: CANCELLED | OUTPATIENT
Start: 2019-04-10 | End: 2019-04-10

## 2019-04-10 NOTE — ASSESSMENT & PLAN NOTE
Would add opstimut to regiment after left heart cath in addition to selexipag 1600 BID (intolerant to adempas)  Discussed that syncope / rapid detoriation can occur with PH Needs to start thinking about health care proxy / code status

## 2019-04-10 NOTE — PROGRESS NOTES
"Subjective: class 2B    Patient ID:  Greg Nolasco is a 78 y.o. male who presents for follow-up of Pulmonary Hypertension.    HPI   multivessel CAD (by [facility] 7/20/2018), severe PH, PAF (on sotalol), COPD with chronic hypoxic respiratory failure (had been home O2) who was started on IV remodulin in late July 2018 for PAH.  Intractible leg pains required transition to selexipag in late Dec 2018 (See my Oct 31 recommendation after his RHC) He has slowly progressed back to his baseline functionality since the transition to selexipag.    Today reports no edema.  Has lost three kg since March 2019 Reports that his appetite has improved since his last visit.  He still would like to be a little more active at home    Preliminary Six Minute Walk Test results (4/10/19) 274 m which is an improvement compared to Feb distance of 243     Review of Systems   Constitution: Negative for decreased appetite, weight gain and weight loss.   Cardiovascular: Negative for chest pain, dyspnea on exertion, leg swelling, near-syncope, orthopnea and palpitations.   Respiratory: Negative for cough and shortness of breath.    Musculoskeletal: Negative for myalgias.   Gastrointestinal: Negative for jaundice.        Objective:  BP (!) 82/59   Pulse 85   Ht 5' 9" (1.753 m)   Wt 66.5 kg (146 lb 9.7 oz)   SpO2 (!) 93%   BMI 21.65 kg/m²       Physical Exam   Constitutional: He is oriented to person, place, and time. He appears well-developed and well-nourished. He is active. He is not intubated.   BP (!) 82/59   Pulse 85   Ht 5' 9" (1.753 m)   Wt 66.5 kg (146 lb 9.7 oz)   SpO2 (!) 93%   BMI 21.65 kg/m²      HENT:   Head: Normocephalic and atraumatic. Hair is normal.   Right Ear: External ear normal.   Left Ear: External ear normal.   Nose: Nose normal. No nasal deformity. No epistaxis.  No foreign bodies.   Mouth/Throat: Mucous membranes are normal. Mucous membranes are not cyanotic. No oropharyngeal exudate.   Eyes: Pupils are equal, " round, and reactive to light. Conjunctivae and EOM are normal.   Neck: Neck supple. No hepatojugular reflux and no JVD present.   Cardiovascular: Normal rate, regular rhythm, normal heart sounds and normal pulses. Exam reveals no gallop.   Pulmonary/Chest: Effort normal and breath sounds normal. No apnea and no tachypnea. He is not intubated. No respiratory distress. He exhibits no tenderness.   Abdominal: Soft. Normal appearance and bowel sounds are normal. There is no tenderness. No hernia.   Musculoskeletal: Normal range of motion.   Neurological: He is alert and oriented to person, place, and time. He displays no seizure activity.   Skin: Skin is warm, dry and intact. No rash noted. No pallor.   Psychiatric: He has a normal mood and affect. His speech is normal and behavior is normal. Thought content normal. Cognition and memory are normal.           Chemistry        Component Value Date/Time     04/10/2019 0808    K 4.3 04/10/2019 0808     04/10/2019 0808    CO2 26 04/10/2019 0808    BUN 25 (H) 04/10/2019 0808    CREATININE 1.1 04/10/2019 0808     (H) 04/10/2019 0808        Component Value Date/Time    CALCIUM 10.0 04/10/2019 0808    ALKPHOS 150 (H) 04/10/2019 0808    AST 38 04/10/2019 0808    ALT 36 04/10/2019 0808    BILITOT 1.7 (H) 04/10/2019 0808    ESTGFRAFRICA >60.0 04/10/2019 0808    EGFRNONAA >60.0 04/10/2019 0808            Magnesium   Date Value Ref Range Status   02/01/2019 2.5 1.6 - 2.6 mg/dL Final       Lab Results   Component Value Date    WBC 7.92 04/10/2019    HGB 17.0 04/10/2019    HCT 50.8 04/10/2019    MCV 94 04/10/2019     04/10/2019       Lab Results   Component Value Date    INR 1.1 01/10/2019    INR 1.1 10/31/2018    INR 1.1 07/23/2018       BNP   Date Value Ref Range Status   04/10/2019 359 (H) 0 - 99 pg/mL Final     Comment:     Values of less than 100 pg/ml are consistent with non-CHF populations.   03/01/2019 776 (H) 0 - 99 pg/mL Final     Comment:     Values  of less than 100 pg/ml are consistent with non-CHF populations.   02/01/2019 262 (H) 0 - 99 pg/mL Final     Comment:     Values of less than 100 pg/ml are consistent with non-CHF populations.           Assessment:       1. PHT (pulmonary hypertension)        WHO Group 1   Functional Class 2     Plan:     PHT (pulmonary hypertension)  Would add opstimut to regiment after left heart cath in addition to selexipag 1600 BID (intolerant to adempas)  Discussed that syncope / rapid detoriation can occur with PH Needs to start thinking about health care proxy / code status         Follow up in about 2 months (around 6/10/2019).  Orders Placed This Encounter    Comprehensive metabolic panel    Brain natriuretic peptide    Transthoracic echo (TTE) 2D with Color Flow    Stress test, pulmonary

## 2019-04-10 NOTE — PROGRESS NOTES
OUTPATIENT CATHETERIZATION INSTRUCTIONS    You have been scheduled for a procedure in the catheterization lab on Monday, April 22, 2019.     Please report to the Cardiology Waiting Area on the Third floor of the hospital and check in at 11 AM.   You will then be taken to the SSCU (Short Stay Cardiac Unit) and prepared for your procedure. Please be aware that this is not the time of your procedure but the time you are to arrive. The procedures are scheduled on an hourly basis; however, emergency cases take precedence over all other cases.       You may not have anything to eat or drink after midnight the night before your test. You may take your regular morning medications with water. If there are any medications that you should not take you will be instructed to hold them that morning. If you are diabetic and on Metformin (Glucophage) do not take it the day before, the day of, and for 2 days after your procedure.      The procedure will take 1-2 hours to perform. After the procedure, you will return to SSCU on the third floor of the hospital. You will need to lie still (or keep your arm still) for the next 4 to 6 hours to minimize bleeding from the puncture site. Your family may remain in the room with you during this time.       You may be able to be discharged home that same afternoon if there is someone to drive you home and there were no complications. If you have one of the balloon, stent, or device procedures you may spend the night in the hospital. Your doctor will determine, based on your progress, the date and time of your discharge. The results of your procedure will be discussed with you before you are discharged. Any further testing or procedures will be scheduled for you either before you leave or you will be called with these appointments.       If you should have any questions, concerns, or need to change the date of your procedure, please call JENNIFER Adams @ [phone]    Special  Instructions:    Take 8 Plavix pills today and then 1 pill every day including the day of your procedure.             THE ABOVE INSTRUCTIONS WERE GIVEN TO THE PATIENT VERBALLY AND THEY VERBALIZED UNDERSTANDING.  THEY DO NOT REQUIRE ANY SPECIAL NEEDS AND DO NOT HAVE ANY LEARNING BARRIERS.          Directions for Reporting to Cardiology Waiting Area in the Hospital  If you park in the Parking Garage:  Take elevators to the1st floor of the parking garage.  Continue past the gift shop, coffee shop, and piano.  Take a right and go to the gold elevators. (Elevator B)  Take the elevator to the 3rd floor.  Follow the arrow on the sign on the wall that says Cath Lab Registration/EP Lab Registration.  Follow the long hallway all the way around until you come to a big open area.  This is the registration area.  Check in at Reception Desk.    OR    If family is dropping you off:  Have them drop you off at the front of the Hospital under the green overhang.  Enter through the doors and take a right.  Take the E elevators to the 3rd floor Cardiology Waiting Area.  Check in at the Reception Desk in the waiting room.

## 2019-04-10 NOTE — LETTER
April 10, 2019      Robert Lomas MD  [address]  [facility] [address]           [facility]  [address]  [facility] [address]  Phone: [phone]          Patient: Greg Nolasco   MR Number: [id number]   YOB: 1940   Date of Visit: 4/10/2019       Dear Dr. Robert Lomas:    Thank you for referring Greg Nolasco to me for evaluation. Attached you will find relevant portions of my assessment and plan of care.    If you have questions, please do not hesitate to call me. I look forward to following Greg Nolasco along with you.    Sincerely,    Dex Lomas MD    Enclosure  CC:  No Recipients    If you would like to receive this communication electronically, please contact [email or web address] or [phone] to request more information on [employer] access.    For providers and/or their staff who would like to refer a patient to [facility], please contact us through our one-stop-shop provider referral line, [facility], at [phone].    If you feel you have received this communication in error or would no longer like to receive these types of communications, please e-mail [email or web address]

## 2019-04-11 NOTE — PROGRESS NOTES
"Subjective:    Patient ID:  Greg Nolasco is a 78 y.o. male who presents for follow-up of Coronary Artery Disease    Referring Physician: Dr ELSA Lomas    HPI  Mr. Nolasco is a 79 y/o gentleman who was originally seen in this clinic on 7/19/18 for chronic hypoxic respiratory failure and exertional dyspnea.  He underwent cardiac catheterization on 7/20/18 and was found to have pulmonary hypertensionwith right to left shunting across a PFO and multi-vessel CAD.  He was referred to Dr. Ruffin, pulmonary HTN clinic, and started on Uptravi.  Today he reports a marked decrease in his exertional dyspnea.  He is able to complete full sentences in clinic without dyspnea.  He reports that he uses home O2 at night and 2-3 hours during the day.  He also rpeors a h/o exertional chest discomfort that he is unable to further characterize.  Mr. Nolasco denies LE edema, orthopnea, or pND.  He denies palpitations, syncope, or near syncope.  He denies claudication, but reports "gerry horses" in his legs and feet at night.  Mr. Nolasco reports good medication compliance.    Past Medical History:   Diagnosis Date    Chronic hypoxemic respiratory failure     Coronary artery disease     BARBARA (obstructive sleep apnea)     Pulmonary hypertension      Past Surgical History:   Procedure Laterality Date    ANGIOPLASTY  1991    BACK SURGERY      COLONOSCOPY  04/2019    HERNIA REPAIR      INSERTION, CATHETER, RIGHT HEART Right 1/10/2019    [facility] at [facility] CATH LAB    KNEE SURGERY      REMOVAL, CATHETER, CENTRAL VENOUS, TUNNELED N/A 12/20/2018    Performed by Walt Smith MD at [facility] CATH LAB    RIGHT HEART CATH Right 10/31/2018    Performed by Robert Lmoas MD at [facility] CATH LAB    SHOULDER SURGERY      SINUS SURGERY       Current Outpatient Medications on File Prior to Visit   Medication Sig Dispense Refill    allopurinol (ZYLOPRIM) 300 MG tablet Take 300 mg by mouth.      aspirin (ECOTRIN) 81 " MG EC tablet Take 81 mg by mouth once daily.      atorvastatin (LIPITOR) 40 MG tablet Take 1 tablet (40 mg total) by mouth once daily. 90 tablet 3    furosemide (LASIX) 40 MG tablet Take 1 tablet (40 mg total) by mouth once daily. 30 tablet 12    multivit-minerals/ferrous fum (MULTI VITAMIN ORAL) Take 1 tablet by mouth.      potassium chloride (MICRO-K) 10 MEQ CpSR Take 10 mEq by mouth once.   11    RABEprazole (ACIPHEX) 20 mg tablet Take 20 mg by mouth 2 (two) times daily.       selexipag 200 mcg (140)- 800 mcg (60) DsPk Take 1,400 mcg by mouth 2 (two) times daily. 200 mcg, BID, PO x1 week, increase by 200 mcg,BID, weekly, to highest tolerated dose to 1600 mcg.       spironolactone (ALDACTONE) 25 MG tablet Take 1 tablet (25 mg total) by mouth every morning. 30 tablet 11    melatonin 3 mg Tab Take 1 capsule by mouth daily as needed.       OXYGEN-AIR DELIVERY SYSTEMS MISC by Misc.(Non-Drug; Combo Route) route.      UPTRAVI 1,400 mcg Tab Take 1,400 mcg by mouth once daily.      [DISCONTINUED] ALPRAZolam (XANAX) 1 MG tablet 0.5 mg as needed.        No current facility-administered medications on file prior to visit.      Review of patient's allergies indicates:   Allergen Reactions    Clindamycin Shortness Of Breath    Penicillins Rash       Review of Systems   Constitution: Negative for decreased appetite, diaphoresis, fever, malaise/fatigue, weight gain and weight loss.   HENT: Negative for congestion, nosebleeds and sore throat.    Eyes: Negative for blurred vision, vision loss in left eye, vision loss in right eye and visual disturbance.   Cardiovascular: Positive for chest pain and dyspnea on exertion. Negative for claudication, leg swelling, near-syncope, orthopnea, palpitations, paroxysmal nocturnal dyspnea and syncope.   Respiratory: Negative for cough, hemoptysis, shortness of breath and wheezing.    Endocrine: Negative for polyuria.   Hematologic/Lymphatic: Does not bruise/bleed easily.   Skin:  "Negative for nail changes and rash.   Musculoskeletal: Negative for back pain, muscle cramps and myalgias.   Gastrointestinal: Negative for abdominal pain, change in bowel habit, diarrhea, heartburn, hematemesis, hematochezia, melena, nausea and vomiting.   Genitourinary: Negative for bladder incontinence, dysuria, frequency and hematuria.   Psychiatric/Behavioral: Negative for depression.   Allergic/Immunologic: Negative for hives.        Objective:  Vitals:    04/10/19 0823 04/10/19 0827   BP: (!) 94/58 99/60   BP Location: Left arm Right arm   Patient Position: Sitting Sitting   BP Method: Large (Automatic) Large (Automatic)   Pulse: 82 92   SpO2: 96%    Weight: 66.9 kg (147 lb 7.8 oz)    Height: 5' 9" (1.753 m)          Physical Exam   Constitutional: He is oriented to person, place, and time. He appears well-developed and well-nourished.   HENT:   Head: Normocephalic and atraumatic.   Eyes: Pupils are equal, round, and reactive to light. EOM are normal.   Neck: Neck supple. No JVD present. No thyromegaly present.   Cardiovascular: Normal rate and regular rhythm. PMI is displaced. Exam reveals no gallop and no friction rub.   No murmur heard.  Pulses:       Carotid pulses are 2+ on the right side, and 2+ on the left side.       Radial pulses are 2+ on the right side, and 2+ on the left side.        Femoral pulses are 2+ on the right side, and 2+ on the left side.       Dorsalis pedis pulses are 2+ on the right side, and 2+ on the left side.        Posterior tibial pulses are 2+ on the right side, and 2+ on the left side.   Right ventricular heave  Normal right radial Jonathan's test.   Pulmonary/Chest: Effort normal. He has no wheezes. He has no rhonchi. He has no rales.   Abdominal: Soft. Normal appearance. He exhibits no distension. There is no hepatosplenomegaly. There is no tenderness.   Neurological: He is alert and oriented to person, place, and time. Gait normal.   Psychiatric: He has a normal mood and " affect.         Assessment:       1. Abnormal cardiovascular stress test    2. Chronic pulmonary heart disease    3. BARBARA (obstructive sleep apnea)    4. Dyslipidemia    5. Coronary artery disease involving native coronary artery of native heart without angina pectoris    6. Chronic hypoxemic respiratory failure    7. 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 PHT (pulmonary hypertension)         Plan:       1) CAD.  The patient has a h/o pulmonary HTN as well as right to left shunting across his PFO which certainly is likely contributing to his exertional dyspnea.  He also has high grade steneosis of the LAD and a  to the RCA with left to right collaterals which may also be contributing to the patient's exertional dyspnea and exertional chest discomfort.  His 3/1/19 PET stress test demonstrated RCA territory ischemia. I discussed the theorectical benefit of LAD PCI which may increase collateral flow to the RCA with the patient and his family as well as the future potential possibility of RCA  PCI. The risks and potential benefit were discussed in detail with the patient and his family. The risks of DAPT were discussed with the patient and his family.  The patient stated that he is interested in LAD PCI.   -continue EC ASA 81mg po qday  -start Plavix 600mg po X1 today and then 75mg po qday  -6Fr R radial access; plan for rota-PCI  -filters on IV's as patient has right to left shunt  The risks, benefits, and alternatives of cardiac catheterization and possible intervention were discussed with the patient.  All questions were answered and informed consent was obtained.  -anesthesiology consult for sedation during procedure    2) Chronic hypoxic respiratory failure and pulmonary HTN.  HTS/ pulmonary hypertension management appreciated  -patient will bring his Uptravi    3) Dyslipidemia. Continue statin    4) H/O GERD. Continue rabeprazole    All of the patient's and his family's questions were  answered.

## 2019-04-11 NOTE — PROCEDURES
Greg Nolasco is a 78 y.o.  male patient, who presents for a 6 minute walk test ordered by Miguelina Ruffin MD.  The diagnosis is Pulmonary Hypertension.  The patient's BMI is 21.5 kg/m2.  Predicted distance (lower limit of normal) is 325.06 meters.      Test Results:    The test was completed without stopping.  The total time walked was 360 seconds.  During walking, the patient reported:  Dyspnea.  The patient used no assistive devices during testing.     04/10/2019---------Distance: 274.32 meters (900 feet)     O2 Sat % Supplemental Oxygen Heart Rate Blood Pressure Lenard Scale   Pre-exercise  (Resting) 94 % Room Air 84 bpm 95/54 mmHg 1   During Exercise 73 % Room Air 119 bpm 134/73 mmHg 5-6   Post-exercise  (Recovery) 90 % Room Air  112 bpm       Recovery Time:  261 seconds    Performing nurse/tech:  JUAN Cho      PREVIOUS STUDY:   02/01/2019---------Distance: 243.84 meters (800 feet)       O2 Sat % Supplemental Oxygen Heart Rate Blood Pressure Lenard Scale   Pre-exercise  (Resting) 90 % Room Air 107 bpm 117/67 mmHg 0.5   During Exercise 71 % Room Air 127 bpm 116/72 mmHg 4   Post-exercise  (Recovery) 89 % Room Air  108 bpm   mmHg          CLINICAL INTERPRETATION:  Six minute walk distance is 274.32 meters (900 feet) with heavy dyspnea.  During exercise, there was significant desaturation while breathing room air.  Both blood pressure and heart rate increased significantly with walking.  Hypotension was present prior to exercise.  The patient did not report non-pulmonary symptoms during exercise.  Significant exercise impairment is likely due to desaturation, cardiovascular causes and subjective symptoms.  The patient did complete the study, walking 360 seconds of the 360 second test.  The patient may benefit from using supplemental oxygen during exertion.  Since the previous study in February 2019, exercise capacity may be somewhat improved.  Based upon age and body mass index, exercise capacity is  less than predicted.

## 2019-04-11 NOTE — H&P (VIEW-ONLY)
"Subjective:    Patient ID:  Greg Nolasco is a 78 y.o. male who presents for follow-up of Coronary Artery Disease    Referring Physician: Dr ELSA Lomas    HPI  Mr. Nolasco is a 77 y/o gentleman who was originally seen in this clinic on 7/19/18 for chronic hypoxic respiratory failure and exertional dyspnea.  He underwent cardiac catheterization on 7/20/18 and was found to have pulmonary hypertensionwith right to left shunting across a PFO and multi-vessel CAD.  He was referred to Dr. Ruffin, pulmonary HTN clinic, and started on Uptravi.  Today he reports a marked decrease in his exertional dyspnea.  He is able to complete full sentences in clinic without dyspnea.  He reports that he uses home O2 at night and 2-3 hours during the day.  He also rpeors a h/o exertional chest discomfort that he is unable to further characterize.  Mr. Nolasco denies LE edema, orthopnea, or pND.  He denies palpitations, syncope, or near syncope.  He denies claudication, but reports "gerry horses" in his legs and feet at night.  Mr. Nolasco reports good medication compliance.    Past Medical History:   Diagnosis Date    Chronic hypoxemic respiratory failure     Coronary artery disease     BARBARA (obstructive sleep apnea)     Pulmonary hypertension      Past Surgical History:   Procedure Laterality Date    ANGIOPLASTY  1991    BACK SURGERY      COLONOSCOPY  04/2019    HERNIA REPAIR      INSERTION, CATHETER, RIGHT HEART Right 1/10/2019    Performed [facility] at [facility] CATH LAB    KNEE SURGERY      REMOVAL, CATHETER, CENTRAL VENOUS, TUNNELED N/A 12/20/2018    Performed by Walt Smith MD at [facility] CATH LAB    RIGHT HEART CATH Right 10/31/2018    Performed by Robert Lomas MD at [facility] CATH LAB    SHOULDER SURGERY      SINUS SURGERY       Current Outpatient Medications on File Prior to Visit   Medication Sig Dispense Refill    allopurinol (ZYLOPRIM) 300 MG tablet Take 300 mg by mouth.      aspirin (ECOTRIN) 81 " MG EC tablet Take 81 mg by mouth once daily.      atorvastatin (LIPITOR) 40 MG tablet Take 1 tablet (40 mg total) by mouth once daily. 90 tablet 3    furosemide (LASIX) 40 MG tablet Take 1 tablet (40 mg total) by mouth once daily. 30 tablet 12    multivit-minerals/ferrous fum (MULTI VITAMIN ORAL) Take 1 tablet by mouth.      potassium chloride (MICRO-K) 10 MEQ CpSR Take 10 mEq by mouth once.   11    RABEprazole (ACIPHEX) 20 mg tablet Take 20 mg by mouth 2 (two) times daily.       selexipag 200 mcg (140)- 800 mcg (60) DsPk Take 1,400 mcg by mouth 2 (two) times daily. 200 mcg, BID, PO x1 week, increase by 200 mcg,BID, weekly, to highest tolerated dose to 1600 mcg.       spironolactone (ALDACTONE) 25 MG tablet Take 1 tablet (25 mg total) by mouth every morning. 30 tablet 11    melatonin 3 mg Tab Take 1 capsule by mouth daily as needed.       OXYGEN-AIR DELIVERY SYSTEMS MISC by Misc.(Non-Drug; Combo Route) route.      UPTRAVI 1,400 mcg Tab Take 1,400 mcg by mouth once daily.      [DISCONTINUED] ALPRAZolam (XANAX) 1 MG tablet 0.5 mg as needed.        No current facility-administered medications on file prior to visit.      Review of patient's allergies indicates:   Allergen Reactions    Clindamycin Shortness Of Breath    Penicillins Rash       Review of Systems   Constitution: Negative for decreased appetite, diaphoresis, fever, malaise/fatigue, weight gain and weight loss.   HENT: Negative for congestion, nosebleeds and sore throat.    Eyes: Negative for blurred vision, vision loss in left eye, vision loss in right eye and visual disturbance.   Cardiovascular: Positive for chest pain and dyspnea on exertion. Negative for claudication, leg swelling, near-syncope, orthopnea, palpitations, paroxysmal nocturnal dyspnea and syncope.   Respiratory: Negative for cough, hemoptysis, shortness of breath and wheezing.    Endocrine: Negative for polyuria.   Hematologic/Lymphatic: Does not bruise/bleed easily.   Skin:  "Negative for nail changes and rash.   Musculoskeletal: Negative for back pain, muscle cramps and myalgias.   Gastrointestinal: Negative for abdominal pain, change in bowel habit, diarrhea, heartburn, hematemesis, hematochezia, melena, nausea and vomiting.   Genitourinary: Negative for bladder incontinence, dysuria, frequency and hematuria.   Psychiatric/Behavioral: Negative for depression.   Allergic/Immunologic: Negative for hives.        Objective:  Vitals:    04/10/19 0823 04/10/19 0827   BP: (!) 94/58 99/60   BP Location: Left arm Right arm   Patient Position: Sitting Sitting   BP Method: Large (Automatic) Large (Automatic)   Pulse: 82 92   SpO2: 96%    Weight: 66.9 kg (147 lb 7.8 oz)    Height: 5' 9" (1.753 m)          Physical Exam   Constitutional: He is oriented to person, place, and time. He appears well-developed and well-nourished.   HENT:   Head: Normocephalic and atraumatic.   Eyes: Pupils are equal, round, and reactive to light. EOM are normal.   Neck: Neck supple. No JVD present. No thyromegaly present.   Cardiovascular: Normal rate and regular rhythm. PMI is displaced. Exam reveals no gallop and no friction rub.   No murmur heard.  Pulses:       Carotid pulses are 2+ on the right side, and 2+ on the left side.       Radial pulses are 2+ on the right side, and 2+ on the left side.        Femoral pulses are 2+ on the right side, and 2+ on the left side.       Dorsalis pedis pulses are 2+ on the right side, and 2+ on the left side.        Posterior tibial pulses are 2+ on the right side, and 2+ on the left side.   Right ventricular heave  Normal right radial Jonathan's test.   Pulmonary/Chest: Effort normal. He has no wheezes. He has no rhonchi. He has no rales.   Abdominal: Soft. Normal appearance. He exhibits no distension. There is no hepatosplenomegaly. There is no tenderness.   Neurological: He is alert and oriented to person, place, and time. Gait normal.   Psychiatric: He has a normal mood and " affect.         Assessment:       1. Abnormal cardiovascular stress test    2. Chronic pulmonary heart disease    3. BARBARA (obstructive sleep apnea)    4. Dyslipidemia    5. Coronary artery disease involving native coronary artery of native heart without angina pectoris    6. Chronic hypoxemic respiratory failure    7. 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 PHT (pulmonary hypertension)         Plan:       1) CAD.  The patient has a h/o pulmonary HTN as well as right to left shunting across his PFO which certainly is likely contributing to his exertional dyspnea.  He also has high grade steneosis of the LAD and a  to the RCA with left to right collaterals which may also be contributing to the patient's exertional dyspnea and exertional chest discomfort.  His 3/1/19 PET stress test demonstrated RCA territory ischemia. I discussed the theorectical benefit of LAD PCI which may increase collateral flow to the RCA with the patient and his family as well as the future potential possibility of RCA  PCI. The risks and potential benefit were discussed in detail with the patient and his family. The risks of DAPT were discussed with the patient and his family.  The patient stated that he is interested in LAD PCI.   -continue EC ASA 81mg po qday  -start Plavix 600mg po X1 today and then 75mg po qday  -6Fr R radial access; plan for rota-PCI  -filters on IV's as patient has right to left shunt  The risks, benefits, and alternatives of cardiac catheterization and possible intervention were discussed with the patient.  All questions were answered and informed consent was obtained.  -anesthesiology consult for sedation during procedure    2) Chronic hypoxic respiratory failure and pulmonary HTN.  HTS/ pulmonary hypertension management appreciated  -patient will bring his Uptravi    3) Dyslipidemia. Continue statin    4) H/O GERD. Continue rabeprazole    All of the patient's and his family's questions were  answered.

## 2019-04-13 ENCOUNTER — PATIENT MESSAGE (OUTPATIENT)
Dept: MEDSURG UNIT | Facility: HOSPITAL | Age: 79
End: 2019-04-13

## 2019-04-15 ENCOUNTER — TELEPHONE (OUTPATIENT)
Dept: CARDIOLOGY | Facility: CLINIC | Age: 79
End: 2019-04-15

## 2019-04-15 ENCOUNTER — TELEPHONE (OUTPATIENT)
Dept: TRANSPLANT | Facility: CLINIC | Age: 79
End: 2019-04-15

## 2019-04-15 NOTE — TELEPHONE ENCOUNTER
Received an email from patient's daughter stating that patient is having side effects from Plavix. He is having nausea, diarrhea, headaches, and dizziness. Dr. Lomas reviewed the chart and orders given for patient to stop the plavix and start Brilinta 90mg PO 2x daily with a 180mg loading dose. I called the patient and gave him these instructions. Prescription called in to patient's pharmacy. Patient verbalized understanding of all instructions.

## 2019-04-15 NOTE — TELEPHONE ENCOUNTER
Mr. Nolasco today to report that he had had horrible SE's with Plavix. He said he took the 8 pills, at one time,  as directed and experienced a very bad headache, for which he tried Tylenol, BC Powders, Motrin. He was very nauseated, lost his appetite and was  dizzy. Since starting the Plavix 75mg daily, he still has a headache (slightly less) and is dizzy. His BP is stable. He also said his breathing is very good.   He is taking 1600 mcg Uptravi but reports starting that 2 weeks before the Plavix with no problems like he experienced after taking Plavix.    Mr. Nolasco would like to know if there is anything else he can take. Advised patient that I would reach out to Dr. GILMA Lomas and the Interventional Cardiology team for recommendations going forward.    [facility] contacted team and they are aware of current issues. Will contact patient ASAP.

## 2019-04-21 ENCOUNTER — ANESTHESIA EVENT (OUTPATIENT)
Dept: MEDSURG UNIT | Facility: HOSPITAL | Age: 79
End: 2019-04-21
Payer: MEDICARE

## 2019-04-22 ENCOUNTER — ANESTHESIA (OUTPATIENT)
Dept: MEDSURG UNIT | Facility: HOSPITAL | Age: 79
End: 2019-04-22
Payer: MEDICARE

## 2019-04-22 ENCOUNTER — HOSPITAL ENCOUNTER (OUTPATIENT)
Facility: HOSPITAL | Age: 79
Discharge: HOME | End: 2019-04-23
Attending: INTERNAL MEDICINE | Admitting: INTERNAL MEDICINE
Payer: MEDICARE

## 2019-04-22 DIAGNOSIS — R94.39 ABNORMAL CARDIOVASCULAR STRESS TEST: ICD-10-CM

## 2019-04-22 LAB
ABO + RH BLD: NORMAL
ANION GAP SERPL CALC-SCNC: 13 MMOL/L (ref 8–16)
BASOPHILS # BLD AUTO: 0.06 K/UL (ref 0–0.2)
BASOPHILS NFR BLD: 0.7 % (ref 0–1.9)
BLD GP AB SCN CELLS X3 SERPL QL: NORMAL
BUN SERPL-MCNC: 26 MG/DL (ref 8–23)
CALCIUM SERPL-MCNC: 10.2 MG/DL (ref 8.7–10.5)
CHLORIDE SERPL-SCNC: 104 MMOL/L (ref 95–110)
CO2 SERPL-SCNC: 23 MMOL/L (ref 23–29)
CREAT SERPL-MCNC: 1 MG/DL (ref 0.5–1.4)
DIFFERENTIAL METHOD: ABNORMAL
EOSINOPHIL # BLD AUTO: 0.1 K/UL (ref 0–0.5)
EOSINOPHIL NFR BLD: 1.3 % (ref 0–8)
ERYTHROCYTE [DISTWIDTH] IN BLOOD BY AUTOMATED COUNT: 15 % (ref 11.5–14.5)
EST. GFR  (AFRICAN AMERICAN): >60 ML/MIN/1.73 M^2
EST. GFR  (NON AFRICAN AMERICAN): >60 ML/MIN/1.73 M^2
GLUCOSE SERPL-MCNC: 87 MG/DL (ref 70–110)
HCT VFR BLD AUTO: 49.4 % (ref 40–54)
HGB BLD-MCNC: 16.8 G/DL (ref 14–18)
IMM GRANULOCYTES # BLD AUTO: 0.03 K/UL (ref 0–0.04)
IMM GRANULOCYTES NFR BLD AUTO: 0.4 % (ref 0–0.5)
LYMPHOCYTES # BLD AUTO: 1.8 K/UL (ref 1–4.8)
LYMPHOCYTES NFR BLD: 21.9 % (ref 18–48)
MCH RBC QN AUTO: 31.6 PG (ref 27–31)
MCHC RBC AUTO-ENTMCNC: 34 G/DL (ref 32–36)
MCV RBC AUTO: 93 FL (ref 82–98)
MONOCYTES # BLD AUTO: 0.6 K/UL (ref 0.3–1)
MONOCYTES NFR BLD: 6.9 % (ref 4–15)
NEUTROPHILS # BLD AUTO: 5.6 K/UL (ref 1.8–7.7)
NEUTROPHILS NFR BLD: 68.8 % (ref 38–73)
NRBC BLD-RTO: 0 /100 WBC
PLATELET # BLD AUTO: 256 K/UL (ref 150–350)
PMV BLD AUTO: 9.5 FL (ref 9.2–12.9)
POTASSIUM SERPL-SCNC: 3.4 MMOL/L (ref 3.5–5.1)
RBC # BLD AUTO: 5.31 M/UL (ref 4.6–6.2)
SODIUM SERPL-SCNC: 140 MMOL/L (ref 136–145)
WBC # BLD AUTO: 8.21 K/UL (ref 3.9–12.7)

## 2019-04-22 PROCEDURE — C1894 INTRO/SHEATH, NON-LASER: HCPCS | Performed by: INTERNAL MEDICINE

## 2019-04-22 PROCEDURE — 25000003 PHARM REV CODE 250: Performed by: INTERNAL MEDICINE

## 2019-04-22 PROCEDURE — C1753 CATH, INTRAVAS ULTRASOUND: HCPCS | Performed by: INTERNAL MEDICINE

## 2019-04-22 PROCEDURE — 63600175 PHARM REV CODE 636 W HCPCS: Performed by: NURSE ANESTHETIST, CERTIFIED REGISTERED

## 2019-04-22 PROCEDURE — 93010 ELECTROCARDIOGRAM REPORT: CPT | Mod: 76,,, | Performed by: INTERNAL MEDICINE

## 2019-04-22 PROCEDURE — 92933 PR STENT & ATHERECTOMY: ICD-10-PCS | Mod: LD,,, | Performed by: INTERNAL MEDICINE

## 2019-04-22 PROCEDURE — D9220A PRA ANESTHESIA: ICD-10-PCS | Mod: ANES,,, | Performed by: ANESTHESIOLOGY

## 2019-04-22 PROCEDURE — 92933 PRQ TRLML C ATHRC ST ANGIOP1: CPT | Mod: LD,,, | Performed by: INTERNAL MEDICINE

## 2019-04-22 PROCEDURE — 63600175 PHARM REV CODE 636 W HCPCS: Performed by: INTERNAL MEDICINE

## 2019-04-22 PROCEDURE — 93010 EKG 12-LEAD: ICD-10-PCS | Mod: ,,, | Performed by: INTERNAL MEDICINE

## 2019-04-22 PROCEDURE — 93571 IV DOP VEL&/PRESS C FLO 1ST: CPT | Mod: 26,52,, | Performed by: INTERNAL MEDICINE

## 2019-04-22 PROCEDURE — D9220A PRA ANESTHESIA: Mod: CRNA,,, | Performed by: NURSE ANESTHETIST, CERTIFIED REGISTERED

## 2019-04-22 PROCEDURE — C1725 CATH, TRANSLUMIN NON-LASER: HCPCS | Performed by: INTERNAL MEDICINE

## 2019-04-22 PROCEDURE — C1724 CATH, TRANS ATHEREC,ROTATION: HCPCS | Performed by: INTERNAL MEDICINE

## 2019-04-22 PROCEDURE — 27201423 OPTIME MED/SURG SUP & DEVICES STERILE SUPPLY: Performed by: INTERNAL MEDICINE

## 2019-04-22 PROCEDURE — C9602 PERC D-E COR STENT ATHER S: HCPCS | Mod: LD | Performed by: INTERNAL MEDICINE

## 2019-04-22 PROCEDURE — D9220A PRA ANESTHESIA: ICD-10-PCS | Mod: CRNA,,, | Performed by: NURSE ANESTHETIST, CERTIFIED REGISTERED

## 2019-04-22 PROCEDURE — 25500020 PHARM REV CODE 255: Performed by: INTERNAL MEDICINE

## 2019-04-22 PROCEDURE — 25000003 PHARM REV CODE 250: Performed by: NURSE ANESTHETIST, CERTIFIED REGISTERED

## 2019-04-22 PROCEDURE — 99152 MOD SED SAME PHYS/QHP 5/>YRS: CPT | Mod: ,,, | Performed by: INTERNAL MEDICINE

## 2019-04-22 PROCEDURE — 93005 ELECTROCARDIOGRAM TRACING: CPT

## 2019-04-22 PROCEDURE — 93005 ELECTROCARDIOGRAM TRACING: CPT | Mod: 59

## 2019-04-22 PROCEDURE — 93571 IV DOP VEL&/PRESS C FLO 1ST: CPT | Mod: 52 | Performed by: INTERNAL MEDICINE

## 2019-04-22 PROCEDURE — C1769 GUIDE WIRE: HCPCS | Performed by: INTERNAL MEDICINE

## 2019-04-22 PROCEDURE — 36415 COLL VENOUS BLD VENIPUNCTURE: CPT

## 2019-04-22 PROCEDURE — C1874 STENT, COATED/COV W/DEL SYS: HCPCS | Performed by: INTERNAL MEDICINE

## 2019-04-22 PROCEDURE — 93571 PR HEART FLOW RESERV MEASURE,INIT VESSL: ICD-10-PCS | Mod: 26,52,, | Performed by: INTERNAL MEDICINE

## 2019-04-22 PROCEDURE — 92929 PR STENT, ADD'L VESSEL: CPT | Mod: LD,,, | Performed by: INTERNAL MEDICINE

## 2019-04-22 PROCEDURE — C1887 CATHETER, GUIDING: HCPCS | Performed by: INTERNAL MEDICINE

## 2019-04-22 PROCEDURE — 99152 PR MOD CONSCIOUS SEDATION, SAME PHYS, 5+ YRS, FIRST 15 MIN: ICD-10-PCS | Mod: ,,, | Performed by: INTERNAL MEDICINE

## 2019-04-22 PROCEDURE — C9601 PERC DRUG-EL COR STENT BRAN: HCPCS | Mod: LD | Performed by: INTERNAL MEDICINE

## 2019-04-22 PROCEDURE — 37000009 HC ANESTHESIA EA ADD 15 MINS: Performed by: INTERNAL MEDICINE

## 2019-04-22 PROCEDURE — D9220A PRA ANESTHESIA: Mod: ANES,,, | Performed by: ANESTHESIOLOGY

## 2019-04-22 PROCEDURE — 92929 PR STENT, ADD'L VESSEL: ICD-10-PCS | Mod: LD,,, | Performed by: INTERNAL MEDICINE

## 2019-04-22 PROCEDURE — 80048 BASIC METABOLIC PNL TOTAL CA: CPT

## 2019-04-22 PROCEDURE — 85025 COMPLETE CBC W/AUTO DIFF WBC: CPT

## 2019-04-22 PROCEDURE — 37000008 HC ANESTHESIA 1ST 15 MINUTES: Performed by: INTERNAL MEDICINE

## 2019-04-22 PROCEDURE — 93010 ELECTROCARDIOGRAM REPORT: CPT | Mod: ,,, | Performed by: INTERNAL MEDICINE

## 2019-04-22 PROCEDURE — 86850 RBC ANTIBODY SCREEN: CPT

## 2019-04-22 DEVICE — STENT RONYX35026UX RESOLUTE ONYX 3.50X26
Type: IMPLANTABLE DEVICE | Site: HEART | Status: FUNCTIONAL
Brand: RESOLUTE ONYX™

## 2019-04-22 DEVICE — STENT RONYX35012UX RESOLUTE ONYX 3.50X12
Type: IMPLANTABLE DEVICE | Site: HEART | Status: FUNCTIONAL
Brand: RESOLUTE ONYX™

## 2019-04-22 DEVICE — STENT RONYX22512UX RESOLUTE ONYX 2.25X12
Type: IMPLANTABLE DEVICE | Site: HEART | Status: FUNCTIONAL
Brand: RESOLUTE ONYX™

## 2019-04-22 RX ORDER — HEPARIN SODIUM 1000 [USP'U]/ML
INJECTION, SOLUTION INTRAVENOUS; SUBCUTANEOUS
Status: DISCONTINUED | OUTPATIENT
Start: 2019-04-22 | End: 2019-04-22

## 2019-04-22 RX ORDER — HEPARIN SODIUM 200 [USP'U]/100ML
INJECTION, SOLUTION INTRAVENOUS
Status: DISCONTINUED | OUTPATIENT
Start: 2019-04-22 | End: 2019-04-23 | Stop reason: HOSPADM

## 2019-04-22 RX ORDER — SODIUM CHLORIDE 0.9 G/100ML
IRRIGANT IRRIGATION
Status: DISCONTINUED | OUTPATIENT
Start: 2019-04-22 | End: 2019-04-23 | Stop reason: HOSPADM

## 2019-04-22 RX ORDER — HEPARIN SODIUM 1000 [USP'U]/ML
INJECTION, SOLUTION INTRAVENOUS; SUBCUTANEOUS
Status: DISCONTINUED | OUTPATIENT
Start: 2019-04-22 | End: 2019-04-23 | Stop reason: HOSPADM

## 2019-04-22 RX ORDER — VERAPAMIL HYDROCHLORIDE 2.5 MG/ML
INJECTION, SOLUTION INTRAVENOUS
Status: DISCONTINUED | OUTPATIENT
Start: 2019-04-22 | End: 2019-04-23 | Stop reason: HOSPADM

## 2019-04-22 RX ORDER — FENTANYL CITRATE 50 UG/ML
INJECTION, SOLUTION INTRAMUSCULAR; INTRAVENOUS
Status: DISCONTINUED | OUTPATIENT
Start: 2019-04-22 | End: 2019-04-22

## 2019-04-22 RX ORDER — MIDAZOLAM HYDROCHLORIDE 1 MG/ML
INJECTION, SOLUTION INTRAMUSCULAR; INTRAVENOUS
Status: DISCONTINUED | OUTPATIENT
Start: 2019-04-22 | End: 2019-04-22

## 2019-04-22 RX ORDER — SODIUM CHLORIDE 9 MG/ML
3 INJECTION, SOLUTION INTRAVENOUS CONTINUOUS
Status: ACTIVE | OUTPATIENT
Start: 2019-04-22 | End: 2019-04-22

## 2019-04-22 RX ORDER — ASPIRIN 81 MG/1
81 TABLET ORAL DAILY
Status: DISCONTINUED | OUTPATIENT
Start: 2019-04-23 | End: 2019-04-22

## 2019-04-22 RX ORDER — POTASSIUM CHLORIDE 20 MEQ/1
20 TABLET, EXTENDED RELEASE ORAL ONCE
Status: COMPLETED | OUTPATIENT
Start: 2019-04-22 | End: 2019-04-22

## 2019-04-22 RX ORDER — FUROSEMIDE 40 MG/1
40 TABLET ORAL DAILY
Status: DISCONTINUED | OUTPATIENT
Start: 2019-04-23 | End: 2019-04-23 | Stop reason: HOSPADM

## 2019-04-22 RX ORDER — TALC
POWDER (GRAM) TOPICAL DAILY PRN
Status: DISCONTINUED | OUTPATIENT
Start: 2019-04-22 | End: 2019-04-23 | Stop reason: HOSPADM

## 2019-04-22 RX ORDER — PANTOPRAZOLE SODIUM 40 MG/1
40 TABLET, DELAYED RELEASE ORAL DAILY
Status: DISCONTINUED | OUTPATIENT
Start: 2019-04-23 | End: 2019-04-23 | Stop reason: HOSPADM

## 2019-04-22 RX ORDER — DIPHENHYDRAMINE HCL 25 MG
50 CAPSULE ORAL ONCE
Status: COMPLETED | OUTPATIENT
Start: 2019-04-22 | End: 2019-04-22

## 2019-04-22 RX ORDER — LIDOCAINE HYDROCHLORIDE 20 MG/ML
INJECTION, SOLUTION INFILTRATION; PERINEURAL
Status: DISCONTINUED | OUTPATIENT
Start: 2019-04-22 | End: 2019-04-23 | Stop reason: HOSPADM

## 2019-04-22 RX ORDER — ATORVASTATIN CALCIUM 20 MG/1
40 TABLET, FILM COATED ORAL DAILY
Status: DISCONTINUED | OUTPATIENT
Start: 2019-04-23 | End: 2019-04-23 | Stop reason: HOSPADM

## 2019-04-22 RX ORDER — ALLOPURINOL 300 MG/1
300 TABLET ORAL ONCE
Status: DISCONTINUED | OUTPATIENT
Start: 2019-04-22 | End: 2019-04-23 | Stop reason: HOSPADM

## 2019-04-22 RX ORDER — ASPIRIN 81 MG/1
81 TABLET ORAL NIGHTLY
Status: DISCONTINUED | OUTPATIENT
Start: 2019-04-22 | End: 2019-04-23 | Stop reason: HOSPADM

## 2019-04-22 RX ORDER — SODIUM CHLORIDE 9 MG/ML
INJECTION, SOLUTION INTRAVENOUS CONTINUOUS PRN
Status: DISCONTINUED | OUTPATIENT
Start: 2019-04-22 | End: 2019-04-22

## 2019-04-22 RX ORDER — NITROGLYCERIN 5 MG/ML
INJECTION, SOLUTION INTRAVENOUS
Status: DISCONTINUED | OUTPATIENT
Start: 2019-04-22 | End: 2019-04-23 | Stop reason: HOSPADM

## 2019-04-22 RX ADMIN — MIDAZOLAM 1 MG: 1 INJECTION INTRAMUSCULAR; INTRAVENOUS at 02:04

## 2019-04-22 RX ADMIN — FENTANYL CITRATE 25 MCG: 50 INJECTION, SOLUTION INTRAMUSCULAR; INTRAVENOUS at 02:04

## 2019-04-22 RX ADMIN — DIPHENHYDRAMINE HYDROCHLORIDE 50 MG: 25 CAPSULE ORAL at 11:04

## 2019-04-22 RX ADMIN — HEPARIN SODIUM 3000 UNITS: 1000 INJECTION INTRAVENOUS; SUBCUTANEOUS at 02:04

## 2019-04-22 RX ADMIN — SODIUM CHLORIDE 3 ML/KG/HR: 0.9 INJECTION, SOLUTION INTRAVENOUS at 11:04

## 2019-04-22 RX ADMIN — MIDAZOLAM 1 MG: 1 INJECTION INTRAMUSCULAR; INTRAVENOUS at 01:04

## 2019-04-22 RX ADMIN — POTASSIUM CHLORIDE 20 MEQ: 1500 TABLET, EXTENDED RELEASE ORAL at 07:04

## 2019-04-22 RX ADMIN — HEPARIN SODIUM 6000 UNITS: 1000 INJECTION INTRAVENOUS; SUBCUTANEOUS at 02:04

## 2019-04-22 RX ADMIN — FENTANYL CITRATE 25 MCG: 50 INJECTION, SOLUTION INTRAMUSCULAR; INTRAVENOUS at 03:04

## 2019-04-22 RX ADMIN — SODIUM CHLORIDE, SODIUM GLUCONATE, SODIUM ACETATE, POTASSIUM CHLORIDE, MAGNESIUM CHLORIDE, SODIUM PHOSPHATE, DIBASIC, AND POTASSIUM PHOSPHATE: .53; .5; .37; .037; .03; .012; .00082 INJECTION, SOLUTION INTRAVENOUS at 02:04

## 2019-04-22 NOTE — Clinical Note
Catheter is removed from the ostium   left main. Angiography performed post intervention of the left coronary arteries in multiple views. Angiography performed via hand injection with .

## 2019-04-22 NOTE — ANESTHESIA PREPROCEDURE EVALUATION
04/22/2019  Greg Nolasco is a 78 y.o., male.   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  TM Distance: Normal, at least 6 cm      Dental:  Dental Findings: In tact   Chest/Lungs:  Chest/Lungs Findings: Clear to auscultation     Heart/Vascular:  Heart Findings: Rate: Normal  Rhythm: Regular Rhythm  Sounds: Normal        Mental Status:  Mental Status Findings:  Cooperative, Alert and Oriented         Anesthesia Plan  Type of Anesthesia, risks & benefits discussed:  Anesthesia Type:  MAC  Patient's Preference: General  Intra-op Monitoring Plan: standard ASA monitors  Intra-op Monitoring Plan Comments: Standard ASA monitors.   Post Op Pain Control Plan: per primary service following discharge from PACU  Post Op Pain Control Plan Comments: Per primary service.     Induction:   IV  Beta Blocker:  Patient is not currently on a Beta-Blocker (No further documentation required).       Informed Consent: Patient understands risks and agrees with Anesthesia plan.  Questions answered.  Anesthesia consent signed with patient.  ASA Score: 4     Day of Surgery Review of History & Physical:    H&P update referred to the surgeon.     Anesthesia Plan Notes: Chart reviewed, patient interviewed and examined.  Sedation plan explained.        Ready For Surgery From Anesthesia Perspective.

## 2019-04-22 NOTE — TRANSFER OF CARE
"Anesthesia Transfer of Care Note    Patient: Greg Nolasco    Procedure(s) Performed: Procedure(s) (LRB):  INSERTION, STENT, CORONARY ARTERY (Right)    Patient location: Cath Lab    Anesthesia Type: MAC    Transport from OR: Transported from OR on 6-10 L/min O2 by face mask with adequate spontaneous ventilation    Post pain: adequate analgesia    Post assessment: no apparent anesthetic complications    Post vital signs: stable    Level of consciousness: awake, alert and oriented    Nausea/Vomiting: no nausea/vomiting    Complications: none    Transfer of care protocol was followed      Last vitals:   Visit Vitals  /85 (BP Location: Right arm, Patient Position: Lying)   Pulse 91   Temp 36.7 °C (98 °F) (Oral)   Resp 20   Ht 5' 9" (1.753 m)   Wt 67 kg (147 lb 11.3 oz)   SpO2 (!) 94%   BMI 21.81 kg/m²     "

## 2019-04-22 NOTE — Clinical Note
Catheter is repositioned to the ostium   left main. Angiography performed of the left coronary arteries in multiple views. Angiography performed via hand injection with .

## 2019-04-22 NOTE — INTERVAL H&P NOTE
The patient has been examined and the H&P has been reviewed:    I concur with the findings and changes have been noted since the H&P was written: Patient reports shortness of breath since starting Brilinta. Patient was having side effects from plavix before including dizziness, headache, leg pains and was switched to Brilnta which he started taking 4.16.2019. Side effects from plavix have resolved but now he is experiencing shortness of breath which is not related to exertion and his pulse ox stays above 90 %. This shortness of breath wakes him up at night.     General: alert, awake and oriented x 3  Eyes:PERRL.   Neck:no JVD   Lungs:  clear to auscultation bilaterally   Cardiovascular: Heart: regular rate and rhythm, S1, S2 normal, no murmur, click, rub or gallop.   Chest Wall: no tenderness.   Extremities: no cyanosis or edema.   Abdomen/Rectal: Abdomen: soft, non-tender non-distented; bowel sounds normal  Neurologic: Normal strength and tone. No focal numbness or weakness     LEFT RIGHT   RADIAL 2+ 2+   ULNAR     BRACHIAL     FEMORAL 2+ 2+   DP Doppler Biphasic Doppler Biphasic   TP Doppler Biphasic Doppler Biphasic   LYNETTE'S TEST Normal Normal   BARBEAU TEST           Anesthesia/Surgery risks, benefits and alternative options discussed and understood by patient/family.    Plan for right radial access 6 Fr sheath rota-PCI of LAD  Filters on all IVs(PFO with right to left shunt)  Last dose of uptravi this am-patient brought his uptravi  Anesthesia support        Active Hospital Problems    Diagnosis  POA    Abnormal cardiovascular stress test [R94.39]  Yes      Resolved Hospital Problems   No resolved problems to display.

## 2019-04-23 VITALS
TEMPERATURE: 98 F | BODY MASS INDEX: 21.87 KG/M2 | HEIGHT: 69 IN | SYSTOLIC BLOOD PRESSURE: 98 MMHG | WEIGHT: 147.69 LBS | DIASTOLIC BLOOD PRESSURE: 55 MMHG | RESPIRATION RATE: 16 BRPM | OXYGEN SATURATION: 92 % | HEART RATE: 84 BPM

## 2019-04-23 LAB
ANION GAP SERPL CALC-SCNC: 7 MMOL/L (ref 8–16)
BASOPHILS # BLD AUTO: 0.05 K/UL (ref 0–0.2)
BASOPHILS # BLD AUTO: 0.07 K/UL (ref 0–0.2)
BASOPHILS NFR BLD: 0.8 % (ref 0–1.9)
BASOPHILS NFR BLD: 1 % (ref 0–1.9)
BUN SERPL-MCNC: 19 MG/DL (ref 8–23)
CALCIUM SERPL-MCNC: 9.1 MG/DL (ref 8.7–10.5)
CHLORIDE SERPL-SCNC: 109 MMOL/L (ref 95–110)
CO2 SERPL-SCNC: 22 MMOL/L (ref 23–29)
CREAT SERPL-MCNC: 1 MG/DL (ref 0.5–1.4)
DIFFERENTIAL METHOD: ABNORMAL
DIFFERENTIAL METHOD: ABNORMAL
EOSINOPHIL # BLD AUTO: 0.3 K/UL (ref 0–0.5)
EOSINOPHIL # BLD AUTO: 0.4 K/UL (ref 0–0.5)
EOSINOPHIL NFR BLD: 4.6 % (ref 0–8)
EOSINOPHIL NFR BLD: 5.2 % (ref 0–8)
ERYTHROCYTE [DISTWIDTH] IN BLOOD BY AUTOMATED COUNT: 14.9 % (ref 11.5–14.5)
ERYTHROCYTE [DISTWIDTH] IN BLOOD BY AUTOMATED COUNT: 15.1 % (ref 11.5–14.5)
EST. GFR  (AFRICAN AMERICAN): >60 ML/MIN/1.73 M^2
EST. GFR  (NON AFRICAN AMERICAN): >60 ML/MIN/1.73 M^2
GLUCOSE SERPL-MCNC: 82 MG/DL (ref 70–110)
HCT VFR BLD AUTO: 42.9 % (ref 40–54)
HCT VFR BLD AUTO: 43.4 % (ref 40–54)
HGB BLD-MCNC: 14.5 G/DL (ref 14–18)
HGB BLD-MCNC: 14.6 G/DL (ref 14–18)
IMM GRANULOCYTES # BLD AUTO: 0.02 K/UL (ref 0–0.04)
IMM GRANULOCYTES # BLD AUTO: 0.02 K/UL (ref 0–0.04)
IMM GRANULOCYTES NFR BLD AUTO: 0.3 % (ref 0–0.5)
IMM GRANULOCYTES NFR BLD AUTO: 0.3 % (ref 0–0.5)
LYMPHOCYTES # BLD AUTO: 1.3 K/UL (ref 1–4.8)
LYMPHOCYTES # BLD AUTO: 1.9 K/UL (ref 1–4.8)
LYMPHOCYTES NFR BLD: 21.8 % (ref 18–48)
LYMPHOCYTES NFR BLD: 26.2 % (ref 18–48)
MCH RBC QN AUTO: 31.4 PG (ref 27–31)
MCH RBC QN AUTO: 31.5 PG (ref 27–31)
MCHC RBC AUTO-ENTMCNC: 33.4 G/DL (ref 32–36)
MCHC RBC AUTO-ENTMCNC: 34 G/DL (ref 32–36)
MCV RBC AUTO: 93 FL (ref 82–98)
MCV RBC AUTO: 94 FL (ref 82–98)
MONOCYTES # BLD AUTO: 0.6 K/UL (ref 0.3–1)
MONOCYTES # BLD AUTO: 0.7 K/UL (ref 0.3–1)
MONOCYTES NFR BLD: 9.4 % (ref 4–15)
MONOCYTES NFR BLD: 9.6 % (ref 4–15)
NEUTROPHILS # BLD AUTO: 3.9 K/UL (ref 1.8–7.7)
NEUTROPHILS # BLD AUTO: 4.2 K/UL (ref 1.8–7.7)
NEUTROPHILS NFR BLD: 57.9 % (ref 38–73)
NEUTROPHILS NFR BLD: 62.9 % (ref 38–73)
NRBC BLD-RTO: 0 /100 WBC
NRBC BLD-RTO: 0 /100 WBC
PLATELET # BLD AUTO: 215 K/UL (ref 150–350)
PLATELET # BLD AUTO: 237 K/UL (ref 150–350)
PMV BLD AUTO: 9.3 FL (ref 9.2–12.9)
PMV BLD AUTO: 9.5 FL (ref 9.2–12.9)
POC ACTIVATED CLOTTING TIME K: 268 SEC (ref 74–137)
POTASSIUM SERPL-SCNC: 4.2 MMOL/L (ref 3.5–5.1)
RBC # BLD AUTO: 4.62 M/UL (ref 4.6–6.2)
RBC # BLD AUTO: 4.63 M/UL (ref 4.6–6.2)
SAMPLE: ABNORMAL
SODIUM SERPL-SCNC: 138 MMOL/L (ref 136–145)
WBC # BLD AUTO: 6.14 K/UL (ref 3.9–12.7)
WBC # BLD AUTO: 7.26 K/UL (ref 3.9–12.7)

## 2019-04-23 PROCEDURE — 85025 COMPLETE CBC W/AUTO DIFF WBC: CPT

## 2019-04-23 PROCEDURE — 36415 COLL VENOUS BLD VENIPUNCTURE: CPT

## 2019-04-23 PROCEDURE — 80048 BASIC METABOLIC PNL TOTAL CA: CPT

## 2019-04-23 RX ORDER — HYDROMORPHONE HYDROCHLORIDE 1 MG/ML
0.2 INJECTION, SOLUTION INTRAMUSCULAR; INTRAVENOUS; SUBCUTANEOUS EVERY 5 MIN PRN
Status: DISCONTINUED | OUTPATIENT
Start: 2019-04-23 | End: 2019-04-23

## 2019-04-23 RX ORDER — DIPHENHYDRAMINE HYDROCHLORIDE 50 MG/ML
25 INJECTION INTRAMUSCULAR; INTRAVENOUS EVERY 6 HOURS PRN
Status: DISCONTINUED | OUTPATIENT
Start: 2019-04-23 | End: 2019-04-23

## 2019-04-23 RX ORDER — FENTANYL CITRATE 50 UG/ML
25 INJECTION, SOLUTION INTRAMUSCULAR; INTRAVENOUS EVERY 5 MIN PRN
Status: DISCONTINUED | OUTPATIENT
Start: 2019-04-23 | End: 2019-04-23

## 2019-04-23 NOTE — PLAN OF CARE
Problem: Adult Inpatient Plan of Care  Goal: Plan of Care Review  Outcome: Ongoing (interventions implemented as appropriate)  Pt AAO x3, NAD, s/p LHC with 3 stents placed (1-LAD, 2-diagonal), Rt radial dsg CDI, no active bleeding and no hematoma noted. Pt ambulated and voids without any difficulty. Call light within pt reach, pt instructed to call staff for any needed assistance, safety maintained. Pt denies needs/concerns at this time, will continue to monitor.

## 2019-04-23 NOTE — NURSING
Notified Dr Davis and Dr Duran of BP 85/56 and pt asymptomatic. Orders for CBC now and Dr Davis performed echo (negative). Rt radial dsg CDI.  Will continue to monitor.

## 2019-04-23 NOTE — PLAN OF CARE
Patient discharged per MD orders. Instructions given on medications, wound care, activity, signs of infection, when to call MD, and follow up appointments. Pt verbalized understanding. Telemetry and PIV removed. Patient and family taken by wc to private vehicle.

## 2019-04-23 NOTE — ANESTHESIA POSTPROCEDURE EVALUATION
Anesthesia Post Evaluation    Patient: Greg Nolasco    Procedure(s) Performed: Procedure(s) (LRB):  INSERTION, STENT, CORONARY ARTERY (Right)    Final Anesthesia Type: general  Patient location during evaluation: PACU  Patient participation: Yes- Able to Participate  Level of consciousness: awake and alert  Post-procedure vital signs: reviewed and stable  Pain management: adequate  Airway patency: patent  PONV status at discharge: No PONV  Anesthetic complications: no      Cardiovascular status: hemodynamically stable  Respiratory status: unassisted  Hydration status: euvolemic  Follow-up not needed.          Vitals Value Taken Time   BP 98/55 4/23/2019  7:40 AM   Temp 36.6 °C (97.8 °F) 4/23/2019  7:40 AM   Pulse 84 4/23/2019  8:55 AM   Resp 16 4/23/2019  7:40 AM   SpO2 92 % 4/23/2019  7:40 AM         No case tracking events are documented in the log.      Pain/Belen Score: No data recorded

## 2019-04-24 NOTE — HOSPITAL COURSE
Patient presented for PCI via right radial access. He underwent successful rota-PCI of 70% (iFr=0.78) mid LAD stenosis with 3.5X26 DAT & 3.5X12 DAT and Successful PCI of 90% D2 stenosis with 2.25X12 DAT. He also had 80% stenosis of distal LCx which was not intervened on. He has known  of RCA with left to right collaterals. Patient was observed overnight and had no acute events. He was discharged home in a stable condition. He is to continue aspirin and Brilinta and statin. He is not on BB due to borderline low BP. He is to continue uptravi for his pulmonary HTN.

## 2019-04-24 NOTE — DISCHARGE SUMMARY
"[facility]  Interventional Cardiology  Discharge Summary      Patient Name: Greg Nolasco  MRN: [id number]  Admission Date: 4/22/2019  Hospital Length of Stay: 0 days  Discharge Date and Time: 4/23/2019  9:38 AM  Attending Physician: No att. providers found  Discharging Provider: Elvis Bey MD  Primary Care Physician: Pablo Lorenzo MD    HPI:  79 y/o gentleman who underwent cardiac catheterization on 7/20/18 and was found to have pulmonary hypertension with right to left shunting across a PFO and multi-vessel CAD and presented for PCI for LAD.  He was referred to Dr. Ruffin, pulmonary HTN clinic, and started on Uptravi.  Today he reports a marked decrease in his exertional dyspnea.  He is able to complete full sentences in clinic without dyspnea.  He reports that he uses home O2 at night and 2-3 hours during the day.  He also rpeors a h/o exertional chest discomfort that he is unable to further characterize.  Mr. Nolasco denies LE edema, orthopnea, or pND.  He denies palpitations, syncope, or near syncope.  He denies claudication, but reports "gerry horses" in his legs and feet at night.  Mr. Nolasco reports good medication compliance. He reports shortness of breath with Brilinta without drop in his oxygen saturation.           Procedure(s) (LRB):  INSERTION, STENT, CORONARY ARTERY (Right)     Indwelling Lines/Drains at time of discharge:  Lines/Drains/Airways          None          Hospital Course:  Patient presented for PCI via right radial access. He underwent successful rota-PCI of 70% (iFr=0.78) mid LAD stenosis with 3.5X26 DAT & 3.5X12 DAT and Successful PCI of 90% D2 stenosis with 2.25X12 DAT. He also had 80% stenosis of distal LCx which was not intervened on. He has known  of RCA with left to right collaterals. Patient was observed overnight and had no acute events. He was discharged home in a stable condition. He is to continue aspirin and Brilinta and statin. He is not on " BB due to borderline low BP. He is to continue uptravi for his pulmonary HTN.        Significant Diagnostic Studies: Labs:   BMP:   Recent Labs   Lab 04/22/19  1041 04/23/19  0457   GLU 87 82    138   K 3.4* 4.2    109   CO2 23 22*   BUN 26* 19   CREATININE 1.0 1.0   CALCIUM 10.2 9.1    and CBC   Recent Labs   Lab 04/22/19  1041 04/23/19  0117 04/23/19  0457   WBC 8.21 6.14 7.26   HGB 16.8 14.6 14.5   HCT 49.4 42.9 43.4    215 237       Pending Diagnostic Studies:     None            Discharged Condition: good    Follow Up: With Dr Lomas and Dr Ruffin    Patient Instructions:   No discharge procedures on file.  Medications:  Reconciled Home Medications:      Medication List      CHANGE how you take these medications    BRILINTA 90 mg tablet  Generic drug:  ticagrelor  Take 1 tablet (90 mg total) by mouth 2 (two) times daily.  What changed:  when to take this     UPTRAVI 1,400 mcg Tab  Generic drug:  selexipag  Take 1,400 mcg by mouth 2 (two) times daily.  What changed:  Another medication with the same name was removed. Continue taking this medication, and follow the directions you see here.        CONTINUE taking these medications    allopurinol 300 MG tablet  Commonly known as:  ZYLOPRIM  Take 300 mg by mouth.     aspirin 81 MG EC tablet  Commonly known as:  ECOTRIN  Take 81 mg by mouth once daily.     atorvastatin 40 MG tablet  Commonly known as:  LIPITOR  Take 1 tablet (40 mg total) by mouth once daily.     furosemide 40 MG tablet  Commonly known as:  LASIX  Take 1 tablet (40 mg total) by mouth once daily.     melatonin 3 mg Tab  Take 1 capsule by mouth daily as needed.     MULTI VITAMIN ORAL  Take 1 tablet by mouth.     OXYGEN-AIR DELIVERY SYSTEMS MISC  by Misc.(Non-Drug; Combo Route) route.     potassium chloride 10 MEQ Cpsr  Commonly known as:  MICRO-K  Take 10 mEq by mouth once.     RABEprazole 20 mg tablet  Commonly known as:  ACIPHEX  Take 20 mg by mouth 2 (two) times daily.      spironolactone 25 MG tablet  Commonly known as:  ALDACTONE  Take 1 tablet (25 mg total) by mouth every morning.        STOP taking these medications    clopidogrel 75 mg tablet  Commonly known as:  PLAVIX            Time spent on the discharge of patient: 35 minutes    Elvis Bey MD  Interventional Cardiology  [facility]

## 2019-04-24 NOTE — HPI
"79 y/o gentleman who underwent cardiac catheterization on 7/20/18 and was found to have pulmonary hypertension with right to left shunting across a PFO and multi-vessel CAD and presented for PCI for LAD.  He was referred to Dr. Ruffin, pulmonary HTN clinic, and started on Uptravi.  Today he reports a marked decrease in his exertional dyspnea.  He is able to complete full sentences in clinic without dyspnea.  He reports that he uses home O2 at night and 2-3 hours during the day.  He also rpeors a h/o exertional chest discomfort that he is unable to further characterize.  Mr. Nolasco denies LE edema, orthopnea, or pND.  He denies palpitations, syncope, or near syncope.  He denies claudication, but reports "gerry horses" in his legs and feet at night.  Mr. Nolasco reports good medication compliance. He reports shortness of breath with Brilinta without drop in his oxygen saturation.         "

## 2019-04-29 ENCOUNTER — TELEPHONE (OUTPATIENT)
Dept: TRANSPLANT | Facility: CLINIC | Age: 79
End: 2019-04-29

## 2019-04-29 NOTE — TELEPHONE ENCOUNTER
"F/U call with Mr. Nolasco. He states that he is doing ok. His "oxygen level is up" when he checks it. He is having trouble sleeping, however. He states that about 2 hours after he falls asleep he is awakened and very short of breath. He can't catch his breath until he sits up.  He is unable to go back to sleep unless he moves to his recliner. He currently sleeps on one pillow. He denies any swelling in his extremities or abdomen.  Asked Mr. Nolasco about having a sleep study. He states that he had one and it showed that he did not need CPAP. After review of his records in "Care Everywhere" it was noted that the doctor wanted a repeat sleep study. Mr. Nolasco says he does not remember that but really does not want to do another one.   Explained the benefits of CPAP if it is necessary. Will discuss with Dr. Lomas and f/u with patient.   "

## 2019-05-01 ENCOUNTER — TELEPHONE (OUTPATIENT)
Dept: TRANSPLANT | Facility: CLINIC | Age: 79
End: 2019-05-01

## 2019-05-09 NOTE — PROGRESS NOTES
Heart Failure Clinic Note  07/12/2018    Subjective:       Patient ID: Greg Nolasco is a 77 y.o. male being seen for initial consult for further evaluation of Shortness of breath    Chief Complaint: Pulmonary Hypertension (Consult)    HPI   Mr. Nolasco is a 77 y.o. Male with CAD (s/p angioplasty '91), ex-smoker (quit '91) on 2-3L O2 via NC at home, who was referred by Dr. Pelletier for further evaluation of Shortness of breath.   He reports walking up to 2 miles daily last week. Since this Jan/Feb, he reports progressively worsening shortness of breath on exertion. He can now walk up to 100-200 feet before he has to stop and catch his breath. He denies any exertional chest or dyspnea. SOB does not occur at rest. Associated with PND and benopnea. He denies orthopnea and sleeps on 1 (thick) pillow. Denies leg edema.  He is followed at [facility], but was referred to [facility] for further evaluation. Reports last [facility] was negative. He is on 2-3L O2 via NC at home, but did not bring it with him.   Of note, he reports h/o symptomatic atrial fibrillation for which he has been taking sotalol for 15 years. Denies any palpitations in recent years.    6mw 7/12/18  Distance: 183 meters  Oxygen sats: 90% to 66%  BP: 145/75 to 171/85  HR 69 to 85 bpm    2D ECHO 5/31/18  1.The estimated LV ejection fraction is 61 %                                                                                       2.There is mild concentric LV hypertrophy                                                                                                 3.RV size is mildly dilated                                                                                                                  4.There is mild aortic valve sclerosis without significant stenosis                                                                           5.There is mild mitral annular  calcification                                                                                                  6.There is mild mitral regurgitation                                                                                                          7.There is moderate tricuspid regurgitation                                                                                                   8.Moderate elevation of estimated RV systolic pressure                                                                                         Left Ventricle:                                                         The estimated LV ejection fraction is 61 %. LV chamber size is normal.  There is mild concentric LV hypertrophy. LV systolic function is        normal. There are no wall motion abnormalities observed. Normal left    atrial pressure with Grade I diastolic dysfunction.                                                                                                                                                         Right Ventricle                                                         RV size is mildly dilated. The RV systolic function is normal.                                                                                  Septum                                                                  There is no atrial septal defect (ASD). There is no ventricular septal  defect (VSD).                                                                                                                                                                                                         Left Atrium                                                             LA chamber size is normal. The LA volume index (MOD, Biplane) is 26.5    ml/m2.                                                                                                                                                                                                                Right Atrium                                                            RA chamber size is normal.                                                                                                                      Aortic Valve                                                            There is mild aortic valve sclerosis without significant stenosis.      There is mild aortic regurgitation. There is no aortic valve stenosis.  AV Peak gradient: 10 mmHg.                                                                                                                      Mitral Valve                                                            There is mild mitral annular calcification. There is mild mitral        regurgitation. There is no mitral stenosis.                                                                                                                                                                           Tricuspid Valve                                                         Tricuspid valve is structurally normal. There is moderate tricuspid     regurgitation. Estimated RVSP is 72 mmHg. Moderate elevation of         estimated RV systolic pressure. There is no tricuspid valve stenosis.     Past Medical History:   Diagnosis Date    Chronic hypoxemic respiratory failure     BARBARA (obstructive sleep apnea)      Past Surgical History:   Procedure Laterality Date    BACK SURGERY      HERNIA REPAIR      KNEE SURGERY      SHOULDER SURGERY      SINUS SURGERY       Social History    Marital status:      Spouse name: N/A    Number of children: N/A    Years of education: N/A     Social History Main Topics    Smoking status: Never Smoker    Smokeless tobacco: Never Used    Alcohol  "use Yes    Drug use: Unknown    Sexual activity: Not Asked     Patient's Medications   New Prescriptions    No medications on file   Previous Medications    ALLOPURINOL (ZYLOPRIM) 300 MG TABLET    Take 300 mg by mouth.    ALPRAZOLAM (XANAX) 1 MG TABLET    0.5 mg.     ASPIRIN-CALCIUM CARBONATE 81 MG-300 MG CALCIUM(777 MG) TAB    Take 81 mg by mouth.    MELATONIN 3 MG TAB    Take 1 capsule by mouth.    MULTIVIT-MINERALS/FERROUS FUM (MULTI VITAMIN ORAL)    Take 1 tablet by mouth.    OXYGEN-AIR DELIVERY SYSTEMS MISC    by Misc.(Non-Drug; Combo Route) route.    RABEPRAZOLE (ACIPHEX) 20 MG TABLET    Take 20 mg by mouth.    SOTALOL (BETAPACE) 120 MG TAB    Take 120 mg by mouth.    UMECLIDINIUM-VILANTEROL 62.5-25 MCG/ACTUATION DSDV    Inhale 1 puff into the lungs.   Modified Medications    No medications on file   Discontinued Medications    FUROSEMIDE (LASIX) 20 MG TABLET    Take 20 mg by mouth Daily.    ISOSORBIDE MONONITRATE (IMDUR) 30 MG 24 HR TABLET    Take 30 mg by mouth.     Review of Systems   Constitutional: Positive for malaise/fatigue. Negative for chills, fever and weight loss.   HENT: Negative for congestion.    Respiratory: Positive for shortness of breath (on exertion). Negative for cough and sputum production.    Cardiovascular: Positive for PND. Negative for chest pain, palpitations and leg swelling.   Gastrointestinal: Negative for constipation, diarrhea, nausea and vomiting.   Musculoskeletal: Negative for joint pain and neck pain.   Neurological: Negative for weakness and headaches.     Objective:      /74 (BP Location: Left arm, Patient Position: Sitting, BP Method: Medium (Automatic))   Pulse 81   Ht 5' 9" (1.753 m)   Wt 77.8 kg (171 lb 8.3 oz)   SpO2 (!) 81%   BMI 25.33 kg/m²   Body mass index is 25.33 kg/m².    Physical Exam   Constitutional: He is oriented to person, place, and time. He appears well-developed and well-nourished.   HENT:   Head: Normocephalic and atraumatic.   Neck: No " JVD present.   Cardiovascular: Normal rate, regular rhythm, normal heart sounds and intact distal pulses.    No murmur heard.  Pulmonary/Chest: Effort normal and breath sounds normal. No respiratory distress.   Abdominal: Soft. Bowel sounds are normal. He exhibits no distension. There is no tenderness.   Musculoskeletal: He exhibits no edema.   Neurological: He is alert and oriented to person, place, and time.   Skin: Skin is warm. Capillary refill takes less than 2 seconds.   Peripheral cyanosis in fingers   Psychiatric: He has a normal mood and affect. His behavior is normal. Judgment and thought content normal.   Vitals reviewed.      CBC 7/12/2018 07:23   WBC 8.46   RBC 5.92   Hemoglobin 18.6 (H)   Hematocrit 56.0 (H)   MCV 95   MCH 31.4 (H)   MCHC 33.2   RDW 14.7 (H)   Platelets 215     CMP 7/12/2018 07:23   Sodium 143   Potassium 4.9   Chloride 109   CO2 22 (L)   Anion Gap 12   BUN, Bld 12   Creatinine 1.2   eGFR if non  58.0 (A)   eGFR if African American >60.0   Glucose 108   Calcium 10.2   Magnesium 2.2   Alkaline Phosphatase 139 (H)   Total Protein 7.4   Albumin 3.9   Total Bilirubin 3.0 (H)   AST 24   ALT 18     BNP (pg/mL)   Date Value   07/12/2018 1,120 (H)       Assessment:         1. SOB (shortness of breath)  Ambulatory Referral to Interventional Cardiology   2. Paroxysmal atrial fibrillation  Ambulatory consult to Electrophysiology   3. Chronic pulmonary heart disease     4. Coronary artery disease involving native coronary artery of native heart without angina pectoris  Ambulatory Referral to Interventional Cardiology   5. Pulmonary hypertension           Plan:         Greg Nolasco is a 77 y.o. male being seen for initial consult for further evaluation of shortness of breath.     1. SOB (shortness of breath)  - likely multifactorial due to pulmonary hypertension vs. shunt vs. lung disease. Unlikely secondary to TR & RV failure.  - likely chronic given secondary polycythemia  (hgb: 18.6, Hct: 56) & peripheral cyanosis. - Ambulatory Referral to Interventional Cardiology for RHC, LHC and shunt run  - elevated BNP. Recommend take lasix every other day     2. Paroxysmal atrial fibrillation  - takes sotalol for last 15 years  - Ambulatory consult to Electrophysiology to reconsider whether pt needs to be on sotalol or not    3. Chronic pulmonary heart disease  - will follow up CT chest & further recs by Dr. Pelletier    Patient seen and plan of care discussed with Dr. Chito Bey MD  Internal Medicine, PGY-3  [phone]      Agree with findings and plan      Have reviewed the CT scan    1. Severe centrilobular emphysema with upper lobe predominance.  2. Areas of localized fibrotic change in the right and left lower lobes with peripheral reticulation, cystic changes and traction bronchiectasis.  However, we do not believe this to be widespread enough to diagnose diffuse interstitial lung disease.  3. Multiple lymph nodes in the paratracheal and subcarinal area which are normal to mildly enlarged in size.  4. There is a 0.8 cm nodule in the right upper lobe (axial series 4, image 176). Per the Fleischner society guidelines, for any solitary nodule >8 mm recommend follow-up CT at 3 months, and/or CT-PET, and/or biopsy.  5. Atherosclerosis, cholelithiasis, renal cysts, and other findings as above.  6.  This report was flagged in Epic as abnormal.        Certainly the patient has lung disease as a primary diagnosis (no heart disease). WE will do RHC as well as LHC. If he has PAH it will be Group 3 where medical therapy for PAH has not shown benfit . We will talk to him after this. Oxygen for now as per Dr Pelletier      Thanks     09-May-2019

## 2019-05-13 DIAGNOSIS — I25.10 CORONARY ARTERY DISEASE INVOLVING NATIVE CORONARY ARTERY OF NATIVE HEART WITHOUT ANGINA PECTORIS: Primary | ICD-10-CM

## 2019-05-13 RX ORDER — SPIRONOLACTONE 25 MG/1
25 TABLET ORAL EVERY MORNING
Qty: 90 TABLET | Refills: 3 | Status: SHIPPED | OUTPATIENT
Start: 2019-05-13 | End: 2020-03-18

## 2019-05-13 RX ORDER — SPIRONOLACTONE 25 MG/1
25 TABLET ORAL EVERY MORNING
Qty: 90 TABLET | Refills: 3 | Status: CANCELLED | OUTPATIENT
Start: 2019-05-13 | End: 2020-05-12

## 2019-05-15 ENCOUNTER — TELEPHONE (OUTPATIENT)
Dept: TRANSPLANT | Facility: CLINIC | Age: 79
End: 2019-05-15

## 2019-05-15 RX ORDER — ATORVASTATIN CALCIUM 40 MG/1
40 TABLET, FILM COATED ORAL DAILY
Qty: 90 TABLET | Refills: 3 | Status: SHIPPED | OUTPATIENT
Start: 2019-05-15 | End: 2020-05-13

## 2019-05-15 NOTE — TELEPHONE ENCOUNTER
"Patient called to request refill on Lipitor. He also confirmed appt with Dr. GILMA Lomas tomorrow.  Mr. Nolasco reports feeling good the past few days. He was able to go to [employer] yesterday. He still gets "short-winded" around 1-2pm (he wakes around 4am) when he feels like "the medication wears off." Advised patient to come by the clinic tomorrow to sign application for additional PH med. Patient verbalized understanding.  "

## 2019-05-16 ENCOUNTER — OFFICE VISIT (OUTPATIENT)
Dept: CARDIOLOGY | Facility: CLINIC | Age: 79
End: 2019-05-16
Payer: MEDICARE

## 2019-05-16 VITALS
WEIGHT: 144.81 LBS | OXYGEN SATURATION: 92 % | SYSTOLIC BLOOD PRESSURE: 100 MMHG | DIASTOLIC BLOOD PRESSURE: 61 MMHG | HEART RATE: 86 BPM | HEIGHT: 69 IN | BODY MASS INDEX: 21.45 KG/M2

## 2019-05-16 DIAGNOSIS — R42 VERTIGO DUE TO CEREBROVASCULAR DISEASE: ICD-10-CM

## 2019-05-16 DIAGNOSIS — I25.10 CORONARY ARTERY DISEASE INVOLVING NATIVE CORONARY ARTERY OF NATIVE HEART WITHOUT ANGINA PECTORIS: ICD-10-CM

## 2019-05-16 DIAGNOSIS — E78.5 DYSLIPIDEMIA: ICD-10-CM

## 2019-05-16 DIAGNOSIS — I67.9 VERTIGO DUE TO CEREBROVASCULAR DISEASE: ICD-10-CM

## 2019-05-16 DIAGNOSIS — I27.20 PHT (PULMONARY HYPERTENSION): ICD-10-CM

## 2019-05-16 DIAGNOSIS — Z95.5 S/P DRUG ELUTING CORONARY STENT PLACEMENT: ICD-10-CM

## 2019-05-16 DIAGNOSIS — I65.23 BILATERAL CAROTID ARTERY STENOSIS: Primary | ICD-10-CM

## 2019-05-16 DIAGNOSIS — I48.0 PAROXYSMAL ATRIAL FIBRILLATION: ICD-10-CM

## 2019-05-16 DIAGNOSIS — J96.11 CHRONIC HYPOXEMIC RESPIRATORY FAILURE: ICD-10-CM

## 2019-05-16 PROCEDURE — 99213 OFFICE O/P EST LOW 20 MIN: CPT | Mod: S$PBB,,, | Performed by: INTERNAL MEDICINE

## 2019-05-16 PROCEDURE — 99999 PR PBB SHADOW E&M-EST. PATIENT-LVL III: ICD-10-PCS | Mod: PBBFAC,,, | Performed by: INTERNAL MEDICINE

## 2019-05-16 PROCEDURE — 99999 PR PBB SHADOW E&M-EST. PATIENT-LVL III: CPT | Mod: PBBFAC,,, | Performed by: INTERNAL MEDICINE

## 2019-05-16 PROCEDURE — 99213 OFFICE O/P EST LOW 20 MIN: CPT | Mod: PBBFAC | Performed by: INTERNAL MEDICINE

## 2019-05-16 PROCEDURE — 99213 PR OFFICE/OUTPT VISIT, EST, LEVL III, 20-29 MIN: ICD-10-PCS | Mod: S$PBB,,, | Performed by: INTERNAL MEDICINE

## 2019-05-19 NOTE — PROGRESS NOTES
"Subjective:    Patient ID:  Greg Nolasco is a 78 y.o. male who presents for follow-up of Coronary Artery Disease      HPI  Mr. Nolasco is a 79 y/o gentleman who was originally seen in this clinic on 7/19/18 for chronic hypoxic respiratory failure and exertional dyspnea.  He underwent cardiac catheterization on 7/20/18 and was found to have pulmonary hypertensionwith right to left shunting across a PFO and multi-vessel CAD.  He was referred to Dr. Ruffin, pulmonary HTN clinic, and started on Uptravi.  On 422/19 he underwent PCI of the LAD and D2.  He ahs a  of the RCA with left to right collaterals. Today he reports a marked decrease in his exertional dyspnea.  He is able to complete full sentences in clinic without dyspnea.  He reports that he uses home O2 at night and 2-3 hours during the day. He no longer experiences exertional chest discomfort.  Mr. Nolasco denies LE edema, orthopnea, or pND.  He denies palpitations, syncope, or near syncope.  He denies claudication, but reports "gerry horses" in his legs and feet at night.  Mr. Nolasco reports good medication compliance. However he reports a h/o lightheadedness hen looking up regardless of whether he is sitting ir standing.      Past Medical History:   Diagnosis Date    Chronic hypoxemic respiratory failure     Coronary artery disease     BARBARA (obstructive sleep apnea)     Pulmonary hypertension      Past Surgical History:   Procedure Laterality Date    ANGIOPLASTY  1991    BACK SURGERY      COLONOSCOPY  04/2019    HERNIA REPAIR      INSERTION, CATHETER, RIGHT HEART Right 1/10/2019    Performed by [facility] at [facility] C[facility]    INSERTION, STENT, CORONARY ARTERY Right 4/22/2019    Performed by Dex Lomas MD at [facility] CATH [facility]    KNEE SURGERY      REMOVAL, CATHETER, CENTRAL VENOUS, TUNNELED N/A 12/20/2018    Performed by Walt Smith MD at [facility] CATH [facility]    RIGHT HEART CATH Right 10/31/2018    [facility]    SHOULDER SURGERY      SINUS SURGERY       Current Outpatient Medications on File Prior to Visit   Medication Sig Dispense Refill    allopurinol (ZYLOPRIM) 300 MG tablet Take 300 mg by mouth.      aspirin (ECOTRIN) 81 MG EC tablet Take 81 mg by mouth once daily.      atorvastatin (LIPITOR) 40 MG tablet Take 1 tablet (40 mg total) by mouth once daily. 90 tablet 3    furosemide (LASIX) 40 MG tablet Take 1 tablet (40 mg total) by mouth once daily. 30 tablet 12    melatonin 3 mg Tab Take 1 capsule by mouth daily as needed.       multivit-minerals/ferrous fum (MULTI VITAMIN ORAL) Take 1 tablet by mouth.      OXYGEN-AIR DELIVERY SYSTEMS MISC by Misc.(Non-Drug; Combo Route) route.      potassium chloride (MICRO-K) 10 MEQ CpSR Take 10 mEq by mouth once.   11    RABEprazole (ACIPHEX) 20 mg tablet Take 20 mg by mouth 2 (two) times daily.       spironolactone (ALDACTONE) 25 MG tablet Take 1 tablet (25 mg total) by mouth every morning. 90 tablet 3    ticagrelor (BRILINTA) 90 mg tablet Take 1 tablet (90 mg total) by mouth 2 (two) times daily. 180 tablet 3    UPTRAVI 1,400 mcg Tab Take 1,400 mcg by mouth 2 (two) times daily.        No current facility-administered medications on file prior to visit.      Review of patient's allergies indicates:   Allergen Reactions    Clindamycin Shortness Of Breath    Penicillins Rash     Social History     Tobacco Use    Smoking status: Former Smoker     Last attempt to quit:      Years since quittin.3    Smokeless tobacco: Current User     Types: Snuff   Substance Use Topics    Alcohol use: No     Frequency: Never     Comment: none since 2018    Drug use: No     Family History   Problem Relation Age of Onset    Cirrhosis Neg Hx             Review of Systems   Constitution: Negative for decreased appetite, diaphoresis, fever, malaise/fatigue, weight gain and weight loss.   HENT: Negative for congestion, nosebleeds and sore throat.   "  Eyes: Negative for blurred vision, vision loss in left eye, vision loss in right eye and visual disturbance.   Cardiovascular: Negative for chest pain, claudication, dyspnea on exertion, leg swelling, near-syncope, orthopnea, palpitations, paroxysmal nocturnal dyspnea and syncope.   Respiratory: Negative for cough, hemoptysis, shortness of breath and wheezing.    Endocrine: Negative for polyuria.   Hematologic/Lymphatic: Does not bruise/bleed easily.   Skin: Negative for nail changes and rash.   Musculoskeletal: Negative for back pain, muscle cramps and myalgias.   Gastrointestinal: Negative for abdominal pain, change in bowel habit, diarrhea, heartburn, hematemesis, hematochezia, melena, nausea and vomiting.   Genitourinary: Negative for bladder incontinence, dysuria, frequency and hematuria.   Psychiatric/Behavioral: Negative for depression.   Allergic/Immunologic: Negative for hives.        Objective:  Vitals:    05/16/19 1113 05/16/19 1116   BP: 99/61 100/61   BP Location: Left arm Right arm   Patient Position: Sitting Sitting   BP Method: Large (Automatic) Large (Automatic)   Pulse: 81 86   SpO2: (!) 92%    Weight: 65.7 kg (144 lb 13.5 oz)    Height: 5' 9" (1.753 m)          Physical Exam   Constitutional: He is oriented to person, place, and time. He appears well-developed and well-nourished.   HENT:   Head: Normocephalic and atraumatic.   Eyes: Pupils are equal, round, and reactive to light. EOM are normal.   Neck: Neck supple. No JVD present. No thyromegaly present.   Cardiovascular: Normal rate and regular rhythm. PMI is displaced. Exam reveals no gallop and no friction rub.   No murmur heard.  Pulses:       Carotid pulses are 2+ on the right side, and 2+ on the left side.       Radial pulses are 2+ on the right side, and 2+ on the left side.        Femoral pulses are 2+ on the right side, and 2+ on the left side.       Dorsalis pedis pulses are 2+ on the right side, and 2+ on the left side.        " Posterior tibial pulses are 2+ on the right side, and 2+ on the left side.   Right ventricular heave   Pulmonary/Chest: Effort normal. He has no wheezes. He has no rhonchi. He has no rales.   Abdominal: Soft. Normal appearance. He exhibits no distension. There is no hepatosplenomegaly. There is no tenderness.   Neurological: He is alert and oriented to person, place, and time. Gait normal.   Psychiatric: He has a normal mood and affect.         Assessment:       1. Bilateral carotid artery stenosis    2. S/P drug eluting coronary stent placement    3. Vertigo due to cerebrovascular disease    4. Paroxysmal atrial fibrillation    5. Chronic hypoxemic respiratory failure    6. Coronary artery disease involving native coronary artery of native heart without angina pectoris    7. PHT (pulmonary hypertension)    8. Dyslipidemia         Plan:       1) CAD.  The patient has a h/o pulmonary HTN as well as right to left shunting across his PFO which certainly is likely contributing to his exertional dyspnea.  He underwent PCI of high grade steneosis of the LAD and D2. He has a  of the RCA with left to right collaterals   -continue EC ASA 81mg po qday  -continue Ticagelor 90mg po BID X 6 months minimum  -start phase 2 cardiac rehab     2) Chronic hypoxic respiratory failure and pulmonary HTN.  HTS/ pulmonary hypertension management appreciated  -patient will bring his Uptravi     3) Dyslipidemia. Continue statin     4) H/O GERD. Continue rabeprazole    5) H/o positional vertigo.  The patient's symptoms are concerning for VBI; will order carotid duplex; if negative for extracranial vertebral stenosis then will get CTA of head.    All of the patient's and his family's questions were answered.

## 2019-05-20 ENCOUNTER — TELEPHONE (OUTPATIENT)
Dept: TRANSPLANT | Facility: CLINIC | Age: 79
End: 2019-05-20

## 2019-05-20 NOTE — TELEPHONE ENCOUNTER
Discussed initiation of Opsumit which is a prescription medicine used to treat PAH.   Educated patient on application process and role of Specialty Pharmacy. PH Coordinator will send paperwork but patient needs to speak with [facility] and [facility] to review benefits and arrange shipment of medication.  The pill is taken once daily. Side effects may include fluid retention, nausea or anemia. Cautioned patient about potential for liver problems and asked them to call if they  experience  vomiting, pain in the upper right stomach, tiredness, loss of appetite, yellowing of your skin or whites of your eyes, fever or itching.  Gave patient a medication guide and direct phone number for PH coordinator.  Answered all questions and concerns. Sent referral to [facility].

## 2019-05-28 ENCOUNTER — TELEPHONE (OUTPATIENT)
Dept: TRANSPLANT | Facility: CLINIC | Age: 79
End: 2019-05-28

## 2019-05-28 NOTE — TELEPHONE ENCOUNTER
"Patient called to report that he because sick last Thursday. Started as a runny nose and coughing and then worsened. Went to the local ER on Saturday and was diagnosed with acute bronchitis. He was given an albuterol inhaler and 4 days supply of Azithromycin. Patient says he still does not feel well. He had to stop the inhaler because it made him too shaky, "nervous." He continues to cough up clear mucous and have yellow discharge when he blows his nose. Advised patient to call his MD since he is not getting any better. Also gave him a list of acceptable cold and sinus meds for him to take as needed.  Mr. Nolasco verbalized understanding of all. He reports starting the Opsumit on Friday.  "

## 2019-05-30 ENCOUNTER — HOSPITAL ENCOUNTER (INPATIENT)
Facility: HOSPITAL | Age: 79
LOS: 7 days | Discharge: HOME | DRG: 189 | End: 2019-06-06
Attending: EMERGENCY MEDICINE | Admitting: INTERNAL MEDICINE
Payer: MEDICARE

## 2019-05-30 DIAGNOSIS — I25.10 CAD (CORONARY ARTERY DISEASE): ICD-10-CM

## 2019-05-30 DIAGNOSIS — R07.9 CHEST PAIN: ICD-10-CM

## 2019-05-30 DIAGNOSIS — R09.02 HYPOXEMIA: ICD-10-CM

## 2019-05-30 DIAGNOSIS — J96.21 ACUTE ON CHRONIC RESPIRATORY FAILURE WITH HYPOXEMIA: ICD-10-CM

## 2019-05-30 DIAGNOSIS — R79.89 ELEVATED TROPONIN: ICD-10-CM

## 2019-05-30 DIAGNOSIS — J06.9 UPPER RESPIRATORY TRACT INFECTION, UNSPECIFIED TYPE: ICD-10-CM

## 2019-05-30 DIAGNOSIS — I27.20 PHT (PULMONARY HYPERTENSION): ICD-10-CM

## 2019-05-30 DIAGNOSIS — I25.10 CORONARY ARTERY DISEASE, ANGINA PRESENCE UNSPECIFIED, UNSPECIFIED VESSEL OR LESION TYPE, UNSPECIFIED WHETHER NATIVE OR TRANSPLANTED HEART: ICD-10-CM

## 2019-05-30 DIAGNOSIS — R09.02 HYPOXIA: Primary | ICD-10-CM

## 2019-05-30 PROBLEM — E87.20 LACTIC ACID ACIDOSIS: Status: ACTIVE | Noted: 2019-05-30

## 2019-05-30 LAB
ALBUMIN SERPL BCP-MCNC: 4 G/DL (ref 3.5–5.2)
ALLENS TEST: ABNORMAL
ALP SERPL-CCNC: 170 U/L (ref 55–135)
ALT SERPL W/O P-5'-P-CCNC: 23 U/L (ref 10–44)
ANION GAP SERPL CALC-SCNC: 13 MMOL/L (ref 8–16)
AST SERPL-CCNC: 34 U/L (ref 10–40)
BASOPHILS # BLD AUTO: 0.03 K/UL (ref 0–0.2)
BASOPHILS NFR BLD: 0.4 % (ref 0–1.9)
BILIRUB SERPL-MCNC: 2.4 MG/DL (ref 0.1–1)
BNP SERPL-MCNC: 593 PG/ML (ref 0–99)
BUN SERPL-MCNC: 20 MG/DL (ref 8–23)
CALCIUM SERPL-MCNC: 10.3 MG/DL (ref 8.7–10.5)
CHLORIDE SERPL-SCNC: 102 MMOL/L (ref 95–110)
CO2 SERPL-SCNC: 21 MMOL/L (ref 23–29)
CREAT SERPL-MCNC: 1.1 MG/DL (ref 0.5–1.4)
DELSYS: ABNORMAL
DIFFERENTIAL METHOD: ABNORMAL
EOSINOPHIL # BLD AUTO: 0 K/UL (ref 0–0.5)
EOSINOPHIL NFR BLD: 0.5 % (ref 0–8)
ERYTHROCYTE [DISTWIDTH] IN BLOOD BY AUTOMATED COUNT: 15.7 % (ref 11.5–14.5)
ERYTHROCYTE [SEDIMENTATION RATE] IN BLOOD BY WESTERGREN METHOD: 19 MM/H
EST. GFR  (AFRICAN AMERICAN): >60 ML/MIN/1.73 M^2
EST. GFR  (NON AFRICAN AMERICAN): >60 ML/MIN/1.73 M^2
FIO2: 100
FLOW: 15
GLUCOSE SERPL-MCNC: 126 MG/DL (ref 70–110)
HCO3 UR-SCNC: 17.7 MMOL/L (ref 24–28)
HCT VFR BLD AUTO: 45 % (ref 40–54)
HGB BLD-MCNC: 15.6 G/DL (ref 14–18)
IMM GRANULOCYTES # BLD AUTO: 0.04 K/UL (ref 0–0.04)
IMM GRANULOCYTES NFR BLD AUTO: 0.5 % (ref 0–0.5)
LACTATE SERPL-SCNC: 2.4 MMOL/L (ref 0.5–2.2)
LDH SERPL L TO P-CCNC: 2.13 MMOL/L (ref 0.36–1.25)
LYMPHOCYTES # BLD AUTO: 1.3 K/UL (ref 1–4.8)
LYMPHOCYTES NFR BLD: 15.5 % (ref 18–48)
MCH RBC QN AUTO: 32.2 PG (ref 27–31)
MCHC RBC AUTO-ENTMCNC: 34.7 G/DL (ref 32–36)
MCV RBC AUTO: 93 FL (ref 82–98)
MODE: ABNORMAL
MONOCYTES # BLD AUTO: 0.5 K/UL (ref 0.3–1)
MONOCYTES NFR BLD: 5.7 % (ref 4–15)
NEUTROPHILS # BLD AUTO: 6.5 K/UL (ref 1.8–7.7)
NEUTROPHILS NFR BLD: 77.4 % (ref 38–73)
NRBC BLD-RTO: 0 /100 WBC
PCO2 BLDA: 21.7 MMHG (ref 35–45)
PH SMN: 7.52 [PH] (ref 7.35–7.45)
PLATELET # BLD AUTO: 211 K/UL (ref 150–350)
PMV BLD AUTO: 9.5 FL (ref 9.2–12.9)
PO2 BLDA: 56 MMHG (ref 80–100)
POC BE: -5 MMOL/L
POC SATURATED O2: 93 % (ref 95–100)
POC TCO2: 18 MMOL/L (ref 23–27)
POTASSIUM SERPL-SCNC: 3.8 MMOL/L (ref 3.5–5.1)
PROCALCITONIN SERPL IA-MCNC: 0.18 NG/ML
PROT SERPL-MCNC: 8.4 G/DL (ref 6–8.4)
PROVIDER CREDENTIALS: ABNORMAL
PROVIDER NOTIFIED: ABNORMAL
RBC # BLD AUTO: 4.84 M/UL (ref 4.6–6.2)
SAMPLE: ABNORMAL
SITE: ABNORMAL
SODIUM SERPL-SCNC: 136 MMOL/L (ref 136–145)
SP02: 95
TIME NOTIFIED: 1635
TROPONIN I SERPL DL<=0.01 NG/ML-MCNC: 0.77 NG/ML (ref 0–0.03)
VERBAL RESULT READBACK PERFORMED: YES
WBC # BLD AUTO: 8.44 K/UL (ref 3.9–12.7)

## 2019-05-30 PROCEDURE — 99291 PR CRITICAL CARE, E/M 30-74 MINUTES: ICD-10-PCS | Mod: ,,, | Performed by: EMERGENCY MEDICINE

## 2019-05-30 PROCEDURE — 93010 ELECTROCARDIOGRAM REPORT: CPT | Mod: ,,, | Performed by: INTERNAL MEDICINE

## 2019-05-30 PROCEDURE — 96372 THER/PROPH/DIAG INJ SC/IM: CPT

## 2019-05-30 PROCEDURE — 93010 EKG 12-LEAD: ICD-10-PCS | Mod: ,,, | Performed by: INTERNAL MEDICINE

## 2019-05-30 PROCEDURE — 84145 PROCALCITONIN (PCT): CPT

## 2019-05-30 PROCEDURE — 36600 WITHDRAWAL OF ARTERIAL BLOOD: CPT

## 2019-05-30 PROCEDURE — 63600175 PHARM REV CODE 636 W HCPCS

## 2019-05-30 PROCEDURE — 27000221 HC OXYGEN, UP TO 24 HOURS

## 2019-05-30 PROCEDURE — 83605 ASSAY OF LACTIC ACID: CPT

## 2019-05-30 PROCEDURE — 85025 COMPLETE CBC W/AUTO DIFF WBC: CPT

## 2019-05-30 PROCEDURE — 12000002 HC ACUTE/MED SURGE SEMI-PRIVATE ROOM

## 2019-05-30 PROCEDURE — 25000003 PHARM REV CODE 250

## 2019-05-30 PROCEDURE — 82803 BLOOD GASES ANY COMBINATION: CPT

## 2019-05-30 PROCEDURE — 94761 N-INVAS EAR/PLS OXIMETRY MLT: CPT

## 2019-05-30 PROCEDURE — 25000242 PHARM REV CODE 250 ALT 637 W/ HCPCS

## 2019-05-30 PROCEDURE — 87040 BLOOD CULTURE FOR BACTERIA: CPT

## 2019-05-30 PROCEDURE — 83880 ASSAY OF NATRIURETIC PEPTIDE: CPT

## 2019-05-30 PROCEDURE — 80053 COMPREHEN METABOLIC PANEL: CPT

## 2019-05-30 PROCEDURE — 84484 ASSAY OF TROPONIN QUANT: CPT

## 2019-05-30 PROCEDURE — 93005 ELECTROCARDIOGRAM TRACING: CPT

## 2019-05-30 PROCEDURE — 96374 THER/PROPH/DIAG INJ IV PUSH: CPT

## 2019-05-30 PROCEDURE — 99291 CRITICAL CARE FIRST HOUR: CPT | Mod: ,,, | Performed by: EMERGENCY MEDICINE

## 2019-05-30 PROCEDURE — 99291 CRITICAL CARE FIRST HOUR: CPT | Mod: 25

## 2019-05-30 RX ORDER — ASPIRIN 81 MG/1
81 TABLET ORAL DAILY
Status: DISCONTINUED | OUTPATIENT
Start: 2019-05-31 | End: 2019-06-06 | Stop reason: HOSPADM

## 2019-05-30 RX ORDER — ENOXAPARIN SODIUM 100 MG/ML
40 INJECTION SUBCUTANEOUS EVERY 24 HOURS
Status: DISCONTINUED | OUTPATIENT
Start: 2019-05-30 | End: 2019-06-03

## 2019-05-30 RX ORDER — ALBUTEROL SULFATE 90 UG/1
2 AEROSOL, METERED RESPIRATORY (INHALATION) EVERY 4 HOURS
Status: DISCONTINUED | OUTPATIENT
Start: 2019-05-30 | End: 2019-05-31

## 2019-05-30 RX ORDER — ATORVASTATIN CALCIUM 20 MG/1
40 TABLET, FILM COATED ORAL DAILY
Status: DISCONTINUED | OUTPATIENT
Start: 2019-05-31 | End: 2019-06-03

## 2019-05-30 RX ORDER — ALLOPURINOL 100 MG/1
300 TABLET ORAL DAILY
Status: DISCONTINUED | OUTPATIENT
Start: 2019-05-31 | End: 2019-06-03

## 2019-05-30 RX ORDER — SPIRONOLACTONE 25 MG/1
25 TABLET ORAL EVERY MORNING
Status: DISCONTINUED | OUTPATIENT
Start: 2019-05-31 | End: 2019-06-06 | Stop reason: HOSPADM

## 2019-05-30 RX ORDER — CEFTRIAXONE 1 G/1
1 INJECTION, POWDER, FOR SOLUTION INTRAMUSCULAR; INTRAVENOUS
Status: DISCONTINUED | OUTPATIENT
Start: 2019-05-30 | End: 2019-06-04

## 2019-05-30 RX ORDER — ACETAMINOPHEN 325 MG/1
650 TABLET ORAL EVERY 4 HOURS PRN
Status: DISCONTINUED | OUTPATIENT
Start: 2019-05-30 | End: 2019-06-06 | Stop reason: HOSPADM

## 2019-05-30 RX ORDER — RAMELTEON 8 MG/1
8 TABLET ORAL NIGHTLY PRN
Status: DISCONTINUED | OUTPATIENT
Start: 2019-05-30 | End: 2019-06-06 | Stop reason: HOSPADM

## 2019-05-30 RX ORDER — AZITHROMYCIN 250 MG/1
500 TABLET, FILM COATED ORAL DAILY
Status: DISCONTINUED | OUTPATIENT
Start: 2019-05-30 | End: 2019-06-04

## 2019-05-30 RX ORDER — POLYETHYLENE GLYCOL 3350 17 G/17G
17 POWDER, FOR SOLUTION ORAL DAILY
Status: DISCONTINUED | OUTPATIENT
Start: 2019-05-31 | End: 2019-06-06 | Stop reason: HOSPADM

## 2019-05-30 RX ORDER — PREDNISONE 20 MG/1
60 TABLET ORAL ONCE
Status: COMPLETED | OUTPATIENT
Start: 2019-05-30 | End: 2019-05-30

## 2019-05-30 RX ORDER — ONDANSETRON 2 MG/ML
4 INJECTION INTRAMUSCULAR; INTRAVENOUS EVERY 8 HOURS PRN
Status: DISCONTINUED | OUTPATIENT
Start: 2019-05-30 | End: 2019-06-06 | Stop reason: HOSPADM

## 2019-05-30 RX ORDER — SODIUM CHLORIDE 0.9 % (FLUSH) 0.9 %
10 SYRINGE (ML) INJECTION
Status: DISCONTINUED | OUTPATIENT
Start: 2019-05-30 | End: 2019-06-06 | Stop reason: HOSPADM

## 2019-05-30 RX ORDER — FAMOTIDINE 20 MG/1
20 TABLET, FILM COATED ORAL 2 TIMES DAILY
Status: DISCONTINUED | OUTPATIENT
Start: 2019-05-30 | End: 2019-06-06 | Stop reason: HOSPADM

## 2019-05-30 RX ORDER — GUAIFENESIN 600 MG/1
600 TABLET, EXTENDED RELEASE ORAL 2 TIMES DAILY
Status: DISCONTINUED | OUTPATIENT
Start: 2019-05-30 | End: 2019-06-06 | Stop reason: HOSPADM

## 2019-05-30 RX ADMIN — PREDNISONE 60 MG: 20 TABLET ORAL at 08:05

## 2019-05-30 RX ADMIN — AZITHROMYCIN 500 MG: 250 TABLET, FILM COATED ORAL at 07:05

## 2019-05-30 RX ADMIN — CEFTRIAXONE SODIUM 1 G: 1 INJECTION, POWDER, FOR SOLUTION INTRAMUSCULAR; INTRAVENOUS at 08:05

## 2019-05-30 RX ADMIN — FAMOTIDINE 20 MG: 20 TABLET, FILM COATED ORAL at 08:05

## 2019-05-30 RX ADMIN — ENOXAPARIN SODIUM 40 MG: 100 INJECTION SUBCUTANEOUS at 07:05

## 2019-05-30 RX ADMIN — TICAGRELOR 90 MG: 90 TABLET ORAL at 08:05

## 2019-05-30 RX ADMIN — GUAIFENESIN 600 MG: 600 TABLET, EXTENDED RELEASE ORAL at 10:05

## 2019-05-30 RX ADMIN — ALBUTEROL SULFATE 2 PUFF: 90 AEROSOL, METERED RESPIRATORY (INHALATION) at 11:05

## 2019-05-30 NOTE — ASSESSMENT & PLAN NOTE
Likely due to exacerbation of group 1 PH secondary to acute viral URI with possible progression to pneumonia characterized by symptoms of sinus congestion, post-nasal drip and cough productive of green sputum. Suspect viral etiology, will treat empirically for community-acquired, healthcare-associated pneumonia. This is unlikely to be macitentant side-effect (usually characterized by elevation in LFT, increased pulmonary edema/fluid retention)    - admit to inpatient to ICU for closely monitoring, respiratory support  - start empiric rocephin/azithromycin  - single dose of prednisone 60mg for suspicion of acute bronchitis  - check procalcitonin (if negative, will consider stopping rocephin), trend lactate  - AM ABG  - nebs q4h  - guaifenesin 600mg BID  - continue selexipag 1400mcg BID (patient brought home med)  - restart macitentan, patient asked to bring in home med  - cardiac diet  - downgrade in AM if stable overnight

## 2019-05-30 NOTE — ED PROVIDER NOTES
Encounter Date: 5/30/2019    SCRIBE #1 NOTE: I, Andrews Rdz, am scribing for, and in the presence of,  Dr. Bruce. I have scribed the entire note.       History     Chief Complaint   Patient presents with    Shortness of Breath     Pt c/o SOB-since Friday.       Mr. Nolasco is a 79 y/o male with a history of Pulmonary HTN, chronic respiratory failure, BARBARA, and CAD w/ stent, here today for evaluation of his Shortness of Breath. Patient states that beginning Friday he has had increasingly worsening SOB and has been having a productive cough with clear sputum. Has associated nausea without vomiting. Called his heart failure physician who advised him to come here for evaluation. Patient denies any associated fevers, chills, vomiting, abd pain, chest pain, numbness, tingling, weakness, hemoptysis, sore throat.     The history is provided by the patient.     Review of patient's allergies indicates:   Allergen Reactions    Clindamycin Shortness Of Breath    Penicillins Rash     Past Medical History:   Diagnosis Date    Chronic hypoxemic respiratory failure     Coronary artery disease     BARBARA (obstructive sleep apnea)     Pulmonary hypertension      Past Surgical History:   Procedure Laterality Date    ANGIOPLASTY  1991    BACK SURGERY      COLONOSCOPY  04/2019    HERNIA REPAIR      INSERTION, CATHETER, RIGHT HEART Right 1/10/2019    Per[facility] at [facility]    INSERTION, STENT, CORONARY ARTERY Right 4/22/2019    Performed by Dex Lomas MD at [facility] CATH [facility]    KNEE SURGERY      REMOVAL, CATHETER, CENTRAL VENOUS, TUNNELED N/A 12/20/2018    Performed by Walt Smith MD at [facility] CATH [facility]    RIGHT HEART CATH Right 10/31/2018    Performed by Robert [facility] at [facility] CATH [facility]    SHOULDER SURGERY      SINUS SURGERY       Family History   Problem Relation Age of Onset    Cirrhosis Neg Hx      Social History     Tobacco Use    Smoking status: Former Smoker     Last  attempt to quit:      Years since quittin.4    Smokeless tobacco: Current User     Types: Snuff   Substance Use Topics    Alcohol use: No     Frequency: Never     Comment: none since 2018    Drug use: No     Review of Systems   Constitutional: Negative for diaphoresis and fever.   HENT: Negative for mouth sores and sore throat.    Eyes: Negative for pain and redness.   Respiratory: Positive for cough and shortness of breath.    Cardiovascular: Negative for chest pain.   Gastrointestinal: Positive for nausea. Negative for anal bleeding.   Endocrine: Negative for polydipsia.   Genitourinary: Negative for dysuria and penile swelling.   Musculoskeletal: Negative for back pain and neck pain.   Skin: Negative for rash.   Neurological: Negative for seizures and weakness.   All other systems reviewed and are negative.      Physical Exam     Initial Vitals [19 1547]   BP Pulse Resp Temp SpO2   (!) 105/58 108 (!) 30 97.7 °F (36.5 °C) (!) 85 %      MAP       --         Physical Exam    Nursing note and vitals reviewed.  Constitutional: He appears well-developed and well-nourished. He is not diaphoretic. No distress.   HENT:   Head: Normocephalic and atraumatic.   Right Ear: External ear normal.   Left Ear: External ear normal.   Neck: Neck supple.   Cardiovascular: Normal rate, regular rhythm, normal heart sounds and intact distal pulses.   Pulmonary/Chest: He is in respiratory distress. He has no wheezes. He has rhonchi. He has no rales.   Tachypnic. Has right sided rhonchi. Left lung sounds are clear. Good air movement diffusely. Speaks in short sentences.    Abdominal: Soft. He exhibits no distension. There is no tenderness.   Musculoskeletal: He exhibits edema.   Trace Bilateral LE edema.    Neurological: He is alert and oriented to person, place, and time. GCS score is 15. GCS eye subscore is 4. GCS verbal subscore is 5. GCS motor subscore is 6.   Skin: Skin is warm. Capillary refill takes less than  2 seconds. No rash noted.   Psychiatric: He has a normal mood and affect.         ED Course   Procedures  Labs Reviewed   CBC W/ AUTO DIFFERENTIAL - Abnormal; Notable for the following components:       Result Value    Mean Corpuscular Hemoglobin 32.2 (*)     RDW 15.7 (*)     Gran% 77.4 (*)     Lymph% 15.5 (*)     All other components within normal limits   COMPREHENSIVE METABOLIC PANEL - Abnormal; Notable for the following components:    CO2 21 (*)     Glucose 126 (*)     Total Bilirubin 2.4 (*)     Alkaline Phosphatase 170 (*)     All other components within normal limits   TROPONIN I - Abnormal; Notable for the following components:    Troponin I 0.772 (*)     All other components within normal limits   B-TYPE NATRIURETIC PEPTIDE - Abnormal; Notable for the following components:     (*)     All other components within normal limits   LACTIC ACID, PLASMA - Abnormal; Notable for the following components:    Lactate (Lactic Acid) 2.4 (*)     All other components within normal limits   ISTAT PROCEDURE - Abnormal; Notable for the following components:    POC PH 7.519 (*)     POC PCO2 21.7 (*)     POC PO2 56 (*)     POC HCO3 17.7 (*)     POC SATURATED O2 93 (*)     POC Lactate 2.13 (*)     POC TCO2 18 (*)     All other components within normal limits   PROCALCITONIN   PROCALCITONIN    Narrative:     ADD ON PCAL [phone] PER YOSI SHAHID RN  05/30/2019  20:00      EKG Readings: (Independently Interpreted)   Initial Reading: No STEMI. Previous EKG: Compared with most recent EKG   NSR, HR @ 98 bpm, Normal intervals, No Stemi. No gross change from 4/22/19.       Imaging Results          X-Ray Chest AP Portable (Final result)  Result time 05/30/19 17:16:23    Final result by Alonso Gaming MD (05/30/19 17:16:23)                 Impression:      Findings of emphysema similar with prior.  No definite superimposed failure or pneumonia.      Electronically signed by: Alonso  [facility]  Date:    05/30/2019  Time:    17:16             Narrative:    EXAMINATION:  XR CHEST AP PORTABLE    CLINICAL HISTORY:  Chest Pain;    TECHNIQUE:  Single frontal view of the chest was performed.    COMPARISON:  07/23/2018 and 06/21/2018    FINDINGS:  Single frontal portable view at 16:50.    There is mild scattered reticular streaky opacities of the lungs.  There is overall heterogeneity of the lungs consistent with underlying emphysema unchanged, better shown with 07/12/2018 CT.  No definite superimposed failure or pneumonia.  No pneumothorax or pleural effusion.  No dominant nodular cavitary lesion.  The hilar shadows and trachea and heart appear normal.  There are overlying leads.  No abdominal free air.                              X-Rays:   Independently Interpreted Readings:   Other Readings:  CXR viewed. No acute infiltrate, effusion or PTX on my read.     Medical Decision Making:   History:   Old Medical Records: I decided to obtain old medical records.  Old Records Summarized: records from clinic visits and records from previous admission(s).  Independently Interpreted Test(s):   I have ordered and independently interpreted X-rays - see prior notes.  I have ordered and independently interpreted EKG Reading(s) - see prior notes  Clinical Tests:   Lab Tests: Ordered and Reviewed  Radiological Study: Ordered and Reviewed  Medical Tests: Ordered and Reviewed  ED Management:  Hypoxic, requiring NRB to maintain O2 sats in mid to upper 90s. Here w/ what sounds to be acute exacerbation of pHTN. CBC, CMP, lactate, BNP, troponin, ABG, blood cx, CXR, EKG obtained.    ABG 7.52/22/56 on NRB FM. Lactate 2.13.  Has maintained O2 sats on NRB FM, and appears relatively comfortable. Will defer CPAP until HTS evaluation. Consulted HTS.  EKG and CXR w/o acute changes.    Labs otherwise grossly WNL w/o actionable values except , troponin 0.772. I suspect these findings are from CHF/pHTN exacerbation and strain on  the heart rather than ACS.    Discussed with HTS who admitted to CCU.  Other:   I have discussed this case with another health care provider.       <> Summary of the Discussion: HTS            Scribe Attestation:   Scribe #1: I performed the above scribed service and the documentation accurately describes the services I performed. I attest to the accuracy of the note.    Attending Attestation:         Attending Critical Care:   Critical Care Times:   Direct Patient Care (initial evaluation, reassessments, and time considering the case)................................................................10 minutes.   Additional History from reviewing old medical records or taking additional history from the family, EMS, PCP, etc.......................5 minutes.   Ordering, Reviewing, and Interpreting Diagnostic Studies...............................................................................................................5 minutes.   Documentation..................................................................................................................................................................................10 minutes.   Consultation with other Physicians. .................................................................................................................................................5 minutes.   ==============================================================  · Total Critical Care Time - exclusive of procedural time: 35 minutes.  ==============================================================  Critical care was necessary to treat or prevent imminent or life-threatening deterioration of the following conditions: congestive heart failure and respiratory failure.   The following critical care procedures were done by me (see procedure notes): airway management, pulse oximetry and blood draw for specimens.   Critical care was time spent personally by me on the following activities: obtaining  history from patient or relative, examination of patient, review of old charts, ordering lab, x-rays, and/or EKG, development of treatment plan with patient or relative, ordering and performing treatments and interventions, evaluation of patient's response to treatment, discussion with consultants, interpretation of cardiac measurements and re-evaluation of patient's conition.   Critical Care Condition: life-threatening                  Clinical Impression:       ICD-10-CM ICD-9-CM   1. Hypoxia R09.02 799.02   2. Chest pain R07.9 786.50   3. Elevated troponin R74.8 790.6   4. Hypoxemia R09.02 799.02   5. CAD (coronary artery disease) I25.10 414.00   6. Acute on chronic respiratory failure with hypoxemia J96.21 518.84   7. Coronary artery disease, angina presence unspecified, unspecified vessel or lesion type, unspecified whether native or transplanted heart I25.10 414.00   8. PHT (pulmonary hypertension) I27.20 416.8   9. Upper respiratory tract infection, unspecified type J06.9 465.9         Disposition:   Disposition: Admitted  Condition: Stable                        Felix Bruce MD  06/01/19 1240

## 2019-05-30 NOTE — ED NOTES
Pt sitting up in bed tolerating NRB. No other changes in assessment.  See flowsheet for O2 titration.

## 2019-05-30 NOTE — ASSESSMENT & PLAN NOTE
Due to hypoxemia in the setting of pre-existing significant CAD. No chest pain or significant EKG changes to suggest ACS.    - no need to trend troponing, will monitor closely  - check echo in AM

## 2019-05-30 NOTE — SUBJECTIVE & OBJECTIVE
Past Medical History:   Diagnosis Date    Chronic hypoxemic respiratory failure     Coronary artery disease     BARBARA (obstructive sleep apnea)     Pulmonary hypertension        Past Surgical History:   Procedure Laterality Date    ANGIOPLASTY  1991    BACK SURGERY      COLONOSCOPY  04/2019    HERNIA REPAIR      INSERTION, CATHETER, RIGHT HEART Right 1/10/2019    Performed by Miguelina Ruffin MD at [facility] CATH LAB    INSERTION, STENT, CORONARY ARTERY Right 4/22/2019    Performed by Dex Lomas [facility] at [facility] CATH LAB    KNEE SURGERY      REMOVAL, CATHETER, CENTRAL VENOUS, TUNNELED N/A 12/20/2018    Performed by Walt Smith MD at [facility] CATH LAB    RIGHT HEART CATH Right 10/31/2018    Performed by Robert Lomas MD at [facility] CATH LAB    SHOULDER SURGERY      SINUS SURGERY         Review of patient's allergies indicates:   Allergen Reactions    Clindamycin Shortness Of Breath    Penicillins Rash       No current facility-administered medications on file prior to encounter.      Current Outpatient Medications on File Prior to Encounter   Medication Sig    allopurinol (ZYLOPRIM) 300 MG tablet Take 300 mg by mouth.    aspirin (ECOTRIN) 81 MG EC tablet Take 81 mg by mouth once daily.    atorvastatin (LIPITOR) 40 MG tablet Take 1 tablet (40 mg total) by mouth once daily.    furosemide (LASIX) 40 MG tablet Take 1 tablet (40 mg total) by mouth once daily.    macitentan 10 mg Tab Take 10 mg by mouth once daily.    melatonin 3 mg Tab Take 1 capsule by mouth daily as needed.     multivit-minerals/ferrous fum (MULTI VITAMIN ORAL) Take 1 tablet by mouth.    OXYGEN-AIR DELIVERY SYSTEMS MISC by Misc.(Non-Drug; Combo Route) route.    potassium chloride (MICRO-K) 10 MEQ CpSR Take 10 mEq by mouth once.     RABEprazole (ACIPHEX) 20 mg tablet Take 20 mg by mouth 2 (two) times daily.     spironolactone (ALDACTONE) 25 MG tablet Take 1 tablet (25 mg total) by mouth every morning.    ticagrelor  (BRILINTA) 90 mg tablet Take 1 tablet (90 mg total) by mouth 2 (two) times daily.    UPTRAVI 1,400 mcg Tab Take 1,400 mcg by mouth 2 (two) times daily.      Family History     None        Tobacco Use    Smoking status: Former Smoker     Last attempt to quit: 1991     Years since quittin.4    Smokeless tobacco: Current User     Types: Snuff   Substance and Sexual Activity    Alcohol use: No     Frequency: Never     Comment: none since 2018    Drug use: No    Sexual activity: Not on file     Review of Systems   Constitution: Positive for chills and malaise/fatigue. Negative for fever and weight gain.   HENT: Positive for congestion and hoarse voice.    Eyes: Negative for visual disturbance.   Cardiovascular: Negative for chest pain, claudication, dyspnea on exertion, leg swelling, orthopnea, palpitations and syncope.   Respiratory: Positive for cough (productive) and shortness of breath. Negative for snoring.    Hematologic/Lymphatic: Does not bruise/bleed easily.   Skin: Negative for rash.   Musculoskeletal: Negative for muscle cramps and myalgias.   Gastrointestinal: Negative for bloating, abdominal pain, constipation, diarrhea and melena.   Genitourinary: Negative for bladder incontinence.   Neurological: Negative for excessive daytime sleepiness, focal weakness and weakness.   Psychiatric/Behavioral: Negative for depression and suicidal ideas.     Objective:     Vital Signs (Most Recent):  Temp: 98.3 °F (36.8 °C) (19 1708)  Pulse: 99 (19 1720)  Resp: 17 (19 1720)  BP: 113/67 (19 1708)  SpO2: 99 % (19 1720) Vital Signs (24h Range):  Temp:  [97.7 °F (36.5 °C)-98.3 °F (36.8 °C)] 98.3 °F (36.8 °C)  Pulse:  [] 99  Resp:  [16-38] 17  SpO2:  [85 %-99 %] 99 %  BP: (105-126)/(58-67) 113/67     Weight: 62.6 kg (138 lb)  Body mass index is 20.38 kg/m².    SpO2: 99 %  O2 Device (Oxygen Therapy): Non Rebreather Mask    No intake or output data in the 24 hours ending 19  1833    Lines/Drains/Airways     Peripheral Intravenous Line                 Peripheral IV - Single Lumen 05/30/19 1614 18 G Left Antecubital less than 1 day         Peripheral IV - Single Lumen 05/30/19 1631 18 G Right Antecubital less than 1 day                Physical Exam   Constitutional: He is oriented to person, place, and time. He appears well-developed and well-nourished. No distress.   HENT:   Head: Normocephalic and atraumatic.   Mouth/Throat: Oropharynx is clear and moist.   Eyes: Pupils are equal, round, and reactive to light. Conjunctivae and EOM are normal. No scleral icterus.   Neck: Normal range of motion. Neck supple. No JVD present.   Cardiovascular: Normal rate, regular rhythm, normal heart sounds and intact distal pulses.   No murmur heard.  Pulses:       Radial pulses are 2+ on the right side, and 2+ on the left side.   Pulmonary/Chest: Effort normal. No respiratory distress. He has wheezes (mild and bilateral).   Symmetrical expansion   Abdominal: Soft. Bowel sounds are normal. There is no hepatosplenomegaly. There is no tenderness.   Musculoskeletal: Normal range of motion. He exhibits no edema.   Neurological: He is alert and oriented to person, place, and time. No cranial nerve deficit.   Skin: Skin is warm and dry. No rash noted. He is not diaphoretic.   Psychiatric: He has a normal mood and affect. Judgment and thought content normal.       Significant Labs:   ABG:   Recent Labs   Lab 05/30/19  1635   PH 7.519*   PCO2 21.7*   HCO3 17.7*   POCSATURATED 93*   BE -5   , CMP   Recent Labs   Lab 05/30/19  1613      K 3.8      CO2 21*   *   BUN 20   CREATININE 1.1   CALCIUM 10.3   PROT 8.4   ALBUMIN 4.0   BILITOT 2.4*   ALKPHOS 170*   AST 34   ALT 23   ANIONGAP 13   ESTGFRAFRICA >60.0   EGFRNONAA >60.0   , CBC   Recent Labs   Lab 05/30/19  1613   WBC 8.44   HGB 15.6   HCT 45.0       and Troponin   Recent Labs   Lab 05/30/19  1613   TROPONINI 0.772*     BNP:  540        Significant Imaging:   CXR: clear, chronic changes consistent with COPD    EKG: NSR, HR 98, non-specific T-wave flattening in III, otherwise no significant changes from prior

## 2019-05-30 NOTE — ASSESSMENT & PLAN NOTE
Hypoxia-induced due to above. Will treat supportively and follow closely. Presentation not consistent with severe sepsis/septic shock.

## 2019-05-31 PROBLEM — R79.89 ELEVATED TROPONIN: Status: RESOLVED | Noted: 2019-05-30 | Resolved: 2019-05-31

## 2019-05-31 PROBLEM — R09.02 HYPOXIA: Status: ACTIVE | Noted: 2019-05-31

## 2019-05-31 PROBLEM — E87.20 LACTIC ACID ACIDOSIS: Status: RESOLVED | Noted: 2019-05-30 | Resolved: 2019-05-31

## 2019-05-31 LAB
ALLENS TEST: ABNORMAL
ALLENS TEST: ABNORMAL
ANION GAP SERPL CALC-SCNC: 9 MMOL/L (ref 8–16)
ASCENDING AORTA: 3.75 CM
BASOPHILS # BLD AUTO: 0.01 K/UL (ref 0–0.2)
BASOPHILS NFR BLD: 0.2 % (ref 0–1.9)
BSA FOR ECHO PROCEDURE: 1.74 M2
BUN SERPL-MCNC: 18 MG/DL (ref 8–23)
CALCIUM SERPL-MCNC: 9.3 MG/DL (ref 8.7–10.5)
CHLORIDE SERPL-SCNC: 104 MMOL/L (ref 95–110)
CO2 SERPL-SCNC: 21 MMOL/L (ref 23–29)
CREAT SERPL-MCNC: 1 MG/DL (ref 0.5–1.4)
CV ECHO LV RWT: 0.23 CM
DELSYS: ABNORMAL
DELSYS: ABNORMAL
DIFFERENTIAL METHOD: ABNORMAL
DOP CALC LVOT AREA: 3.08 CM2
DOP CALC LVOT DIAMETER: 1.98 CM
ECHO LV POSTERIOR WALL: 0.51 CM (ref 0.6–1.1)
EOSINOPHIL # BLD AUTO: 0 K/UL (ref 0–0.5)
EOSINOPHIL NFR BLD: 0 % (ref 0–8)
ERYTHROCYTE [DISTWIDTH] IN BLOOD BY AUTOMATED COUNT: 15.4 % (ref 11.5–14.5)
ERYTHROCYTE [SEDIMENTATION RATE] IN BLOOD BY WESTERGREN METHOD: 28 MM/H
EST. GFR  (AFRICAN AMERICAN): >60 ML/MIN/1.73 M^2
EST. GFR  (NON AFRICAN AMERICAN): >60 ML/MIN/1.73 M^2
FIO2: 60
FIO2: 70
FLOW: 22
FLOW: 22
FRACTIONAL SHORTENING: 37 % (ref 28–44)
GLUCOSE SERPL-MCNC: 150 MG/DL (ref 70–110)
HCO3 UR-SCNC: 19.3 MMOL/L (ref 24–28)
HCO3 UR-SCNC: 19.5 MMOL/L (ref 24–28)
HCT VFR BLD AUTO: 36.6 % (ref 40–54)
HGB BLD-MCNC: 12.6 G/DL (ref 14–18)
IMM GRANULOCYTES # BLD AUTO: 0.03 K/UL (ref 0–0.04)
IMM GRANULOCYTES NFR BLD AUTO: 0.5 % (ref 0–0.5)
INTERVENTRICULAR SEPTUM: 0.73 CM (ref 0.6–1.1)
LACTATE SERPL-SCNC: 1.2 MMOL/L (ref 0.5–2.2)
LEFT ATRIUM SIZE: 4.93 CM
LEFT INTERNAL DIMENSION IN SYSTOLE: 2.78 CM (ref 2.1–4)
LEFT VENTRICLE DIASTOLIC VOLUME INDEX: 50.5 ML/M2
LEFT VENTRICLE DIASTOLIC VOLUME: 88.83 ML
LEFT VENTRICLE MASS INDEX: 45.3 G/M2
LEFT VENTRICLE SYSTOLIC VOLUME INDEX: 16.4 ML/M2
LEFT VENTRICLE SYSTOLIC VOLUME: 28.93 ML
LEFT VENTRICULAR INTERNAL DIMENSION IN DIASTOLE: 4.42 CM (ref 3.5–6)
LEFT VENTRICULAR MASS: 79.62 G
LYMPHOCYTES # BLD AUTO: 0.8 K/UL (ref 1–4.8)
LYMPHOCYTES NFR BLD: 12.6 % (ref 18–48)
MAGNESIUM SERPL-MCNC: 2.2 MG/DL (ref 1.6–2.6)
MCH RBC QN AUTO: 31.5 PG (ref 27–31)
MCHC RBC AUTO-ENTMCNC: 34.4 G/DL (ref 32–36)
MCV RBC AUTO: 92 FL (ref 82–98)
MODE: ABNORMAL
MODE: ABNORMAL
MONOCYTES # BLD AUTO: 0.3 K/UL (ref 0.3–1)
MONOCYTES NFR BLD: 4.4 % (ref 4–15)
NEUTROPHILS # BLD AUTO: 4.9 K/UL (ref 1.8–7.7)
NEUTROPHILS NFR BLD: 82.3 % (ref 38–73)
NRBC BLD-RTO: 0 /100 WBC
PCO2 BLDA: 22.7 MMHG (ref 35–45)
PCO2 BLDA: 24.6 MMHG (ref 35–45)
PH SMN: 7.51 [PH] (ref 7.35–7.45)
PH SMN: 7.54 [PH] (ref 7.35–7.45)
PHOSPHATE SERPL-MCNC: 3.6 MG/DL (ref 2.7–4.5)
PISA TR MAX VEL: 3 M/S
PLATELET # BLD AUTO: 181 K/UL (ref 150–350)
PMV BLD AUTO: 9.8 FL (ref 9.2–12.9)
PO2 BLDA: 58 MMHG (ref 80–100)
PO2 BLDA: 59 MMHG (ref 80–100)
POC BE: -3 MMOL/L
POC BE: -3 MMOL/L
POC SATURATED O2: 93 % (ref 95–100)
POC SATURATED O2: 94 % (ref 95–100)
POC TCO2: 20 MMOL/L (ref 23–27)
POC TCO2: 20 MMOL/L (ref 23–27)
POTASSIUM SERPL-SCNC: 4 MMOL/L (ref 3.5–5.1)
PROVIDER CREDENTIALS: ABNORMAL
PROVIDER NOTIFIED: ABNORMAL
RA PRESSURE: 3 MMHG
RBC # BLD AUTO: 4 M/UL (ref 4.6–6.2)
SAMPLE: ABNORMAL
SAMPLE: ABNORMAL
SINUS: 3.96 CM
SITE: ABNORMAL
SITE: ABNORMAL
SODIUM SERPL-SCNC: 134 MMOL/L (ref 136–145)
SP02: 92
SP02: 93
STJ: 3.05 CM
TIME NOTIFIED: 1118
TR MAX PG: 36 MMHG
TRICUSPID ANNULAR PLANE SYSTOLIC EXCURSION: 1.71 CM
TV REST PULMONARY ARTERY PRESSURE: 39 MMHG
VERBAL RESULT READBACK PERFORMED: YES
WBC # BLD AUTO: 5.95 K/UL (ref 3.9–12.7)

## 2019-05-31 PROCEDURE — 99900035 HC TECH TIME PER 15 MIN (STAT)

## 2019-05-31 PROCEDURE — 94640 AIRWAY INHALATION TREATMENT: CPT

## 2019-05-31 PROCEDURE — 82803 BLOOD GASES ANY COMBINATION: CPT

## 2019-05-31 PROCEDURE — 99223 PR INITIAL HOSPITAL CARE,LEVL III: ICD-10-PCS | Mod: AI,,, | Performed by: INTERNAL MEDICINE

## 2019-05-31 PROCEDURE — 25000003 PHARM REV CODE 250

## 2019-05-31 PROCEDURE — 80048 BASIC METABOLIC PNL TOTAL CA: CPT

## 2019-05-31 PROCEDURE — 27100171 HC OXYGEN HIGH FLOW UP TO 24 HOURS

## 2019-05-31 PROCEDURE — 84100 ASSAY OF PHOSPHORUS: CPT

## 2019-05-31 PROCEDURE — 25000242 PHARM REV CODE 250 ALT 637 W/ HCPCS: Performed by: INTERNAL MEDICINE

## 2019-05-31 PROCEDURE — 94761 N-INVAS EAR/PLS OXIMETRY MLT: CPT

## 2019-05-31 PROCEDURE — 36600 WITHDRAWAL OF ARTERIAL BLOOD: CPT

## 2019-05-31 PROCEDURE — 85025 COMPLETE CBC W/AUTO DIFF WBC: CPT

## 2019-05-31 PROCEDURE — 87632 RESP VIRUS 6-11 TARGETS: CPT

## 2019-05-31 PROCEDURE — 99223 1ST HOSP IP/OBS HIGH 75: CPT | Mod: AI,,, | Performed by: INTERNAL MEDICINE

## 2019-05-31 PROCEDURE — 63600175 PHARM REV CODE 636 W HCPCS

## 2019-05-31 PROCEDURE — 20600001 HC STEP DOWN PRIVATE ROOM

## 2019-05-31 PROCEDURE — 83735 ASSAY OF MAGNESIUM: CPT

## 2019-05-31 PROCEDURE — 25000003 PHARM REV CODE 250: Performed by: PHYSICIAN ASSISTANT

## 2019-05-31 PROCEDURE — 83605 ASSAY OF LACTIC ACID: CPT

## 2019-05-31 PROCEDURE — 63600175 PHARM REV CODE 636 W HCPCS: Performed by: INTERNAL MEDICINE

## 2019-05-31 RX ORDER — ALBUTEROL SULFATE 90 UG/1
2 AEROSOL, METERED RESPIRATORY (INHALATION) EVERY 4 HOURS
Status: DISCONTINUED | OUTPATIENT
Start: 2019-05-31 | End: 2019-06-06 | Stop reason: HOSPADM

## 2019-05-31 RX ORDER — TRAMADOL HYDROCHLORIDE 50 MG/1
50 TABLET ORAL ONCE
Status: COMPLETED | OUTPATIENT
Start: 2019-05-31 | End: 2019-05-31

## 2019-05-31 RX ADMIN — RAMELTEON 8 MG: 8 TABLET, FILM COATED ORAL at 08:05

## 2019-05-31 RX ADMIN — ENOXAPARIN SODIUM 40 MG: 100 INJECTION SUBCUTANEOUS at 05:05

## 2019-05-31 RX ADMIN — ALBUTEROL SULFATE 2 PUFF: 90 AEROSOL, METERED RESPIRATORY (INHALATION) at 06:05

## 2019-05-31 RX ADMIN — HUMAN ALBUMIN MICROSPHERES AND PERFLUTREN 0.66 MG: 10; .22 INJECTION, SOLUTION INTRAVENOUS at 01:05

## 2019-05-31 RX ADMIN — TICAGRELOR 90 MG: 90 TABLET ORAL at 09:05

## 2019-05-31 RX ADMIN — ATORVASTATIN CALCIUM 40 MG: 20 TABLET, FILM COATED ORAL at 08:05

## 2019-05-31 RX ADMIN — GUAIFENESIN 600 MG: 600 TABLET, EXTENDED RELEASE ORAL at 08:05

## 2019-05-31 RX ADMIN — MACITENTAN 10 MG: 10 TABLET, FILM COATED ORAL at 05:05

## 2019-05-31 RX ADMIN — CEFTRIAXONE SODIUM 1 G: 1 INJECTION, POWDER, FOR SOLUTION INTRAMUSCULAR; INTRAVENOUS at 08:05

## 2019-05-31 RX ADMIN — ALBUTEROL SULFATE 2 PUFF: 90 AEROSOL, METERED RESPIRATORY (INHALATION) at 07:05

## 2019-05-31 RX ADMIN — ALBUTEROL SULFATE 2 PUFF: 90 AEROSOL, METERED RESPIRATORY (INHALATION) at 02:05

## 2019-05-31 RX ADMIN — ALBUTEROL SULFATE 2 PUFF: 90 AEROSOL, METERED RESPIRATORY (INHALATION) at 10:05

## 2019-05-31 RX ADMIN — ALLOPURINOL 300 MG: 100 TABLET ORAL at 08:05

## 2019-05-31 RX ADMIN — ALBUTEROL SULFATE 2 PUFF: 90 AEROSOL, METERED RESPIRATORY (INHALATION) at 03:05

## 2019-05-31 RX ADMIN — ASPIRIN 81 MG: 81 TABLET, COATED ORAL at 08:05

## 2019-05-31 RX ADMIN — AZITHROMYCIN 500 MG: 250 TABLET, FILM COATED ORAL at 08:05

## 2019-05-31 RX ADMIN — TICAGRELOR 90 MG: 90 TABLET ORAL at 08:05

## 2019-05-31 RX ADMIN — FAMOTIDINE 20 MG: 20 TABLET, FILM COATED ORAL at 08:05

## 2019-05-31 RX ADMIN — SPIRONOLACTONE 25 MG: 25 TABLET, FILM COATED ORAL at 07:05

## 2019-05-31 RX ADMIN — TRAMADOL HYDROCHLORIDE 50 MG: 50 TABLET, FILM COATED ORAL at 03:05

## 2019-05-31 NOTE — PROGRESS NOTES
Admit Note     Met with patient to assess needs. Patient is a 78 y.o.  male, admitted for URI. Pt has a history of PHTN. Pt's PHTN medications are taken orally.    Patient admitted from emergency on 5/30/2019 .  At this time, patient presents as alert and oriented x 4, good eye contact, calm and communicative.  At this time, patients caregiver presents as not present. Pt states his daughter is coming this afternoon.    Household/Family Systems     Patient resides alone at:    [address] [place] [place] [address]  3 hours from [facility]    Mailing address:     [address]  [place] [address].      Support system includes son and daughter.    Patient does not have dependents that are need of being cared for.     Patients primary caregiver is self.   Pt's cell:  [phone]  Aiden Escamillarop: (son, lives 20 miles from pt in [place], [place] and drives) [phone]  Carly Rodríguez Gaurav: (daughter, lives 20 miles from pt in [place], [place]) [phone]    During admission, patient's caregiver plans to stay in patient's room.  Confirmed patient and patients caregivers do have access to reliable transportation.    Cognitive Status/Learning     Patient reports reading ability as 12th grade and states patient does not have difficulty with reading, writing, seeing, hearing, comprehension, learning and memory.  Patient reports patient learns best by hands on .     Needed: No.   Highest education level: High School (9-12) or GED    Vocation/Disability     Working for Income: No  If no, reason not working: Patient Choice - Retired  Patient is retired from the [employer]    Adherence     Patient reports a high level of adherence to patients health care regimen.    Adherence counseling and education provided. Patient verbalizes understanding.    Substance Use    Patient reports the following substance usage.    Tobacco: Pt reports he is uses chewing tobacco. The pt used to smoke (for 36 years) but  quit in .  Alcohol: the pt reports he likes to drink beer and daiquiris, but is not a heavy drinker.   Illicit Drugs/Non-prescribed Medications: none, patient denies any use.  Patient states clear understanding of the potential impact of substance use.  Substance abstinence/cessation counseling, education and resources provided and reviewed.     Services Utilizing/ADLS    Infusion Service: Prior to admission, patient utilizing? no    Home Health: Prior to admission, patient utilizing? no   DME: Prior to admission, yes Pt has a home 02 set up with concentrator and portable 02 and a shower chair.  Pt's 02 is from [facility] in [place]. Family to bring portable tank at d/c  Pulmonary/Cardiac Rehab: Prior to admission, no  Dialysis:  Prior to admission, no  Transplant Specialty Pharmacy:  Prior to admission, no.    Prior to admission, patient reports patient was mostly  independent with ADLS.  Pt reports he needs to use the shower chair due to shortness of breath while taking a shower.  Pt does drive.  Patient reports patient is not able to care for self at this time due to compromised medical condition (as documented in medical record) and physical weakness.  Patient indicates a willingness to care for self once medically cleared to do so.    Insurance/Medications    Insured by   Payor/Plan Subscr  Sex Relation Sub. Ins. ID Effective Group Num   1.  FOR L* CHANDLER BALL 1940 Male  [id number]                                    [address]   2. MEDICARE - MECHANDLER FINE 1940 Male  [id number]                                    [address]      Primary Insurance (for UNOS reporting): Private Insurance-- for life  Secondary Insurance (for UNOS reporting): Public Insurance - Medicare FFS (Fee For Service)    Patient reports patient is able to obtain and afford medications at this time and at time of discharge.  Pt reports he does not get [facility] and  medications are from [facility].     Living Will/Healthcare Power of     Patient states patient does not have a LW and/or HCPA.   provided education regarding LW and HCPA and the completion of forms.    Coping/Mental Health    Patient is coping adequately with the aid of  family members.   Patient denies mental health difficulties.   Worker provided general support.     Discharge Planning    At time of discharge, patient plans to return to patient's home under the care of self and children as needed.  Patients daughter will transport patient.  Per rounds today, expected discharge date has not been medically determined at this time. Patient and patients caregiver  verbalize understanding and are involved in treatment planning and discharge process.    Additional Concerns    Patient's caretaker denies additional needs and/or concerns at this time. Patient is being followed for needs, education, resources, information, emotional support, supportive counseling, and for supportive and skilled discharge plan of care.  providing ongoing psychosocial support, education, resources and d/c planning as needed.  SW remains available. Patient's caregiver verbalizes understanding and agreement with information reviewed,  availability and how to access available resources as needed. Patient denies additional needs and/or concerns at this time. Patient verbalizes understanding and agreement with information reviewed, social work availability, and how to access available resources as needed.

## 2019-05-31 NOTE — ASSESSMENT & PLAN NOTE
-Cont selexipag 1400 mg BID  -Pt was started on macitentan last week but stopped after 5 doses bc he was feeling so bad. Consider restarting

## 2019-05-31 NOTE — ASSESSMENT & PLAN NOTE
-Likely due to exacerbation of group 1 PH secondary to acute viral URI   - admitted to inpatient to ICU for closely monitoring  -Wean O2. Was on 4L at home  - Started on empiric rocephin/azithromycin  -Pred 60 mg x 1 given 5/30/19  -procalcitonin negative- will consider stopping Abx  -Lactate 2.4 on admit, trended down to normal this am  -Will get RVP  - nebs q4h, guaifenesin 600mg BID  - continue selexipag 1400mcg BID (patient brought home med). Consider restarting macitentan, as this is likely infection and not SE of med

## 2019-05-31 NOTE — PROGRESS NOTES
[facility]  Heart Transplant  Progress Note    Patient Name: Greg Nolasco  MRN: [id number]  Admission Date: 5/30/2019  Hospital Length of Stay: 1 days  Attending Physician: Merary Garcia MD  Primary Care Provider: Pablo Lorenzo MD  Principal Problem:Acute on chronic respiratory failure with hypoxemia    Subjective:     Interval History: HA is gone. Cough and SOB have improved. Currently on high flow O2 at 22L, 60%    Continuous Infusions:  Scheduled Meds:   albuterol  2 puff Inhalation Q4H    allopurinol  300 mg Oral Daily    aspirin  81 mg Oral Daily    atorvastatin  40 mg Oral Daily    azithromycin  500 mg Oral Daily    cefTRIAXone (ROCEPHIN) IVPB  1 g Intravenous Q24H    enoxaparin  40 mg Subcutaneous Daily    famotidine  20 mg Oral BID    guaiFENesin  600 mg Oral BID    polyethylene glycol  17 g Oral Daily    selexipag  1,400 mcg Oral BID    spironolactone  25 mg Oral QAM    ticagrelor  90 mg Oral BID     PRN Meds:acetaminophen, ondansetron, ramelteon, sodium chloride 0.9%    Review of patient's allergies indicates:   Allergen Reactions    Clindamycin Shortness Of Breath    Penicillins Rash     Objective:     Vital Signs (Most Recent):  Temp: 97.6 °F (36.4 °C) (05/31/19 0701)  Pulse: 101 (05/31/19 0900)  Resp: 20 (05/31/19 0900)  BP: 128/78 (05/31/19 0900)  SpO2: (!) 94 % (05/31/19 0900) Vital Signs (24h Range):  Temp:  [97.5 °F (36.4 °C)-99 °F (37.2 °C)] 97.6 °F (36.4 °C)  Pulse:  [] 101  Resp:  [16-38] 20  SpO2:  [85 %-100 %] 94 %  BP: ()/(55-78) 128/78     Patient Vitals for the past 72 hrs (Last 3 readings):   Weight   05/31/19 0334 62.4 kg (137 lb 9.1 oz)   05/30/19 1547 62.6 kg (138 lb)     Body mass index is 20.32 kg/m².      Intake/Output Summary (Last 24 hours) at 5/31/2019 1018  Last data filed at 5/31/2019 0900  Gross per 24 hour   Intake 540 ml   Output 425 ml   Net 115 ml       Hemodynamic Parameters:       Telemetry: Reviewed    Physical Exam    Constitutional: He is oriented to person, place, and time. He appears well-developed and well-nourished.   Neck: Normal range of motion. Neck supple. No JVD present.   Cardiovascular: Normal rate and regular rhythm. Exam reveals no gallop and no friction rub.   No murmur heard.  Pulmonary/Chest: Effort normal and breath sounds normal. He has no wheezes. He has no rales.   On O2 via HFNC   Abdominal: Soft. Bowel sounds are normal. There is no tenderness.   Musculoskeletal: He exhibits no edema.   Neurological: He is alert and oriented to person, place, and time.   Skin: Skin is warm and dry.       Significant Labs:  CBC:  Recent Labs   Lab 05/30/19  1613 05/31/19  0755   WBC 8.44 5.95   RBC 4.84 4.00*   HGB 15.6 12.6*   HCT 45.0 36.6*    181   MCV 93 92   MCH 32.2* 31.5*   MCHC 34.7 34.4     BNP:  Recent Labs   Lab 05/30/19  1613   *     CMP:  Recent Labs   Lab 05/30/19  1613 05/31/19  0343   * 150*   CALCIUM 10.3 9.3   ALBUMIN 4.0  --    PROT 8.4  --     134*   K 3.8 4.0   CO2 21* 21*    104   BUN 20 18   CREATININE 1.1 1.0   ALKPHOS 170*  --    ALT 23  --    AST 34  --    BILITOT 2.4*  --       Coagulation:   No results for input(s): PT, INR, APTT in the last 168 hours.  LDH:  No results for input(s): LDH in the last 72 hours.  Microbiology:  Microbiology Results (last 7 days)     Procedure Component Value Units Date/Time    Respiratory Viral Panel by PCR [facility]; Nasal Swab [[id number]]     Order Status:  No result Specimen:  Respiratory     Blood culture #2 **CANNOT BE ORDERED STAT** [[id number]] Collected:  05/30/19 1631    Order Status:  Completed Specimen:  Blood from Peripheral, Antecubital, Right Updated:  05/31/19 0115     Blood Culture, Routine No Growth to date    Blood culture #1 **CANNOT BE ORDERED STAT** [[id number]] Collected:  05/30/19 1610    Order Status:  Completed Specimen:  Blood from Peripheral, Antecubital, Left Updated:  05/31/19 0115     Blood Culture, Routine  No Growth to date          I have reviewed all pertinent labs within the past 24 hours.    Estimated Creatinine Clearance: 53.7 mL/min (based on SCr of 1 mg/dL).    Diagnostic Results:  I have reviewed all pertinent imaging results/findings within the past 24 hours.    Assessment and Plan:     We are admitting this 77 yo gentleman for complaints of productive cough, nasal congestion and chief complaint of shortness of breath. 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     He had been in his usual state of health, but began having sinus congestion and drainage on Friday, this progressed to productive cough and worsening dyspnea that has not improved with nebs (received Saturday), but has stabilized somewhat since he started guaifenesin.    He denies weight gain (in fact reports weight loss), has been compliant with diet and medications and is usually on 4L NC at home. He stopped his macitentan on Wednesday as there was a suspicion this was causing his symptoms.    Currently he is short of breath (relieved with partial non-rebreather) and reports being thirsty. He denies fevers, chest pain, palpitations, syncope.    * Acute on chronic respiratory failure with hypoxemia  -Likely due to exacerbation of group 1 PH secondary to acute viral URI   - admitted to inpatient to ICU for closely monitoring  -Wean O2. Was on 4L at home  - Started on empiric rocephin/azithromycin  -Pred 60 mg x 1 given 5/30/19  -procalcitonin negative- will consider stopping Abx  -Lactate 2.4 on admit, trended down to normal this am  -Will get RVP  - nebs q4h, guaifenesin 600mg BID  - continue selexipag 1400mcg BID (patient brought home med). Consider restarting macitentan, as this is likely infection and not SE of med      PHT (pulmonary hypertension)  -Cont selexipag 1400 mg  BID  -Pt was started on macitentan last week but stopped after 5 doses bc he was feeling so bad. Consider restarting    Coronary artery disease involving native coronary artery of native heart without angina pectoris  -Cont ASA, statin    Uninterrupted Critical Care/Counseling Time (not including procedures): 35 minutes    Stacey Moses PA-C  Heart Transplant  [facility]

## 2019-05-31 NOTE — H&P
[facility]  Heart Transplant  H&P    Patient Name: Greg Nolasco  MRN: [id number]  Admission Date: 5/30/2019  Attending Physician: Felix Bruce MD  Primary Care Provider: Pablo Lorenzo MD  Principal Problem:Acute on chronic respiratory failure with hypoxemia    Subjective:     History of Present Illness:  We are admitting this 79 yo gentleman for complaints of productive cough, nasal congestion and chief complaint of shortness of breath. 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     He had been in his usual state of health, but began having sinus congestion and drainage on Friday, this progressed to productive cough and worsening dyspnea that has not improved with nebs (received Saturday), but has stabilized somewhat since he started guaifenesin.    He denies weight gain (in fact reports weight loss), has been compliant with diet and medications and is usually on 4L NC at home. He stopped his macitentan on Wednesday as there was a suspicion this was causing his symptoms.    Currently he is short of breath (relieved with partial non-rebreather) and reports being thirsty. He denies fevers, chest pain, palpitations, syncope.    Past Medical History:   Diagnosis Date    Chronic hypoxemic respiratory failure     Coronary artery disease     BARBARA (obstructive sleep apnea)     Pulmonary hypertension        Past Surgical History:   Procedure Laterality Date    ANGIOPLASTY  1991    BACK SURGERY      COLONOSCOPY  04/2019    HERNIA REPAIR      INSERTION, CATHETER, RIGHT HEART Right 1/10/2019    Performed by [facility] at [facility] CATH LAB    INSERTION, STENT, CORONARY ARTERY Right 4/22/2019    Performed by Dex Lomas MD at [facility] CATH LAB    KNEE SURGERY      REMOVAL, CATHETER, CENTRAL VENOUS,  TUNNELED N/A 12/20/2018    Performed by Walt [facility] at [facility] CATH LAB    RIGHT HEART CATH Right 10/31/2018    Performed by Robert Lomas MD at [facility] CATH LAB    SHOULDER SURGERY      SINUS SURGERY         Review of patient's allergies indicates:   Allergen Reactions    Clindamycin Shortness Of Breath    Penicillins Rash       Current Facility-Administered Medications   Medication    acetaminophen tablet 650 mg    albuterol inhaler 2 puff    [START ON 5/31/2019] allopurinol tablet 300 mg    [START ON 5/31/2019] aspirin EC tablet 81 mg    [START ON 5/31/2019] atorvastatin tablet 40 mg    azithromycin tablet 500 mg    cefTRIAXone injection 1 g    enoxaparin injection 40 mg    famotidine tablet 20 mg    guaiFENesin 12 hr tablet 600 mg    ondansetron injection 4 mg    [START ON 5/31/2019] polyethylene glycol packet 17 g    predniSONE tablet 60 mg    ramelteon tablet 8 mg    selexipag Tab 1,400 mcg    sodium chloride 0.9% flush 10 mL    [START ON 5/31/2019] spironolactone tablet 25 mg    ticagrelor tablet 90 mg     Current Outpatient Medications   Medication Sig    allopurinol (ZYLOPRIM) 300 MG tablet Take 300 mg by mouth.    aspirin (ECOTRIN) 81 MG EC tablet Take 81 mg by mouth once daily.    atorvastatin (LIPITOR) 40 MG tablet Take 1 tablet (40 mg total) by mouth once daily.    furosemide (LASIX) 40 MG tablet Take 1 tablet (40 mg total) by mouth once daily.    macitentan 10 mg Tab Take 10 mg by mouth once daily.    melatonin 3 mg Tab Take 1 capsule by mouth daily as needed.     multivit-minerals/ferrous fum (MULTI VITAMIN ORAL) Take 1 tablet by mouth.    OXYGEN-AIR DELIVERY SYSTEMS MISC by Misc.(Non-Drug; Combo Route) route.    potassium chloride (MICRO-K) 10 MEQ CpSR Take 10 mEq by mouth once.     RABEprazole (ACIPHEX) 20 mg tablet Take 20 mg by mouth 2 (two) times daily.     spironolactone (ALDACTONE) 25 MG tablet Take 1 tablet (25 mg total) by mouth every morning.     ticagrelor (BRILINTA) 90 mg tablet Take 1 tablet (90 mg total) by mouth 2 (two) times daily.    UPTRAVI 1,400 mcg Tab Take 1,400 mcg by mouth 2 (two) times daily.      Family History     None        Tobacco Use    Smoking status: Former Smoker     Last attempt to quit:      Years since quittin.4    Smokeless tobacco: Current User     Types: Snuff   Substance and Sexual Activity    Alcohol use: No     Frequency: Never     Comment: none since 2018    Drug use: No    Sexual activity: Not on file     Review of Systems   Constitutional: Positive for chills. Negative for activity change and unexpected weight change.   HENT: Positive for postnasal drip, rhinorrhea and sinus pressure. Negative for congestion and nosebleeds.    Respiratory: Positive for cough (productive) and shortness of breath. Negative for chest tightness.    Cardiovascular: Negative for chest pain and leg swelling.   Endocrine: Negative for polydipsia and polyuria.   Genitourinary: Negative for scrotal swelling.   Skin: Negative for color change and pallor.   Neurological: Negative for syncope and light-headedness.   Hematological: Does not bruise/bleed easily.   Psychiatric/Behavioral: The patient is not nervous/anxious.      Objective:     Vital Signs (Most Recent):  Temp: 98.3 °F (36.8 °C) (19 1708)  Pulse: 99 (19 1720)  Resp: 17 (19 1720)  BP: 113/67 (19 1708)  SpO2: 99 % (19 1720) Vital Signs (24h Range):  Temp:  [97.7 °F (36.5 °C)-98.3 °F (36.8 °C)] 98.3 °F (36.8 °C)  Pulse:  [] 99  Resp:  [16-38] 17  SpO2:  [85 %-99 %] 99 %  BP: (105-126)/(58-67) 113/67     Patient Vitals for the past 72 hrs (Last 3 readings):   Weight   19 1547 62.6 kg (138 lb)     Body mass index is 20.38 kg/m².    No intake or output data in the 24 hours ending 19 1904    Physical Exam   Constitutional: He is oriented to person, place, and time. He appears well-developed and well-nourished. No distress.   HENT:    Head: Normocephalic and atraumatic.   Eyes: Pupils are equal, round, and reactive to light. Conjunctivae and EOM are normal. No scleral icterus.   Neck: Normal range of motion. Neck supple. No JVD present.   Cardiovascular: Normal rate, regular rhythm and normal heart sounds.   No murmur heard.  Pulmonary/Chest: Effort normal. He has wheezes (mild and bilateral).   Abdominal: Soft. Bowel sounds are normal. He exhibits no distension. There is no tenderness.   Musculoskeletal: Normal range of motion. He exhibits no edema.   Neurological: He is alert and oriented to person, place, and time. No cranial nerve deficit or sensory deficit.   Skin: Skin is warm and dry. Capillary refill takes 2 to 3 seconds. No rash noted.   Psychiatric: He has a normal mood and affect. Thought content normal.       Significant Labs:  CBC:  Recent Labs   Lab 05/30/19  1613   WBC 8.44   RBC 4.84   HGB 15.6   HCT 45.0      MCV 93   MCH 32.2*   MCHC 34.7     BNP:  Recent Labs   Lab 05/30/19  1613   *     CMP:  Recent Labs   Lab 05/30/19  1613   *   CALCIUM 10.3   ALBUMIN 4.0   PROT 8.4      K 3.8   CO2 21*      BUN 20   CREATININE 1.1   ALKPHOS 170*   ALT 23   AST 34   BILITOT 2.4*     Lactate 2.4    Microbiology:  Microbiology Results (last 7 days)     Procedure Component Value Units Date/Time    Blood culture #2 **CANNOT BE ORDERED STAT** [[id number]] Collected:  05/30/19 1631    Order Status:  Sent Specimen:  Blood from Peripheral, Antecubital, Right Updated:  05/30/19 1643    Blood culture #1 **CANNOT BE ORDERED STAT** [[id number]] Collected:  05/30/19 1610    Order Status:  Sent Specimen:  Blood from Peripheral, Antecubital, Left Updated:  05/30/19 1623          Microbiology Results (last 7 days)     Procedure Component Value Units Date/Time    Blood culture #2 **CANNOT BE ORDERED STAT** [[id number]] Collected:  05/30/19 1631    Order Status:  Sent Specimen:  Blood from Peripheral, Antecubital, Right  Updated:  05/30/19 1643    Blood culture #1 **CANNOT BE ORDERED STAT** [[id number]] Collected:  05/30/19 1610    Order Status:  Sent Specimen:  Blood from Peripheral, Antecubital, Left Updated:  05/30/19 1623        I have reviewed all pertinent labs within the past 24 hours.    Diagnostic Results:  CXR: clear, chronic changes consistent with COPD    EKG: NSR, HR 98, non-specific T-wave flattening in III, otherwise no significant changes from prior    Assessment/Plan:     * Acute on chronic respiratory failure with hypoxemia  Likely due to exacerbation of group 1 PH secondary to acute viral URI with possible progression to pneumonia characterized by symptoms of sinus congestion, post-nasal drip and cough productive of green sputum. Suspect viral etiology, will treat empirically for community-acquired, healthcare-associated pneumonia. This is unlikely to be macitentant side-effect (usually characterized by elevation in LFT, increased pulmonary edema/fluid retention)    - admit to inpatient to ICU for closely monitoring, respiratory support  - start empiric rocephin/azithromycin  - single dose of prednisone 60mg for suspicion of acute bronchitis  - check procalcitonin (if negative, will consider stopping rocephin), trend lactate  - AM ABG  - nebs q4h  - guaifenesin 600mg BID  - continue selexipag 1400mcg BID (patient brought home med)  - restart macitentan, patient asked to bring in home med  - cardiac diet      - downgrade in AM if stable overnight    Elevated troponin  Due to hypoxemia in the setting of pre-existing significant CAD. No chest pain or significant EKG changes to suggest ACS.    - no need to trend troponing, will monitor closely  - check echo in AM    Lactic acid acidosis  Hypoxia-induced due to above. Will treat supportively and follow closely. Presentation not consistent with severe sepsis/septic shock.        Pepe Obrien MD  Heart Transplant  [facility]

## 2019-05-31 NOTE — PLAN OF CARE
Problem: Adult Inpatient Plan of Care  Goal: Plan of Care Review  Outcome: Ongoing (interventions implemented as appropriate)    No acute events throughout day. See vital signs and assessments in flowsheets. See below for updates on today's progress.     Pulmonary: Oxygenating well on comfort flow today 20 L and 60%. Only c/o SOB while standing to use urinal and walking to commode.    Cardiovascular: NSR to ST. No c/o chest pain. Pulses palpable in all extremities.    Neurological: AAO x 4. PERRLA. Moves all extremities.    Gastrointestinal: BM x 1. Cardiac diet.    Genitourinary: Voids spontaneously per urinal.    Skin/Bath: Preventive foam in place to sacrum and heels. Linens changed and new gown. Pt refused bath.    Infusions: None    Patient progressing towards goals as tolerated, plan of care communicated and reviewed with Greg Nolasco and family. All concerns addressed. Will continue to monitor.

## 2019-05-31 NOTE — SUBJECTIVE & OBJECTIVE
Interval History: HA is gone. Cough and SOB have improved. Currently on high flow O2 at 22L, 60%    Continuous Infusions:  Scheduled Meds:   albuterol  2 puff Inhalation Q4H    allopurinol  300 mg Oral Daily    aspirin  81 mg Oral Daily    atorvastatin  40 mg Oral Daily    azithromycin  500 mg Oral Daily    cefTRIAXone (ROCEPHIN) IVPB  1 g Intravenous Q24H    enoxaparin  40 mg Subcutaneous Daily    famotidine  20 mg Oral BID    guaiFENesin  600 mg Oral BID    polyethylene glycol  17 g Oral Daily    selexipag  1,400 mcg Oral BID    spironolactone  25 mg Oral QAM    ticagrelor  90 mg Oral BID     PRN Meds:acetaminophen, ondansetron, ramelteon, sodium chloride 0.9%    Review of patient's allergies indicates:   Allergen Reactions    Clindamycin Shortness Of Breath    Penicillins Rash     Objective:     Vital Signs (Most Recent):  Temp: 97.6 °F (36.4 °C) (05/31/19 0701)  Pulse: 101 (05/31/19 0900)  Resp: 20 (05/31/19 0900)  BP: 128/78 (05/31/19 0900)  SpO2: (!) 94 % (05/31/19 0900) Vital Signs (24h Range):  Temp:  [97.5 °F (36.4 °C)-99 °F (37.2 °C)] 97.6 °F (36.4 °C)  Pulse:  [] 101  Resp:  [16-38] 20  SpO2:  [85 %-100 %] 94 %  BP: ()/(55-78) 128/78     Patient Vitals for the past 72 hrs (Last 3 readings):   Weight   05/31/19 0334 62.4 kg (137 lb 9.1 oz)   05/30/19 1547 62.6 kg (138 lb)     Body mass index is 20.32 kg/m².      Intake/Output Summary (Last 24 hours) at 5/31/2019 1018  Last data filed at 5/31/2019 0900  Gross per 24 hour   Intake 540 ml   Output 425 ml   Net 115 ml       Hemodynamic Parameters:       Telemetry: Reviewed    Physical Exam   Constitutional: He is oriented to person, place, and time. He appears well-developed and well-nourished.   Neck: Normal range of motion. Neck supple. No JVD present.   Cardiovascular: Normal rate and regular rhythm. Exam reveals no gallop and no friction rub.   No murmur heard.  Pulmonary/Chest: Effort normal and breath sounds normal. He has no  wheezes. He has no rales.   On O2 via HFNC   Abdominal: Soft. Bowel sounds are normal. There is no tenderness.   Musculoskeletal: He exhibits no edema.   Neurological: He is alert and oriented to person, place, and time.   Skin: Skin is warm and dry.       Significant Labs:  CBC:  Recent Labs   Lab 05/30/19  1613 05/31/19  0755   WBC 8.44 5.95   RBC 4.84 4.00*   HGB 15.6 12.6*   HCT 45.0 36.6*    181   MCV 93 92   MCH 32.2* 31.5*   MCHC 34.7 34.4     BNP:  Recent Labs   Lab 05/30/19  1613   *     CMP:  Recent Labs   Lab 05/30/19  1613 05/31/19  0343   * 150*   CALCIUM 10.3 9.3   ALBUMIN 4.0  --    PROT 8.4  --     134*   K 3.8 4.0   CO2 21* 21*    104   BUN 20 18   CREATININE 1.1 1.0   ALKPHOS 170*  --    ALT 23  --    AST 34  --    BILITOT 2.4*  --       Coagulation:   No results for input(s): PT, INR, APTT in the last 168 hours.  LDH:  No results for input(s): LDH in the last 72 hours.  Microbiology:  Microbiology Results (last 7 days)     Procedure Component Value Units Date/Time    Respiratory Viral Panel by PCR Jessner; Nasal Swab [[id number]]     Order Status:  No result Specimen:  Respiratory     Blood culture #2 **CANNOT BE ORDERED STAT** [[id number]] Collected:  05/30/19 1631    Order Status:  Completed Specimen:  Blood from Peripheral, Antecubital, Right Updated:  05/31/19 0115     Blood Culture, Routine No Growth to date    Blood culture #1 **CANNOT BE ORDERED STAT** [[id number]] Collected:  05/30/19 1610    Order Status:  Completed Specimen:  Blood from Peripheral, Antecubital, Left Updated:  05/31/19 0115     Blood Culture, Routine No Growth to date          I have reviewed all pertinent labs within the past 24 hours.    Estimated Creatinine Clearance: 53.7 mL/min (based on SCr of 1 mg/dL).    Diagnostic Results:  I have reviewed all pertinent imaging results/findings within the past 24 hours.

## 2019-05-31 NOTE — PLAN OF CARE
Problem: Adult Inpatient Plan of Care  Goal: Plan of Care Review  Outcome: Ongoing (interventions implemented as appropriate)  See flowsheets for VS and assessments. See below for updates on progress made.    Neuro:     Cardiovascular:     Pulmonary:     GI:     :     Endocrine:     Integumentary:     Infusions:     POC:     Patient progressing toward goals as tolerated. POC communicated and reviewed with ????????????. All concerns address. WCTM.

## 2019-05-31 NOTE — SUBJECTIVE & OBJECTIVE
Past Medical History:   Diagnosis Date    Chronic hypoxemic respiratory failure     Coronary artery disease     BARBARA (obstructive sleep apnea)     Pulmonary hypertension        Past Surgical History:   Procedure Laterality Date    ANGIOPLASTY  1991    BACK SURGERY      COLONOSCOPY  04/2019    HERNIA REPAIR      INSERTION, CATHETER, RIGHT HEART Right 1/10/2019    Performed by Miguel[facility]in [facility] at [facility] CATH LAB    INSERTION, STENT, CORONARY ARTERY Right 4/22/2019    Performed by Dex Lomas MD at [facility] CATH LAB    KNEE SURGERY      REMOVAL, CATHETER, CENTRAL VENOUS, TUNNELED N/A 12/20/2018    Performed by Walt Smith MD at [facility] CATH LAB    RIGHT HEART CATH Right 10/31/2018    Performed by Robert Lomas MD at [facility] CATH LAB    SHOULDER SURGERY      SINUS SURGERY         Review of patient's allergies indicates:   Allergen Reactions    Clindamycin Shortness Of Breath    Penicillins Rash       Current Facility-Administered Medications   Medication    acetaminophen tablet 650 mg    albuterol inhaler 2 puff    [START ON 5/31/2019] allopurinol tablet 300 mg    [START ON 5/31/2019] aspirin EC tablet 81 mg    [START ON 5/31/2019] atorvastatin tablet 40 mg    azithromycin tablet 500 mg    cefTRIAXone injection 1 g    enoxaparin injection 40 mg    famotidine tablet 20 mg    guaiFENesin 12 hr tablet 600 mg    ondansetron injection 4 mg    [START ON 5/31/2019] polyethylene glycol packet 17 g    predniSONE tablet 60 mg    ramelteon tablet 8 mg    selexipag Tab 1,400 mcg    sodium chloride 0.9% flush 10 mL    [START ON 5/31/2019] spironolactone tablet 25 mg    ticagrelor tablet 90 mg     Current Outpatient Medications   Medication Sig    allopurinol (ZYLOPRIM) 300 MG tablet Take 300 mg by mouth.    aspirin (ECOTRIN) 81 MG EC tablet Take 81 mg by mouth once daily.    atorvastatin (LIPITOR) 40 MG tablet Take 1 tablet (40 mg total) by mouth once daily.    furosemide (LASIX) 40  MG tablet Take 1 tablet (40 mg total) by mouth once daily.    macitentan 10 mg Tab Take 10 mg by mouth once daily.    melatonin 3 mg Tab Take 1 capsule by mouth daily as needed.     multivit-minerals/ferrous fum (MULTI VITAMIN ORAL) Take 1 tablet by mouth.    OXYGEN-AIR DELIVERY SYSTEMS MISC by [facility]Non-Drug; Combo Route) route.    potassium chloride (MICRO-K) 10 MEQ CpSR Take 10 mEq by mouth once.     RABEprazole (ACIPHEX) 20 mg tablet Take 20 mg by mouth 2 (two) times daily.     spironolactone (ALDACTONE) 25 MG tablet Take 1 tablet (25 mg total) by mouth every morning.    ticagrelor (BRILINTA) 90 mg tablet Take 1 tablet (90 mg total) by mouth 2 (two) times daily.    UPTRAVI 1,400 mcg Tab Take 1,400 mcg by mouth 2 (two) times daily.      Family History     None        Tobacco Use    Smoking status: Former Smoker     Last attempt to quit:      Years since quittin.4    Smokeless tobacco: Current User     Types: Snuff   Substance and Sexual Activity    Alcohol use: No     Frequency: Never     Comment: none since 2018    Drug use: No    Sexual activity: Not on file     Review of Systems   Constitutional: Positive for chills. Negative for activity change and unexpected weight change.   HENT: Positive for postnasal drip, rhinorrhea and sinus pressure. Negative for congestion and nosebleeds.    Respiratory: Positive for cough (productive) and shortness of breath. Negative for chest tightness.    Cardiovascular: Negative for chest pain and leg swelling.   Endocrine: Negative for polydipsia and polyuria.   Genitourinary: Negative for scrotal swelling.   Skin: Negative for color change and pallor.   Neurological: Negative for syncope and light-headedness.   Hematological: Does not bruise/bleed easily.   Psychiatric/Behavioral: The patient is not nervous/anxious.      Objective:     Vital Signs (Most Recent):  Temp: 98.3 °F (36.8 °C) (19 1708)  Pulse: 99 (19 1720)  Resp: 17 (19  1720)  BP: 113/67 (05/30/19 1708)  SpO2: 99 % (05/30/19 1720) Vital Signs (24h Range):  Temp:  [97.7 °F (36.5 °C)-98.3 °F (36.8 °C)] 98.3 °F (36.8 °C)  Pulse:  [] 99  Resp:  [16-38] 17  SpO2:  [85 %-99 %] 99 %  BP: (105-126)/(58-67) 113/67     Patient Vitals for the past 72 hrs (Last 3 readings):   Weight   05/30/19 1547 62.6 kg (138 lb)     Body mass index is 20.38 kg/m².    No intake or output data in the 24 hours ending 05/30/19 1904    Physical Exam   Constitutional: He is oriented to person, place, and time. He appears well-developed and well-nourished. No distress.   HENT:   Head: Normocephalic and atraumatic.   Eyes: Pupils are equal, round, and reactive to light. Conjunctivae and EOM are normal. No scleral icterus.   Neck: Normal range of motion. Neck supple. No JVD present.   Cardiovascular: Normal rate, regular rhythm and normal heart sounds.   No murmur heard.  Pulmonary/Chest: Effort normal. He has wheezes (mild and bilateral).   Abdominal: Soft. Bowel sounds are normal. He exhibits no distension. There is no tenderness.   Musculoskeletal: Normal range of motion. He exhibits no edema.   Neurological: He is alert and oriented to person, place, and time. No cranial nerve deficit or sensory deficit.   Skin: Skin is warm and dry. Capillary refill takes 2 to 3 seconds. No rash noted.   Psychiatric: He has a normal mood and affect. Thought content normal.       Significant Labs:  CBC:  Recent Labs   Lab 05/30/19  1613   WBC 8.44   RBC 4.84   HGB 15.6   HCT 45.0      MCV 93   MCH 32.2*   MCHC 34.7     BNP:  Recent Labs   Lab 05/30/19  1613   *     CMP:  Recent Labs   Lab 05/30/19  1613   *   CALCIUM 10.3   ALBUMIN 4.0   PROT 8.4      K 3.8   CO2 21*      BUN 20   CREATININE 1.1   ALKPHOS 170*   ALT 23   AST 34   BILITOT 2.4*     Lactate 2.4    Microbiology:  Microbiology Results (last 7 days)     Procedure Component Value Units Date/Time    Blood culture #2 **CANNOT BE  ORDERED STAT** [[id number]] Collected:  05/30/19 1631    Order Status:  Sent Specimen:  Blood from Peripheral, Antecubital, Right Updated:  05/30/19 1643    Blood culture #1 **CANNOT BE ORDERED STAT** [[id number]] Collected:  05/30/19 1610    Order Status:  Sent Specimen:  Blood from Peripheral, Antecubital, Left Updated:  05/30/19 1623          Microbiology Results (last 7 days)     Procedure Component Value Units Date/Time    Blood culture #2 **CANNOT BE ORDERED STAT** [[id number]] Collected:  05/30/19 1631    Order Status:  Sent Specimen:  Blood from Peripheral, Antecubital, Right Updated:  05/30/19 1643    Blood culture #1 **CANNOT BE ORDERED STAT** [[id number]] Collected:  05/30/19 1610    Order Status:  Sent Specimen:  Blood from Peripheral, Antecubital, Left Updated:  05/30/19 1623        I have reviewed all pertinent labs within the past 24 hours.    Diagnostic Results:  CXR: clear, chronic changes consistent with COPD    EKG: NSR, HR 98, non-specific T-wave flattening in III, otherwise no significant changes from prior

## 2019-06-01 PROBLEM — J06.9 UPPER RESPIRATORY INFECTION: Status: ACTIVE | Noted: 2019-06-01

## 2019-06-01 LAB
ANION GAP SERPL CALC-SCNC: 8 MMOL/L (ref 8–16)
BASOPHILS # BLD AUTO: 0.03 K/UL (ref 0–0.2)
BASOPHILS NFR BLD: 0.4 % (ref 0–1.9)
BUN SERPL-MCNC: 16 MG/DL (ref 8–23)
CALCIUM SERPL-MCNC: 9.5 MG/DL (ref 8.7–10.5)
CHLORIDE SERPL-SCNC: 106 MMOL/L (ref 95–110)
CO2 SERPL-SCNC: 21 MMOL/L (ref 23–29)
CREAT SERPL-MCNC: 0.8 MG/DL (ref 0.5–1.4)
DIFFERENTIAL METHOD: ABNORMAL
EOSINOPHIL # BLD AUTO: 0.2 K/UL (ref 0–0.5)
EOSINOPHIL NFR BLD: 2.6 % (ref 0–8)
ERYTHROCYTE [DISTWIDTH] IN BLOOD BY AUTOMATED COUNT: 15.4 % (ref 11.5–14.5)
EST. GFR  (AFRICAN AMERICAN): >60 ML/MIN/1.73 M^2
EST. GFR  (NON AFRICAN AMERICAN): >60 ML/MIN/1.73 M^2
GLUCOSE SERPL-MCNC: 82 MG/DL (ref 70–110)
HCT VFR BLD AUTO: 40.2 % (ref 40–54)
HGB BLD-MCNC: 13.5 G/DL (ref 14–18)
IMM GRANULOCYTES # BLD AUTO: 0.03 K/UL (ref 0–0.04)
IMM GRANULOCYTES NFR BLD AUTO: 0.4 % (ref 0–0.5)
LYMPHOCYTES # BLD AUTO: 1.3 K/UL (ref 1–4.8)
LYMPHOCYTES NFR BLD: 15.3 % (ref 18–48)
MAGNESIUM SERPL-MCNC: 2.1 MG/DL (ref 1.6–2.6)
MCH RBC QN AUTO: 31.8 PG (ref 27–31)
MCHC RBC AUTO-ENTMCNC: 33.6 G/DL (ref 32–36)
MCV RBC AUTO: 95 FL (ref 82–98)
MONOCYTES # BLD AUTO: 0.6 K/UL (ref 0.3–1)
MONOCYTES NFR BLD: 7.5 % (ref 4–15)
NEUTROPHILS # BLD AUTO: 6.2 K/UL (ref 1.8–7.7)
NEUTROPHILS NFR BLD: 73.8 % (ref 38–73)
NRBC BLD-RTO: 0 /100 WBC
PHOSPHATE SERPL-MCNC: 3.1 MG/DL (ref 2.7–4.5)
PLATELET # BLD AUTO: 209 K/UL (ref 150–350)
PMV BLD AUTO: 9.8 FL (ref 9.2–12.9)
POTASSIUM SERPL-SCNC: 3.9 MMOL/L (ref 3.5–5.1)
RBC # BLD AUTO: 4.25 M/UL (ref 4.6–6.2)
SODIUM SERPL-SCNC: 135 MMOL/L (ref 136–145)
WBC # BLD AUTO: 8.44 K/UL (ref 3.9–12.7)

## 2019-06-01 PROCEDURE — 36415 COLL VENOUS BLD VENIPUNCTURE: CPT

## 2019-06-01 PROCEDURE — 83735 ASSAY OF MAGNESIUM: CPT

## 2019-06-01 PROCEDURE — 99233 SBSQ HOSP IP/OBS HIGH 50: CPT | Mod: GC,,, | Performed by: PHYSICIAN ASSISTANT

## 2019-06-01 PROCEDURE — 84100 ASSAY OF PHOSPHORUS: CPT

## 2019-06-01 PROCEDURE — 20600001 HC STEP DOWN PRIVATE ROOM

## 2019-06-01 PROCEDURE — 25000003 PHARM REV CODE 250: Performed by: PHYSICIAN ASSISTANT

## 2019-06-01 PROCEDURE — 27100092 HC HIGH FLOW DELIVERY CANNULA

## 2019-06-01 PROCEDURE — 94761 N-INVAS EAR/PLS OXIMETRY MLT: CPT

## 2019-06-01 PROCEDURE — 63600175 PHARM REV CODE 636 W HCPCS

## 2019-06-01 PROCEDURE — 80048 BASIC METABOLIC PNL TOTAL CA: CPT

## 2019-06-01 PROCEDURE — 85025 COMPLETE CBC W/AUTO DIFF WBC: CPT

## 2019-06-01 PROCEDURE — 99233 PR SUBSEQUENT HOSPITAL CARE,LEVL III: ICD-10-PCS | Mod: GC,,, | Performed by: PHYSICIAN ASSISTANT

## 2019-06-01 PROCEDURE — 27000221 HC OXYGEN, UP TO 24 HOURS

## 2019-06-01 PROCEDURE — 25000003 PHARM REV CODE 250

## 2019-06-01 PROCEDURE — 27100171 HC OXYGEN HIGH FLOW UP TO 24 HOURS

## 2019-06-01 RX ADMIN — ALBUTEROL SULFATE 2 PUFF: 90 AEROSOL, METERED RESPIRATORY (INHALATION) at 07:06

## 2019-06-01 RX ADMIN — ALBUTEROL SULFATE 2 PUFF: 90 AEROSOL, METERED RESPIRATORY (INHALATION) at 04:06

## 2019-06-01 RX ADMIN — GUAIFENESIN 600 MG: 600 TABLET, EXTENDED RELEASE ORAL at 08:06

## 2019-06-01 RX ADMIN — MACITENTAN 10 MG: 10 TABLET, FILM COATED ORAL at 09:06

## 2019-06-01 RX ADMIN — TICAGRELOR 90 MG: 90 TABLET ORAL at 09:06

## 2019-06-01 RX ADMIN — FAMOTIDINE 20 MG: 20 TABLET, FILM COATED ORAL at 08:06

## 2019-06-01 RX ADMIN — TICAGRELOR 90 MG: 90 TABLET ORAL at 07:06

## 2019-06-01 RX ADMIN — GUAIFENESIN 600 MG: 600 TABLET, EXTENDED RELEASE ORAL at 07:06

## 2019-06-01 RX ADMIN — CEFTRIAXONE SODIUM 1 G: 1 INJECTION, POWDER, FOR SOLUTION INTRAMUSCULAR; INTRAVENOUS at 07:06

## 2019-06-01 RX ADMIN — ALBUTEROL SULFATE 2 PUFF: 90 AEROSOL, METERED RESPIRATORY (INHALATION) at 12:06

## 2019-06-01 RX ADMIN — ASPIRIN 81 MG: 81 TABLET, COATED ORAL at 08:06

## 2019-06-01 RX ADMIN — FAMOTIDINE 20 MG: 20 TABLET, FILM COATED ORAL at 07:06

## 2019-06-01 RX ADMIN — POLYETHYLENE GLYCOL 3350 17 G: 17 POWDER, FOR SOLUTION ORAL at 08:06

## 2019-06-01 RX ADMIN — ENOXAPARIN SODIUM 40 MG: 100 INJECTION SUBCUTANEOUS at 04:06

## 2019-06-01 RX ADMIN — AZITHROMYCIN 500 MG: 250 TABLET, FILM COATED ORAL at 08:06

## 2019-06-01 RX ADMIN — ALLOPURINOL 300 MG: 100 TABLET ORAL at 08:06

## 2019-06-01 RX ADMIN — ATORVASTATIN CALCIUM 40 MG: 20 TABLET, FILM COATED ORAL at 08:06

## 2019-06-01 RX ADMIN — ALBUTEROL SULFATE 2 PUFF: 90 AEROSOL, METERED RESPIRATORY (INHALATION) at 08:06

## 2019-06-01 RX ADMIN — SPIRONOLACTONE 25 MG: 25 TABLET, FILM COATED ORAL at 06:06

## 2019-06-01 NOTE — ASSESSMENT & PLAN NOTE
-Likely due to exacerbation of group 1 PH secondary to acute viral URI   -On O2 via HFNC. Wean O2. Was on 4L at home  - Cont rocephin/azithromycin- plan to complete 5 day course  -Pred 60 mg x 1 given 5/30/19  -procalcitonin negative  -Lactate 2.4 on admit, trended down to normal   -RVP pending  - nebs q4h, guaifenesin 600mg BID  - continue selexipag 1400mcg BID (patient brought home med). Restarted macitentan 5/31/19, as this is likely infection and not SE of med

## 2019-06-01 NOTE — ASSESSMENT & PLAN NOTE
-Cont selexipag 1400 mg BID  -Pt was started on macitentan last week but stopped after 5 doses bc he was feeling so bad. Restarted macitentan 5/31/19

## 2019-06-01 NOTE — PROGRESS NOTES
Pt arrives to floor via wheelchair.  Pt on high Comfort flow 20L 60%.  No acute distress noted.  Skin intact.  Pt oriented to room and call bell.  Will continue to monitor pt.

## 2019-06-01 NOTE — PLAN OF CARE
"Problem: Adult Inpatient Plan of Care  Goal: Plan of Care Review  Outcome: Ongoing (interventions implemented as appropriate)  No acute events.  VSS. Attempts to wean O2.  Patient reports being "very short of breath after moving around." Ambulated within the room.  Good appetite.  POC d/w patient, will continue to monitor.       "

## 2019-06-01 NOTE — NURSING TRANSFER
Nursing Transfer Note      5/31/2019     Transfer To: [id number]     Transfer via wheelchair    Transfer with NC to O2, on telemetry    Transported by RN and PCT    Medicines sent: Yes    Chart send with patient: Yes    Patient reassessed at: 5/31/19 @ 2115    Upon arrival to floor: cardiac monitor applied, patient oriented to room, call bell in reach and bed in lowest position    Comfort flow brought to room. Transfer tolerated without issue.

## 2019-06-01 NOTE — PROGRESS NOTES
[facility]  Heart Transplant  Progress Note    Patient Name: Greg Nolasco  MRN: [id number]  Admission Date: 5/30/2019  Hospital Length of Stay: 2 days  Attending Physician: Merary Garcia MD  Primary Care Provider: Pablo Lorenzo MD  Principal Problem:Acute on chronic respiratory failure with hypoxemia    Subjective:     Interval History: Pt in bed eating breakfast. Still c/o cough but has improved since admit. No more nausea or HA since admit    Continuous Infusions:  Scheduled Meds:   albuterol  2 puff Inhalation Q4H    allopurinol  300 mg Oral Daily    aspirin  81 mg Oral Daily    atorvastatin  40 mg Oral Daily    azithromycin  500 mg Oral Daily    cefTRIAXone (ROCEPHIN) IVPB  1 g Intravenous Q24H    enoxaparin  40 mg Subcutaneous Daily    famotidine  20 mg Oral BID    guaiFENesin  600 mg Oral BID    macitentan  10 mg Oral Daily    polyethylene glycol  17 g Oral Daily    selexipag  1,400 mcg Oral BID    spironolactone  25 mg Oral QAM    ticagrelor  90 mg Oral BID     PRN Meds:acetaminophen, ondansetron, ramelteon, sodium chloride 0.9%    Review of patient's allergies indicates:   Allergen Reactions    Clindamycin Shortness Of Breath    Penicillins Rash     Objective:     Vital Signs (Most Recent):  Temp: 96.4 °F (35.8 °C) (06/01/19 1116)  Pulse: 85 (06/01/19 1116)  Resp: 18 (06/01/19 1109)  BP: 126/66 (06/01/19 1109)  SpO2: (!) 94 % (06/01/19 1116) Vital Signs (24h Range):  Temp:  [96.4 °F (35.8 °C)-98.1 °F (36.7 °C)] 96.4 °F (35.8 °C)  Pulse:  [67-97] 85  Resp:  [12-28] 18  SpO2:  [92 %-99 %] 94 %  BP: (105-142)/(64-88) 126/66     Patient Vitals for the past 72 hrs (Last 3 readings):   Weight   06/01/19 0600 64 kg (141 lb 1.5 oz)   05/31/19 1300 62.1 kg (137 lb)   05/31/19 0334 62.4 kg (137 lb 9.1 oz)     Body mass index is 20.84 kg/m².      Intake/Output Summary (Last 24 hours) at 6/1/2019 1159  Last data filed at 6/1/2019 0600  Gross per 24 hour   Intake 880 ml   Output  1200 ml   Net -320 ml       Hemodynamic Parameters:       Telemetry: Reviewed    Physical Exam   Constitutional: He is oriented to person, place, and time. He appears well-developed and well-nourished.   Neck: Normal range of motion. Neck supple. No JVD present.   Cardiovascular: Normal rate and regular rhythm. Exam reveals no gallop and no friction rub.   No murmur heard.  Pulmonary/Chest: Effort normal and breath sounds normal. He has no wheezes. He has no rales.   On O2 via HFNC   Abdominal: Soft. Bowel sounds are normal. There is no tenderness.   Musculoskeletal: He exhibits no edema.   Neurological: He is alert and oriented to person, place, and time.   Skin: Skin is warm and dry.       Significant Labs:  CBC:  Recent Labs   Lab 05/30/19  1613 05/31/19  0755 06/01/19  0616   WBC 8.44 5.95 8.44   RBC 4.84 4.00* 4.25*   HGB 15.6 12.6* 13.5*   HCT 45.0 36.6* 40.2    181 209   MCV 93 92 95   MCH 32.2* 31.5* 31.8*   MCHC 34.7 34.4 33.6     BNP:  Recent Labs   Lab 05/30/19  1613   *     CMP:  Recent Labs   Lab 05/30/19  1613 05/31/19  0343 06/01/19  0616   * 150* 82   CALCIUM 10.3 9.3 9.5   ALBUMIN 4.0  --   --    PROT 8.4  --   --     134* 135*   K 3.8 4.0 3.9   CO2 21* 21* 21*    104 106   BUN 20 18 16   CREATININE 1.1 1.0 0.8   ALKPHOS 170*  --   --    ALT 23  --   --    AST 34  --   --    BILITOT 2.4*  --   --       Coagulation:   No results for input(s): PT, INR, APTT in the last 168 hours.  LDH:  No results for input(s): LDH in the last 72 hours.  Microbiology:  Microbiology Results (last 7 days)     Procedure Component Value Units Date/Time    Blood culture #1 **CANNOT BE ORDERED STAT** [[id number]] Collected:  05/30/19 1610    Order Status:  Completed Specimen:  Blood from Peripheral, Antecubital, Left Updated:  05/31/19 1812     Blood Culture, Routine No Growth to date     Blood Culture, Routine No Growth to date    Blood culture #2 **CANNOT BE ORDERED STAT** [[id number]]  Collected:  05/30/19 1631    Order Status:  Completed Specimen:  Blood from Peripheral, Antecubital, Right Updated:  05/31/19 1812     Blood Culture, Routine No Growth to date     Blood Culture, Routine No Growth to date    Respiratory Viral Panel by PCR [facility]; Nasal Swab [[id number]] Collected:  05/31/19 1027    Order Status:  Sent Specimen:  Respiratory Updated:  05/31/19 1036          I have reviewed all pertinent labs within the past 24 hours.    Estimated Creatinine Clearance: 68.9 mL/min (based on SCr of 0.8 mg/dL).    Diagnostic Results:  I have reviewed all pertinent imaging results/findings within the past 24 hours.    Assessment and Plan:     We are admitting this 79 yo gentleman for complaints of productive cough, nasal congestion and chief complaint of shortness of breath. 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     He had been in his usual state of health, but began having sinus congestion and drainage on Friday, this progressed to productive cough and worsening dyspnea that has not improved with nebs (received Saturday), but has stabilized somewhat since he started guaifenesin.    He denies weight gain (in fact reports weight loss), has been compliant with diet and medications and is usually on 4L NC at home. He stopped his macitentan on Wednesday as there was a suspicion this was causing his symptoms.    Currently he is short of breath (relieved with partial non-rebreather) and reports being thirsty. He denies fevers, chest pain, palpitations, syncope.    * Acute on chronic respiratory failure with hypoxemia  -Likely due to exacerbation of group 1 PH secondary to acute viral URI   -On O2 via HFNC. Wean O2. Was on 4L at home  - Cont rocephin/azithromycin- plan to complete 5 day course  -Pred 60 mg x 1 given  5/30/19  -procalcitonin negative  -Lactate 2.4 on admit, trended down to normal   -RVP pending  - nebs q4h, guaifenesin 600mg BID  - continue selexipag 1400mcg BID (patient brought home med). Restarted macitentan 5/31/19, as this is likely infection and not SE of med      PHT (pulmonary hypertension)  -Cont selexipag 1400 mg BID  -Pt was started on macitentan last week but stopped after 5 doses bc he was feeling so bad. Consider restarting    Coronary artery disease involving native coronary artery of native heart without angina pectoris  -Cont ASA, Brilinta, statin      Stacey Moses PA-C  Heart Transplant  [facility]

## 2019-06-01 NOTE — SUBJECTIVE & OBJECTIVE
Interval History: Pt in bed eating breakfast. Still c/o cough but has improved since admit. No more nausea or HA since admit    Continuous Infusions:  Scheduled Meds:   albuterol  2 puff Inhalation Q4H    allopurinol  300 mg Oral Daily    aspirin  81 mg Oral Daily    atorvastatin  40 mg Oral Daily    azithromycin  500 mg Oral Daily    cefTRIAXone (ROCEPHIN) IVPB  1 g Intravenous Q24H    enoxaparin  40 mg Subcutaneous Daily    famotidine  20 mg Oral BID    guaiFENesin  600 mg Oral BID    macitentan  10 mg Oral Daily    polyethylene glycol  17 g Oral Daily    selexipag  1,400 mcg Oral BID    spironolactone  25 mg Oral QAM    ticagrelor  90 mg Oral BID     PRN Meds:acetaminophen, ondansetron, ramelteon, sodium chloride 0.9%    Review of patient's allergies indicates:   Allergen Reactions    Clindamycin Shortness Of Breath    Penicillins Rash     Objective:     Vital Signs (Most Recent):  Temp: 96.4 °F (35.8 °C) (06/01/19 1116)  Pulse: 85 (06/01/19 1116)  Resp: 18 (06/01/19 1109)  BP: 126/66 (06/01/19 1109)  SpO2: (!) 94 % (06/01/19 1116) Vital Signs (24h Range):  Temp:  [96.4 °F (35.8 °C)-98.1 °F (36.7 °C)] 96.4 °F (35.8 °C)  Pulse:  [67-97] 85  Resp:  [12-28] 18  SpO2:  [92 %-99 %] 94 %  BP: (105-142)/(64-88) 126/66     Patient Vitals for the past 72 hrs (Last 3 readings):   Weight   06/01/19 0600 64 kg (141 lb 1.5 oz)   05/31/19 1300 62.1 kg (137 lb)   05/31/19 0334 62.4 kg (137 lb 9.1 oz)     Body mass index is 20.84 kg/m².      Intake/Output Summary (Last 24 hours) at 6/1/2019 1159  Last data filed at 6/1/2019 0600  Gross per 24 hour   Intake 880 ml   Output 1200 ml   Net -320 ml       Hemodynamic Parameters:       Telemetry: Reviewed    Physical Exam   Constitutional: He is oriented to person, place, and time. He appears well-developed and well-nourished.   Neck: Normal range of motion. Neck supple. No JVD present.   Cardiovascular: Normal rate and regular rhythm. Exam reveals no gallop and no  friction rub.   No murmur heard.  Pulmonary/Chest: Effort normal and breath sounds normal. He has no wheezes. He has no rales.   On O2 via HFNC   Abdominal: Soft. Bowel sounds are normal. There is no tenderness.   Musculoskeletal: He exhibits no edema.   Neurological: He is alert and oriented to person, place, and time.   Skin: Skin is warm and dry.       Significant Labs:  CBC:  Recent Labs   Lab 05/30/19  1613 05/31/19  0755 06/01/19  0616   WBC 8.44 5.95 8.44   RBC 4.84 4.00* 4.25*   HGB 15.6 12.6* 13.5*   HCT 45.0 36.6* 40.2    181 209   MCV 93 92 95   MCH 32.2* 31.5* 31.8*   MCHC 34.7 34.4 33.6     BNP:  Recent Labs   Lab 05/30/19  1613   *     CMP:  Recent Labs   Lab 05/30/19  1613 05/31/19  0343 06/01/19  0616   * 150* 82   CALCIUM 10.3 9.3 9.5   ALBUMIN 4.0  --   --    PROT 8.4  --   --     134* 135*   K 3.8 4.0 3.9   CO2 21* 21* 21*    104 106   BUN 20 18 16   CREATININE 1.1 1.0 0.8   ALKPHOS 170*  --   --    ALT 23  --   --    AST 34  --   --    BILITOT 2.4*  --   --       Coagulation:   No results for input(s): PT, INR, APTT in the last 168 hours.  LDH:  No results for input(s): LDH in the last 72 hours.  Microbiology:  Microbiology Results (last 7 days)     Procedure Component Value Units Date/Time    Blood culture #1 **CANNOT BE ORDERED STAT** [[id number]] Collected:  05/30/19 1610    Order Status:  Completed Specimen:  Blood from Peripheral, Antecubital, Left Updated:  05/31/19 1812     Blood Culture, Routine No Growth to date     Blood Culture, Routine No Growth to date    Blood culture #2 **CANNOT BE ORDERED STAT** [[id number]] Collected:  05/30/19 1631    Order Status:  Completed Specimen:  Blood from Peripheral, Antecubital, Right Updated:  05/31/19 1812     Blood Culture, Routine No Growth to date     Blood Culture, Routine No Growth to date    Respiratory Viral Panel by PCR [facility]; Nasal Swab [[id number]] Collected:  05/31/19 1027    Order Status:  Sent Specimen:   Respiratory Updated:  05/31/19 1036          I have reviewed all pertinent labs within the past 24 hours.    Estimated Creatinine Clearance: 68.9 mL/min (based on SCr of 0.8 mg/dL).    Diagnostic Results:  I have reviewed all pertinent imaging results/findings within the past 24 hours.

## 2019-06-01 NOTE — PLAN OF CARE
Problem: Adult Inpatient Plan of Care  Goal: Plan of Care Review  Outcome: Ongoing (interventions implemented as appropriate)  -Pt AAOx4  -Vital signs stable  -Pt on Comfort flow 20L 60%, sats remain above 92%  -Telemetry monitoring NSR ; HR ranging in the 70s to 80s  -Echo done at the bedside today; EF 60%  -PRN remeron given HS  -Fall precautions maintained, non skid socks worn  -No acute events/falls/injuries this shift  -Pt aware to call for help with ambulating.    -Bed lowered, locked, siderails up x2, and call bell in reach.    See flowsheet for assessment findings.  Will continue to monitor pt.

## 2019-06-02 LAB
ANION GAP SERPL CALC-SCNC: 9 MMOL/L (ref 8–16)
BASOPHILS # BLD AUTO: 0.05 K/UL (ref 0–0.2)
BASOPHILS NFR BLD: 0.7 % (ref 0–1.9)
BNP SERPL-MCNC: 195 PG/ML (ref 0–99)
BUN SERPL-MCNC: 14 MG/DL (ref 8–23)
CALCIUM SERPL-MCNC: 9.2 MG/DL (ref 8.7–10.5)
CHLORIDE SERPL-SCNC: 106 MMOL/L (ref 95–110)
CO2 SERPL-SCNC: 21 MMOL/L (ref 23–29)
CREAT SERPL-MCNC: 0.8 MG/DL (ref 0.5–1.4)
DIFFERENTIAL METHOD: ABNORMAL
EOSINOPHIL # BLD AUTO: 0.3 K/UL (ref 0–0.5)
EOSINOPHIL NFR BLD: 4.1 % (ref 0–8)
ERYTHROCYTE [DISTWIDTH] IN BLOOD BY AUTOMATED COUNT: 15.3 % (ref 11.5–14.5)
EST. GFR  (AFRICAN AMERICAN): >60 ML/MIN/1.73 M^2
EST. GFR  (NON AFRICAN AMERICAN): >60 ML/MIN/1.73 M^2
GLUCOSE SERPL-MCNC: 81 MG/DL (ref 70–110)
HCT VFR BLD AUTO: 38.7 % (ref 40–54)
HGB BLD-MCNC: 13.4 G/DL (ref 14–18)
IMM GRANULOCYTES # BLD AUTO: 0.06 K/UL (ref 0–0.04)
IMM GRANULOCYTES NFR BLD AUTO: 0.8 % (ref 0–0.5)
LYMPHOCYTES # BLD AUTO: 1.3 K/UL (ref 1–4.8)
LYMPHOCYTES NFR BLD: 17.4 % (ref 18–48)
MAGNESIUM SERPL-MCNC: 2.2 MG/DL (ref 1.6–2.6)
MCH RBC QN AUTO: 32.1 PG (ref 27–31)
MCHC RBC AUTO-ENTMCNC: 34.6 G/DL (ref 32–36)
MCV RBC AUTO: 93 FL (ref 82–98)
MONOCYTES # BLD AUTO: 0.6 K/UL (ref 0.3–1)
MONOCYTES NFR BLD: 8 % (ref 4–15)
NEUTROPHILS # BLD AUTO: 5.2 K/UL (ref 1.8–7.7)
NEUTROPHILS NFR BLD: 69 % (ref 38–73)
NRBC BLD-RTO: 0 /100 WBC
PHOSPHATE SERPL-MCNC: 2.8 MG/DL (ref 2.7–4.5)
PLATELET # BLD AUTO: 226 K/UL (ref 150–350)
PMV BLD AUTO: 9.5 FL (ref 9.2–12.9)
POTASSIUM SERPL-SCNC: 3.8 MMOL/L (ref 3.5–5.1)
RBC # BLD AUTO: 4.17 M/UL (ref 4.6–6.2)
SODIUM SERPL-SCNC: 136 MMOL/L (ref 136–145)
WBC # BLD AUTO: 7.49 K/UL (ref 3.9–12.7)

## 2019-06-02 PROCEDURE — 20600001 HC STEP DOWN PRIVATE ROOM

## 2019-06-02 PROCEDURE — 83880 ASSAY OF NATRIURETIC PEPTIDE: CPT

## 2019-06-02 PROCEDURE — 85025 COMPLETE CBC W/AUTO DIFF WBC: CPT

## 2019-06-02 PROCEDURE — 27100171 HC OXYGEN HIGH FLOW UP TO 24 HOURS

## 2019-06-02 PROCEDURE — 27100092 HC HIGH FLOW DELIVERY CANNULA

## 2019-06-02 PROCEDURE — 84100 ASSAY OF PHOSPHORUS: CPT

## 2019-06-02 PROCEDURE — 83735 ASSAY OF MAGNESIUM: CPT

## 2019-06-02 PROCEDURE — 25000003 PHARM REV CODE 250

## 2019-06-02 PROCEDURE — 25000003 PHARM REV CODE 250: Performed by: PHYSICIAN ASSISTANT

## 2019-06-02 PROCEDURE — 80048 BASIC METABOLIC PNL TOTAL CA: CPT

## 2019-06-02 PROCEDURE — 27000221 HC OXYGEN, UP TO 24 HOURS

## 2019-06-02 PROCEDURE — 99233 PR SUBSEQUENT HOSPITAL CARE,LEVL III: ICD-10-PCS | Mod: GC,,, | Performed by: PHYSICIAN ASSISTANT

## 2019-06-02 PROCEDURE — 36415 COLL VENOUS BLD VENIPUNCTURE: CPT

## 2019-06-02 PROCEDURE — 94761 N-INVAS EAR/PLS OXIMETRY MLT: CPT

## 2019-06-02 PROCEDURE — 99233 SBSQ HOSP IP/OBS HIGH 50: CPT | Mod: GC,,, | Performed by: PHYSICIAN ASSISTANT

## 2019-06-02 PROCEDURE — 63600175 PHARM REV CODE 636 W HCPCS

## 2019-06-02 RX ORDER — FUROSEMIDE 40 MG/1
40 TABLET ORAL DAILY
Status: DISCONTINUED | OUTPATIENT
Start: 2019-06-02 | End: 2019-06-06 | Stop reason: HOSPADM

## 2019-06-02 RX ORDER — POTASSIUM CHLORIDE 20 MEQ/1
20 TABLET, EXTENDED RELEASE ORAL ONCE
Status: COMPLETED | OUTPATIENT
Start: 2019-06-02 | End: 2019-06-02

## 2019-06-02 RX ADMIN — ALBUTEROL SULFATE 2 PUFF: 90 AEROSOL, METERED RESPIRATORY (INHALATION) at 08:06

## 2019-06-02 RX ADMIN — ALBUTEROL SULFATE 2 PUFF: 90 AEROSOL, METERED RESPIRATORY (INHALATION) at 04:06

## 2019-06-02 RX ADMIN — AZITHROMYCIN 500 MG: 250 TABLET, FILM COATED ORAL at 08:06

## 2019-06-02 RX ADMIN — POTASSIUM CHLORIDE 20 MEQ: 1500 TABLET, EXTENDED RELEASE ORAL at 08:06

## 2019-06-02 RX ADMIN — ENOXAPARIN SODIUM 40 MG: 100 INJECTION SUBCUTANEOUS at 04:06

## 2019-06-02 RX ADMIN — POLYETHYLENE GLYCOL 3350 17 G: 17 POWDER, FOR SOLUTION ORAL at 08:06

## 2019-06-02 RX ADMIN — ATORVASTATIN CALCIUM 40 MG: 20 TABLET, FILM COATED ORAL at 08:06

## 2019-06-02 RX ADMIN — GUAIFENESIN 600 MG: 600 TABLET, EXTENDED RELEASE ORAL at 08:06

## 2019-06-02 RX ADMIN — ALBUTEROL SULFATE 2 PUFF: 90 AEROSOL, METERED RESPIRATORY (INHALATION) at 12:06

## 2019-06-02 RX ADMIN — TICAGRELOR 90 MG: 90 TABLET ORAL at 08:06

## 2019-06-02 RX ADMIN — MACITENTAN 10 MG: 10 TABLET, FILM COATED ORAL at 08:06

## 2019-06-02 RX ADMIN — SPIRONOLACTONE 25 MG: 25 TABLET, FILM COATED ORAL at 05:06

## 2019-06-02 RX ADMIN — ALLOPURINOL 300 MG: 100 TABLET ORAL at 08:06

## 2019-06-02 RX ADMIN — FUROSEMIDE 40 MG: 40 TABLET ORAL at 08:06

## 2019-06-02 RX ADMIN — RAMELTEON 8 MG: 8 TABLET, FILM COATED ORAL at 08:06

## 2019-06-02 RX ADMIN — CEFTRIAXONE SODIUM 1 G: 1 INJECTION, POWDER, FOR SOLUTION INTRAMUSCULAR; INTRAVENOUS at 07:06

## 2019-06-02 RX ADMIN — ASPIRIN 81 MG: 81 TABLET, COATED ORAL at 08:06

## 2019-06-02 RX ADMIN — FAMOTIDINE 20 MG: 20 TABLET, FILM COATED ORAL at 08:06

## 2019-06-02 NOTE — SUBJECTIVE & OBJECTIVE
Interval History: Pt in bed. Still c/o POPE. Oxygen still being weaned- down to 20L, 40%    Continuous Infusions:  Scheduled Meds:   albuterol  2 puff Inhalation Q4H    allopurinol  300 mg Oral Daily    aspirin  81 mg Oral Daily    atorvastatin  40 mg Oral Daily    azithromycin  500 mg Oral Daily    cefTRIAXone (ROCEPHIN) IVPB  1 g Intravenous Q24H    enoxaparin  40 mg Subcutaneous Daily    famotidine  20 mg Oral BID    furosemide  40 mg Oral Daily    guaiFENesin  600 mg Oral BID    macitentan  10 mg Oral Daily    polyethylene glycol  17 g Oral Daily    selexipag  1,400 mcg Oral BID    spironolactone  25 mg Oral QAM    ticagrelor  90 mg Oral BID     PRN Meds:acetaminophen, ondansetron, ramelteon, sodium chloride 0.9%    Review of patient's allergies indicates:   Allergen Reactions    Clindamycin Shortness Of Breath    Penicillins Rash     Objective:     Vital Signs (Most Recent):  Temp: 98 °F (36.7 °C) (06/02/19 0725)  Pulse: 82 (06/02/19 0820)  Resp: 18 (06/02/19 0820)  BP: 91/67 (06/02/19 0735)  SpO2: 95 % (06/02/19 0735) Vital Signs (24h Range):  Temp:  [96.4 °F (35.8 °C)-98 °F (36.7 °C)] 98 °F (36.7 °C)  Pulse:  [68-98] 82  Resp:  [15-26] 18  SpO2:  [87 %-98 %] 95 %  BP: ()/(61-79) 91/67     Patient Vitals for the past 72 hrs (Last 3 readings):   Weight   06/02/19 0500 65.7 kg (144 lb 13.5 oz)   06/01/19 0600 64 kg (141 lb 1.5 oz)   05/31/19 1300 62.1 kg (137 lb)     Body mass index is 21.39 kg/m².      Intake/Output Summary (Last 24 hours) at 6/2/2019 1000  Last data filed at 6/2/2019 0825  Gross per 24 hour   Intake 1070 ml   Output 1450 ml   Net -380 ml       Hemodynamic Parameters:       Telemetry: Reviewed    Physical Exam   Constitutional: He is oriented to person, place, and time. He appears well-developed and well-nourished.   Neck: Normal range of motion. Neck supple. No JVD present.   Cardiovascular: Normal rate and regular rhythm. Exam reveals no gallop and no friction rub.   No  murmur heard.  Pulmonary/Chest: Effort normal and breath sounds normal. He has no wheezes. He has no rales.   On O2 via HFNC   Abdominal: Soft. Bowel sounds are normal. There is no tenderness.   Musculoskeletal: He exhibits no edema.   Neurological: He is alert and oriented to person, place, and time.   Skin: Skin is warm and dry.       Significant Labs:  CBC:  Recent Labs   Lab 05/31/19  0755 06/01/19  0616 06/02/19  0621   WBC 5.95 8.44 7.49   RBC 4.00* 4.25* 4.17*   HGB 12.6* 13.5* 13.4*   HCT 36.6* 40.2 38.7*    209 226   MCV 92 95 93   MCH 31.5* 31.8* 32.1*   MCHC 34.4 33.6 34.6     BNP:  Recent Labs   Lab 05/30/19  1613 06/02/19  0806   * 195*     CMP:  Recent Labs   Lab 05/30/19  1613 05/31/19  0343 06/01/19  0616 06/02/19  0621   * 150* 82 81   CALCIUM 10.3 9.3 9.5 9.2   ALBUMIN 4.0  --   --   --    PROT 8.4  --   --   --     134* 135* 136   K 3.8 4.0 3.9 3.8   CO2 21* 21* 21* 21*    104 106 106   BUN 20 18 16 14   CREATININE 1.1 1.0 0.8 0.8   ALKPHOS 170*  --   --   --    ALT 23  --   --   --    AST 34  --   --   --    BILITOT 2.4*  --   --   --       Coagulation:   No results for input(s): PT, INR, APTT in the last 168 hours.  LDH:  No results for input(s): LDH in the last 72 hours.  Microbiology:  Microbiology Results (last 7 days)     Procedure Component Value Units Date/Time    Blood culture #1 **CANNOT BE ORDERED STAT** [[id number]] Collected:  05/30/19 1610    Order Status:  Completed Specimen:  Blood from Peripheral, Antecubital, Left Updated:  06/01/19 1812     Blood Culture, Routine No Growth to date     Blood Culture, Routine No Growth to date     Blood Culture, Routine No Growth to date    Blood culture #2 **CANNOT BE ORDERED STAT** [[id number]] Collected:  05/30/19 1631    Order Status:  Completed Specimen:  Blood from Peripheral, Antecubital, Right Updated:  06/01/19 1812     Blood Culture, Routine No Growth to date     Blood Culture, Routine No Growth to date      Blood Culture, Routine No Growth to date    Respiratory Viral Panel by PCR [facility]; Nasal Swab [[id number]] Collected:  05/31/19 1027    Order Status:  Sent Specimen:  Respiratory Updated:  05/31/19 1036          I have reviewed all pertinent labs within the past 24 hours.    Estimated Creatinine Clearance: 70.7 mL/min (based on SCr of 0.8 mg/dL).    Diagnostic Results:  I have reviewed all pertinent imaging results/findings within the past 24 hours.

## 2019-06-02 NOTE — ASSESSMENT & PLAN NOTE
-Cont selexipag 1400 mg BID  -Pt was started on macitentan last week but stopped after 5 doses bc he was feeling so bad. Restarted macitentan 5/31/19  -Restart home dose of Lasix 40 mg daily today

## 2019-06-02 NOTE — PLAN OF CARE
Problem: Adult Inpatient Plan of Care  Goal: Plan of Care Review  -Pt AAOx4  -Vital signs stable  -Pt on Comfort flow 20L 40%, sats remain above 88%.  Weaning as pt tolerates  -Telemetry monitoring NSR ; HR ranging in the 70s to 80s  -Fall precautions maintained, non skid socks worn  -No acute events/falls/injuries this shift  -Pt aware to call for help with ambulating.    -Bed lowered, locked, siderails up x2, and call bell in reach.     See flowsheet for assessment findings.  Will continue to monitor pt.

## 2019-06-02 NOTE — PLAN OF CARE
Problem: Adult Inpatient Plan of Care  Goal: Plan of Care Review  Outcome: Ongoing (interventions implemented as appropriate)  Patient is AAOx4, VSS. Cardiac monitoring in progress - NSR. HR in 80s-90s. O2 sats maintained >92% on comfort flow 20L 40%. Pt. c/o shortness of breath despite O2 sats. Will try to wean as tolerated. Inhaler administered q 4 hours. Afebrile. Azithromycin and Rocephin continued. Proper hand hygiene performed before and after patient care activities. Urine output 825 cc so far this shift. Restarted on lasix po. 1 BM this shift. Patient remained free from falls and injury so far this shift. Up ad layne. Bed locked and in lowest position, call light within reach, non skid socks on. Will continue to monitor.

## 2019-06-02 NOTE — PROGRESS NOTES
[facility]  Heart Transplant  Progress Note    Patient Name: Greg Nolasco  MRN: [id number]  Admission Date: 5/30/2019  Hospital Length of Stay: 3 days  Attending Physician: Merary Garcia MD  Primary Care Provider: Pablo Lorenzo MD  Principal Problem:Acute on chronic respiratory failure with hypoxemia    Subjective:     Interval History: Pt in bed. Still c/o POPE. Oxygen still being weaned- down to 20L, 40%    Continuous Infusions:  Scheduled Meds:   albuterol  2 puff Inhalation Q4H    allopurinol  300 mg Oral Daily    aspirin  81 mg Oral Daily    atorvastatin  40 mg Oral Daily    azithromycin  500 mg Oral Daily    cefTRIAXone (ROCEPHIN) IVPB  1 g Intravenous Q24H    enoxaparin  40 mg Subcutaneous Daily    famotidine  20 mg Oral BID    furosemide  40 mg Oral Daily    guaiFENesin  600 mg Oral BID    macitentan  10 mg Oral Daily    polyethylene glycol  17 g Oral Daily    selexipag  1,400 mcg Oral BID    spironolactone  25 mg Oral QAM    ticagrelor  90 mg Oral BID     PRN Meds:acetaminophen, ondansetron, ramelteon, sodium chloride 0.9%    Review of patient's allergies indicates:   Allergen Reactions    Clindamycin Shortness Of Breath    Penicillins Rash     Objective:     Vital Signs (Most Recent):  Temp: 98 °F (36.7 °C) (06/02/19 0725)  Pulse: 82 (06/02/19 0820)  Resp: 18 (06/02/19 0820)  BP: 91/67 (06/02/19 0735)  SpO2: 95 % (06/02/19 0735) Vital Signs (24h Range):  Temp:  [96.4 °F (35.8 °C)-98 °F (36.7 °C)] 98 °F (36.7 °C)  Pulse:  [68-98] 82  Resp:  [15-26] 18  SpO2:  [87 %-98 %] 95 %  BP: ()/(61-79) 91/67     Patient Vitals for the past 72 hrs (Last 3 readings):   Weight   06/02/19 0500 65.7 kg (144 lb 13.5 oz)   06/01/19 0600 64 kg (141 lb 1.5 oz)   05/31/19 1300 62.1 kg (137 lb)     Body mass index is 21.39 kg/m².      Intake/Output Summary (Last 24 hours) at 6/2/2019 1000  Last data filed at 6/2/2019 0825  Gross per 24 hour   Intake 1070 ml   Output 1450 ml   Net  -380 ml       Hemodynamic Parameters:       Telemetry: Reviewed    Physical Exam   Constitutional: He is oriented to person, place, and time. He appears well-developed and well-nourished.   Neck: Normal range of motion. Neck supple. No JVD present.   Cardiovascular: Normal rate and regular rhythm. Exam reveals no gallop and no friction rub.   No murmur heard.  Pulmonary/Chest: Effort normal and breath sounds normal. He has no wheezes. He has no rales.   On O2 via HFNC   Abdominal: Soft. Bowel sounds are normal. There is no tenderness.   Musculoskeletal: He exhibits no edema.   Neurological: He is alert and oriented to person, place, and time.   Skin: Skin is warm and dry.       Significant Labs:  CBC:  Recent Labs   Lab 05/31/19  0755 06/01/19  0616 06/02/19  0621   WBC 5.95 8.44 7.49   RBC 4.00* 4.25* 4.17*   HGB 12.6* 13.5* 13.4*   HCT 36.6* 40.2 38.7*    209 226   MCV 92 95 93   MCH 31.5* 31.8* 32.1*   MCHC 34.4 33.6 34.6     BNP:  Recent Labs   Lab 05/30/19  1613 06/02/19  0806   * 195*     CMP:  Recent Labs   Lab 05/30/19  1613 05/31/19  0343 06/01/19  0616 06/02/19  0621   * 150* 82 81   CALCIUM 10.3 9.3 9.5 9.2   ALBUMIN 4.0  --   --   --    PROT 8.4  --   --   --     134* 135* 136   K 3.8 4.0 3.9 3.8   CO2 21* 21* 21* 21*    104 106 106   BUN 20 18 16 14   CREATININE 1.1 1.0 0.8 0.8   ALKPHOS 170*  --   --   --    ALT 23  --   --   --    AST 34  --   --   --    BILITOT 2.4*  --   --   --       Coagulation:   No results for input(s): PT, INR, APTT in the last 168 hours.  LDH:  No results for input(s): LDH in the last 72 hours.  Microbiology:  Microbiology Results (last 7 days)     Procedure Component Value Units Date/Time    Blood culture #1 **CANNOT BE ORDERED STAT** [[id number]] Collected:  05/30/19 1610    Order Status:  Completed Specimen:  Blood from Peripheral, Antecubital, Left Updated:  06/01/19 1812     Blood Culture, Routine No Growth to date     Blood Culture,  Routine No Growth to date     Blood Culture, Routine No Growth to date    Blood culture #2 **CANNOT BE ORDERED STAT** [[id number]] Collected:  05/30/19 1631    Order Status:  Completed Specimen:  Blood from Peripheral, Antecubital, Right Updated:  06/01/19 1812     Blood Culture, Routine No Growth to date     Blood Culture, Routine No Growth to date     Blood Culture, Routine No Growth to date    Respiratory Viral Panel by PCR [facility]; Nasal Swab [[id number]] Collected:  05/31/19 1027    Order Status:  Sent Specimen:  Respiratory Updated:  05/31/19 1036          I have reviewed all pertinent labs within the past 24 hours.    Estimated Creatinine Clearance: 70.7 mL/min (based on SCr of 0.8 mg/dL).    Diagnostic Results:  I have reviewed all pertinent imaging results/findings within the past 24 hours.    Assessment and Plan:     We are admitting this 79 yo gentleman for complaints of productive cough, nasal congestion and chief complaint of shortness of breath. 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     He had been in his usual state of health, but began having sinus congestion and drainage on Friday, this progressed to productive cough and worsening dyspnea that has not improved with nebs (received Saturday), but has stabilized somewhat since he started guaifenesin.    He denies weight gain (in fact reports weight loss), has been compliant with diet and medications and is usually on 4L NC at home. He stopped his macitentan on Wednesday as there was a suspicion this was causing his symptoms.    Currently he is short of breath (relieved with partial non-rebreather) and reports being thirsty. He denies fevers, chest pain, palpitations, syncope.    * Acute on chronic respiratory failure with hypoxemia  -Likely due to exacerbation of group 1 PH  secondary to acute viral URI   -On O2 via HFNC. Wean O2. Was on 4L at home  - Cont rocephin/azithromycin- plan to complete 5 day course  -Pred 60 mg x 1 given 5/30/19  -procalcitonin negative  -Lactate 2.4 on admit, trended down to normal   -RVP pending  - nebs q4h, guaifenesin 600mg BID  - continue selexipag 1400mcg BID (patient brought home med). Restarted macitentan 5/31/19, as this is likely infection and not SE of med      PHT (pulmonary hypertension)  -Cont selexipag 1400 mg BID  -Pt was started on macitentan last week but stopped after 5 doses bc he was feeling so bad. Restarted macitentan 5/31/19  -Restart home dose of Lasix 40 mg daily today    CAD (coronary artery disease)  -Cont ASA, Brilinta, statin      Stacey Moses PA-C  Heart Transplant  [facility]

## 2019-06-03 LAB
ANION GAP SERPL CALC-SCNC: 10 MMOL/L (ref 8–16)
BASOPHILS # BLD AUTO: 0.07 K/UL (ref 0–0.2)
BASOPHILS NFR BLD: 0.8 % (ref 0–1.9)
BNP SERPL-MCNC: 163 PG/ML (ref 0–99)
BUN SERPL-MCNC: 15 MG/DL (ref 8–23)
CALCIUM SERPL-MCNC: 9.5 MG/DL (ref 8.7–10.5)
CHLORIDE SERPL-SCNC: 104 MMOL/L (ref 95–110)
CO2 SERPL-SCNC: 19 MMOL/L (ref 23–29)
CREAT SERPL-MCNC: 0.9 MG/DL (ref 0.5–1.4)
DIFFERENTIAL METHOD: ABNORMAL
ENTEROVIRUS: NOT DETECTED
EOSINOPHIL # BLD AUTO: 0.4 K/UL (ref 0–0.5)
EOSINOPHIL NFR BLD: 4.8 % (ref 0–8)
ERYTHROCYTE [DISTWIDTH] IN BLOOD BY AUTOMATED COUNT: 15.2 % (ref 11.5–14.5)
EST. GFR  (AFRICAN AMERICAN): >60 ML/MIN/1.73 M^2
EST. GFR  (NON AFRICAN AMERICAN): >60 ML/MIN/1.73 M^2
GLUCOSE SERPL-MCNC: 99 MG/DL (ref 70–110)
HCT VFR BLD AUTO: 41.2 % (ref 40–54)
HGB BLD-MCNC: 14.2 G/DL (ref 14–18)
HUMAN BOCAVIRUS: NOT DETECTED
HUMAN CORONAVIRUS, COMMON COLD VIRUS: NOT DETECTED
IMM GRANULOCYTES # BLD AUTO: 0.07 K/UL (ref 0–0.04)
IMM GRANULOCYTES NFR BLD AUTO: 0.8 % (ref 0–0.5)
INFLUENZA A - H1N1-09: NOT DETECTED
LYMPHOCYTES # BLD AUTO: 1.8 K/UL (ref 1–4.8)
LYMPHOCYTES NFR BLD: 22.1 % (ref 18–48)
MAGNESIUM SERPL-MCNC: 2 MG/DL (ref 1.6–2.6)
MCH RBC QN AUTO: 31.7 PG (ref 27–31)
MCHC RBC AUTO-ENTMCNC: 34.5 G/DL (ref 32–36)
MCV RBC AUTO: 92 FL (ref 82–98)
MONOCYTES # BLD AUTO: 0.7 K/UL (ref 0.3–1)
MONOCYTES NFR BLD: 8.5 % (ref 4–15)
NEUTROPHILS # BLD AUTO: 5.2 K/UL (ref 1.8–7.7)
NEUTROPHILS NFR BLD: 63 % (ref 38–73)
NRBC BLD-RTO: 0 /100 WBC
PARAINFLUENZA: NOT DETECTED
PHOSPHATE SERPL-MCNC: 3.5 MG/DL (ref 2.7–4.5)
PLATELET # BLD AUTO: 267 K/UL (ref 150–350)
PMV BLD AUTO: 9.3 FL (ref 9.2–12.9)
POTASSIUM SERPL-SCNC: 4.1 MMOL/L (ref 3.5–5.1)
RBC # BLD AUTO: 4.48 M/UL (ref 4.6–6.2)
RVP - ADENOVIRUS: NOT DETECTED
RVP - HUMAN METAPNEUMOVIRUS (HMPV): NOT DETECTED
RVP - INFLUENZA A: NOT DETECTED
RVP - INFLUENZA B: NOT DETECTED
RVP - RESPIRATORY SYNCTIAL VIRUS (RSV) A: NOT DETECTED
RVP - RESPIRATORY VIRAL PANEL, SOURCE: NORMAL
RVP - RHINOVIRUS: NOT DETECTED
SODIUM SERPL-SCNC: 133 MMOL/L (ref 136–145)
WBC # BLD AUTO: 8.25 K/UL (ref 3.9–12.7)

## 2019-06-03 PROCEDURE — 80048 BASIC METABOLIC PNL TOTAL CA: CPT

## 2019-06-03 PROCEDURE — 83880 ASSAY OF NATRIURETIC PEPTIDE: CPT

## 2019-06-03 PROCEDURE — 36415 COLL VENOUS BLD VENIPUNCTURE: CPT

## 2019-06-03 PROCEDURE — 27100171 HC OXYGEN HIGH FLOW UP TO 24 HOURS

## 2019-06-03 PROCEDURE — 94761 N-INVAS EAR/PLS OXIMETRY MLT: CPT

## 2019-06-03 PROCEDURE — 63600175 PHARM REV CODE 636 W HCPCS

## 2019-06-03 PROCEDURE — 84100 ASSAY OF PHOSPHORUS: CPT

## 2019-06-03 PROCEDURE — 27000221 HC OXYGEN, UP TO 24 HOURS

## 2019-06-03 PROCEDURE — 27100092 HC HIGH FLOW DELIVERY CANNULA

## 2019-06-03 PROCEDURE — 83735 ASSAY OF MAGNESIUM: CPT

## 2019-06-03 PROCEDURE — 25000003 PHARM REV CODE 250: Performed by: PHYSICIAN ASSISTANT

## 2019-06-03 PROCEDURE — 85025 COMPLETE CBC W/AUTO DIFF WBC: CPT

## 2019-06-03 PROCEDURE — 25000003 PHARM REV CODE 250

## 2019-06-03 PROCEDURE — 20600001 HC STEP DOWN PRIVATE ROOM

## 2019-06-03 PROCEDURE — 63600175 PHARM REV CODE 636 W HCPCS: Performed by: PHYSICIAN ASSISTANT

## 2019-06-03 RX ORDER — ATORVASTATIN CALCIUM 20 MG/1
40 TABLET, FILM COATED ORAL NIGHTLY
Status: DISCONTINUED | OUTPATIENT
Start: 2019-06-04 | End: 2019-06-06 | Stop reason: HOSPADM

## 2019-06-03 RX ORDER — DOBUTAMINE HYDROCHLORIDE 400 MG/100ML
2.5 INJECTION, SOLUTION INTRAVENOUS CONTINUOUS
Status: DISCONTINUED | OUTPATIENT
Start: 2019-06-03 | End: 2019-06-05

## 2019-06-03 RX ORDER — ALLOPURINOL 100 MG/1
300 TABLET ORAL NIGHTLY
Status: DISCONTINUED | OUTPATIENT
Start: 2019-06-04 | End: 2019-06-06 | Stop reason: HOSPADM

## 2019-06-03 RX ADMIN — ALBUTEROL SULFATE 2 PUFF: 90 AEROSOL, METERED RESPIRATORY (INHALATION) at 08:06

## 2019-06-03 RX ADMIN — DOBUTAMINE IN DEXTROSE 2.5 MCG/KG/MIN: 200 INJECTION, SOLUTION INTRAVENOUS at 11:06

## 2019-06-03 RX ADMIN — ALBUTEROL SULFATE 2 PUFF: 90 AEROSOL, METERED RESPIRATORY (INHALATION) at 05:06

## 2019-06-03 RX ADMIN — MACITENTAN 10 MG: 10 TABLET, FILM COATED ORAL at 09:06

## 2019-06-03 RX ADMIN — SPIRONOLACTONE 25 MG: 25 TABLET, FILM COATED ORAL at 05:06

## 2019-06-03 RX ADMIN — ASPIRIN 81 MG: 81 TABLET, COATED ORAL at 09:06

## 2019-06-03 RX ADMIN — CEFTRIAXONE SODIUM 1 G: 1 INJECTION, POWDER, FOR SOLUTION INTRAMUSCULAR; INTRAVENOUS at 07:06

## 2019-06-03 RX ADMIN — ALBUTEROL SULFATE 2 PUFF: 90 AEROSOL, METERED RESPIRATORY (INHALATION) at 11:06

## 2019-06-03 RX ADMIN — GUAIFENESIN 600 MG: 600 TABLET, EXTENDED RELEASE ORAL at 08:06

## 2019-06-03 RX ADMIN — FUROSEMIDE 40 MG: 40 TABLET ORAL at 09:06

## 2019-06-03 RX ADMIN — RAMELTEON 8 MG: 8 TABLET, FILM COATED ORAL at 08:06

## 2019-06-03 RX ADMIN — TICAGRELOR 90 MG: 90 TABLET ORAL at 09:06

## 2019-06-03 RX ADMIN — TICAGRELOR 90 MG: 90 TABLET ORAL at 08:06

## 2019-06-03 RX ADMIN — FAMOTIDINE 20 MG: 20 TABLET, FILM COATED ORAL at 09:06

## 2019-06-03 RX ADMIN — ALBUTEROL SULFATE 2 PUFF: 90 AEROSOL, METERED RESPIRATORY (INHALATION) at 01:06

## 2019-06-03 RX ADMIN — AZITHROMYCIN 500 MG: 250 TABLET, FILM COATED ORAL at 09:06

## 2019-06-03 RX ADMIN — ALLOPURINOL 300 MG: 100 TABLET ORAL at 09:06

## 2019-06-03 RX ADMIN — FAMOTIDINE 20 MG: 20 TABLET, FILM COATED ORAL at 08:06

## 2019-06-03 RX ADMIN — ATORVASTATIN CALCIUM 40 MG: 20 TABLET, FILM COATED ORAL at 09:06

## 2019-06-03 RX ADMIN — GUAIFENESIN 600 MG: 600 TABLET, EXTENDED RELEASE ORAL at 09:06

## 2019-06-03 RX ADMIN — ALBUTEROL SULFATE 2 PUFF: 90 AEROSOL, METERED RESPIRATORY (INHALATION) at 04:06

## 2019-06-03 NOTE — PROGRESS NOTES
Pt's abdomen bleeding at Lovenox injection site.  Applied Vaseline guaze, guaze, and pressure dressing x2 -both dressings completely saturated.  Redressed with gel foam and pressure dressing per Charge nurse-  Dressing remains CDI throughout night.  Pt requesting to not receive Lovenox injections anymore. Will continue to monitor pt.

## 2019-06-03 NOTE — PROGRESS NOTES
[facility]  Heart Transplant  Progress Note    Patient Name: Greg Nolasco  MRN: [id number]  Admission Date: 5/30/2019  Hospital Length of Stay: 4 days  Attending Physician: Merary Garcia MD  Primary Care Provider: Pablo Lorenzo MD  Principal Problem:Acute on chronic respiratory failure with hypoxemia    Subjective:     Interval History: Pt still c/o POPE with minimal exertion. Productive cough is better    Continuous Infusions:   DOBUTamine 2.5 mcg/kg/min (06/03/19 1126)     Scheduled Meds:   albuterol  2 puff Inhalation Q4H    [START ON 6/4/2019] allopurinol  300 mg Oral QHS    aspirin  81 mg Oral Daily    [START ON 6/4/2019] atorvastatin  40 mg Oral QHS    azithromycin  500 mg Oral Daily    cefTRIAXone (ROCEPHIN) IVPB  1 g Intravenous Q24H    famotidine  20 mg Oral BID    furosemide  40 mg Oral Daily    guaiFENesin  600 mg Oral BID    macitentan  10 mg Oral Daily    polyethylene glycol  17 g Oral Daily    selexipag  1,400 mcg Oral BID    spironolactone  25 mg Oral QAM    ticagrelor  90 mg Oral BID     PRN Meds:acetaminophen, ondansetron, ramelteon, sodium chloride 0.9%    Review of patient's allergies indicates:   Allergen Reactions    Clindamycin Shortness Of Breath    Penicillins Rash     Objective:     Vital Signs (Most Recent):  Temp: 97.6 °F (36.4 °C) (06/03/19 1127)  Pulse: 83 (06/03/19 1148)  Resp: 12 (06/03/19 1148)  BP: 105/74 (06/03/19 0800)  SpO2: (!) 91 % (06/03/19 0800) Vital Signs (24h Range):  Temp:  [97.6 °F (36.4 °C)-98.2 °F (36.8 °C)] 97.6 °F (36.4 °C)  Pulse:  [70-98] 83  Resp:  [12-18] 12  SpO2:  [88 %-95 %] 91 %  BP: ()/(69-74) 105/74     Patient Vitals for the past 72 hrs (Last 3 readings):   Weight   06/03/19 0600 66.7 kg (147 lb 0.8 oz)   06/02/19 0500 65.7 kg (144 lb 13.5 oz)   06/01/19 0600 64 kg (141 lb 1.5 oz)     Body mass index is 21.72 kg/m².      Intake/Output Summary (Last 24 hours) at 6/3/2019 1150  Last data filed at 6/3/2019  1149  Gross per 24 hour   Intake 1600 ml   Output 2150 ml   Net -550 ml       Hemodynamic Parameters:       Telemetry: Reviewed    Physical Exam   Constitutional: He is oriented to person, place, and time. He appears cachectic.   Neck: Normal range of motion. Neck supple. No JVD present.   Cardiovascular: Normal rate and regular rhythm. Exam reveals no gallop and no friction rub.   No murmur heard.  Pulmonary/Chest: Effort normal and breath sounds normal. He has no wheezes. He has no rales.   On O2 via HFNC   Abdominal: Soft. Bowel sounds are normal. There is no tenderness.   Musculoskeletal: He exhibits no edema.   Neurological: He is alert and oriented to person, place, and time.   Skin: Skin is dry.   Bilat LEs are cool on exam       Significant Labs:  CBC:  Recent Labs   Lab 06/01/19  0616 06/02/19  0621 06/03/19  0617   WBC 8.44 7.49 8.25   RBC 4.25* 4.17* 4.48*   HGB 13.5* 13.4* 14.2   HCT 40.2 38.7* 41.2    226 267   MCV 95 93 92   MCH 31.8* 32.1* 31.7*   MCHC 33.6 34.6 34.5     BNP:  Recent Labs   Lab 05/30/19  1613 06/02/19  0806 06/03/19  0617   * 195* 163*     CMP:  Recent Labs   Lab 05/30/19  1613  06/01/19  0616 06/02/19  0621 06/03/19  0617   *   < > 82 81 99   CALCIUM 10.3   < > 9.5 9.2 9.5   ALBUMIN 4.0  --   --   --   --    PROT 8.4  --   --   --   --       < > 135* 136 133*   K 3.8   < > 3.9 3.8 4.1   CO2 21*   < > 21* 21* 19*      < > 106 106 104   BUN 20   < > 16 14 15   CREATININE 1.1   < > 0.8 0.8 0.9   ALKPHOS 170*  --   --   --   --    ALT 23  --   --   --   --    AST 34  --   --   --   --    BILITOT 2.4*  --   --   --   --     < > = values in this interval not displayed.      Coagulation:   No results for input(s): PT, INR, APTT in the last 168 hours.  LDH:  No results for input(s): LDH in the last 72 hours.  Microbiology:  Microbiology Results (last 7 days)     Procedure Component Value Units Date/Time    Respiratory Viral Panel by PCR [facility]; Nasal Swab  [[id number]] Collected:  05/31/19 1027    Order Status:  Completed Specimen:  Respiratory Updated:  06/03/19 1059     Respiratory Virus Panel, source Nasal Swab     RVP - Adenovirus Not Detected     Comment: Detects Serotypes B and E. Detection of Serotype C may   be limited. If Adenovirus infection is suspected and a   Not Detected result is returned the sample should be   re-tested for Adenovirus using an independent method  (e.g. [employer] Adenovirus Quantitative Real-Time  PCR test.          Enterovirus Not Detected     Comment: Cross-reactivity has been observed between certain Rhinovirus  strains and the Enterovirus assay.          Human Bocavirus Not Detected     Human Coronavirus Not Detected     Comment: The Human Coronavirus assay detects Human coronavirus types  229E, OC43,NL63 and HKU1.          RVP - Human Metapneumovirus (hMPV) Not Detected     RVP - Influenza A Not Detected     Influenza A - K0V3-37 Not Detected     RVP - Influenza B Not Detected     Parainfluenza Not Detected     Respiratory Syncytial VirusVirus (RSV) A Not Detected     Comment: The Respiratory Syncytial Viral assay detects types A and B,  however it does not distinguish between the two.          RVP - Rhinovirus Not Detected     Comment: Cross-Reactivity has been observed between certain   Rhinovirus strains and the Enterovirus assay.  Target Enriched Mulitplex Polymerase Chain Reaction (TEM-PCR)  allows for the detection of multiple pathogens out of a single  reaction.  This test was developed and its performance   characteristics determined by [employer].  It has not   been cleared or approved by the U.S.Food and Drug Administration.  Results should be used in conjunction with clinical findings,   and should not form the sole basis for a diagnosis or treatment  decision.  TEM-PCR is a licensed technology of [employer].         Narrative:       Receiving Lab:->[facility]    Blood culture #2 **CANNOT BE  ORDERED STAT** [[id number]] Collected:  05/30/19 1631    Order Status:  Completed Specimen:  Blood from Peripheral, Antecubital, Right Updated:  06/02/19 1812     Blood Culture, Routine No Growth to date     Blood Culture, Routine No Growth to date     Blood Culture, Routine No Growth to date     Blood Culture, Routine No Growth to date    Blood culture #1 **CANNOT BE ORDERED STAT** [[id number]] Collected:  05/30/19 1610    Order Status:  Completed Specimen:  Blood from Peripheral, Antecubital, Left Updated:  06/02/19 1812     Blood Culture, Routine No Growth to date     Blood Culture, Routine No Growth to date     Blood Culture, Routine No Growth to date     Blood Culture, Routine No Growth to date          I have reviewed all pertinent labs within the past 24 hours.    Estimated Creatinine Clearance: 63.8 mL/min (based on SCr of 0.9 mg/dL).    Diagnostic Results:  I have reviewed all pertinent imaging results/findings within the past 24 hours.    Assessment and Plan:     We are admitting this 79 yo gentleman for complaints of productive cough, nasal congestion and chief complaint of shortness of breath. 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     He had been in his usual state of health, but began having sinus congestion and drainage on Friday, this progressed to productive cough and worsening dyspnea that has not improved with nebs (received Saturday), but has stabilized somewhat since he started guaifenesin.    He denies weight gain (in fact reports weight loss), has been compliant with diet and medications and is usually on 4L NC at home. He stopped his macitentan on Wednesday as there was a suspicion this was causing his symptoms.    Currently he is short of breath (relieved with partial non-rebreather) and reports being thirsty. He  denies fevers, chest pain, palpitations, syncope.    * Acute on chronic respiratory failure with hypoxemia  -Likely due to exacerbation of group 1 PH secondary to acute viral URI   -On O2 via HFNC. Wean O2. Was on 4L at home  - Cont rocephin/azithromycin- plan to complete 5 day course  -Pred 60 mg x 1 given 5/30/19  -procalcitonin negative  -Lactate 2.4 on admit, trended down to normal   -RVP negative  - nebs q4h, guaifenesin 600mg BID  - continue selexipag 1400mcg BID (patient brought home med). Restarted macitentan 5/31/19, as this is likely infection and not SE of med      PHT (pulmonary hypertension)  -Cont selexipag 1400 mg BID  -Pt was started on macitentan last week but stopped after 5 doses bc he was feeling so bad. Restarted macitentan 5/31/19  -Restart home dose of Lasix 40 mg daily 6/2/19  -Pt still with SOB with very minimal exertion and feels cool on exam which is concerning for low flow HF in setting of severe PH. Will start  today and monitor response    CAD (coronary artery disease)  -Cont ASA, Brilinta, statin      Stacey Moses PA-C  Heart Transplant  [facility]

## 2019-06-03 NOTE — SUBJECTIVE & OBJECTIVE
Interval History: Pt still c/o POPE with minimal exertion. Productive cough is better    Continuous Infusions:   DOBUTamine 2.5 mcg/kg/min (06/03/19 1126)     Scheduled Meds:   albuterol  2 puff Inhalation Q4H    [START ON 6/4/2019] allopurinol  300 mg Oral QHS    aspirin  81 mg Oral Daily    [START ON 6/4/2019] atorvastatin  40 mg Oral QHS    azithromycin  500 mg Oral Daily    cefTRIAXone (ROCEPHIN) IVPB  1 g Intravenous Q24H    famotidine  20 mg Oral BID    furosemide  40 mg Oral Daily    guaiFENesin  600 mg Oral BID    macitentan  10 mg Oral Daily    polyethylene glycol  17 g Oral Daily    selexipag  1,400 mcg Oral BID    spironolactone  25 mg Oral QAM    ticagrelor  90 mg Oral BID     PRN Meds:acetaminophen, ondansetron, ramelteon, sodium chloride 0.9%    Review of patient's allergies indicates:   Allergen Reactions    Clindamycin Shortness Of Breath    Penicillins Rash     Objective:     Vital Signs (Most Recent):  Temp: 97.6 °F (36.4 °C) (06/03/19 1127)  Pulse: 83 (06/03/19 1148)  Resp: 12 (06/03/19 1148)  BP: 105/74 (06/03/19 0800)  SpO2: (!) 91 % (06/03/19 0800) Vital Signs (24h Range):  Temp:  [97.6 °F (36.4 °C)-98.2 °F (36.8 °C)] 97.6 °F (36.4 °C)  Pulse:  [70-98] 83  Resp:  [12-18] 12  SpO2:  [88 %-95 %] 91 %  BP: ()/(69-74) 105/74     Patient Vitals for the past 72 hrs (Last 3 readings):   Weight   06/03/19 0600 66.7 kg (147 lb 0.8 oz)   06/02/19 0500 65.7 kg (144 lb 13.5 oz)   06/01/19 0600 64 kg (141 lb 1.5 oz)     Body mass index is 21.72 kg/m².      Intake/Output Summary (Last 24 hours) at 6/3/2019 1150  Last data filed at 6/3/2019 1149  Gross per 24 hour   Intake 1600 ml   Output 2150 ml   Net -550 ml       Hemodynamic Parameters:       Telemetry: Reviewed    Physical Exam   Constitutional: He is oriented to person, place, and time. He appears cachectic.   Neck: Normal range of motion. Neck supple. No JVD present.   Cardiovascular: Normal rate and regular rhythm. Exam reveals  no gallop and no friction rub.   No murmur heard.  Pulmonary/Chest: Effort normal and breath sounds normal. He has no wheezes. He has no rales.   On O2 via HFNC   Abdominal: Soft. Bowel sounds are normal. There is no tenderness.   Musculoskeletal: He exhibits no edema.   Neurological: He is alert and oriented to person, place, and time.   Skin: Skin is dry.   Bilat LEs are cool on exam       Significant Labs:  CBC:  Recent Labs   Lab 06/01/19  0616 06/02/19 0621 06/03/19 0617   WBC 8.44 7.49 8.25   RBC 4.25* 4.17* 4.48*   HGB 13.5* 13.4* 14.2   HCT 40.2 38.7* 41.2    226 267   MCV 95 93 92   MCH 31.8* 32.1* 31.7*   MCHC 33.6 34.6 34.5     BNP:  Recent Labs   Lab 05/30/19  1613 06/02/19  0806 06/03/19 0617   * 195* 163*     CMP:  Recent Labs   Lab 05/30/19  1613  06/01/19  0616 06/02/19 0621 06/03/19 0617   *   < > 82 81 99   CALCIUM 10.3   < > 9.5 9.2 9.5   ALBUMIN 4.0  --   --   --   --    PROT 8.4  --   --   --   --       < > 135* 136 133*   K 3.8   < > 3.9 3.8 4.1   CO2 21*   < > 21* 21* 19*      < > 106 106 104   BUN 20   < > 16 14 15   CREATININE 1.1   < > 0.8 0.8 0.9   ALKPHOS 170*  --   --   --   --    ALT 23  --   --   --   --    AST 34  --   --   --   --    BILITOT 2.4*  --   --   --   --     < > = values in this interval not displayed.      Coagulation:   No results for input(s): PT, INR, APTT in the last 168 hours.  LDH:  No results for input(s): LDH in the last 72 hours.  Microbiology:  Microbiology Results (last 7 days)     Procedure Component Value Units Date/Time    Respiratory Viral Panel by PCR [facility]; Nasal Swab [[id number]] Collected:  05/31/19 1027    Order Status:  Completed Specimen:  Respiratory Updated:  06/03/19 1059     Respiratory Virus Panel, source Nasal Swab     RVP - Adenovirus Not Detected     Comment: Detects Serotypes B and E. Detection of Serotype C may   be limited. If Adenovirus infection is suspected and a   Not Detected result is returned  the sample should be   re-tested for Adenovirus using an independent method  (e.g. [employer] Adenovirus Quantitative Real-Time  PCR test.          Enterovirus Not Detected     Comment: Cross-reactivity has been observed between certain Rhinovirus  strains and the Enterovirus assay.          Human Bocavirus Not Detected     Human Coronavirus Not Detected     Comment: The Human Coronavirus assay detects Human coronavirus types  229E, OC43,NL63 and HKU1.          RVP - Human Metapneumovirus (hMPV) Not Detected     RVP - Influenza A Not Detected     Influenza A - [id number] Not Detected     RVP - Influenza B Not Detected     Parainfluenza Not Detected     Respiratory Syncytial VirusVirus (RSV) A Not Detected     Comment: The Respiratory Syncytial Viral assay detects types A and B,  however it does not distinguish between the two.          RVP - Rhinovirus Not Detected     Comment: Cross-Reactivity has been observed between certain   Rhinovirus strains and the Enterovirus assay.  Target Enriched Mulitplex Polymerase Chain Reaction (TEM-PCR)  allows for the detection of multiple pathogens out of a single  reaction.  This test was developed and its performance   characteristics determined by [employer].  It has not   been cleared or approved by the U.S.Food and Drug Administration.  Results should be used in conjunction with clinical findings,   and should not form the sole basis for a diagnosis or treatment  decision.  TEM-PCR is a licensed technology of [employer].         Narrative:       Receiving Lab:->[facility]    Blood culture #2 **CANNOT BE ORDERED STAT** [[id number]] Collected:  05/30/19 1631    Order Status:  Completed Specimen:  Blood from Peripheral, Antecubital, Right Updated:  06/02/19 1812     Blood Culture, Routine No Growth to date     Blood Culture, Routine No Growth to date     Blood Culture, Routine No Growth to date     Blood Culture, Routine No Growth to date    Blood culture  #1 **CANNOT BE ORDERED STAT** [[id number]] Collected:  05/30/19 1610    Order Status:  Completed Specimen:  Blood from Peripheral, Antecubital, Left Updated:  06/02/19 1812     Blood Culture, Routine No Growth to date     Blood Culture, Routine No Growth to date     Blood Culture, Routine No Growth to date     Blood Culture, Routine No Growth to date          I have reviewed all pertinent labs within the past 24 hours.    Estimated Creatinine Clearance: 63.8 mL/min (based on SCr of 0.9 mg/dL).    Diagnostic Results:  I have reviewed all pertinent imaging results/findings within the past 24 hours.

## 2019-06-03 NOTE — PLAN OF CARE
Problem: Adult Inpatient Plan of Care  Goal: Plan of Care Review  Outcome: Ongoing (interventions implemented as appropriate)  -Pt AAOx4  -Vital signs stable  -Pt on Comfort flow 20L 40%, sats remain above 88%.  Weaning as pt tolerates  -Telemetry monitoring NSR ; HR ranging in the 70s to 80s  -Fall precautions maintained, non skid socks worn  -No acute events/falls/injuries this shift  -Pt aware to call for help with ambulating.    -Bed lowered, locked, siderails up x2, and call bell in reach.     See flowsheet for assessment findings.  Will continue to monitor pt.

## 2019-06-03 NOTE — ASSESSMENT & PLAN NOTE
-Cont selexipag 1400 mg BID  -Pt was started on macitentan last week but stopped after 5 doses bc he was feeling so bad. Restarted macitentan 5/31/19  -Restart home dose of Lasix 40 mg daily 6/2/19  -Pt still with SOB with very minimal exertion and feels cool on exam which is concerning for low flow HF in setting of severe PH. Will start  today and monitor response

## 2019-06-04 LAB
ANION GAP SERPL CALC-SCNC: 8 MMOL/L (ref 8–16)
BACTERIA BLD CULT: NORMAL
BACTERIA BLD CULT: NORMAL
BASOPHILS # BLD AUTO: 0.05 K/UL (ref 0–0.2)
BASOPHILS NFR BLD: 0.7 % (ref 0–1.9)
BUN SERPL-MCNC: 15 MG/DL (ref 8–23)
CALCIUM SERPL-MCNC: 9.8 MG/DL (ref 8.7–10.5)
CHLORIDE SERPL-SCNC: 101 MMOL/L (ref 95–110)
CO2 SERPL-SCNC: 25 MMOL/L (ref 23–29)
CREAT SERPL-MCNC: 1 MG/DL (ref 0.5–1.4)
DIFFERENTIAL METHOD: ABNORMAL
EOSINOPHIL # BLD AUTO: 0.3 K/UL (ref 0–0.5)
EOSINOPHIL NFR BLD: 4.6 % (ref 0–8)
ERYTHROCYTE [DISTWIDTH] IN BLOOD BY AUTOMATED COUNT: 15.5 % (ref 11.5–14.5)
EST. GFR  (AFRICAN AMERICAN): >60 ML/MIN/1.73 M^2
EST. GFR  (NON AFRICAN AMERICAN): >60 ML/MIN/1.73 M^2
GLUCOSE SERPL-MCNC: 97 MG/DL (ref 70–110)
HCT VFR BLD AUTO: 38.5 % (ref 40–54)
HGB BLD-MCNC: 13.7 G/DL (ref 14–18)
IMM GRANULOCYTES # BLD AUTO: 0.1 K/UL (ref 0–0.04)
IMM GRANULOCYTES NFR BLD AUTO: 1.3 % (ref 0–0.5)
LYMPHOCYTES # BLD AUTO: 1.2 K/UL (ref 1–4.8)
LYMPHOCYTES NFR BLD: 16.1 % (ref 18–48)
MAGNESIUM SERPL-MCNC: 2.2 MG/DL (ref 1.6–2.6)
MCH RBC QN AUTO: 32.6 PG (ref 27–31)
MCHC RBC AUTO-ENTMCNC: 35.6 G/DL (ref 32–36)
MCV RBC AUTO: 92 FL (ref 82–98)
MONOCYTES # BLD AUTO: 0.7 K/UL (ref 0.3–1)
MONOCYTES NFR BLD: 9 % (ref 4–15)
NEUTROPHILS # BLD AUTO: 5.1 K/UL (ref 1.8–7.7)
NEUTROPHILS NFR BLD: 68.3 % (ref 38–73)
NRBC BLD-RTO: 0 /100 WBC
PHOSPHATE SERPL-MCNC: 3.4 MG/DL (ref 2.7–4.5)
PLATELET # BLD AUTO: 238 K/UL (ref 150–350)
PMV BLD AUTO: 9.3 FL (ref 9.2–12.9)
POTASSIUM SERPL-SCNC: 3.7 MMOL/L (ref 3.5–5.1)
RBC # BLD AUTO: 4.2 M/UL (ref 4.6–6.2)
SODIUM SERPL-SCNC: 134 MMOL/L (ref 136–145)
WBC # BLD AUTO: 7.44 K/UL (ref 3.9–12.7)

## 2019-06-04 PROCEDURE — 97161 PT EVAL LOW COMPLEX 20 MIN: CPT

## 2019-06-04 PROCEDURE — 27100092 HC HIGH FLOW DELIVERY CANNULA

## 2019-06-04 PROCEDURE — 99233 PR SUBSEQUENT HOSPITAL CARE,LEVL III: ICD-10-PCS | Mod: ,,, | Performed by: INTERNAL MEDICINE

## 2019-06-04 PROCEDURE — 84100 ASSAY OF PHOSPHORUS: CPT

## 2019-06-04 PROCEDURE — 20600001 HC STEP DOWN PRIVATE ROOM

## 2019-06-04 PROCEDURE — 94761 N-INVAS EAR/PLS OXIMETRY MLT: CPT

## 2019-06-04 PROCEDURE — 63600175 PHARM REV CODE 636 W HCPCS: Performed by: PHYSICIAN ASSISTANT

## 2019-06-04 PROCEDURE — 27100171 HC OXYGEN HIGH FLOW UP TO 24 HOURS

## 2019-06-04 PROCEDURE — 25000003 PHARM REV CODE 250: Performed by: PHYSICIAN ASSISTANT

## 2019-06-04 PROCEDURE — 36415 COLL VENOUS BLD VENIPUNCTURE: CPT

## 2019-06-04 PROCEDURE — 83735 ASSAY OF MAGNESIUM: CPT

## 2019-06-04 PROCEDURE — 27000221 HC OXYGEN, UP TO 24 HOURS

## 2019-06-04 PROCEDURE — 85025 COMPLETE CBC W/AUTO DIFF WBC: CPT

## 2019-06-04 PROCEDURE — 25000003 PHARM REV CODE 250

## 2019-06-04 PROCEDURE — 99233 SBSQ HOSP IP/OBS HIGH 50: CPT | Mod: ,,, | Performed by: INTERNAL MEDICINE

## 2019-06-04 PROCEDURE — 80048 BASIC METABOLIC PNL TOTAL CA: CPT

## 2019-06-04 RX ADMIN — RAMELTEON 8 MG: 8 TABLET, FILM COATED ORAL at 07:06

## 2019-06-04 RX ADMIN — TICAGRELOR 90 MG: 90 TABLET ORAL at 08:06

## 2019-06-04 RX ADMIN — ALBUTEROL SULFATE 2 PUFF: 90 AEROSOL, METERED RESPIRATORY (INHALATION) at 04:06

## 2019-06-04 RX ADMIN — ALBUTEROL SULFATE 2 PUFF: 90 AEROSOL, METERED RESPIRATORY (INHALATION) at 08:06

## 2019-06-04 RX ADMIN — ALBUTEROL SULFATE 2 PUFF: 90 AEROSOL, METERED RESPIRATORY (INHALATION) at 12:06

## 2019-06-04 RX ADMIN — TICAGRELOR 90 MG: 90 TABLET ORAL at 07:06

## 2019-06-04 RX ADMIN — MACITENTAN 10 MG: 10 TABLET, FILM COATED ORAL at 08:06

## 2019-06-04 RX ADMIN — SPIRONOLACTONE 25 MG: 25 TABLET, FILM COATED ORAL at 05:06

## 2019-06-04 RX ADMIN — ATORVASTATIN CALCIUM 40 MG: 20 TABLET, FILM COATED ORAL at 07:06

## 2019-06-04 RX ADMIN — ALLOPURINOL 300 MG: 100 TABLET ORAL at 07:06

## 2019-06-04 RX ADMIN — GUAIFENESIN 600 MG: 600 TABLET, EXTENDED RELEASE ORAL at 08:06

## 2019-06-04 RX ADMIN — FAMOTIDINE 20 MG: 20 TABLET, FILM COATED ORAL at 08:06

## 2019-06-04 RX ADMIN — FUROSEMIDE 40 MG: 40 TABLET ORAL at 08:06

## 2019-06-04 RX ADMIN — FAMOTIDINE 20 MG: 20 TABLET, FILM COATED ORAL at 07:06

## 2019-06-04 RX ADMIN — ACETAMINOPHEN 650 MG: 325 TABLET ORAL at 11:06

## 2019-06-04 RX ADMIN — AZITHROMYCIN 500 MG: 250 TABLET, FILM COATED ORAL at 08:06

## 2019-06-04 RX ADMIN — GUAIFENESIN 600 MG: 600 TABLET, EXTENDED RELEASE ORAL at 07:06

## 2019-06-04 RX ADMIN — ASPIRIN 81 MG: 81 TABLET, COATED ORAL at 08:06

## 2019-06-04 RX ADMIN — ALBUTEROL SULFATE 2 PUFF: 90 AEROSOL, METERED RESPIRATORY (INHALATION) at 07:06

## 2019-06-04 RX ADMIN — DOBUTAMINE IN DEXTROSE 2.5 MCG/KG/MIN: 200 INJECTION, SOLUTION INTRAVENOUS at 01:06

## 2019-06-04 NOTE — PROGRESS NOTES
[facility]  Heart Transplant  Progress Note    Patient Name: Greg Nolasco  MRN: [id number]  Admission Date: 5/30/2019  Hospital Length of Stay: 5 days  Attending Physician: Miguelina Ruffin MD  Primary Care Provider: Pablo Lorenzo MD  Principal Problem:Acute on chronic respiratory failure with hypoxemia    Subjective:     Interval History: Patient started on  yesterday and he reports he doesn't feel any different.  However, he reports he has not walked around since hospitalization.  PT consulted. He has completed 5 days of abx for URI.      Continuous Infusions:   DOBUTamine 2.5 mcg/kg/min (06/03/19 1126)     Scheduled Meds:   albuterol  2 puff Inhalation Q4H    allopurinol  300 mg Oral QHS    aspirin  81 mg Oral Daily    atorvastatin  40 mg Oral QHS    azithromycin  500 mg Oral Daily    cefTRIAXone (ROCEPHIN) IVPB  1 g Intravenous Q24H    famotidine  20 mg Oral BID    furosemide  40 mg Oral Daily    guaiFENesin  600 mg Oral BID    macitentan  10 mg Oral Daily    polyethylene glycol  17 g Oral Daily    selexipag  1,400 mcg Oral BID    spironolactone  25 mg Oral QAM    ticagrelor  90 mg Oral BID     PRN Meds:acetaminophen, ondansetron, ramelteon, sodium chloride 0.9%    Review of patient's allergies indicates:   Allergen Reactions    Clindamycin Shortness Of Breath    Penicillins Rash     Objective:     Vital Signs (Most Recent):  Temp: 98 °F (36.7 °C) (06/04/19 0833)  Pulse: 87 (06/04/19 1122)  Resp: (!) 21 (06/04/19 1023)  BP: 116/73 (06/04/19 1023)  SpO2: (!) 93 % (06/04/19 1023) Vital Signs (24h Range):  Temp:  [97.5 °F (36.4 °C)-98 °F (36.7 °C)] 98 °F (36.7 °C)  Pulse:  [70-96] 87  Resp:  [0-21] 21  SpO2:  [87 %-98 %] 93 %  BP: ()/(58-95) 116/73     Patient Vitals for the past 72 hrs (Last 3 readings):   Weight   06/04/19 0400 65.2 kg (143 lb 11.8 oz)   06/03/19 0600 66.7 kg (147 lb 0.8 oz)   06/02/19 0500 65.7 kg (144 lb 13.5 oz)     Body mass index is 21.23  kg/m².      Intake/Output Summary (Last 24 hours) at 6/4/2019 1148  Last data filed at 6/4/2019 0400  Gross per 24 hour   Intake 1050 ml   Output 1325 ml   Net -275 ml       Hemodynamic Parameters:       Telemetry: reviewed    Physical Exam  Constitutional: He is oriented to person, place, and time. He appears cachectic.   Neck: Normal range of motion. Neck supple. No JVD present.   Cardiovascular: Normal rate and regular rhythm. Exam reveals no gallop and no friction rub. No murmur heard.  Pulmonary/Chest: Effort normal and breath sounds normal. He has no wheezes. He has no rales. On O2 via HFNC   Abdominal: Soft. Bowel sounds are normal. There is no tenderness.   Musculoskeletal: He exhibits no edema.   Neurological: He is alert and oriented to person, place, and time.   Skin: Skin is dry.   Bilat LEs are cool on exam     Significant Labs:  CBC:  Recent Labs   Lab 06/02/19  0621 06/03/19  0617 06/04/19  0626   WBC 7.49 8.25 7.44   RBC 4.17* 4.48* 4.20*   HGB 13.4* 14.2 13.7*   HCT 38.7* 41.2 38.5*    267 238   MCV 93 92 92   MCH 32.1* 31.7* 32.6*   MCHC 34.6 34.5 35.6     BNP:  Recent Labs   Lab 05/30/19  1613 06/02/19  0806 06/03/19  0617   * 195* 163*     CMP:  Recent Labs   Lab 05/30/19  1613  06/02/19  0621 06/03/19  0617 06/04/19  0626   *   < > 81 99 97   CALCIUM 10.3   < > 9.2 9.5 9.8   ALBUMIN 4.0  --   --   --   --    PROT 8.4  --   --   --   --       < > 136 133* 134*   K 3.8   < > 3.8 4.1 3.7   CO2 21*   < > 21* 19* 25      < > 106 104 101   BUN 20   < > 14 15 15   CREATININE 1.1   < > 0.8 0.9 1.0   ALKPHOS 170*  --   --   --   --    ALT 23  --   --   --   --    AST 34  --   --   --   --    BILITOT 2.4*  --   --   --   --     < > = values in this interval not displayed.      Coagulation:   No results for input(s): PT, INR, APTT in the last 168 hours.  LDH:  No results for input(s): LDH in the last 72 hours.  Microbiology:  Microbiology Results (last 7 days)     Procedure  Component Value Units Date/Time    Blood culture #1 **CANNOT BE ORDERED STAT** [[id number]] Collected:  05/30/19 1610    Order Status:  Completed Specimen:  Blood from Peripheral, Antecubital, Left Updated:  06/03/19 1812     Blood Culture, Routine No Growth to date     Blood Culture, Routine No Growth to date     Blood Culture, Routine No Growth to date     Blood Culture, Routine No Growth to date     Blood Culture, Routine No Growth to date    Blood culture #2 **CANNOT BE ORDERED STAT** [[id number]] Collected:  05/30/19 1631    Order Status:  Completed Specimen:  Blood from Peripheral, Antecubital, Right Updated:  06/03/19 1812     Blood Culture, Routine No Growth to date     Blood Culture, Routine No Growth to date     Blood Culture, Routine No Growth to date     Blood Culture, Routine No Growth to date     Blood Culture, Routine No Growth to date    Respiratory Viral Panel by PCR [facility]; Nasal Swab [[id number]] Collected:  05/31/19 1027    Order Status:  Completed Specimen:  Respiratory Updated:  06/03/19 1059     Respiratory Virus Panel, source Nasal Swab     RVP - Adenovirus Not Detected     Comment: Detects Serotypes B and E. Detection of Serotype C may   be limited. If Adenovirus infection is suspected and a   Not Detected result is returned the sample should be   re-tested for Adenovirus using an independent method  (e.g. [facility] Adenovirus Quantitative Real-Time  PCR test.          Enterovirus Not Detected     Comment: Cross-reactivity has been observed between certain Rhinovirus  strains and the Enterovirus assay.          Human Bocavirus Not Detected     Human Coronavirus Not Detected     Comment: The Human Coronavirus assay detects Human coronavirus types  229E, OC43,NL63 and HKU1.          RVP - Human Metapneumovirus (hMPV) Not Detected     RVP - Influenza A Not Detected     Influenza A - N4V5-10 Not Detected     RVP - Influenza B Not Detected     Parainfluenza Not Detected     Respiratory  Syncytial VirusVirus (RSV) A Not Detected     Comment: The Respiratory Syncytial Viral assay detects types A and B,  however it does not distinguish between the two.          RVP - Rhinovirus Not Detected     Comment: Cross-Reactivity has been observed between certain   Rhinovirus strains and the Enterovirus assay.  Target Enriched Mulitplex Polymerase Chain Reaction (TEM-PCR)  allows for the detection of multiple pathogens out of a single  reaction.  This test was developed and its performance   characteristics determined by [employer].  It has not   been cleared or approved by the U.S.Food and Drug Administration.  Results should be used in conjunction with clinical findings,   and should not form the sole basis for a diagnosis or treatment  decision.  TEM-PCR is a licensed technology of [employer].         Narrative:       Receiving Lab:->[facility]          I have reviewed all pertinent labs within the past 24 hours.    Estimated Creatinine Clearance: 56.1 mL/min (based on SCr of 1 mg/dL).    Diagnostic Results:  I have reviewed all pertinent imaging results/findings within the past 24 hours.    Assessment and Plan:     We are admitting this 79 yo gentleman for complaints of productive cough, nasal congestion and chief complaint of shortness of breath. 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     He had been in his usual state of health, but began having sinus congestion and drainage on Friday, this progressed to productive cough and worsening dyspnea that has not improved with nebs (received Saturday), but has stabilized somewhat since he started guaifenesin.    He denies weight gain (in fact reports weight loss), has been compliant with diet and medications and is usually on 4L NC at home. He stopped his macitentan  on Wednesday as there was a suspicion this was causing his symptoms.    Currently he is short of breath (relieved with partial non-rebreather) and reports being thirsty. He denies fevers, chest pain, palpitations, syncope.    * Acute on chronic respiratory failure with hypoxemia  -Likely due to exacerbation of group 1 PH secondary to acute viral URI   -On O2 via HFNC. Wean O2. Was on 4L at home  -completed 5 day coarse of rocephin/azithromycin  -Pred 60 mg x 1 given 5/30/19  -procalcitonin negative  -Lactate 2.4 on admit, trended down to normal   -RVP negative  - nebs q4h, guaifenesin 600mg BID  - continue selexipag 1400mcg BID (patient brought home med). Restarted macitentan 5/31/19, as this is likely infection and not SE of med      PHT (pulmonary hypertension)  -Cont selexipag 1400 mg BID  -Pt was started on macitentan last week but stopped after 5 doses bc he was feeling so bad. Restarted macitentan 5/31/19  -Restart home dose of Lasix 40 mg daily 6/2/19  -Pt still with SOB with very minimal exertion and feels cool on exam which is concerning for low flow HF in setting of severe PH. Started on  yesterday but he does not feel any different on it.  Will see how he does with Physical Therapy when they walk him     CAD (coronary artery disease)  -Cont ASA, Brilinta, statin      WILLIAM Jackson  Heart Transplant  [facility]

## 2019-06-04 NOTE — ASSESSMENT & PLAN NOTE
-Cont selexipag 1400 mg BID  -Pt was started on macitentan last week but stopped after 5 doses bc he was feeling so bad. Restarted macitentan 5/31/19  -Restart home dose of Lasix 40 mg daily 6/2/19  -Pt still with SOB with very minimal exertion and feels cool on exam which is concerning for low flow HF in setting of severe PH. Started on  yesterday but he does not feel any different on it.  Will see how he does with Physical Therapy when they walk him

## 2019-06-04 NOTE — SUBJECTIVE & OBJECTIVE
Interval History: Patient started on  yesterday and he reports he doesn't feel any different.  However, he reports he has not walked around since hospitalization.  PT consulted. He has completed 5 days of abx for URI.      Continuous Infusions:   DOBUTamine 2.5 mcg/kg/min (06/03/19 1126)     Scheduled Meds:   albuterol  2 puff Inhalation Q4H    allopurinol  300 mg Oral QHS    aspirin  81 mg Oral Daily    atorvastatin  40 mg Oral QHS    azithromycin  500 mg Oral Daily    cefTRIAXone (ROCEPHIN) IVPB  1 g Intravenous Q24H    famotidine  20 mg Oral BID    furosemide  40 mg Oral Daily    guaiFENesin  600 mg Oral BID    macitentan  10 mg Oral Daily    polyethylene glycol  17 g Oral Daily    selexipag  1,400 mcg Oral BID    spironolactone  25 mg Oral QAM    ticagrelor  90 mg Oral BID     PRN Meds:acetaminophen, ondansetron, ramelteon, sodium chloride 0.9%    Review of patient's allergies indicates:   Allergen Reactions    Clindamycin Shortness Of Breath    Penicillins Rash     Objective:     Vital Signs (Most Recent):  Temp: 98 °F (36.7 °C) (06/04/19 0833)  Pulse: 87 (06/04/19 1122)  Resp: (!) 21 (06/04/19 1023)  BP: 116/73 (06/04/19 1023)  SpO2: (!) 93 % (06/04/19 1023) Vital Signs (24h Range):  Temp:  [97.5 °F (36.4 °C)-98 °F (36.7 °C)] 98 °F (36.7 °C)  Pulse:  [70-96] 87  Resp:  [0-21] 21  SpO2:  [87 %-98 %] 93 %  BP: ()/(58-95) 116/73     Patient Vitals for the past 72 hrs (Last 3 readings):   Weight   06/04/19 0400 65.2 kg (143 lb 11.8 oz)   06/03/19 0600 66.7 kg (147 lb 0.8 oz)   06/02/19 0500 65.7 kg (144 lb 13.5 oz)     Body mass index is 21.23 kg/m².      Intake/Output Summary (Last 24 hours) at 6/4/2019 1148  Last data filed at 6/4/2019 0400  Gross per 24 hour   Intake 1050 ml   Output 1325 ml   Net -275 ml       Hemodynamic Parameters:       Telemetry: reviewed    Physical Exam  Constitutional: He is oriented to person, place, and time. He appears cachectic.   Neck: Normal range of  motion. Neck supple. No JVD present.   Cardiovascular: Normal rate and regular rhythm. Exam reveals no gallop and no friction rub. No murmur heard.  Pulmonary/Chest: Effort normal and breath sounds normal. He has no wheezes. He has no rales. On O2 via HFNC   Abdominal: Soft. Bowel sounds are normal. There is no tenderness.   Musculoskeletal: He exhibits no edema.   Neurological: He is alert and oriented to person, place, and time.   Skin: Skin is dry.   Bilat LEs are cool on exam     Significant Labs:  CBC:  Recent Labs   Lab 06/02/19  0621 06/03/19  0617 06/04/19  0626   WBC 7.49 8.25 7.44   RBC 4.17* 4.48* 4.20*   HGB 13.4* 14.2 13.7*   HCT 38.7* 41.2 38.5*    267 238   MCV 93 92 92   MCH 32.1* 31.7* 32.6*   MCHC 34.6 34.5 35.6     BNP:  Recent Labs   Lab 05/30/19  1613 06/02/19  0806 06/03/19 0617   * 195* 163*     CMP:  Recent Labs   Lab 05/30/19  1613  06/02/19  0621 06/03/19 0617 06/04/19  0626   *   < > 81 99 97   CALCIUM 10.3   < > 9.2 9.5 9.8   ALBUMIN 4.0  --   --   --   --    PROT 8.4  --   --   --   --       < > 136 133* 134*   K 3.8   < > 3.8 4.1 3.7   CO2 21*   < > 21* 19* 25      < > 106 104 101   BUN 20   < > 14 15 15   CREATININE 1.1   < > 0.8 0.9 1.0   ALKPHOS 170*  --   --   --   --    ALT 23  --   --   --   --    AST 34  --   --   --   --    BILITOT 2.4*  --   --   --   --     < > = values in this interval not displayed.      Coagulation:   No results for input(s): PT, INR, APTT in the last 168 hours.  LDH:  No results for input(s): LDH in the last 72 hours.  Microbiology:  Microbiology Results (last 7 days)     Procedure Component Value Units Date/Time    Blood culture #1 **CANNOT BE ORDERED STAT** [[id number]] Collected:  05/30/19 1610    Order Status:  Completed Specimen:  Blood from Peripheral, Antecubital, Left Updated:  06/03/19 1812     Blood Culture, Routine No Growth to date     Blood Culture, Routine No Growth to date     Blood Culture, Routine No  Growth to date     Blood Culture, Routine No Growth to date     Blood Culture, Routine No Growth to date    Blood culture #2 **CANNOT BE ORDERED STAT** [[id number]] Collected:  05/30/19 1631    Order Status:  Completed Specimen:  Blood from Peripheral, Antecubital, Right Updated:  06/03/19 1812     Blood Culture, Routine No Growth to date     Blood Culture, Routine No Growth to date     Blood Culture, Routine No Growth to date     Blood Culture, Routine No Growth to date     Blood Culture, Routine No Growth to date    Respiratory Viral Panel by PCR [facility]; Nasal Swab [[id number]] Collected:  05/31/19 1027    Order Status:  Completed Specimen:  Respiratory Updated:  06/03/19 1059     Respiratory Virus Panel, source Nasal Swab     RVP - Adenovirus Not Detected     Comment: Detects Serotypes B and E. Detection of Serotype C may   be limited. If Adenovirus infection is suspected and a   Not Detected result is returned the sample should be   re-tested for Adenovirus using an independent method  (e.g. [employer] Adenovirus Quantitative Real-Time  PCR test.          Enterovirus Not Detected     Comment: Cross-reactivity has been observed between certain Rhinovirus  strains and the Enterovirus assay.          Human Bocavirus Not Detected     Human Coronavirus Not Detected     Comment: The Human Coronavirus assay detects Human coronavirus types  229E, OC43,NL63 and HKU1.          RVP - Human Metapneumovirus (hMPV) Not Detected     RVP - Influenza A Not Detected     Influenza A - T3B1-90 Not Detected     RVP - Influenza B Not Detected     Parainfluenza Not Detected     Respiratory Syncytial VirusVirus (RSV) A Not Detected     Comment: The Respiratory Syncytial Viral assay detects types A and B,  however it does not distinguish between the two.          RVP - Rhinovirus Not Detected     Comment: Cross-Reactivity has been observed between certain   Rhinovirus strains and the Enterovirus assay.  Target Enriched Mulitplex  Polymerase Chain Reaction (TEM-PCR)  allows for the detection of multiple pathogens out of a single  reaction.  This test was developed and its performance   characteristics determined by [employer].  It has not   been cleared or approved by the U.S.Food and Drug Administration.  Results should be used in conjunction with clinical findings,   and should not form the sole basis for a diagnosis or treatment  decision.  TEM-PCR is a licensed technology of [employer].         Narrative:       Receiving Lab:->[facility]          I have reviewed all pertinent labs within the past 24 hours.    Estimated Creatinine Clearance: 56.1 mL/min (based on SCr of 1 mg/dL).    Diagnostic Results:  I have reviewed all pertinent imaging results/findings within the past 24 hours.

## 2019-06-04 NOTE — PLAN OF CARE
Problem: Physical Therapy Goal  Goal: Physical Therapy Goal  Goals to be met by: 2019     Patient will increase functional independence with mobility by performin. Sit to stand transfer with Modified [place]  2. Gait  x 150 feet with Supervision LRAD  Outcome: Ongoing (interventions implemented as appropriate)  Eval completed and POC established    Julio Bejarano PT,DPT  2019

## 2019-06-04 NOTE — PLAN OF CARE
Problem: Adult Inpatient Plan of Care  Goal: Plan of Care Review  Pt free from fall or injury so far this shift. Instructed to call if assistance needed, verbalized understanding.   Comfort flow off, O2 weaned down to 4.5 L HFNC, sats low 90's.   Cardiac monitoring in progress, currently SR. Dobutamine gtt continued at 2.5 mcg, rate @ 5 cc/hr.   Tylenol given once for a HA, mild relief obtained.   Worked with PT today. OT consulted.   Afebrile. Ceftriaxone and Azithromycin DC'd. Hand hygiene reinforced. Daily labs monitored.

## 2019-06-04 NOTE — PT/OT/SLP EVAL
"Physical Therapy Evaluation    Patient Name:  Greg Nolasco   MRN:  [id number]    Recommendations:     Discharge Recommendations:  home   Discharge Equipment Recommendations: none   Barriers to discharge: None    Assessment:     Greg Nolasco is a 78 y.o. male admitted with a medical diagnosis of Acute on chronic respiratory failure with hypoxemia.  He presents with the following impairments/functional limitations:  impaired endurance, impaired cardiopulmonary response to activity, impaired self care skills, impaired functional mobilty.  Pt demo decreased functional mobility secondary to weakness and impaired cardiopulmonary response to activity.  Performed bed mobility independently, transfer c S and gait c SBA without AD.  Pt able to take few steps to/from bedside chair and demo mild unsteadiness c turning.  Mobility limited on this date d/t comfort flow lines.  Pt safe to transfer to/from bedside chair c assistance of 1x person.  Pt would benefit from continued skilled acute PT 2x/wk to improve functional mobility.  Anticipating pt will be able to return home c no additional acute PT services needed once medically appropriate.      Rehab Prognosis: Good; patient would benefit from acute skilled PT services to address these deficits and reach maximum level of function.    Recent Surgery: * No surgery found *      Plan:     During this hospitalization, patient to be seen 2 x/week to address the identified rehab impairments via gait training, therapeutic activities, therapeutic exercises, neuromuscular re-education and progress toward the following goals:    · Plan of Care Expires:  06/29/19    Subjective     Chief Complaint: fatigue  Patient/Family Comments/goals: "I can't do much because the lines are too short."  Pain/Comfort:  · Pain Rating 1: 0/10    Patients cultural, spiritual, Scientologist conflicts given the current situation: no    Living Environment:  Pt lives [place] in [place].  Pt's daughter has " been staying c pt since last hospital admission.  Prior to admission, patients level of function was requiring setup assistance.  Equipment used at home: oxygen, shower chair.  DME owned (not currently used): none.  Upon discharge, patient will have assistance from family.    Objective:     Communicated with RN prior to session.  Patient found up in chair with peripheral IV, oxygen, telemetry, pulse ox (continuous)  upon PT entry to room.    General Precautions: Standard, fall   Orthopedic Precautions:N/A   Braces: N/A     Exams:  · Cognitive Exam:  Patient is oriented to Person, Place, Time and Situation  · RLE ROM: WFL  · RLE Strength: WFL  · LLE ROM: WFL  · LLE Strength: WFL    Functional Mobility:  · Bed Mobility:     · Scooting: independence  · Sit to Supine: independence  · Transfers:     · Sit to Stand:  supervision with no AD  · Gait: 5ft x2 c no AD (S)  · Balance: standing (S)      Therapeutic Activities and Exercises:   Pt educated on: PT role/POC; safety c mobility; benefits of OOB activities; performing therex; d/c recs - v/u      AM-PAC 6 CLICK MOBILITY  Total Score:19     Patient left HOB elevated with all lines intact, call button in reach and RN notified.    GOALS:   Multidisciplinary Problems     Physical Therapy Goals        Problem: Physical Therapy Goal    Goal Priority Disciplines Outcome Goal Variances Interventions   Physical Therapy Goal     PT, PT/OT Ongoing (interventions implemented as appropriate)     Description:  Goals to be met by: 2019     Patient will increase functional independence with mobility by performin. Sit to stand transfer with Modified [place]  2. Gait  x 150 feet with Supervision LRAD                    History:     Past Medical History:   Diagnosis Date    Chronic hypoxemic respiratory failure     Coronary artery disease     BARBARA (obstructive sleep apnea)     Pulmonary hypertension        Past Surgical History:   Procedure Laterality Date     ANGIOPLASTY  1991    BACK SURGERY      COLONOSCOPY  04/2019    HERNIA REPAIR      INSERTION, CATHETER, RIGHT HEART Right 1/10/2019    Performed by [facility]in [facility] at [facility] CATH LAB    INSERTION, STENT, CORONARY ARTERY Right 4/22/2019    Performed by Dex Lomas MD at [facility] CATH LAB    KNEE SURGERY      REMOVAL, CATHETER, CENTRAL VENOUS, TUNNELED N/A 12/20/2018    Performed by Wlat Smith MD at [facility] CATH LAB    RIGHT HEART CATH Right 10/31/2018    Performed by Robert Lomas MD at [facility] CATH LAB    SHOULDER SURGERY      SINUS SURGERY         Time Tracking:     PT Received On: 06/04/19  PT Start Time: 0951     PT Stop Time: 1000  PT Total Time (min): 9 min     Billable Minutes: Evaluation 9 min      Julio Bejarano, PT  06/04/2019

## 2019-06-04 NOTE — PLAN OF CARE
Problem: Adult Inpatient Plan of Care  Goal: Plan of Care Review  Outcome: Ongoing (interventions implemented as appropriate)  -Pt AAOx4  -Vital signs stable  -Pt on Comfort flow 10L 40%, sats remain above 88%.  Weaning as pt tolerates  -Pt requesting not to receive Lovenox shots due to excessive bleeding after injections.  SCDs in place.  -Telemetry monitoring NSR ; HR ranging in the 70s to 80s  -Pt started on continuous dobutamine gtt at 2.5mcg/kg/min or 5cc/hr  -Fall precautions maintained, non skid socks worn  -No acute events/falls/injuries this shift  -Pt aware to call for help with ambulating.    -Bed lowered, locked, siderails up x2, and call bell in reach.     See flowsheet for assessment findings.  Will continue to monitor pt.

## 2019-06-04 NOTE — ASSESSMENT & PLAN NOTE
-Likely due to exacerbation of group 1 PH secondary to acute viral URI   -On O2 via HFNC. Wean O2. Was on 4L at home  -completed 5 day coarse of rocephin/azithromycin  -Pred 60 mg x 1 given 5/30/19  -procalcitonin negative  -Lactate 2.4 on admit, trended down to normal   -RVP negative  - nebs q4h, guaifenesin 600mg BID  - continue selexipag 1400mcg BID (patient brought home med). Restarted macitentan 5/31/19, as this is likely infection and not SE of med

## 2019-06-04 NOTE — CARE UPDATE
Rapid Response Respiratory Therapy Proactive Rounding Note      Time of visit: 913    Code Status: Full Code   : 1940  Age: 78 y.o.  Weight:   Wt Readings from Last 1 Encounters:   19 65.2 kg (143 lb 11.8 oz)     Sex: male  Race: White   Bed: 96669/92123 A:   MRN: [id number]    SITUATION     Evaluated patient for: Respiratory    BACKGROUND     Patient has a past medical history of Chronic hypoxemic respiratory failure, Coronary artery disease, BARBARA (obstructive sleep apnea), and Pulmonary hypertension.  Pulmonary Hx: Pulmonary HTN      ASSESSMENT     Pulse: Pulse: 93 Respiratory rate: Resp: 15 Temperature: Temp: 98 °F (36.7 °C) BP: BP: 107/67 SpO2:SpO2: 96 %   Level of Consciousness: Level of Consciousness (AVPU): alert  Respiratory Effort: Respiratory Effort: Unlabored  Expansion/Accessory Muscle Usage: Expansion/Accessory Muscles/Retractions: no retractions, no use of accessory muscles  All Lung Field Breath Sounds: All Lung Fields Breath Sounds: diminished  WES Breath Sounds: clear  LLL Breath Sounds: diminished  RUL Breath Sounds: clear, equal bilaterally  RML Breath Sounds: diminished  RLL Breath Sounds: diminished  Cough Type: Cough Type: nonproductive  Mobility at time of assessment: General Mobility: generalized weakness  O2 Device/Concentration: O2 Device (Oxygen Therapy): Comfort Flow   Flow (L/min): 10 Oxygen Concentration (%): 30    Current Respiratory Care Orders: Oxygen and MDI Q4    Ambu at bedside: Ambu bag with the patient?: Yes, Adult Ambu    INTERVENTIONS/RECOMMENDATIONS   ?  Transition pt to HF Nasal cannula. Will continue to monitor.     Discussed plan of care  primary RTNaomy    FOLLOW-UP     Please call back the Rapid Response RTOdalis, CRT at x [phone] for any questions or concerns.

## 2019-06-04 NOTE — PROGRESS NOTES
Patient placed on 6L HFNC from CF 10L/30%.  No SOB noted at this time.  Will continue to monitor patient.

## 2019-06-05 LAB
ANION GAP SERPL CALC-SCNC: 8 MMOL/L (ref 8–16)
BASOPHILS # BLD AUTO: 0.07 K/UL (ref 0–0.2)
BASOPHILS NFR BLD: 0.8 % (ref 0–1.9)
BNP SERPL-MCNC: 77 PG/ML (ref 0–99)
BUN SERPL-MCNC: 15 MG/DL (ref 8–23)
CALCIUM SERPL-MCNC: 9.7 MG/DL (ref 8.7–10.5)
CHLORIDE SERPL-SCNC: 100 MMOL/L (ref 95–110)
CO2 SERPL-SCNC: 25 MMOL/L (ref 23–29)
CREAT SERPL-MCNC: 1.1 MG/DL (ref 0.5–1.4)
DIFFERENTIAL METHOD: ABNORMAL
EOSINOPHIL # BLD AUTO: 0.4 K/UL (ref 0–0.5)
EOSINOPHIL NFR BLD: 5.1 % (ref 0–8)
ERYTHROCYTE [DISTWIDTH] IN BLOOD BY AUTOMATED COUNT: 15.5 % (ref 11.5–14.5)
EST. GFR  (AFRICAN AMERICAN): >60 ML/MIN/1.73 M^2
EST. GFR  (NON AFRICAN AMERICAN): >60 ML/MIN/1.73 M^2
GLUCOSE SERPL-MCNC: 122 MG/DL (ref 70–110)
HCT VFR BLD AUTO: 40.6 % (ref 40–54)
HGB BLD-MCNC: 14 G/DL (ref 14–18)
IMM GRANULOCYTES # BLD AUTO: 0.12 K/UL (ref 0–0.04)
IMM GRANULOCYTES NFR BLD AUTO: 1.4 % (ref 0–0.5)
LYMPHOCYTES # BLD AUTO: 1.4 K/UL (ref 1–4.8)
LYMPHOCYTES NFR BLD: 17.1 % (ref 18–48)
MAGNESIUM SERPL-MCNC: 2.1 MG/DL (ref 1.6–2.6)
MCH RBC QN AUTO: 32 PG (ref 27–31)
MCHC RBC AUTO-ENTMCNC: 34.5 G/DL (ref 32–36)
MCV RBC AUTO: 93 FL (ref 82–98)
MONOCYTES # BLD AUTO: 0.8 K/UL (ref 0.3–1)
MONOCYTES NFR BLD: 9.5 % (ref 4–15)
NEUTROPHILS # BLD AUTO: 5.5 K/UL (ref 1.8–7.7)
NEUTROPHILS NFR BLD: 66.1 % (ref 38–73)
NRBC BLD-RTO: 0 /100 WBC
PHOSPHATE SERPL-MCNC: 3.6 MG/DL (ref 2.7–4.5)
PLATELET # BLD AUTO: 245 K/UL (ref 150–350)
PMV BLD AUTO: 9 FL (ref 9.2–12.9)
POTASSIUM SERPL-SCNC: 3.9 MMOL/L (ref 3.5–5.1)
RBC # BLD AUTO: 4.37 M/UL (ref 4.6–6.2)
SODIUM SERPL-SCNC: 133 MMOL/L (ref 136–145)
WBC # BLD AUTO: 8.29 K/UL (ref 3.9–12.7)

## 2019-06-05 PROCEDURE — 20600001 HC STEP DOWN PRIVATE ROOM

## 2019-06-05 PROCEDURE — 27100092 HC HIGH FLOW DELIVERY CANNULA

## 2019-06-05 PROCEDURE — 83880 ASSAY OF NATRIURETIC PEPTIDE: CPT

## 2019-06-05 PROCEDURE — 36415 COLL VENOUS BLD VENIPUNCTURE: CPT

## 2019-06-05 PROCEDURE — 85025 COMPLETE CBC W/AUTO DIFF WBC: CPT

## 2019-06-05 PROCEDURE — 25000003 PHARM REV CODE 250

## 2019-06-05 PROCEDURE — 25000003 PHARM REV CODE 250: Performed by: PHYSICIAN ASSISTANT

## 2019-06-05 PROCEDURE — 80048 BASIC METABOLIC PNL TOTAL CA: CPT

## 2019-06-05 PROCEDURE — 97165 OT EVAL LOW COMPLEX 30 MIN: CPT

## 2019-06-05 PROCEDURE — 84100 ASSAY OF PHOSPHORUS: CPT

## 2019-06-05 PROCEDURE — 83735 ASSAY OF MAGNESIUM: CPT

## 2019-06-05 RX ADMIN — FAMOTIDINE 20 MG: 20 TABLET, FILM COATED ORAL at 08:06

## 2019-06-05 RX ADMIN — POLYETHYLENE GLYCOL 3350 17 G: 17 POWDER, FOR SOLUTION ORAL at 08:06

## 2019-06-05 RX ADMIN — SPIRONOLACTONE 25 MG: 25 TABLET, FILM COATED ORAL at 05:06

## 2019-06-05 RX ADMIN — TICAGRELOR 90 MG: 90 TABLET ORAL at 08:06

## 2019-06-05 RX ADMIN — ATORVASTATIN CALCIUM 40 MG: 20 TABLET, FILM COATED ORAL at 09:06

## 2019-06-05 RX ADMIN — FAMOTIDINE 20 MG: 20 TABLET, FILM COATED ORAL at 09:06

## 2019-06-05 RX ADMIN — GUAIFENESIN 600 MG: 600 TABLET, EXTENDED RELEASE ORAL at 09:06

## 2019-06-05 RX ADMIN — ALBUTEROL SULFATE 2 PUFF: 90 AEROSOL, METERED RESPIRATORY (INHALATION) at 05:06

## 2019-06-05 RX ADMIN — ALBUTEROL SULFATE 2 PUFF: 90 AEROSOL, METERED RESPIRATORY (INHALATION) at 12:06

## 2019-06-05 RX ADMIN — GUAIFENESIN 600 MG: 600 TABLET, EXTENDED RELEASE ORAL at 08:06

## 2019-06-05 RX ADMIN — TICAGRELOR 90 MG: 90 TABLET ORAL at 09:06

## 2019-06-05 RX ADMIN — MACITENTAN 10 MG: 10 TABLET, FILM COATED ORAL at 08:06

## 2019-06-05 RX ADMIN — ALBUTEROL SULFATE 2 PUFF: 90 AEROSOL, METERED RESPIRATORY (INHALATION) at 10:06

## 2019-06-05 RX ADMIN — ALBUTEROL SULFATE 2 PUFF: 90 AEROSOL, METERED RESPIRATORY (INHALATION) at 04:06

## 2019-06-05 RX ADMIN — ALBUTEROL SULFATE 2 PUFF: 90 AEROSOL, METERED RESPIRATORY (INHALATION) at 02:06

## 2019-06-05 RX ADMIN — ALLOPURINOL 300 MG: 100 TABLET ORAL at 09:06

## 2019-06-05 RX ADMIN — ASPIRIN 81 MG: 81 TABLET, COATED ORAL at 08:06

## 2019-06-05 RX ADMIN — ALBUTEROL SULFATE 2 PUFF: 90 AEROSOL, METERED RESPIRATORY (INHALATION) at 09:06

## 2019-06-05 RX ADMIN — FUROSEMIDE 40 MG: 40 TABLET ORAL at 08:06

## 2019-06-05 NOTE — PROGRESS NOTES
[facility]  Heart Transplant  Progress Note    Patient Name: Greg Nolasco  MRN: [id number]  Admission Date: 5/30/2019  Hospital Length of Stay: 6 days  Attending Physician: Miguelina Rufifn MD  Primary Care Provider: Pablo Lorenzo MD  Principal Problem:Acute on chronic respiratory failure with hypoxemia    Subjective:     Interval History: Patient worked with PT yesterday and didn't notice a difference in symptoms with the .  Is oxygen has been weaned down to 4.5L .    Continuous Infusions:    Scheduled Meds:   albuterol  2 puff Inhalation Q4H    allopurinol  300 mg Oral QHS    aspirin  81 mg Oral Daily    atorvastatin  40 mg Oral QHS    famotidine  20 mg Oral BID    furosemide  40 mg Oral Daily    guaiFENesin  600 mg Oral BID    macitentan  10 mg Oral Daily    polyethylene glycol  17 g Oral Daily    selexipag  1,400 mcg Oral BID    spironolactone  25 mg Oral QAM    ticagrelor  90 mg Oral BID     PRN Meds:acetaminophen, ondansetron, ramelteon, sodium chloride 0.9%    Review of patient's allergies indicates:   Allergen Reactions    Clindamycin Shortness Of Breath    Penicillins Rash     Objective:     Vital Signs (Most Recent):  Temp: 98 °F (36.7 °C) (06/04/19 2000)  Pulse: 96 (06/05/19 1001)  Resp: 18 (06/05/19 1001)  BP: 100/67 (06/05/19 0945)  SpO2: 100 % (06/05/19 0945) Vital Signs (24h Range):  Temp:  [97.6 °F (36.4 °C)-98 °F (36.7 °C)] 98 °F (36.7 °C)  Pulse:  [74-96] 96  Resp:  [11-18] 18  SpO2:  [92 %-100 %] 100 %  BP: ()/(64-85) 100/67     Patient Vitals for the past 72 hrs (Last 3 readings):   Weight   06/05/19 0500 66.3 kg (146 lb 2.6 oz)   06/04/19 0400 65.2 kg (143 lb 11.8 oz)   06/03/19 0600 66.7 kg (147 lb 0.8 oz)     Body mass index is 21.58 kg/m².      Intake/Output Summary (Last 24 hours) at 6/5/2019 1135  Last data filed at 6/5/2019 0800  Gross per 24 hour   Intake 1522.83 ml   Output 1525 ml   Net -2.17 ml       Hemodynamic Parameters:        Telemetry: reviewed    Physical Exam  Constitutional: He is oriented to person, place, and time. He appears cachectic.   Neck: Normal range of motion. Neck supple. No JVD present.   Cardiovascular: Normal rate and regular rhythm. Exam reveals no gallop and no friction rub. No murmur heard.  Pulmonary/Chest: Effort normal and breath sounds normal. He has no wheezes. He has no rales. On O2 via HFNC   Abdominal: Soft. Bowel sounds are normal. There is no tenderness.   Musculoskeletal: He exhibits no edema.   Neurological: He is alert and oriented to person, place, and time.   Skin: Skin is dry.   Bilat LEs are cool on exam     Significant Labs:  CBC:  Recent Labs   Lab 06/03/19  0617 06/04/19  0626 06/05/19 0624   WBC 8.25 7.44 8.29   RBC 4.48* 4.20* 4.37*   HGB 14.2 13.7* 14.0   HCT 41.2 38.5* 40.6    238 245   MCV 92 92 93   MCH 31.7* 32.6* 32.0*   MCHC 34.5 35.6 34.5     BNP:  Recent Labs   Lab 06/02/19  0806 06/03/19  0617 06/05/19  0624   * 163* 77     CMP:  Recent Labs   Lab 05/30/19  1613  06/03/19  0617 06/04/19 0626 06/05/19  0624   *   < > 99 97 122*   CALCIUM 10.3   < > 9.5 9.8 9.7   ALBUMIN 4.0  --   --   --   --    PROT 8.4  --   --   --   --       < > 133* 134* 133*   K 3.8   < > 4.1 3.7 3.9   CO2 21*   < > 19* 25 25      < > 104 101 100   BUN 20   < > 15 15 15   CREATININE 1.1   < > 0.9 1.0 1.1   ALKPHOS 170*  --   --   --   --    ALT 23  --   --   --   --    AST 34  --   --   --   --    BILITOT 2.4*  --   --   --   --     < > = values in this interval not displayed.      Coagulation:   No results for input(s): PT, INR, APTT in the last 168 hours.  LDH:  No results for input(s): LDH in the last 72 hours.  Microbiology:  Microbiology Results (last 7 days)     Procedure Component Value Units Date/Time    Blood culture #2 **CANNOT BE ORDERED STAT** [[id number]] Collected:  05/30/19 1631    Order Status:  Completed Specimen:  Blood from Peripheral, Antecubital, Right  Updated:  06/04/19 1812     Blood Culture, Routine No growth after 5 days.    Blood culture #1 **CANNOT BE ORDERED STAT** [[id number]] Collected:  05/30/19 1610    Order Status:  Completed Specimen:  Blood from Peripheral, Antecubital, Left Updated:  06/04/19 1812     Blood Culture, Routine No growth after 5 days.    Respiratory Viral Panel by PCR [facility]; Nasal Swab [[id number]] Collected:  05/31/19 1027    Order Status:  Completed Specimen:  Respiratory Updated:  06/03/19 1059     Respiratory Virus Panel, source Nasal Swab     RVP - Adenovirus Not Detected     Comment: Detects Serotypes B and E. Detection of Serotype C may   be limited. If Adenovirus infection is suspected and a   Not Detected result is returned the sample should be   re-tested for Adenovirus using an independent method  (e.g. [employer] Adenovirus Quantitative Real-Time  PCR test.          Enterovirus Not Detected     Comment: Cross-reactivity has been observed between certain Rhinovirus  strains and the Enterovirus assay.          Human Bocavirus Not Detected     Human Coronavirus Not Detected     Comment: The Human Coronavirus assay detects Human coronavirus types  229E, OC43,NL63 and HKU1.          RVP - Human Metapneumovirus (hMPV) Not Detected     RVP - Influenza A Not Detected     Influenza A - Y3Z7-30 Not Detected     RVP - Influenza B Not Detected     Parainfluenza Not Detected     Respiratory Syncytial VirusVirus (RSV) A Not Detected     Comment: The Respiratory Syncytial Viral assay detects types A and B,  however it does not distinguish between the two.          RVP - Rhinovirus Not Detected     Comment: Cross-Reactivity has been observed between certain   Rhinovirus strains and the Enterovirus assay.  Target Enriched Mulitplex Polymerase Chain Reaction (TEM-PCR)  allows for the detection of multiple pathogens out of a single  reaction.  This test was developed and its performance   characteristics determined by [employer].   It has not   been cleared or approved by the U.S.Food and Drug Administration.  Results should be used in conjunction with clinical findings,   and should not form the sole basis for a diagnosis or treatment  decision.  TEM-PCR is a licensed technology of [employer].         Narrative:       Receiving Lab:->[facility]          I have reviewed all pertinent labs within the past 24 hours.    Estimated Creatinine Clearance: 51.9 mL/min (based on SCr of 1.1 mg/dL).    Diagnostic Results:  I have reviewed all pertinent imaging results/findings within the past 24 hours.    Assessment and Plan:     We are admitting this 77 yo gentleman for complaints of productive cough, nasal congestion and chief complaint of shortness of breath. 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     He had been in his usual state of health, but began having sinus congestion and drainage on Friday, this progressed to productive cough and worsening dyspnea that has not improved with nebs (received Saturday), but has stabilized somewhat since he started guaifenesin.    He denies weight gain (in fact reports weight loss), has been compliant with diet and medications and is usually on 4L NC at home. He stopped his macitentan on Wednesday as there was a suspicion this was causing his symptoms.    Currently he is short of breath (relieved with partial non-rebreather) and reports being thirsty. He denies fevers, chest pain, palpitations, syncope.    * Acute on chronic respiratory failure with hypoxemia  -Likely due to exacerbation of group 1 PH secondary to acute viral URI   -On O2 via HFNC. Wean O2. Was on 4L at home  -completed 5 day coarse of rocephin/azithromycin  -Pred 60 mg x 1 given 5/30/19  -procalcitonin negative  -Lactate 2.4 on admit, trended down to normal    -RVP negative  - nebs q4h, guaifenesin 600mg BID  - continue selexipag 1400mcg BID (patient brought home med). Restarted macitentan 5/31/19, as this is likely infection and not SE of med      PHT (pulmonary hypertension)  -Cont selexipag 1400 mg BID  -Pt was started on macitentan last week but stopped after 5 doses bc he was feeling so bad. Restarted macitentan 5/31/19  -Restart home dose of Lasix 40 mg daily 6/2/19  - started because patient still felt SOB with very minimal exertion and felt cool on exam.  However, patient did not notice a difference in symptoms so it was stopped.    CAD (coronary artery disease)  -Cont ASA, Brilinta, statin        WILLIAM Jackson  Heart Transplant  [facility]

## 2019-06-05 NOTE — ASSESSMENT & PLAN NOTE
-Likely due to exacerbation of group 1 PH secondary to acute viral URI   -On O2 via HFNC. Weaned O2 as tolerated until he was on previous dose of 4L   -completed 5 day coarse of rocephin/azithromycin  -Pred 60 mg x 1 given 5/30/19  -procalcitonin negative  -Lactate 2.4 on admit, trended down to normal   -RVP negative  - nebs q4h, guaifenesin 600mg BID  - continue selexipag 1400mcg BID (patient brought home med). Restarted macitentan 5/31/19, as this is likely infection and not SE of med

## 2019-06-05 NOTE — PLAN OF CARE
Problem: Occupational Therapy Goal  Goal: Occupational Therapy Goal  Outcome: Outcome(s) achieved Date Met: 06/05/19  Eval and D/C. No needs.      VICTOR MANUEL Naranjo

## 2019-06-05 NOTE — PLAN OF CARE
Problem: Adult Inpatient Plan of Care  Goal: Plan of Care Review  Outcome: Ongoing (interventions implemented as appropriate)  VSS. Call light in reach. Dobutamine d/c. Pt O2 stayed above 94%. Pt was signed of from OT. Pt OOB and sitting in the chair. He walks to the restroom w/ no complaints. WCTM.

## 2019-06-05 NOTE — ASSESSMENT & PLAN NOTE
-Cont selexipag 1400 mg BID  -Pt was started on macitentan last week but stopped after 5 doses bc he was feeling so bad. Restarted macitentan 5/31/19  -Restart home dose of Lasix 40 mg daily 6/2/19  - started because patient still felt SOB with very minimal exertion and felt cool on exam.  However, patient did not notice a difference in symptoms so it was stopped.

## 2019-06-05 NOTE — PT/OT/SLP EVAL
Occupational Therapy   Evaluation and Discharge Note    Name: Greg Nolasco  MRN: [id number]  Admitting Diagnosis:  Acute on chronic respiratory failure with hypoxemia      Recommendations:     Discharge Recommendations: home  Discharge Equipment Recommendations:  none  Barriers to discharge:       Assessment:     Greg Nolasco is a 78 y.o. male with a medical diagnosis of Acute on chronic respiratory failure with hypoxemia. At this time, patient is functioning at their prior level of function and does not require further acute OT services.     Plan:     During this hospitalization, patient does not require further acute OT services.  Please re-consult if situation changes.    · Plan of Care Reviewed with: patient    Subjective     Chief Complaint: weakness  Patient/Family Comments/goals: to go home    Occupational Profile:  Living Environment: H alone with no steps. Daughter has been living with him since the onset of his medical issues.  Previous level of function: Pt was (I) in all ADLs and driving.  Equipment Used at home:  oxygen, shower chair  Assistance upon Discharge: Daughter is available to help.    Pain/Comfort:  · Pain Rating 1: 0/10    Patients cultural, spiritual, Rastafari conflicts given the current situation:      Objective:     Communicated with: RN prior to session.  Patient found supine with oxygen upon OT entry to room.    General Precautions: Standard, fall   Orthopedic Precautions:    Braces:       Occupational Performance:    Bed Mobility:    · Patient completed Rolling/Turning to Right with independence  · Patient completed Scooting/Bridging with independence  · Patient completed Supine to Sit with independence    Functional Mobility/Transfers:  · Patient completed Sit <> Stand Transfer with independence  with  no assistive device   · Functional Mobility: Pt walked ~100ft with (S) with no AD.    Activities of Daily Living:  · Lower Body Dressing: independence to don and doff  socks      Physical Exam:  Upper Extremity Range of Motion:     -       Right Upper Extremity: WFL  -       Left Upper Extremity: WFL  Upper Extremity Strength:    -       Right Upper Extremity: WFL  -       Left Upper Extremity: WFL    AMPAC 6 Click ADL:  AMPAC Total Score: 23    Treatment & Education:  -UE ROM/MMT  -addressed roles and expectations  -educated the importance of OOB activity to promote strength, endurance, and breathing.  -discussed POC and D/C plan    Education:    Patient left up in chair with call button in reach    GOALS:   Multidisciplinary Problems     Occupational Therapy Goals     Not on file          Multidisciplinary Problems (Resolved)        Problem: Occupational Therapy Goal    Goal Priority Disciplines Outcome Interventions   Occupational Therapy Goal   (Resolved)     OT, PT/OT Outcome(s) achieved                    History:     Past Medical History:   Diagnosis Date    Chronic hypoxemic respiratory failure     Coronary artery disease     BARBARA (obstructive sleep apnea)     Pulmonary hypertension        Past Surgical History:   Procedure Laterality Date    ANGIOPLASTY  1991    BACK SURGERY      COLONOSCOPY  04/2019    HERNIA REPAIR      INSERTION, CATHETER, RIGHT HEART Right 1/10/2019    Performed by Miguelina Ruffin [facility] at [facility] CATH LAB    INSERTION, STENT, CORONARY ARTERY Right 4/22/2019    Performed by Dex Lomas MD at [facility] CATH LAB    KNEE SURGERY      REMOVAL, CATHETER, CENTRAL VENOUS, TUNNELED N/A 12/20/2018    Performed by Walt Smith MD at [facility] CATH LAB    RIGHT HEART CATH Right 10/31/2018    Performed by Robert Lomas MD at [facility] CATH LAB    SHOULDER SURGERY      SINUS SURGERY         Time Tracking:     OT Date of Treatment: 06/05/19  OT Start Time: 0118  OT Stop Time: 0130  OT Total Time (min): 12 min    Billable Minutes:Evaluation 12    VICTOR MANUEL Naranjo  6/5/2019

## 2019-06-05 NOTE — SUBJECTIVE & OBJECTIVE
Interval History: Patient worked with PT yesterday and didn't notice a difference in symptoms with the .  Is oxygen has been weaned down to 4.5L .    Continuous Infusions:    Scheduled Meds:   albuterol  2 puff Inhalation Q4H    allopurinol  300 mg Oral QHS    aspirin  81 mg Oral Daily    atorvastatin  40 mg Oral QHS    famotidine  20 mg Oral BID    furosemide  40 mg Oral Daily    guaiFENesin  600 mg Oral BID    macitentan  10 mg Oral Daily    polyethylene glycol  17 g Oral Daily    selexipag  1,400 mcg Oral BID    spironolactone  25 mg Oral QAM    ticagrelor  90 mg Oral BID     PRN Meds:acetaminophen, ondansetron, ramelteon, sodium chloride 0.9%    Review of patient's allergies indicates:   Allergen Reactions    Clindamycin Shortness Of Breath    Penicillins Rash     Objective:     Vital Signs (Most Recent):  Temp: 98 °F (36.7 °C) (06/04/19 2000)  Pulse: 96 (06/05/19 1001)  Resp: 18 (06/05/19 1001)  BP: 100/67 (06/05/19 0945)  SpO2: 100 % (06/05/19 0945) Vital Signs (24h Range):  Temp:  [97.6 °F (36.4 °C)-98 °F (36.7 °C)] 98 °F (36.7 °C)  Pulse:  [74-96] 96  Resp:  [11-18] 18  SpO2:  [92 %-100 %] 100 %  BP: ()/(64-85) 100/67     Patient Vitals for the past 72 hrs (Last 3 readings):   Weight   06/05/19 0500 66.3 kg (146 lb 2.6 oz)   06/04/19 0400 65.2 kg (143 lb 11.8 oz)   06/03/19 0600 66.7 kg (147 lb 0.8 oz)     Body mass index is 21.58 kg/m².      Intake/Output Summary (Last 24 hours) at 6/5/2019 1135  Last data filed at 6/5/2019 0800  Gross per 24 hour   Intake 1522.83 ml   Output 1525 ml   Net -2.17 ml       Hemodynamic Parameters:       Telemetry: reviewed    Physical Exam  Constitutional: He is oriented to person, place, and time. He appears cachectic.   Neck: Normal range of motion. Neck supple. No JVD present.   Cardiovascular: Normal rate and regular rhythm. Exam reveals no gallop and no friction rub. No murmur heard.  Pulmonary/Chest: Effort normal and breath sounds normal. He has  no wheezes. He has no rales. On O2 via HFNC   Abdominal: Soft. Bowel sounds are normal. There is no tenderness.   Musculoskeletal: He exhibits no edema.   Neurological: He is alert and oriented to person, place, and time.   Skin: Skin is dry.   Bilat LEs are cool on exam     Significant Labs:  CBC:  Recent Labs   Lab 06/03/19 0617 06/04/19 0626 06/05/19 0624   WBC 8.25 7.44 8.29   RBC 4.48* 4.20* 4.37*   HGB 14.2 13.7* 14.0   HCT 41.2 38.5* 40.6    238 245   MCV 92 92 93   MCH 31.7* 32.6* 32.0*   MCHC 34.5 35.6 34.5     BNP:  Recent Labs   Lab 06/02/19  0806 06/03/19 0617 06/05/19 0624   * 163* 77     CMP:  Recent Labs   Lab 05/30/19  1613  06/03/19 0617 06/04/19 0626 06/05/19 0624   *   < > 99 97 122*   CALCIUM 10.3   < > 9.5 9.8 9.7   ALBUMIN 4.0  --   --   --   --    PROT 8.4  --   --   --   --       < > 133* 134* 133*   K 3.8   < > 4.1 3.7 3.9   CO2 21*   < > 19* 25 25      < > 104 101 100   BUN 20   < > 15 15 15   CREATININE 1.1   < > 0.9 1.0 1.1   ALKPHOS 170*  --   --   --   --    ALT 23  --   --   --   --    AST 34  --   --   --   --    BILITOT 2.4*  --   --   --   --     < > = values in this interval not displayed.      Coagulation:   No results for input(s): PT, INR, APTT in the last 168 hours.  LDH:  No results for input(s): LDH in the last 72 hours.  Microbiology:  Microbiology Results (last 7 days)     Procedure Component Value Units Date/Time    Blood culture #2 **CANNOT BE ORDERED STAT** [[id number]] Collected:  05/30/19 1631    Order Status:  Completed Specimen:  Blood from Peripheral, Antecubital, Right Updated:  06/04/19 1812     Blood Culture, Routine No growth after 5 days.    Blood culture #1 **CANNOT BE ORDERED STAT** [[id number]] Collected:  05/30/19 1610    Order Status:  Completed Specimen:  Blood from Peripheral, Antecubital, Left Updated:  06/04/19 1812     Blood Culture, Routine No growth after 5 days.    Respiratory Viral Panel by PCR [facility]; Nasal  Swab [[id number]] Collected:  05/31/19 1027    Order Status:  Completed Specimen:  Respiratory Updated:  06/03/19 1059     Respiratory Virus Panel, source Nasal Swab     RVP - Adenovirus Not Detected     Comment: Detects Serotypes B and E. Detection of Serotype C may   be limited. If Adenovirus infection is suspected and a   Not Detected result is returned the sample should be   re-tested for Adenovirus using an independent method  (e.g. [employer] Adenovirus Quantitative Real-Time  PCR test.          Enterovirus Not Detected     Comment: Cross-reactivity has been observed between certain Rhinovirus  strains and the Enterovirus assay.          Human Bocavirus Not Detected     Human Coronavirus Not Detected     Comment: The Human Coronavirus assay detects Human coronavirus types  229E, OC43,NL63 and HKU1.          RVP - Human Metapneumovirus (hMPV) Not Detected     RVP - Influenza A Not Detected     Influenza A - K5N1-53 Not Detected     RVP - Influenza B Not Detected     Parainfluenza Not Detected     Respiratory Syncytial VirusVirus (RSV) A Not Detected     Comment: The Respiratory Syncytial Viral assay detects types A and B,  however it does not distinguish between the two.          RVP - Rhinovirus Not Detected     Comment: Cross-Reactivity has been observed between certain   Rhinovirus strains and the Enterovirus assay.  Target Enriched Mulitplex Polymerase Chain Reaction (TEM-PCR)  allows for the detection of multiple pathogens out of a single  reaction.  This test was developed and its performance   characteristics determined by [employer].  It has not   been cleared or approved by the U.S.Food and Drug Administration.  Results should be used in conjunction with clinical findings,   and should not form the sole basis for a diagnosis or treatment  decision.  TEM-PCR is a licensed technology of [employer].         Narrative:       Receiving Lab:->[facility]          I have reviewed all  pertinent labs within the past 24 hours.    Estimated Creatinine Clearance: 51.9 mL/min (based on SCr of 1.1 mg/dL).    Diagnostic Results:  I have reviewed all pertinent imaging results/findings within the past 24 hours.

## 2019-06-06 VITALS
DIASTOLIC BLOOD PRESSURE: 59 MMHG | HEIGHT: 69 IN | BODY MASS INDEX: 21.72 KG/M2 | HEART RATE: 86 BPM | TEMPERATURE: 98 F | SYSTOLIC BLOOD PRESSURE: 97 MMHG | RESPIRATION RATE: 18 BRPM | WEIGHT: 146.63 LBS | OXYGEN SATURATION: 96 %

## 2019-06-06 LAB
ANION GAP SERPL CALC-SCNC: 8 MMOL/L (ref 8–16)
BASOPHILS # BLD AUTO: 0.07 K/UL (ref 0–0.2)
BASOPHILS NFR BLD: 0.7 % (ref 0–1.9)
BUN SERPL-MCNC: 17 MG/DL (ref 8–23)
CALCIUM SERPL-MCNC: 9.8 MG/DL (ref 8.7–10.5)
CHLORIDE SERPL-SCNC: 98 MMOL/L (ref 95–110)
CO2 SERPL-SCNC: 26 MMOL/L (ref 23–29)
CREAT SERPL-MCNC: 1.1 MG/DL (ref 0.5–1.4)
DIFFERENTIAL METHOD: ABNORMAL
EOSINOPHIL # BLD AUTO: 0.5 K/UL (ref 0–0.5)
EOSINOPHIL NFR BLD: 4.2 % (ref 0–8)
ERYTHROCYTE [DISTWIDTH] IN BLOOD BY AUTOMATED COUNT: 15.4 % (ref 11.5–14.5)
EST. GFR  (AFRICAN AMERICAN): >60 ML/MIN/1.73 M^2
EST. GFR  (NON AFRICAN AMERICAN): >60 ML/MIN/1.73 M^2
GLUCOSE SERPL-MCNC: 88 MG/DL (ref 70–110)
HCT VFR BLD AUTO: 43.3 % (ref 40–54)
HGB BLD-MCNC: 14.5 G/DL (ref 14–18)
IMM GRANULOCYTES # BLD AUTO: 0.1 K/UL (ref 0–0.04)
IMM GRANULOCYTES NFR BLD AUTO: 0.9 % (ref 0–0.5)
LYMPHOCYTES # BLD AUTO: 2 K/UL (ref 1–4.8)
LYMPHOCYTES NFR BLD: 18.9 % (ref 18–48)
MAGNESIUM SERPL-MCNC: 2.2 MG/DL (ref 1.6–2.6)
MCH RBC QN AUTO: 31.9 PG (ref 27–31)
MCHC RBC AUTO-ENTMCNC: 33.5 G/DL (ref 32–36)
MCV RBC AUTO: 95 FL (ref 82–98)
MONOCYTES # BLD AUTO: 1 K/UL (ref 0.3–1)
MONOCYTES NFR BLD: 9.8 % (ref 4–15)
NEUTROPHILS # BLD AUTO: 6.9 K/UL (ref 1.8–7.7)
NEUTROPHILS NFR BLD: 65.5 % (ref 38–73)
NRBC BLD-RTO: 0 /100 WBC
PHOSPHATE SERPL-MCNC: 3 MG/DL (ref 2.7–4.5)
PLATELET # BLD AUTO: 244 K/UL (ref 150–350)
PMV BLD AUTO: 9.1 FL (ref 9.2–12.9)
POTASSIUM SERPL-SCNC: 4 MMOL/L (ref 3.5–5.1)
RBC # BLD AUTO: 4.54 M/UL (ref 4.6–6.2)
SODIUM SERPL-SCNC: 132 MMOL/L (ref 136–145)
WBC # BLD AUTO: 10.59 K/UL (ref 3.9–12.7)

## 2019-06-06 PROCEDURE — 83735 ASSAY OF MAGNESIUM: CPT

## 2019-06-06 PROCEDURE — 99238 HOSP IP/OBS DSCHRG MGMT 30/<: CPT | Mod: ,,, | Performed by: INTERNAL MEDICINE

## 2019-06-06 PROCEDURE — 25000003 PHARM REV CODE 250

## 2019-06-06 PROCEDURE — 99238 PR HOSPITAL DISCHARGE DAY,<30 MIN: ICD-10-PCS | Mod: ,,, | Performed by: INTERNAL MEDICINE

## 2019-06-06 PROCEDURE — 25000003 PHARM REV CODE 250: Performed by: PHYSICIAN ASSISTANT

## 2019-06-06 PROCEDURE — 85025 COMPLETE CBC W/AUTO DIFF WBC: CPT

## 2019-06-06 PROCEDURE — 80048 BASIC METABOLIC PNL TOTAL CA: CPT

## 2019-06-06 PROCEDURE — 84100 ASSAY OF PHOSPHORUS: CPT

## 2019-06-06 PROCEDURE — 36415 COLL VENOUS BLD VENIPUNCTURE: CPT

## 2019-06-06 RX ORDER — ALBUTEROL SULFATE 90 UG/1
2 AEROSOL, METERED RESPIRATORY (INHALATION) EVERY 4 HOURS
Qty: 1 INHALER | Refills: 1 | Status: SHIPPED | OUTPATIENT
Start: 2019-06-06 | End: 2019-06-07 | Stop reason: SDUPTHER

## 2019-06-06 RX ADMIN — GUAIFENESIN 600 MG: 600 TABLET, EXTENDED RELEASE ORAL at 08:06

## 2019-06-06 RX ADMIN — ASPIRIN 81 MG: 81 TABLET, COATED ORAL at 08:06

## 2019-06-06 RX ADMIN — MACITENTAN 10 MG: 10 TABLET, FILM COATED ORAL at 08:06

## 2019-06-06 RX ADMIN — FAMOTIDINE 20 MG: 20 TABLET, FILM COATED ORAL at 08:06

## 2019-06-06 RX ADMIN — ALBUTEROL SULFATE 2 PUFF: 90 AEROSOL, METERED RESPIRATORY (INHALATION) at 08:06

## 2019-06-06 RX ADMIN — FUROSEMIDE 40 MG: 40 TABLET ORAL at 08:06

## 2019-06-06 RX ADMIN — SPIRONOLACTONE 25 MG: 25 TABLET, FILM COATED ORAL at 05:06

## 2019-06-06 RX ADMIN — TICAGRELOR 90 MG: 90 TABLET ORAL at 08:06

## 2019-06-06 RX ADMIN — ALBUTEROL SULFATE 2 PUFF: 90 AEROSOL, METERED RESPIRATORY (INHALATION) at 05:06

## 2019-06-06 NOTE — PROGRESS NOTES
Discharge Note:  SW met with pt alone in pt's room in order to review discharge plan. Pt was AAOx4, pleasant and engaged. Pt reports that he was recently weaned to 4L of O2. Pt states that this is the amount of O2 he was on before coming into the hospital. Pt states that he uses [employer] for his O2 and states that his son or daughter will be bringing his portable to the hospital for his trip home. Pt explained that his portable can be charged in the car. Pt unsure if his ride will be his dgtr or son but confirmed that one of them will come this afternoon. Pt denied any current needs at this time. SW remains available for continued psychosocial support, education, resources, and additional d/c planning as needed.

## 2019-06-06 NOTE — SUBJECTIVE & OBJECTIVE
Interval History:  stopped yesterday as patient did not notice a difference in symptoms with it on.  He is at his baseline oxygen requirements and eager to go home.     Continuous Infusions:    Scheduled Meds:   albuterol  2 puff Inhalation Q4H    allopurinol  300 mg Oral QHS    aspirin  81 mg Oral Daily    atorvastatin  40 mg Oral QHS    famotidine  20 mg Oral BID    furosemide  40 mg Oral Daily    guaiFENesin  600 mg Oral BID    macitentan  10 mg Oral Daily    polyethylene glycol  17 g Oral Daily    selexipag  1,400 mcg Oral BID    spironolactone  25 mg Oral QAM    ticagrelor  90 mg Oral BID     PRN Meds:acetaminophen, ondansetron, ramelteon, sodium chloride 0.9%    Review of patient's allergies indicates:   Allergen Reactions    Clindamycin Shortness Of Breath    Penicillins Rash     Objective:     Vital Signs (Most Recent):  Temp: 98.2 °F (36.8 °C) (06/06/19 0819)  Pulse: 76 (06/06/19 0819)  Resp: 18 (06/06/19 0819)  BP: 98/60 (06/06/19 0819)  SpO2: 96 % (06/06/19 0819) Vital Signs (24h Range):  Temp:  [97.6 °F (36.4 °C)-98.2 °F (36.8 °C)] 98.2 °F (36.8 °C)  Pulse:  [76-96] 76  Resp:  [11-18] 18  SpO2:  [95 %-99 %] 96 %  BP: ()/(60-80) 98/60     Patient Vitals for the past 72 hrs (Last 3 readings):   Weight   06/06/19 0400 66.5 kg (146 lb 9.7 oz)   06/05/19 0500 66.3 kg (146 lb 2.6 oz)   06/04/19 0400 65.2 kg (143 lb 11.8 oz)     Body mass index is 21.65 kg/m².      Intake/Output Summary (Last 24 hours) at 6/6/2019 1021  Last data filed at 6/6/2019 1000  Gross per 24 hour   Intake 1300 ml   Output 1325 ml   Net -25 ml       Hemodynamic Parameters:       Telemetry: reviewed    Physical Exam  Constitutional: He is oriented to person, place, and time. He appears cachectic.   Neck: Normal range of motion. Neck supple. No JVD present.   Cardiovascular: Normal rate and regular rhythm. Exam reveals no gallop and no friction rub. No murmur heard.  Pulmonary/Chest: Effort normal and breath sounds  normal. He has no wheezes. He has no rales. On O2 via HFNC   Abdominal: Soft. Bowel sounds are normal. There is no tenderness.   Musculoskeletal: He exhibits no edema.   Neurological: He is alert and oriented to person, place, and time.   Skin: Skin is dry.   Bilat LEs are cool on exam     Significant Labs:  CBC:  Recent Labs   Lab 06/04/19  0626 06/05/19 0624 06/06/19 0614   WBC 7.44 8.29 10.59   RBC 4.20* 4.37* 4.54*   HGB 13.7* 14.0 14.5   HCT 38.5* 40.6 43.3    245 244   MCV 92 93 95   MCH 32.6* 32.0* 31.9*   MCHC 35.6 34.5 33.5     BNP:  Recent Labs   Lab 06/02/19  0806 06/03/19  0617 06/05/19 0624   * 163* 77     CMP:  Recent Labs   Lab 05/30/19  1613  06/04/19 0626 06/05/19 0624 06/06/19 0614   *   < > 97 122* 88   CALCIUM 10.3   < > 9.8 9.7 9.8   ALBUMIN 4.0  --   --   --   --    PROT 8.4  --   --   --   --       < > 134* 133* 132*   K 3.8   < > 3.7 3.9 4.0   CO2 21*   < > 25 25 26      < > 101 100 98   BUN 20   < > 15 15 17   CREATININE 1.1   < > 1.0 1.1 1.1   ALKPHOS 170*  --   --   --   --    ALT 23  --   --   --   --    AST 34  --   --   --   --    BILITOT 2.4*  --   --   --   --     < > = values in this interval not displayed.      Coagulation:   No results for input(s): PT, INR, APTT in the last 168 hours.  LDH:  No results for input(s): LDH in the last 72 hours.  Microbiology:  Microbiology Results (last 7 days)     Procedure Component Value Units Date/Time    Blood culture #2 **CANNOT BE ORDERED STAT** [[id number]] Collected:  05/30/19 1631    Order Status:  Completed Specimen:  Blood from Peripheral, Antecubital, Right Updated:  06/04/19 1812     Blood Culture, Routine No growth after 5 days.    Blood culture #1 **CANNOT BE ORDERED STAT** [[id number]] Collected:  05/30/19 1610    Order Status:  Completed Specimen:  Blood from Peripheral, Antecubital, Left Updated:  06/04/19 1812     Blood Culture, Routine No growth after 5 days.    Respiratory Viral Panel by PCR  [facility]; Nasal Swab [[id number]] Collected:  05/31/19 1027    Order Status:  Completed Specimen:  Respiratory Updated:  06/03/19 1059     Respiratory Virus Panel, source Nasal Swab     RVP - Adenovirus Not Detected     Comment: Detects Serotypes B and E. Detection of Serotype C may   be limited. If Adenovirus infection is suspected and a   Not Detected result is returned the sample should be   re-tested for Adenovirus using an independent method  (e.g. [employer] Adenovirus Quantitative Real-Time  PCR test.          Enterovirus Not Detected     Comment: Cross-reactivity has been observed between certain Rhinovirus  strains and the Enterovirus assay.          Human Bocavirus Not Detected     Human Coronavirus Not Detected     Comment: The Human Coronavirus assay detects Human coronavirus types  229E, OC43,NL63 and HKU1.          RVP - Human Metapneumovirus (hMPV) Not Detected     RVP - Influenza A Not Detected     Influenza A - [id number] Not Detected     RVP - Influenza B Not Detected     Parainfluenza Not Detected     Respiratory Syncytial VirusVirus (RSV) A Not Detected     Comment: The Respiratory Syncytial Viral assay detects types A and B,  however it does not distinguish between the two.          RVP - Rhinovirus Not Detected     Comment: Cross-Reactivity has been observed between certain   Rhinovirus strains and the Enterovirus assay.  Target Enriched Mulitplex Polymerase Chain Reaction (TEM-PCR)  allows for the detection of multiple pathogens out of a single  reaction.  This test was developed and its performance   characteristics determined by [employer].  It has not   been cleared or approved by the U.S.Food and Drug Administration.  Results should be used in conjunction with clinical findings,   and should not form the sole basis for a diagnosis or treatment  decision.  TEM-PCR is a licensed technology of [employer].         Narrative:       Receiving Lab:->[facility]          I have  reviewed all pertinent labs within the past 24 hours.    Estimated Creatinine Clearance: 52.1 mL/min (based on SCr of 1.1 mg/dL).    Diagnostic Results:  I have reviewed all pertinent imaging results/findings within the past 24 hours.

## 2019-06-06 NOTE — PROGRESS NOTES
Discharge education given to pt and spouse regarding medications, future appointments, and when to call a healthcare provider. Both verbalized understanding. Visi and tablet removed from room. PIV and telemetry removed. Pt sent with home oxygen, uptravi, inhaler, and personal belongings. Son is driving him home.

## 2019-06-06 NOTE — DISCHARGE SUMMARY
[facility]  Heart Transplant  Discharge Summary      Patient Name: Greg Nolasco  MRN: [id number]  Admission Date: 5/30/2019  Hospital Length of Stay: 7 days  Discharge Date and Time: 06/06/2019 11:29 AM  Attending Physician: Miguelina Ruffin MD   Discharging Provider: WILLIAM Jackson  Primary Care Provider: Pablo Lorenzo MD     HPI: 77 yo gentleman for complaints of productive cough, nasal congestion and chief complaint of shortness of breath. 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    He had been in his usual state of health, but began having sinus congestion and drainage on Friday, this progressed to productive cough and worsening dyspnea that has not improved with nebs (received Saturday), but has stabilized somewhat since he started guaifenesin.   He denies weight gain (in fact reports weight loss), has been compliant with diet and medications and is usually on 4L NC at home. He stopped his macitentan on Wednesday as there was a suspicion this was causing his symptoms.   Currently he is short of breath (relieved with partial non-rebreather) and reports being thirsty. He denies fevers, chest pain, palpitations, syncope.    * No surgery found *     Hospital Course: [facility] [place] [facility] and placed on 24 hour telemetry   Upon admit; patient's acute respiratory failure with hypoxemia likely secondary to acute viral URI complicating his group 1 PH.  He was given a one time dose of Prednisone; started on high flow nasal canula and started on abx, guaifenesin and q4nebs.  Throughout his hospital stay; he completed a 5 day coarse of rocephin/azithromycin and his oxygen requirements were weaned down to his previous home dose of 4L.  Dobutamine was started during his stay because he felt SOB with minimal  exertion and he was cool on exam.  However, patient did not notice a difference in symptoms with  so it was stopped.    His PH regimen remained stable throughout his hospital stay; we continued selexipag and Macitentan.  His Macitentan had previously been stopped after 5 doses because he was feeling bad; but it was restarted without complications on 5/31/19.   Patient was discharged to home in stable condition and has scheduled follow up in [facility] clinic on 7/15/19    Consults (From admission, onward)        Status Ordering Provider     IP consult to case management  Once     Provider:  (Not yet assigned)    Acknowledged MARQUITA PEREIRA     IP consult to Heart Transplant  Once     Provider:  (Not yet assigned)    Acknowledged ANNEMARIE DAN          Significant Diagnostic Studies: See reports for full details  CXR: 5/30/19  Transthoracic echo: 5/31/19    Pending Diagnostic Studies:     None        Final Active Diagnoses:    Diagnosis Date Noted POA    PRINCIPAL PROBLEM:  Acute on chronic respiratory failure with hypoxemia [J96.21] 07/12/2018 Yes    PHT (pulmonary hypertension) [I27.20] 01/08/2019 Yes    CAD (coronary artery disease) [I25.10] 01/29/2018 Yes    Upper respiratory infection [J06.9] 06/01/2019 Yes    Hypoxia [R09.02] 05/31/2019 Yes      Problems Resolved During this Admission:    Diagnosis Date Noted Date Resolved POA    Lactic acid acidosis [E87.2] 05/30/2019 05/31/2019 Yes    Elevated troponin [R74.8] 05/30/2019 05/31/2019 Yes      Discharged Condition: stable    Disposition: Home or Self Care    Follow Up:  Follow-up Information     [facility] On 7/15/2019.    Specialty:  Transplant  Why:  as scheduled   Contact information:  [address]  [facility] [address] [phone]  Additional information:  3rd floor               Patient Instructions:      Diet Cardiac     Activity as tolerated     Medications:  Reconciled Home Medications:      Medication List       START taking these medications    albuterol 90 mcg/actuation inhaler  Commonly known as:  PROVENTIL/VENTOLIN HFA  Inhale 2 puffs into the lungs every 4 (four) hours. Rescue        CONTINUE taking these medications    allopurinol 300 MG tablet  Commonly known as:  ZYLOPRIM  Take 300 mg by mouth.     aspirin 81 MG EC tablet  Commonly known as:  ECOTRIN  Take 81 mg by mouth once daily.     atorvastatin 40 MG tablet  Commonly known as:  LIPITOR  Take 1 tablet (40 mg total) by mouth once daily.     furosemide 40 MG tablet  Commonly known as:  LASIX  Take 1 tablet (40 mg total) by mouth once daily.     macitentan 10 mg Tab  Take 10 mg by mouth once daily.     melatonin 3 mg Tab  Take 1 capsule by mouth daily as needed.     MULTI VITAMIN ORAL  Take 1 tablet by mouth.     OXYGEN-AIR DELIVERY SYSTEMS MISC  by Misc.(Non-Drug; Combo Route) route.     potassium chloride 10 MEQ Cpsr  Commonly known as:  MICRO-K  Take 10 mEq by mouth once.     RABEprazole 20 mg tablet  Commonly known as:  ACIPHEX  Take 20 mg by mouth 2 (two) times daily.     spironolactone 25 MG tablet  Commonly known as:  ALDACTONE  Take 1 tablet (25 mg total) by mouth every morning.     ticagrelor 90 mg tablet  Commonly known as:  BRILINTA  Take 1 tablet (90 mg total) by mouth 2 (two) times daily.     UPTRAVI 1,400 mcg Tab  Generic drug:  selexipag  Take 1,400 mcg by mouth 2 (two) times daily.            WILLIAM Jackson  Heart Transplant  [facility]

## 2019-06-06 NOTE — PROGRESS NOTES
[facility]  Heart Transplant  Progress Note    Patient Name: Greg Nolasco  MRN: [id number]  Admission Date: 5/30/2019  Hospital Length of Stay: 7 days  Attending Physician: Miguelina Ruffin MD  Primary Care Provider: Pablo Lorenzo MD  Principal Problem:Acute on chronic respiratory failure with hypoxemia    Subjective:     Interval History:  stopped yesterday as patient did not notice a difference in symptoms with it on.  He is at his baseline oxygen requirements and eager to go home.     Continuous Infusions:    Scheduled Meds:   albuterol  2 puff Inhalation Q4H    allopurinol  300 mg Oral QHS    aspirin  81 mg Oral Daily    atorvastatin  40 mg Oral QHS    famotidine  20 mg Oral BID    furosemide  40 mg Oral Daily    guaiFENesin  600 mg Oral BID    macitentan  10 mg Oral Daily    polyethylene glycol  17 g Oral Daily    selexipag  1,400 mcg Oral BID    spironolactone  25 mg Oral QAM    ticagrelor  90 mg Oral BID     PRN Meds:acetaminophen, ondansetron, ramelteon, sodium chloride 0.9%    Review of patient's allergies indicates:   Allergen Reactions    Clindamycin Shortness Of Breath    Penicillins Rash     Objective:     Vital Signs (Most Recent):  Temp: 98.2 °F (36.8 °C) (06/06/19 0819)  Pulse: 76 (06/06/19 0819)  Resp: 18 (06/06/19 0819)  BP: 98/60 (06/06/19 0819)  SpO2: 96 % (06/06/19 0819) Vital Signs (24h Range):  Temp:  [97.6 °F (36.4 °C)-98.2 °F (36.8 °C)] 98.2 °F (36.8 °C)  Pulse:  [76-96] 76  Resp:  [11-18] 18  SpO2:  [95 %-99 %] 96 %  BP: ()/(60-80) 98/60     Patient Vitals for the past 72 hrs (Last 3 readings):   Weight   06/06/19 0400 66.5 kg (146 lb 9.7 oz)   06/05/19 0500 66.3 kg (146 lb 2.6 oz)   06/04/19 0400 65.2 kg (143 lb 11.8 oz)     Body mass index is 21.65 kg/m².      Intake/Output Summary (Last 24 hours) at 6/6/2019 1021  Last data filed at 6/6/2019 1000  Gross per 24 hour   Intake 1300 ml   Output 1325 ml   Net -25 ml       Hemodynamic  Parameters:       Telemetry: reviewed    Physical Exam  Constitutional: He is oriented to person, place, and time. He appears cachectic.   Neck: Normal range of motion. Neck supple. No JVD present.   Cardiovascular: Normal rate and regular rhythm. Exam reveals no gallop and no friction rub. No murmur heard.  Pulmonary/Chest: Effort normal and breath sounds normal. He has no wheezes. He has no rales. On O2 via HFNC   Abdominal: Soft. Bowel sounds are normal. There is no tenderness.   Musculoskeletal: He exhibits no edema.   Neurological: He is alert and oriented to person, place, and time.   Skin: Skin is dry.   Bilat LEs are cool on exam     Significant Labs:  CBC:  Recent Labs   Lab 06/04/19  0626 06/05/19  0624 06/06/19  0614   WBC 7.44 8.29 10.59   RBC 4.20* 4.37* 4.54*   HGB 13.7* 14.0 14.5   HCT 38.5* 40.6 43.3    245 244   MCV 92 93 95   MCH 32.6* 32.0* 31.9*   MCHC 35.6 34.5 33.5     BNP:  Recent Labs   Lab 06/02/19  0806 06/03/19  0617 06/05/19  0624   * 163* 77     CMP:  Recent Labs   Lab 05/30/19  1613  06/04/19  0626 06/05/19  0624 06/06/19  0614   *   < > 97 122* 88   CALCIUM 10.3   < > 9.8 9.7 9.8   ALBUMIN 4.0  --   --   --   --    PROT 8.4  --   --   --   --       < > 134* 133* 132*   K 3.8   < > 3.7 3.9 4.0   CO2 21*   < > 25 25 26      < > 101 100 98   BUN 20   < > 15 15 17   CREATININE 1.1   < > 1.0 1.1 1.1   ALKPHOS 170*  --   --   --   --    ALT 23  --   --   --   --    AST 34  --   --   --   --    BILITOT 2.4*  --   --   --   --     < > = values in this interval not displayed.      Coagulation:   No results for input(s): PT, INR, APTT in the last 168 hours.  LDH:  No results for input(s): LDH in the last 72 hours.  Microbiology:  Microbiology Results (last 7 days)     Procedure Component Value Units Date/Time    Blood culture #2 **CANNOT BE ORDERED STAT** [[id number]] Collected:  05/30/19 1631    Order Status:  Completed Specimen:  Blood from Peripheral,  Antecubital, Right Updated:  06/04/19 1812     Blood Culture, Routine No growth after 5 days.    Blood culture #1 **CANNOT BE ORDERED STAT** [[id number]] Collected:  05/30/19 1610    Order Status:  Completed Specimen:  Blood from Peripheral, Antecubital, Left Updated:  06/04/19 1812     Blood Culture, Routine No growth after 5 days.    Respiratory Viral Panel by PCR [facility]; Nasal Swab [[id number]] Collected:  05/31/19 1027    Order Status:  Completed Specimen:  Respiratory Updated:  06/03/19 1059     Respiratory Virus Panel, source Nasal Swab     RVP - Adenovirus Not Detected     Comment: Detects Serotypes B and E. Detection of Serotype C may   be limited. If Adenovirus infection is suspected and a   Not Detected result is returned the sample should be   re-tested for Adenovirus using an independent method  (e.g. [employer] Adenovirus Quantitative Real-Time  PCR test.          Enterovirus Not Detected     Comment: Cross-reactivity has been observed between certain Rhinovirus  strains and the Enterovirus assay.          Human Bocavirus Not Detected     Human Coronavirus Not Detected     Comment: The Human Coronavirus assay detects Human coronavirus types  229E, OC43,NL63 and HKU1.          RVP - Human Metapneumovirus (hMPV) Not Detected     RVP - Influenza A Not Detected     Influenza A - [id number] Not Detected     RVP - Influenza B Not Detected     Parainfluenza Not Detected     Respiratory Syncytial VirusVirus (RSV) A Not Detected     Comment: The Respiratory Syncytial Viral assay detects types A and B,  however it does not distinguish between the two.          RVP - Rhinovirus Not Detected     Comment: Cross-Reactivity has been observed between certain   Rhinovirus strains and the Enterovirus assay.  Target Enriched Mulitplex Polymerase Chain Reaction (TEM-PCR)  allows for the detection of multiple pathogens out of a single  reaction.  This test was developed and its performance   characteristics determined by  [employer].  It has not   been cleared or approved by the U.S.Food and Drug Administration.  Results should be used in conjunction with clinical findings,   and should not form the sole basis for a diagnosis or treatment  decision.  TEM-PCR is a licensed technology of [employer].         Narrative:       Receiving Lab:->[facility]          I have reviewed all pertinent labs within the past 24 hours.    Estimated Creatinine Clearance: 52.1 mL/min (based on SCr of 1.1 mg/dL).    Diagnostic Results:  I have reviewed all pertinent imaging results/findings within the past 24 hours.    Assessment and Plan:     We are admitting this 77 yo gentleman for complaints of productive cough, nasal congestion and chief complaint of shortness of breath. 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     He had been in his usual state of health, but began having sinus congestion and drainage on Friday, this progressed to productive cough and worsening dyspnea that has not improved with nebs (received Saturday), but has stabilized somewhat since he started guaifenesin.    He denies weight gain (in fact reports weight loss), has been compliant with diet and medications and is usually on 4L NC at home. He stopped his macitentan on Wednesday as there was a suspicion this was causing his symptoms.    Currently he is short of breath (relieved with partial non-rebreather) and reports being thirsty. He denies fevers, chest pain, palpitations, syncope.    * Acute on chronic respiratory failure with hypoxemia  -Likely due to exacerbation of group 1 PH secondary to acute viral URI   -On O2 via HFNC. Weaned O2 as tolerated until he was on previous dose of 4L   -completed 5 day coarse of rocephin/azithromycin  -Pred 60 mg x 1 given 5/30/19  -procalcitonin  negative  -Lactate 2.4 on admit, trended down to normal   -RVP negative  - nebs q4h, guaifenesin 600mg BID  - continue selexipag 1400mcg BID (patient brought home med). Restarted macitentan 5/31/19, as this is likely infection and not SE of med      PHT (pulmonary hypertension)  -Cont selexipag 1400 mg BID  -Pt was started on macitentan last week but stopped after 5 doses bc he was feeling so bad. Restarted macitentan 5/31/19  -Restart home dose of Lasix 40 mg daily 6/2/19  - started because patient still felt SOB with very minimal exertion and felt cool on exam.  However, patient did not notice a difference in symptoms so it was stopped.    CAD (coronary artery disease)  -Cont ASA, Brilinta, statin        WILLIAM Jackson  Heart Transplant  [facility]

## 2019-06-06 NOTE — PLAN OF CARE
Problem: Fall Injury Risk  Goal: Absence of Fall and Fall-Related Injury  Outcome: Ongoing (interventions implemented as appropriate)  Intervention: Identify and Manage Contributors to Fall Injury Risk  Pt AOX3, VSS, afebrile. No c/o pain/N/V. Denies SOB or chest pain. Stats >95% on 4.5 L HFNC. Pt in lowest settings, call light w/in reach, nonskid socks when ambulating, remains free from falls. NAD. WCTM.

## 2019-06-07 ENCOUNTER — TELEPHONE (OUTPATIENT)
Dept: CARDIOLOGY | Facility: HOSPITAL | Age: 79
End: 2019-06-07

## 2019-06-07 RX ORDER — ALBUTEROL SULFATE 90 UG/1
2 AEROSOL, METERED RESPIRATORY (INHALATION) EVERY 4 HOURS
Qty: 1 INHALER | Refills: 1 | Status: SHIPPED | OUTPATIENT
Start: 2019-06-07 | End: 2019-07-15

## 2019-06-07 NOTE — TELEPHONE ENCOUNTER
Patient called to review discharge instructions. Also asked questions about paperwork received from insurance concerning oxygen. All questions answered. Feels like he is doing better. No other concerns at this time.

## 2019-06-10 ENCOUNTER — PATIENT OUTREACH (OUTPATIENT)
Dept: ADMINISTRATIVE | Facility: CLINIC | Age: 79
End: 2019-06-10

## 2019-06-10 NOTE — PATIENT INSTRUCTIONS
Left- or Right- Side Congestive Heart Failure (CHF)    The heart is a large muscle. It is a pump that circulates blood throughout the body. Blood carries oxygen to all of the organs, including the brain, muscles, and skin. After your body takes the oxygen out of the blood, the blood returns to the heart. The right side of the heart collects the blood from the body and pumps it to the lungs. In the lungs, it gets fresh oxygen and gives up carbon dioxide. The oxygen-rich blood from the lungs then returns to the left side of the heart, where it is pumped back out to the rest of your body, starting the process all over.  Congestive heart failure (CHF) occurs when the heart muscle is weakened. This affects the pumping action of the heart. Heart failure can affect the right side of the heart or the left side. But heart failure may affect not only the right side of the heart or only the left side. Although it may have started on one side, it can and often eventually does affect both sides.  Right-side heart failure  When the right side of the heart is weakened, it cant handle the blood it is getting from the rest of the body. This blood returns to the heart through veins. When too much pressure builds up in the veins, fluid leaks out into the tissues. Gravity then causes that fluid to move to those parts of the body that are the lowest. So one of the first symptoms of right-side CHF can include swelling in the feet and ankles. If the condition gets worse, the swelling can even go up past the knees. Sometimes it gets so severe, the liver can get congested as well.  Left-side heart failure  When the left side of the heart is weakened, it cant handle the blood it gets from the lungs. Pressure then builds up in the veins of the lungs, causing fluid to leak into the lung tissues. This may cause CHF and pulmonary edema. This causes you to feel short of breath, weak, or dizzy. These symptoms are often worse with exertion,  such as when climbing stairs or walking up hills. Lying with your head flat is uncomfortable and can make your breathing worse. This may make sleeping difficult. You may need to use extra pillows to elevate your upper body to sleep well. The same is true when just resting during the daytime.  There are many causes of heart failure including:  · Coronary artery disease  · Past heart attack (also known as acute myocardial infarction, or AMI)  · High blood pressure  · Damaged heart valve  · Diabetes  · Obesity  · Cigarette smoking  · Alcohol abuse  Heart failure is a chronic condition. There is no cure. The purpose of medical treatment is to improve the pumping action of the heart. The main way to do this is to remove excess water from the body. A number of medicines can help reach this goal, improve symptoms, and prevent the heart from becoming weaker. Sometimes, heart failure can become so severe that a device is placed in the heart to help with pumping. Another major goal is to better treat the causes of heart failure, such as diabetes and high blood pressure, by making changes in your lifestyle and maximizing medical control when needed.  Home care  Follow these guidelines when caring for yourself at home:  · Check your weight every day. This is very important because a sudden increase in weight gain could mean worsening heart failure. Keep these things in mind:  ¨ Use the same scale every day.  ¨ Weigh yourself at the same time every day.  ¨ Make sure the scale is on a hard floor surface, not on a rug or carpet.  ¨ Keep a record of your weight every day so your healthcare provider can see it. If you are not given a log sheet for this, keep a separate journal for this purpose.   · Cut back on the amount of salt (sodium) you eat. Follow your healthcare provider's recommendation on how much salt or sodium you should have each day.  ¨ Avoid high-salt foods. These include olives, pickles, smoked meats, salted potato  chips, and most prepared foods.  ¨ Don't add salt to your food at the table. Use only small amounts of salt when cooking.  ¨ Read the labels carefully on food packages to learn how much salt or sodium is in each serving in the package. Remember, a can or package of food may contain more than 1 serving. So if you eat all the food in the package, you may be getting more salt than you think.  · Follow your healthcare provider's recommendations about how much fluid you should have. Be aware that some foods, such as soup, pudding, and juicy fruits like oranges or melons, contain liquid. You'll need to count the liquid in those foods as part of your daily fluid intake. Your provider can help you with this.  · Stop smoking.  · Cut back on how much alcohol you drink.  · Lose weight if you are overweight. The excess weight adds a lot of stress on the workload of the heart.  · Stay active. Talk with your provider about an exercise program that is safe for your heart.  · Keep your feet elevated to reduce swelling. Ask your provider about support hose as a preventive treatment for daytime leg swelling.  Besides taking your medicine as instructed, an important part of treatment is lifestyle changes. These include diet, physical activity, stopping smoking, and weight control.  Improve your diet by including more fresh foods, cutting back on how much sugar and saturated fat you eat, and eating fewer processed foods and less salt.  Follow-up care  Follow up with your healthcare provider, or as advised.  Make sure to keep any appointments that were made for you. These can help better control your congestive heart failure. You will need to follow up with your provider on a routine basis to make sure your heart failure is well managed.  If an X-ray, ECG, or other tests were done, you will be told of any new findings that may affect your care.  Call 911  Call 911 if you:  · Become severely short of breath  · Feel lightheaded, or feel  like you might pass out or faint  · Have chest pain or discomfort that is different than usual, the medicines your doctor told you to use for this don't help, or the pain lasts longer than 10 to 15 minutes  · You suddenly develop a rapid heart rate  When to seek medical advice  The following may be signs that your heart failure is getting worse. Call your healthcare provider right away if any of these happen:  · Sudden weight gain. This means 3 or more pounds in one day, or 5 or more pounds in 1 week.  · Trouble breathing not related to being active  · New or increased swelling of your legs or ankles  · Swelling or pain in your abdomen  · Breathing trouble at night. This means waking up short of breath or needing more pillows to breathe.  · Frequent coughing that doesnt go away  · Feeling much more tired than usual  Date Last Reviewed: 1/4/2016  © 0540-4384 [employer]. [address] [place] [address], [place] [address]. All rights reserved. This information is not intended as a substitute for professional medical care. Always follow your healthcare professional's instructions.

## 2019-07-12 ENCOUNTER — TELEPHONE (OUTPATIENT)
Dept: TRANSPLANT | Facility: CLINIC | Age: 79
End: 2019-07-12

## 2019-07-12 NOTE — TELEPHONE ENCOUNTER
Patient concerned about the weather and his appointments on Monday. Advised him to monitor, stay safe and call if he needs to reschedule.   He reports that he feels like he is doing a lot better. Has more energy, appetite is better. He walks as much as he can. Has a HA once in a while but is able to manage. Patient was able to obtain handicap tags for his car. No other concerns at this time.

## 2019-07-15 ENCOUNTER — TELEPHONE (OUTPATIENT)
Dept: TRANSPLANT | Facility: CLINIC | Age: 79
End: 2019-07-15

## 2019-07-15 ENCOUNTER — HOSPITAL ENCOUNTER (OUTPATIENT)
Dept: PULMONOLOGY | Facility: CLINIC | Age: 79
Discharge: HOME | End: 2019-07-15
Payer: MEDICARE

## 2019-07-15 ENCOUNTER — CLINICAL SUPPORT (OUTPATIENT)
Dept: CARDIOLOGY | Facility: CLINIC | Age: 79
End: 2019-07-15
Attending: INTERNAL MEDICINE
Payer: MEDICARE

## 2019-07-15 ENCOUNTER — HOSPITAL ENCOUNTER (OUTPATIENT)
Dept: CARDIOLOGY | Facility: CLINIC | Age: 79
Discharge: HOME | End: 2019-07-15
Attending: INTERNAL MEDICINE
Payer: MEDICARE

## 2019-07-15 ENCOUNTER — OFFICE VISIT (OUTPATIENT)
Dept: TRANSPLANT | Facility: CLINIC | Age: 79
End: 2019-07-15
Payer: MEDICARE

## 2019-07-15 VITALS
HEART RATE: 86 BPM | OXYGEN SATURATION: 94 % | BODY MASS INDEX: 20.73 KG/M2 | WEIGHT: 148.13 LBS | WEIGHT: 140 LBS | BODY MASS INDEX: 21.94 KG/M2 | SYSTOLIC BLOOD PRESSURE: 107 MMHG | DIASTOLIC BLOOD PRESSURE: 62 MMHG | HEIGHT: 69 IN | HEIGHT: 69 IN

## 2019-07-15 VITALS
DIASTOLIC BLOOD PRESSURE: 60 MMHG | BODY MASS INDEX: 21.62 KG/M2 | HEART RATE: 95 BPM | SYSTOLIC BLOOD PRESSURE: 100 MMHG | HEIGHT: 69 IN | WEIGHT: 146 LBS

## 2019-07-15 DIAGNOSIS — I27.20 PHT (PULMONARY HYPERTENSION): ICD-10-CM

## 2019-07-15 DIAGNOSIS — I27.9 CHRONIC PULMONARY HEART DISEASE: ICD-10-CM

## 2019-07-15 DIAGNOSIS — I25.10 CORONARY ARTERY DISEASE, ANGINA PRESENCE UNSPECIFIED, UNSPECIFIED VESSEL OR LESION TYPE, UNSPECIFIED WHETHER NATIVE OR TRANSPLANTED HEART: ICD-10-CM

## 2019-07-15 DIAGNOSIS — I65.23 BILATERAL CAROTID ARTERY STENOSIS: ICD-10-CM

## 2019-07-15 DIAGNOSIS — R42 VERTIGO DUE TO CEREBROVASCULAR DISEASE: ICD-10-CM

## 2019-07-15 DIAGNOSIS — I67.9 VERTIGO DUE TO CEREBROVASCULAR DISEASE: ICD-10-CM

## 2019-07-15 DIAGNOSIS — I27.29 RIGHT HEART FAILURE DUE TO PULMONARY HYPERTENSION: Primary | ICD-10-CM

## 2019-07-15 DIAGNOSIS — I50.810 RIGHT HEART FAILURE DUE TO PULMONARY HYPERTENSION: Primary | ICD-10-CM

## 2019-07-15 LAB
ASCENDING AORTA: 3.4 CM
AV INDEX (PROSTH): 0.66
AV MEAN GRADIENT: 9 MMHG
AV PEAK GRADIENT: 16 MMHG
AV VALVE AREA: 2.39 CM2
AV VELOCITY RATIO: 0.5
BSA FOR ECHO PROCEDURE: 1.8 M2
CV ECHO LV RWT: 0.56 CM
DOP CALC AO PEAK VEL: 2.02 M/S
DOP CALC AO VTI: 32.88 CM
DOP CALC LVOT AREA: 3.6 CM2
DOP CALC LVOT DIAMETER: 2.14 CM
DOP CALC LVOT PEAK VEL: 1.01 M/S
DOP CALC LVOT STROKE VOLUME: 78.51 CM3
DOP CALCLVOT PEAK VEL VTI: 21.84 CM
E WAVE DECELERATION TIME: 233.47 MSEC
E/A RATIO: 0.7
ECHO LV POSTERIOR WALL: 0.94 CM (ref 0.6–1.1)
FRACTIONAL SHORTENING: 31 % (ref 28–44)
INTERVENTRICULAR SEPTUM: 0.8 CM (ref 0.6–1.1)
IVRT: 0.11 MSEC
LA MAJOR: 5.45 CM
LA MINOR: 5.73 CM
LA WIDTH: 4.23 CM
LEFT ARM SYSTOLIC BLOOD PRESSURE: 100 MMHG
LEFT ATRIUM SIZE: 4.33 CM
LEFT ATRIUM VOLUME INDEX: 48.1 ML/M2
LEFT ATRIUM VOLUME: 86.97 CM3
LEFT CBA DIAS: 14 CM/S
LEFT CBA SYS: 79 CM/S
LEFT CCA DIST DIAS: 15 CM/S
LEFT CCA DIST SYS: 85 CM/S
LEFT CCA MID DIAS: 16 CM/S
LEFT CCA MID SYS: 91 CM/S
LEFT CCA PROX DIAS: 14 CM/S
LEFT CCA PROX SYS: 71 CM/S
LEFT ECA DIAS: 11 CM/S
LEFT ECA SYS: 129 CM/S
LEFT ICA DIST DIAS: 24 CM/S
LEFT ICA DIST SYS: 91 CM/S
LEFT ICA MID DIAS: 29 CM/S
LEFT ICA MID SYS: 126 CM/S
LEFT ICA PROX DIAS: 15 CM/S
LEFT ICA PROX SYS: 60 CM/S
LEFT INTERNAL DIMENSION IN SYSTOLE: 2.32 CM (ref 2.1–4)
LEFT VENTRICLE DIASTOLIC VOLUME INDEX: 25.16 ML/M2
LEFT VENTRICLE DIASTOLIC VOLUME: 45.47 ML
LEFT VENTRICLE MASS INDEX: 44 G/M2
LEFT VENTRICLE SYSTOLIC VOLUME INDEX: 10.2 ML/M2
LEFT VENTRICLE SYSTOLIC VOLUME: 18.44 ML
LEFT VENTRICULAR INTERNAL DIMENSION IN DIASTOLE: 3.34 CM (ref 3.5–6)
LEFT VENTRICULAR MASS: 78.67 G
LEFT VERTEBRAL DIAS: 13 CM/S
LEFT VERTEBRAL SYS: 47 CM/S
LV LATERAL E/E' RATIO: 3.31 M/S
MV PEAK A VEL: 0.61 M/S
MV PEAK E VEL: 0.43 M/S
OHS CV CAROTID RIGHT ICA EDV HIGHEST: 39
OHS CV CAROTID ULTRASOUND LEFT ICA/CCA RATIO: 1.38
OHS CV CAROTID ULTRASOUND RIGHT ICA/CCA RATIO: 2.06
OHS CV PV CAROTID LEFT HIGHEST CCA: 91
OHS CV PV CAROTID LEFT HIGHEST ICA: 126
OHS CV PV CAROTID RIGHT HIGHEST CCA: 78
OHS CV PV CAROTID RIGHT HIGHEST ICA: 161
OHS CV US CAROTID LEFT HIGHEST EDV: 29
PISA TR MAX VEL: 3.36 M/S
PULM VEIN S/D RATIO: 1.08
PV PEAK D VEL: 0.79 M/S
PV PEAK S VEL: 0.85 M/S
RA MAJOR: 5.3 CM
RA PRESSURE: 3 MMHG
RA WIDTH: 5.2 CM
RIGHT ARM SYSTOLIC BLOOD PRESSURE: 98 MMHG
RIGHT CBA DIAS: 11 CM/S
RIGHT CBA SYS: 74 CM/S
RIGHT CCA DIST DIAS: 16 CM/S
RIGHT CCA DIST SYS: 78 CM/S
RIGHT CCA MID DIAS: 16 CM/S
RIGHT CCA MID SYS: 74 CM/S
RIGHT CCA PROX DIAS: 10 CM/S
RIGHT CCA PROX SYS: 76 CM/S
RIGHT ECA DIAS: 8 CM/S
RIGHT ECA SYS: 86 CM/S
RIGHT ICA DIST DIAS: 21 CM/S
RIGHT ICA DIST SYS: 85 CM/S
RIGHT ICA MID DIAS: 39 CM/S
RIGHT ICA MID SYS: 161 CM/S
RIGHT ICA PROX DIAS: 11 CM/S
RIGHT ICA PROX SYS: 61 CM/S
RIGHT VENTRICULAR END-DIASTOLIC DIMENSION: 5.13 CM
RIGHT VERTEBRAL DIAS: 9 CM/S
RIGHT VERTEBRAL SYS: 48 CM/S
SINUS: 3.78 CM
STJ: 3.22 CM
TDI LATERAL: 0.13 M/S
TR MAX PG: 45 MMHG
TRICUSPID ANNULAR PLANE SYSTOLIC EXCURSION: 2.27 CM
TV REST PULMONARY ARTERY PRESSURE: 48 MMHG

## 2019-07-15 PROCEDURE — 94618 PULMONARY STRESS TESTING: CPT | Mod: 26,S$PBB,, | Performed by: INTERNAL MEDICINE

## 2019-07-15 PROCEDURE — 93306 TRANSTHORACIC ECHO (TTE) COMPLETE (CUPID ONLY): ICD-10-PCS | Mod: 26,S$PBB,, | Performed by: INTERNAL MEDICINE

## 2019-07-15 PROCEDURE — 93880 EXTRACRANIAL BILAT STUDY: CPT | Mod: PBBFAC | Performed by: INTERNAL MEDICINE

## 2019-07-15 PROCEDURE — 93880 CV US DOPPLER CAROTID (CUPID ONLY): ICD-10-PCS | Mod: 26,S$PBB,, | Performed by: INTERNAL MEDICINE

## 2019-07-15 PROCEDURE — 94618 PULMONARY STRESS TESTING: CPT | Mod: PBBFAC | Performed by: INTERNAL MEDICINE

## 2019-07-15 PROCEDURE — 99214 OFFICE O/P EST MOD 30 MIN: CPT | Mod: S$PBB,,, | Performed by: INTERNAL MEDICINE

## 2019-07-15 PROCEDURE — 93306 TTE W/DOPPLER COMPLETE: CPT | Mod: PBBFAC | Performed by: INTERNAL MEDICINE

## 2019-07-15 PROCEDURE — 99999 PR PBB SHADOW E&M-EST. PATIENT-LVL IV: ICD-10-PCS | Mod: PBBFAC,,, | Performed by: INTERNAL MEDICINE

## 2019-07-15 PROCEDURE — 94618 PULMONARY STRESS TESTING: ICD-10-PCS | Mod: 26,S$PBB,, | Performed by: INTERNAL MEDICINE

## 2019-07-15 PROCEDURE — 99214 OFFICE O/P EST MOD 30 MIN: CPT | Mod: PBBFAC,25 | Performed by: INTERNAL MEDICINE

## 2019-07-15 PROCEDURE — 99999 PR PBB SHADOW E&M-EST. PATIENT-LVL IV: CPT | Mod: PBBFAC,,, | Performed by: INTERNAL MEDICINE

## 2019-07-15 PROCEDURE — 99214 PR OFFICE/OUTPT VISIT, EST, LEVL IV, 30-39 MIN: ICD-10-PCS | Mod: S$PBB,,, | Performed by: INTERNAL MEDICINE

## 2019-07-15 NOTE — PROGRESS NOTES
Subjective:    Patient ID:  Greg Nolasco is a 78 y.o. male who presents for follow-up of Pulmonary Hypertension.    HPI   multivessel CAD (s/p PCI of the LAD and D2 4/22/19), severe PH, PAF (on sotalol), COPD with chronic hypoxic respiratory failure (had been home O2) who was started on IV remodulin in late July 2018 for PAH.  Intractible leg pains required transition to selexipag in late Dec 2018 (See my Oct 31 recommendation after his RHC) He has slowly progressed back to his baseline functionality since the transition to selexipag. At his last visit I added opstimut as he is intolerant to adempas.   He was admitted in early June 2019 for acute viral URI which was treated symptomatically .   Since his PCI he has not had any further exertional chest pain . No edema with 2 lb increase in clinic weight which the patient believes is related to a return of his normal appetite.     Six Minute Walk Test:  07/15/2019---------Distance: 267.31 meters (877 feet)  Unchanged compared to April 2019 walk distance of 274     O2 Sat % Supplemental Oxygen Heart Rate Blood Pressure Lenard   Scale   Pre-exercise  (Resting) 97 % 4 L/M 77 bpm 119/60 mmHg 0   During Exercise 76 % 4 L/M 116 bpm 135/78 mmHg 3   Post-exercise  (Recovery) 96 % 4 L/M  82 bpm   mmHg         Echo 7/15/19   · Normal left ventricular systolic function. The estimated ejection fraction is 65%  · Concentric left ventricular remodeling.  · Indeterminate left ventricular diastolic function.  · Septal wall has abnormal motion. Systolic flattening of the interventricular septum consistent with right ventricle pressure overload.  · Severe right ventricular enlargement.  · Moderately reduced right ventricular systolic function.  · The estimated PA systolic pressure is 48 mm Hg  · Normal central venous pressure (3 mm Hg).  · Pulmonary hypertension present.  · Severe left atrial enlargement.      Review of Systems   Constitution: Negative for decreased appetite, weight  "gain and weight loss.   Cardiovascular: Negative for chest pain, dyspnea on exertion, leg swelling, near-syncope, orthopnea and palpitations.   Respiratory: Negative for cough and shortness of breath.    Musculoskeletal: Negative for myalgias.   Gastrointestinal: Negative for jaundice.   Neurological: Positive for headaches (3 hrs after he takes morning meds ).        Objective:  /62 (BP Location: Right arm, Patient Position: Sitting, BP Method: Small (Automatic))   Pulse 86   Ht 5' 9" (1.753 m)   Wt 67.2 kg (148 lb 2.4 oz)   SpO2 (!) 94%   BMI 21.88 kg/m²       Physical Exam   Constitutional: He is oriented to person, place, and time. He appears well-developed and well-nourished. He is active. He is not intubated.   /62 (BP Location: Right arm, Patient Position: Sitting, BP Method: Small (Automatic))   Pulse 86   Ht 5' 9" (1.753 m)   Wt 67.2 kg (148 lb 2.4 oz)   SpO2 (!) 94%   BMI 21.88 kg/m²      HENT:   Head: Normocephalic and atraumatic. Hair is normal.   Right Ear: External ear normal.   Left Ear: External ear normal.   Nose: Nose normal. No nasal deformity. No epistaxis.  No foreign bodies.   Mouth/Throat: Mucous membranes are normal. Mucous membranes are not cyanotic. No oropharyngeal exudate.   Eyes: Pupils are equal, round, and reactive to light. Conjunctivae and EOM are normal.   Neck: Neck supple. No hepatojugular reflux and no JVD present.   Cardiovascular: Normal rate, regular rhythm, normal heart sounds and normal pulses. Exam reveals no gallop.   Pulmonary/Chest: Effort normal and breath sounds normal. No apnea and no tachypnea. He is not intubated. No respiratory distress. He exhibits no tenderness.   Abdominal: Soft. Normal appearance and bowel sounds are normal. There is no tenderness. No hernia.   Musculoskeletal: Normal range of motion.   Neurological: He is alert and oriented to person, place, and time. He displays no seizure activity.   Skin: Skin is warm, dry and intact. " No rash noted. No pallor.   Psychiatric: He has a normal mood and affect. His speech is normal and behavior is normal. Thought content normal. Cognition and memory are normal.           Chemistry        Component Value Date/Time     07/15/2019 1125    K 4.0 07/15/2019 1125     07/15/2019 1125    CO2 26 07/15/2019 1125    BUN 19 07/15/2019 1125    CREATININE 1.0 07/15/2019 1125     07/15/2019 1125        Component Value Date/Time    CALCIUM 9.8 07/15/2019 1125    ALKPHOS 117 07/15/2019 1125    AST 21 07/15/2019 1125    ALT 20 07/15/2019 1125    BILITOT 1.4 (H) 07/15/2019 1125    ESTGFRAFRICA >60.0 07/15/2019 1125    EGFRNONAA >60.0 07/15/2019 1125            Magnesium   Date Value Ref Range Status   06/06/2019 2.2 1.6 - 2.6 mg/dL Final       Lab Results   Component Value Date    WBC 10.59 06/06/2019    HGB 14.5 06/06/2019    HCT 43.3 06/06/2019    MCV 95 06/06/2019     06/06/2019       Lab Results   Component Value Date    INR 1.1 01/10/2019    INR 1.1 10/31/2018    INR 1.1 07/23/2018       BNP   Date Value Ref Range Status   07/15/2019 53 0 - 99 pg/mL Final     Comment:     Values of less than 100 pg/ml are consistent with non-CHF populations.   06/05/2019 77 0 - 99 pg/mL Final     Comment:     Values of less than 100 pg/ml are consistent with non-CHF populations.   06/03/2019 163 (H) 0 - 99 pg/mL Final     Comment:     Values of less than 100 pg/ml are consistent with non-CHF populations.       No results found for: LDH          Assessment:       1. 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 PHT (pulmonary hypertension)    3. Coronary artery disease, angina presence unspecified, unspecified vessel or lesion type, unspecified whether native or transplanted heart    4. Chronic pulmonary heart disease        WHO Group 1   Functional Class 3A     Plan:     Chronic pulmonary heart disease  Stable if not improving on opsimut and uptravi  OK To start physical therapy   TAke opsumit  at night to see if headaches improve    CAD (coronary artery disease)  Cont ASA, Brilinta, statin        Follow up in about 6 months (around 1/15/2020).  Orders Placed This Encounter    Comprehensive metabolic panel    Brain natriuretic peptide    Stress test, pulmonary

## 2019-07-15 NOTE — TELEPHONE ENCOUNTER
----- Message from Andrew Araujo sent at 7/14/2019  3:29 PM CDT -----  Contact: On-call   Pt called with questions about his appointment being canceled due to the weather. Informed pt that his apt was not canceled. Pt would like a call back from the nurse before am to let him know if he should come to his apt. Pt is traveling long distance. Please call pt back at  [phone]. Thanks.

## 2019-07-16 NOTE — PROGRESS NOTES
"07/15/2019    Respiratory Therapist (RT) received patient via wheelchair with a family member at 6 minute walk waiting area. Mr. Nolasco was on Room air and stated he needed to use the restroom. RT directed patient and family member to correct location and would be waiting for his return to begin testing.   When patient returned, RT checked SpO2 level on Room Air via pulse oximeter which was 85%. Patient stated he wears oxygen at home and that an oxygen tank was in the car at that time. RT then obtained an O2 tank for patients use. Patient was placed on 4 LPM Nasal Cannula and sats went up to 97%. After vitals were obtained, patient began removing the nasal cannula to begin walk. RT then began to explain the reasoning of performing test on oxygen. Due to the patients status of currrently wearing oxygen at home and receiving patient on room air with sats of 85%,  RT stated to Mr. Nolasco that it was not recommended to attempt testing on Room Air, due to the exertion that will be performed from 6 minutes of walking, which was recommended for patient safety. At this time, patient and family member began expressing frustration with their experience. RT further explained that if the patient felt more comfortable not to attempt testing, that it was up to the patients disgretion. Patient agreed to perform testing with oxygen. RT asked the patient if he would like to utilize the wheelchair as a "walker" for extra support. After this suggestion was made, the family member mentioned that the patient nearly collapsed after the last 6 minute walk and required oxygen after procedure was done. At this time, Mr. Nolasco and his family member began to dispute in a heated discussion about the matter on rather or not he was going to collapse at the last appointment. Mr. Nolasco refused the wheelchair, but agreed to attempt the 6 minute walk on oxygen (with cylinder rolling cart). At this time, RT asked the patient to let her know if " "he needed the wheelchair at any point- he will be able to use it if needed. As Mr. Nolasco was performing testing, the family member began to ask for the RT's full name and stated" she was not pleased with the service given and the RT was only contributing to worsen the situation". RT apologized for any inconvenience or misunderstanding and if there were any question that she would be glad to clarify or reach out to the supervisor. When the patients 6 minute walk was completed, RT continued to complete the remainder of recovery time- obtaining sats of 76% on 4 LPM/ heart rate 116/ /78. Patient took 270 seconds to recover to SpO2 of 96% on 4 LPM nasal cannula. RT offered the patient a oxygen tank for the remainder of appointments- patient refused. RT notified supervisor JENNIFER Escudero of the incident.   KE Anton RN reached out to JENNIFER Medina ( heart transplant) about this incident and it was communicated that due to patient and family members non compliance-  no future walks will be ordered.     - Usha Herman, RRT  "

## 2019-07-16 NOTE — PROCEDURES
Greg Nolasco is a 78 y.o.  male patient, who presents for a 6 minute walk test ordered by MD Abebe.  The diagnosis is Pulmonary Hypertension.  The patient's BMI is 20.7 kg/m2.  Predicted distance (lower limit of normal) is 329.55 meters.      Test Results:    The test was completed without stopping.  The total time walked was 360 seconds.  During walking, the patient reported:  Dyspnea, Lightheadedness. The patient used no assistive devices and supplemental oxygen during testing.     07/15/2019---------Distance: 267.31 meters (877 feet)     O2 Sat % Supplemental Oxygen Heart Rate Blood Pressure Lenard Scale   Pre-exercise  (Resting) 97 % 4 L/M 77 bpm 119/60 mmHg 0   During Exercise 76 % 4 L/M 116 bpm 135/78 mmHg 3   Post-exercise  (Recovery) 96 % 4 L/M  82 bpm   mmHg       Recovery Time: 270 seconds    Performing nurse/tech: Batsheva MUKHERJEE      PREVIOUS STUDY:   The patient had a previous study.  04/10/2019---------Distance: 274.32 meters (900 feet)       O2 Sat % Supplemental Oxygen Heart Rate Blood Pressure Lenard Scale   Pre-exercise  (Resting) 94 % Room Air 84 bpm 95/54 mmHg 1   During Exercise 73 % Room Air 119 bpm 134/73 mmHg 5-6   Post-exercise  (Recovery) 90 % Room Air  112 bpm           CLINICAL INTERPRETATION:  Six minute walk distance is 267.31 meters (877 feet) with moderate dyspnea.  During exercise, there was significant desaturation while breathing supplemental oxygen.  Blood pressure remained stable and Heart rate increased significantly with walking.  This may represent a tachycardic response to exercise.  The patient reported non-pulmonary symptoms during exercise.  Significant exercise impairment is likely due to desaturation and cardiovascular causes.  Since the previous study in April 2019, exercise capacity is unchanged.  Based upon age and body mass index, exercise capacity is less than predicted.

## 2019-07-16 NOTE — ASSESSMENT & PLAN NOTE
Stable if not improving on opsimut and uptravi  OK To start physical therapy   TAke opsumit at night to see if headaches improve

## 2019-07-25 ENCOUNTER — TELEPHONE (OUTPATIENT)
Dept: TRANSPLANT | Facility: CLINIC | Age: 79
End: 2019-07-25

## 2019-07-30 ENCOUNTER — TELEPHONE (OUTPATIENT)
Dept: TRANSPLANT | Facility: CLINIC | Age: 79
End: 2019-07-30

## 2019-07-30 NOTE — TELEPHONE ENCOUNTER
----- Message from Carol Wolf RN sent at 7/30/2019  9:06 AM CDT -----  Contact: Self  I thought I sent this. The lab sheet should either be hanging or in the standing file to the left, second shelf.  Rachel  ----- Message -----  From: Fidelina Coker  Sent: 7/30/2019   8:30 AM  To: Abebe [facility] Staff    He said he is at the lab where his blood work orders were supposed to be faxed last week but they do not have it. The fax # is [phone] and please make sure the diagnosis is on this as well as what you need them to draw and it must be signed by the physician. The pt is there waiting as he lives 30 miles away so please call him as soon as this has been done.

## 2019-09-11 ENCOUNTER — TELEPHONE (OUTPATIENT)
Dept: TRANSPLANT | Facility: CLINIC | Age: 79
End: 2019-09-11

## 2019-09-11 NOTE — TELEPHONE ENCOUNTER
"Mr. Nolasco reports "stuff coming up in my throat thick and spongy, white for about 2 weeks." No cough, it appears when clearing throat. No fever or other symptoms though he did endorse waking up at night with a slight sore throat. PH Coordinator noted that this maybe post-nasal drip. Patient may take "plain" claritin or "plain" mucinex and also try saline nasal spray. Encouraged patient to contact his PCP if he developed a cough or fever. If more severe symptoms such as SOB or CP then contact our office or go straight to the ED. Patient verbalized understanding.  Mr. Nolasco also reported that he received the Flu shot but they did not have the high dose flu shot.    Additionally, patient went to the dermatologist because he had a spot on the side of his head, near his ear. They determined that it was basal cell carcinoma and want to do a procedure. He asked them to contact Dr. Lomas's office prior to scheduling.    Notified Dr. RUBEN Lomas of all.  "

## 2019-09-24 ENCOUNTER — TELEPHONE (OUTPATIENT)
Dept: TRANSPLANT | Facility: CLINIC | Age: 79
End: 2019-09-24

## 2019-09-24 NOTE — TELEPHONE ENCOUNTER
----- Message from PRANAY Daniels, RN sent at 9/24/2019  2:35 PM CDT -----  Contact: self, [phone]      ----- Message -----  From: Candice Nelson  Sent: 9/24/2019   2:14 PM CDT  To: Abebe [facility] Staff    Patient requests to speak with you regarding surgery he's supposed to have.  Please advise.

## 2019-09-25 ENCOUNTER — TELEPHONE (OUTPATIENT)
Dept: TRANSPLANT | Facility: CLINIC | Age: 79
End: 2019-09-25

## 2019-09-25 NOTE — TELEPHONE ENCOUNTER
----- Message from Dex Lomas MD sent at 9/24/2019  6:06 PM CDT -----  Thank you for the message.  The patient can stop his Brilinta for 3 days prior.  However he must continue a daily aspirin.  ----- Message -----  From: Carol Wolf RN  Sent: 9/24/2019   3:47 PM CDT  To: Dex Lomas MD, Noemy Daniels RN    Hi,    Mr. Nolasco is having a MOHs procedure performed by his dermatologist in [place] (October 4) to remove skin from an area by his left ear.Is he able to stop Brilinta and/or ASA for 3 days prior?   It is ok, if not but they would prefer even just one med stopped since it is the scalp.    I can call them back or their number is [phone].    Thanks!  Rachel  EXT [id number]

## 2019-11-11 ENCOUNTER — TELEPHONE (OUTPATIENT)
Dept: TRANSPLANT | Facility: CLINIC | Age: 79
End: 2019-11-11

## 2019-11-11 DIAGNOSIS — Z79.899 POLYPHARMACY: Primary | ICD-10-CM

## 2019-11-11 DIAGNOSIS — R06.82 TACHYPNEA: ICD-10-CM

## 2019-11-11 RX ORDER — DIPHENOXYLATE HYDROCHLORIDE AND ATROPINE SULFATE 2.5; .025 MG/1; MG/1
1 TABLET ORAL 4 TIMES DAILY PRN
Qty: 120 TABLET | Refills: 3 | Status: SHIPPED | OUTPATIENT
Start: 2019-11-11 | End: 2020-05-20

## 2019-11-11 NOTE — TELEPHONE ENCOUNTER
"Patient called to ask for a refill on Lomotil. He states that overall he is doing ok. Denies edema or worsening SOB. He reports getting a new oxygen tank that is bigger but works better. Mr. Nolasco does not maintain continuous flow at 6L as ordered. He sometimes switches to pulse dose and lower concentration. He has been counseled on the benefits and necessity of oxygen for his condition.   Mr. Nolasco also notes that he "still has stuff coming up" in his throat. He does not cough anything up but it "appears and when he clears his throat" it is there. He says it is clear, think mucus and denies any other accompanying symptoms. He does not that it is worse in the morning. Advised Mr. Nolasco to see his PCP for evaluation and treatment if needed. Patient verbalized understanding.  Mr. Nolasco also states that his visits to rehab are almost done based on insurance coverage. He says he may stop and resume at the first of the year. He is wanting to participate in deer season now. Encouraged patient to do this and enjoy his time and stay as active as possible.  All questions and concerns addressed.   "

## 2020-01-09 ENCOUNTER — TELEPHONE (OUTPATIENT)
Dept: TRANSPLANT | Facility: CLINIC | Age: 80
End: 2020-01-09

## 2020-01-09 DIAGNOSIS — R06.02 SHORTNESS OF BREATH: ICD-10-CM

## 2020-01-09 DIAGNOSIS — I27.29 RIGHT HEART FAILURE DUE TO PULMONARY HYPERTENSION: Primary | ICD-10-CM

## 2020-01-09 DIAGNOSIS — I50.810 RIGHT HEART FAILURE DUE TO PULMONARY HYPERTENSION: Primary | ICD-10-CM

## 2020-01-09 NOTE — TELEPHONE ENCOUNTER
"F/U - Mr. Nolasco reports that he is feeling okay today but recently had two "episodes." Last Friday he "sunk down" and was "wore out." He said that he had been driving out to the woods and just sitting in his truck but the act of loading and unloading made him more tired and short of breath. He reports going to bed early on Sunday and slept for a long time. When he woke on Monday he felt better, then yesterday became very "out of breath" doing something and had a "chest ache. He has not gone to the doctor for either event. Mr. Nolasco states that he is taking all medications as directed. He is wondering if he should increase his medication. Patient has an appt with Dr. Lomas on 1/31.  Advised patient to go to the ER if he has another "episode.: Will update Dr. Lomas and can discuss options at next appt. Patient verbalized understanding.  "

## 2020-01-29 ENCOUNTER — TELEPHONE (OUTPATIENT)
Dept: TRANSPLANT | Facility: CLINIC | Age: 80
End: 2020-01-29

## 2020-01-29 NOTE — TELEPHONE ENCOUNTER
Pt reports POC unit he has now is too heavy and too difficult to maneuver in/out of car, and he is requesting to return to smaller, pulse dose POC. Statement must be sent to oxygen company. Will submit to Dr Lomas and fax to [employer].

## 2020-01-31 ENCOUNTER — TELEPHONE (OUTPATIENT)
Dept: TRANSPLANT | Facility: CLINIC | Age: 80
End: 2020-01-31

## 2020-01-31 ENCOUNTER — HOSPITAL ENCOUNTER (OUTPATIENT)
Dept: CARDIOLOGY | Facility: CLINIC | Age: 80
Discharge: HOME | End: 2020-01-31
Attending: INTERNAL MEDICINE
Payer: MEDICARE

## 2020-01-31 ENCOUNTER — OFFICE VISIT (OUTPATIENT)
Dept: TRANSPLANT | Facility: CLINIC | Age: 80
End: 2020-01-31
Payer: MEDICARE

## 2020-01-31 VITALS
SYSTOLIC BLOOD PRESSURE: 110 MMHG | HEIGHT: 69 IN | HEART RATE: 74 BPM | WEIGHT: 140 LBS | DIASTOLIC BLOOD PRESSURE: 56 MMHG | BODY MASS INDEX: 20.73 KG/M2

## 2020-01-31 VITALS
DIASTOLIC BLOOD PRESSURE: 65 MMHG | BODY MASS INDEX: 22.08 KG/M2 | WEIGHT: 149.06 LBS | SYSTOLIC BLOOD PRESSURE: 92 MMHG | HEART RATE: 89 BPM | OXYGEN SATURATION: 82 % | HEIGHT: 69 IN

## 2020-01-31 DIAGNOSIS — R06.02 SHORTNESS OF BREATH: ICD-10-CM

## 2020-01-31 DIAGNOSIS — Z51.5 PALLIATIVE CARE ENCOUNTER: ICD-10-CM

## 2020-01-31 DIAGNOSIS — I27.29 RIGHT HEART FAILURE DUE TO PULMONARY HYPERTENSION: ICD-10-CM

## 2020-01-31 DIAGNOSIS — I27.20 PHT (PULMONARY HYPERTENSION): ICD-10-CM

## 2020-01-31 DIAGNOSIS — I35.0 MODERATE AORTIC VALVE STENOSIS: ICD-10-CM

## 2020-01-31 DIAGNOSIS — I50.810 RIGHT HEART FAILURE DUE TO PULMONARY HYPERTENSION: ICD-10-CM

## 2020-01-31 LAB
ASCENDING AORTA: 3.11 CM
AV INDEX (PROSTH): 0.31
AV MEAN GRADIENT: 9 MMHG
AV PEAK GRADIENT: 16 MMHG
AV VALVE AREA: 1.14 CM2
AV VELOCITY RATIO: 0.35
BSA FOR ECHO PROCEDURE: 1.76 M2
CV ECHO LV RWT: 0.52 CM
DOP CALC AO PEAK VEL: 2.02 M/S
DOP CALC AO VTI: 41.65 CM
DOP CALC LVOT AREA: 3.7 CM2
DOP CALC LVOT DIAMETER: 2.16 CM
DOP CALC LVOT PEAK VEL: 0.7 M/S
DOP CALC LVOT STROKE VOLUME: 47.39 CM3
DOP CALCLVOT PEAK VEL VTI: 12.94 CM
E WAVE DECELERATION TIME: 167.97 MSEC
E/A RATIO: 0.61
E/E' RATIO: 4.84 M/S
ECHO LV POSTERIOR WALL: 0.82 CM (ref 0.6–1.1)
FRACTIONAL SHORTENING: 35 % (ref 28–44)
INTERVENTRICULAR SEPTUM: 0.88 CM (ref 0.6–1.1)
IVRT: 0.12 MSEC
LA MAJOR: 5.17 CM
LA MINOR: 5.27 CM
LA WIDTH: 4.26 CM
LEFT ATRIUM SIZE: 3.72 CM
LEFT ATRIUM VOLUME INDEX: 39.6 ML/M2
LEFT ATRIUM VOLUME: 70.31 CM3
LEFT INTERNAL DIMENSION IN SYSTOLE: 2.08 CM (ref 2.1–4)
LEFT VENTRICLE DIASTOLIC VOLUME INDEX: 22.68 ML/M2
LEFT VENTRICLE DIASTOLIC VOLUME: 40.26 ML
LEFT VENTRICLE MASS INDEX: 40 G/M2
LEFT VENTRICLE SYSTOLIC VOLUME INDEX: 7.9 ML/M2
LEFT VENTRICLE SYSTOLIC VOLUME: 14.11 ML
LEFT VENTRICULAR INTERNAL DIMENSION IN DIASTOLE: 3.18 CM (ref 3.5–6)
LEFT VENTRICULAR MASS: 70.54 G
LV LATERAL E/E' RATIO: 4.6 M/S
LV SEPTAL E/E' RATIO: 5.11 M/S
MV PEAK A VEL: 0.75 M/S
MV PEAK E VEL: 0.46 M/S
PISA TR MAX VEL: 4.6 M/S
PULM VEIN S/D RATIO: 1.87
PV PEAK D VEL: 0.45 M/S
PV PEAK S VEL: 0.84 M/S
RA MAJOR: 5.68 CM
RA PRESSURE: 8 MMHG
RA WIDTH: 4.47 CM
RIGHT VENTRICULAR END-DIASTOLIC DIMENSION: 5.59 CM
RV TISSUE DOPPLER FREE WALL SYSTOLIC VELOCITY 1 (APICAL 4 CHAMBER VIEW): 11.12 CM/S
SINUS: 3.15 CM
STJ: 2.78 CM
TDI LATERAL: 0.1 M/S
TDI SEPTAL: 0.09 M/S
TDI: 0.1 M/S
TR MAX PG: 85 MMHG
TRICUSPID ANNULAR PLANE SYSTOLIC EXCURSION: 2.51 CM
TV REST PULMONARY ARTERY PRESSURE: 93 MMHG

## 2020-01-31 PROCEDURE — 93306 ECHO (CUPID ONLY): ICD-10-PCS | Mod: 26,S$PBB,, | Performed by: INTERNAL MEDICINE

## 2020-01-31 PROCEDURE — 99215 OFFICE O/P EST HI 40 MIN: CPT | Mod: S$PBB,,, | Performed by: INTERNAL MEDICINE

## 2020-01-31 PROCEDURE — 99999 PR PBB SHADOW E&M-EST. PATIENT-LVL IV: CPT | Mod: PBBFAC,,, | Performed by: INTERNAL MEDICINE

## 2020-01-31 PROCEDURE — 93306 TTE W/DOPPLER COMPLETE: CPT | Mod: PBBFAC | Performed by: INTERNAL MEDICINE

## 2020-01-31 PROCEDURE — 99215 PR OFFICE/OUTPT VISIT, EST, LEVL V, 40-54 MIN: ICD-10-PCS | Mod: S$PBB,,, | Performed by: INTERNAL MEDICINE

## 2020-01-31 PROCEDURE — 99214 OFFICE O/P EST MOD 30 MIN: CPT | Mod: PBBFAC,25 | Performed by: INTERNAL MEDICINE

## 2020-01-31 PROCEDURE — 99999 PR PBB SHADOW E&M-EST. PATIENT-LVL IV: ICD-10-PCS | Mod: PBBFAC,,, | Performed by: INTERNAL MEDICINE

## 2020-01-31 NOTE — ASSESSMENT & PLAN NOTE
Consult pulmonary rehab in [place] for pulmonary hypertension as he did well with cardiac rehab post PCI

## 2020-01-31 NOTE — Clinical Note
See my planIncrease uptravi to 1600 BID Pulm rehab if he can enrollPalliative care ? See him in March 2020 with six minute walk

## 2020-01-31 NOTE — TELEPHONE ENCOUNTER
----- Message from Robert Lomas MD sent at 1/31/2020  3:29 PM CST -----  Ask pt 40 meq of potassium with repeat BMP next week

## 2020-01-31 NOTE — TELEPHONE ENCOUNTER
Left VM for pt, instructing to take KCL 40meq one time only, with labs on Tues or Wed of next week. Asked pt to call us to let us know he received message.

## 2020-01-31 NOTE — TELEPHONE ENCOUNTER
----- Message from Laly Bautista MA sent at 1/31/2020  4:45 PM CST -----  Contact:  patient call  The patient is returning Katie phone please call [phone]. Thank you.

## 2020-01-31 NOTE — ASSESSMENT & PLAN NOTE
Will consult them on next visit for goals of care to  Continue conversation about goals of care He may not  believe his prognosis is poor enough to consider hospice Do not believe risk benefit of IV remodulin makes sense due to intractable side effects Could NOT try IV velatri as he does not have

## 2020-01-31 NOTE — ASSESSMENT & PLAN NOTE
With continued to symptoms of SOB - will increase uptravi 1600 BID   Could add PDe5 next as final add-on therapy May get RHC before I add on third drug

## 2020-02-02 PROBLEM — I35.0 MODERATE AORTIC VALVE STENOSIS: Status: ACTIVE | Noted: 2020-02-02

## 2020-02-02 NOTE — PROGRESS NOTES
Subjective: progressive decline since nov 2019    Patient ID:  Greg Nolasco is a 79 y.o. male who presents for follow-up of Pulmonary Hypertension.    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  Intractible leg pains required transition to selexipag in late Dec 2018 (See my Oct 31 recommendation after his RHC) He has slowly progressed back to his baseline functionality since the transition to selexipag. At his April 2019 visit I added opstimut as he is intolerant to adempas (BP too low to tolerate)    Today reports that he does has increased POPE since Nov 2019.  Gets SOB walking out of his house to truck which is new.  Does have some SOB with ADL's.  Comes into clinic today in a wheelchair (previously walked in) no exertional chest pain.     Echo jan 2020   · Normal left ventricular systolic function. The estimated ejection fraction is 63%.  · Septal wall has abnormal motion.  · Concentric left ventricular remodeling.  · Indeterminate left ventricular diastolic function.  · Mild left atrial enlargement.  · Moderate right ventricular enlargement.  · Low normal right ventricular systolic function.  · Moderate right atrial enlargement.  · Moderate aortic valve stenosis.  · Aortic valve area is 1.14 cm2; peak velocity is 2.02 m/s; mean gradient is 9 mmHg.  · Moderate mitral sclerosis.  · TV sclerosis with mild tricuspid regurgitation.  · Intermediate central venous pressure (8 mmHg).  · The estimated PA systolic pressure is 93 mmHg.    Six Minute Walk Test: 7/15/19 Distance: 267.31 meters (877 feet) Since the previous study in April 2019, exercise capacity is unchanged    (Resting) 97 % 4 L/M 77 bpm 119/60 mmHg 0   During Exercise 76 % 4 L/M 116 bpm 135/78 mmHg 3   Post-exercise (Recovery) 96 % 4 L/M  82 bpm   mmHg       Review of Systems   Constitution: Negative  "for decreased appetite, weight gain and weight loss.   Cardiovascular: Negative for chest pain, dyspnea on exertion, leg swelling, near-syncope, orthopnea and palpitations.   Respiratory: Positive for shortness of breath. Negative for cough.    Musculoskeletal: Negative for myalgias.   Gastrointestinal: Negative for jaundice.        Objective:      Physical Exam   Constitutional: He is oriented to person, place, and time. He appears well-developed and well-nourished. He is active. He is not intubated.   BP 92/65 (BP Location: Left arm, Patient Position: Sitting, BP Method: Medium (Automatic))   Pulse 89   Ht 5' 9" (1.753 m)   Wt 67.6 kg (149 lb 0.5 oz)   SpO2 (!) 82%   BMI 22.01 kg/m²      HENT:   Head: Normocephalic and atraumatic. Hair is normal.   Right Ear: External ear normal.   Left Ear: External ear normal.   Nose: Nose normal. No nasal deformity. No epistaxis.  No foreign bodies.   Mouth/Throat: Mucous membranes are normal. Mucous membranes are not cyanotic. No oropharyngeal exudate.   Eyes: Pupils are equal, round, and reactive to light. Conjunctivae and EOM are normal.   Neck: Neck supple. No hepatojugular reflux and no JVD present.   Cardiovascular: Normal rate, regular rhythm, normal heart sounds and normal pulses. Exam reveals no gallop.   Pulmonary/Chest: Effort normal and breath sounds normal. No apnea and no tachypnea. He is not intubated. No respiratory distress. He exhibits no tenderness.   Abdominal: Soft. Normal appearance and bowel sounds are normal. There is no tenderness. No hernia.   Musculoskeletal: Normal range of motion.   Neurological: He is alert and oriented to person, place, and time. He displays no seizure activity.   Skin: Skin is warm, dry and intact. No rash noted. No pallor.   Psychiatric: He has a normal mood and affect. His speech is normal and behavior is normal. Thought content normal. Cognition and memory are normal.           Chemistry        Component Value Date/Time "     01/31/2020 1049    K 3.3 (L) 01/31/2020 1049     01/31/2020 1049    CO2 27 01/31/2020 1049    BUN 16 01/31/2020 1049    CREATININE 1.1 01/31/2020 1049    GLU 86 01/31/2020 1049        Component Value Date/Time    CALCIUM 9.1 01/31/2020 1049    ALKPHOS 120 01/31/2020 1049    AST 17 01/31/2020 1049    ALT 12 01/31/2020 1049    BILITOT 2.0 (H) 01/31/2020 1049    ESTGFRAFRICA >60.0 01/31/2020 1049    EGFRNONAA >60.0 01/31/2020 1049            Magnesium   Date Value Ref Range Status   01/31/2020 2.1 1.6 - 2.6 mg/dL Final       Lab Results   Component Value Date    WBC 8.62 01/31/2020    HGB 15.6 01/31/2020    HCT 47.2 01/31/2020    MCV 99 (H) 01/31/2020     01/31/2020       Lab Results   Component Value Date    INR 1.1 01/10/2019    INR 1.1 10/31/2018    INR 1.1 07/23/2018       BNP   Date Value Ref Range Status   01/31/2020 206 (H) 0 - 99 pg/mL Final     Comment:     Values of less than 100 pg/ml are consistent with non-CHF populations.   07/15/2019 53 0 - 99 pg/mL Final     Comment:     Values of less than 100 pg/ml are consistent with non-CHF populations.   06/05/2019 77 0 - 99 pg/mL Final     Comment:     Values of less than 100 pg/ml are consistent with non-CHF populations.       No results found for: LDH          Assessment:       1. 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 Palliative care encounter    3. PHT (pulmonary hypertension)    4. 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  - V/Q scan 7/25  low prob for PE. Matched segmental defect apical segment of RUL.         Current  Assessment & Plan     With continued to symptoms of SOB - will increase uptravi 1600 BID   Could add PDe5 next as final add-on therapy May get RHC before I add on third drug          Relevant Orders    Ambulatory Referral to Palliative Care    PHT (pulmonary hypertension)    Current Assessment & Plan     Consult pulmonary rehab in [place] for pulmonary hypertension as he did well with cardiac rehab post PCI         Relevant Orders    Ambulatory Referral to Palliative Care    Palliative care encounter    Current Assessment & Plan     Will consult them on next visit for goals of care to  Continue conversation about goals of care He may not  believe his prognosis is poor enough to consider hospice Do not believe risk benefit of IV remodulin makes sense due to intractable side effects Could try IV velatri  if pt interested          Relevant Orders    Ambulatory Referral to Palliative Care    Moderate aortic valve stenosis    Current Assessment & Plan     Do not believe this is cause of dyspnea as it was read as mild previously   Today mean gradient is 9 - suspect this is related to low outpt from RV                  Follow up in about 6 weeks (around 3/13/2020).  Orders Placed This Encounter    Ambulatory Referral to Palliative Care    macitentan (OPSUMIT) 10 mg Tab

## 2020-02-02 NOTE — ASSESSMENT & PLAN NOTE
Do not believe this is cause of dyspnea as it was read as mild previously   Today mean gradient is 9 - suspect this is related to low outpt from RV

## 2020-02-03 ENCOUNTER — TELEPHONE (OUTPATIENT)
Dept: TRANSPLANT | Facility: CLINIC | Age: 80
End: 2020-02-03

## 2020-02-03 NOTE — TELEPHONE ENCOUNTER
"Mr. Nolasco called to talk about his clinic visit. He states that he had a test done and met with Dr. Lomas but "he didn't tell me anything." Patient states that Dr. Lomas said, "I need to talk to first Rachel."  Reviewed information from the visit with Mr. Nolasco. Confirmed that he only needed to take a one time dose of 40mEq potassium - he took this on Saturday - and that he would need to have repeat labs drawn this week. Advised him that orders had been [facility]ed to [facility].   Mr. Nolasco also wanted to discuss increasing his Uptravi to 1600 mcg, BID. He has some 200 mcg tablets available so he can make the increase now. Advised patient that Dr. Lomas had endorsed this plan. Asked Mr. Nolasco to  try the increased dose for a few days and let us know how he feels. Also discussed adding a third medication.   Mr. Nolasco once again verbalizes that he is more short of breath than he used to be. He says that he takes his Opsumit and Uptravi in the morning about 5am, feels pretty good a few hours later but by late afternoon he is tired again. Patient wondered if he should take more than one Opsumit daily. PH coordinator strongly advised patient to only take Opsumit once daily as it could damage his liver and has not shown benefit.   PH coordinator noted that he might feel more tired later in the day because he is more active. He may need to rest frequently. Additionally, PH coordinator clearly stated that PH is chronic and his symptoms may be part of the progression of the disease. There may come a time soon when there are no more medications to add but the focus will remain on his quality of life. It is unclear whether patient absorbed this information.  Mr. Nolasco also wanted to know who the other doctor was that he is scheduled to see at his next appointment (palliative care). Explained that this provider would talk to him about goals of care and stated that we have all of our patients see someone from this " service at least once.  Patient also asked about restarting cardio-pulmonary rehab. Will send an order to [facility].  Answered all questions and concerns.

## 2020-02-05 ENCOUNTER — TELEPHONE (OUTPATIENT)
Dept: TRANSPLANT | Facility: CLINIC | Age: 80
End: 2020-02-05

## 2020-02-05 ENCOUNTER — TELEPHONE (OUTPATIENT)
Dept: PHARMACY | Facility: CLINIC | Age: 80
End: 2020-02-05

## 2020-02-05 LAB
EXT ALT: 15
EXT AST: 20
EXT CALCIUM: 9.6
EXT CHLORIDE: 106
EXT POTASSIUM: 4.2
EXT PROTEIN TOTAL: 7
EXT SODIUM: 142 MMOL/L

## 2020-02-05 NOTE — TELEPHONE ENCOUNTER
Informed Patient  that [facility] received prescription for Opsumit and prior authorization is required.  [facility] will be back in touch once insurance determination is received.

## 2020-03-04 NOTE — PROGRESS NOTES
"[facility]  Palliative Medicine Clinic  Consult Note    Patient Name: Greg Nolasco  MRN: [id number]  Referring Provider: No ref. provider found   Primary Care Physician: Pablo Lorenzo MD    Assessment/Plan:   Impression: 79 year old male presenting who presents for follow-up of Pulmonary Hypertension.by Dr Lomas.  Palliative care was consulted for a goals of care discussion to include prognostic awareness assessment.   Following an extensive discussion with the patient and his dght, they verbalized improved prognostic awareness and are discussing advance directive completion.  Pt stated his current goal is remain as independent as possible and has increased understanding that to reach this goal he must adhere to utilizing his oxygen continuously.  Barriers to achieving this utilzation was assessed and discussed.   The pt and his dght are open to pursing his VA benefit to increase his resources and  to improve his oxygen optimization.  He is currently setting up Pulmonary Rehab through [facility] near his home in [place].   Neither the patient or his dght are expressing hospice readiness at this time and are willing to be hospitalized for aggressive care.  The patient did state that "when his time came" he wanted to die at home and would be open to home hospice care.        Plan/Recommedations:  1) Would like to follow-up with patient in clinic in 4-6 weeks to continue goals of care discussion, assess quality of life response to oxygen  optimization and complete advance dirtectives.  Please coordinate with his return to clinic visit with   2) Will research [place] [facility] contact and contact patient through [facility] with information.    Thank you for your consult. I will follow along with you. Please call [phone] with questions.     Subjective:   Patient is a 79 y.o. year old male presenting who presents for follow-up of Pulmonary Hypertension.by Dr Lomas and palliative " care consult for a goals of care discussion to include prognostic awareness assessment      HPI:  79 yr old male with multivessel CAD (s/p PCI of the LAD and D2 4/22/19), severe PH, PAF, COPD with chronic hypoxic respiratory failure (had been home O2) who was started on IV remodulin in late July 2018 for PAH.  Intractible leg pains required transition to selexipag in late Dec 2018.  He has slowly progressed back to his baseline functionality since the transition to selexipag. At his April 2019 visit HTS added opstimut as he is intolerant to adempas (BP too low to tolerate)   Patient was last seen by  in clinic on 1/31/20.  At that time the patient chief c/o of SOB and his Uptavi was increase to 1600mg bid. At that time, Dr. Lomas also recommended Pulmonary rehab in [place] and referral to palliative care for a goals of care discussion to include prognostic awareness assessment in with his next clinic visit.      Advance Care Planning     [facility]  I engaged the patient and family(pt's dght) in a conversation about advance care planning and we specifically addressed what the goals of care would be moving forward, in light of the patient's change in clinical status.  We did specifically address the patient's chronic progressive PHTN and assessed his prognosistic awareness. Initially the patient seemed to verbalize poor prognostic awareness which improved following a more extensive discussion.  We explored the patient's goals of care, his values and preferences for future care. Patient reports that he performs most of his basic ADLs himself with support from his family, friends and neighbors for grocery shopping and some cooking. His dght agrees with this staement.  The patient endorses that what is most important right now is to focus on remaining as independent as possible.  Patient admitted that he does not wear his oxygen all the time due to his current DME for his portable oxygen.  Patient's goal for  independence in light of his limited treatment options were discussed. We discussed the patient the possible accessibility of VA benefits to adding to his resources for oxygen support and other services.  Patient and his dght are interested in pursing this.   did briefly discuss the role for hospice care at this stage of the patient's illness, including its ability to help the patient live with the best quality of life possible.  Neither he nor his dght verbalize hospice readiness at this time.  Accordingly, the pt and his dght they have decided that the best plan to meet the patient's goals includes continuing with treatment, and working on improving his use of oxygen to optimize his breathing and quality of life. Patient is seeing Dr. Lomas in clinic following this visit.      Advance Directives  Living will and medical power of  documents were explored.  Patient eubanks not have current documents.  Living will and medical power of  documents were discussed in light of his progressing PHTN.   The patient agreed that he is willing to execute these documents and wants to discuss this further with his dght and son.  The patient was instructed that his wishes for these measures to be discussed with his children and then completed was a necessary part of advance care planning to ensure that his children be made aware of his wishes so that his wishes can be followed.        Advanced Directives::  Living Will: No  LaPOST: No  Do Not Resuscitate Status: No  Medical Power of : No    Decision-Making Capacity: Patient answered questions    I spent a total of 60  minutes engaging the patient in this advance care planning and goals of care discussion.    Past Medical History:   Diagnosis Date    Chronic hypoxemic respiratory failure     Coronary artery disease     BARBARA (obstructive sleep apnea)     Pulmonary hypertension      Past Surgical History:   Procedure Laterality Date    ANGIOPLASTY  1991     BACK SURGERY      COLONOSCOPY  04/2019    CORONARY STENT PLACEMENT Right 4/22/2019    Procedure: INSERTION, STENT, CORONARY ARTERY;  Surgeon: Dex Lomas MD;  Location: [facility] CATH LAB;  Service: Cardiology;  Laterality: Right;    HERNIA REPAIR      KNEE SURGERY      REMOVAL OF TUNNELED CENTRAL VENOUS CATHETER (CVC) N/A 12/20/2018    Procedure: REMOVAL, CATHETER, CENTRAL VENOUS, TUNNELED;  Surgeon: Walt Smith MD;  Location: [facility] CATH LAB;  Service: Nephrology;  Laterality: N/A;    RIGHT HEART CATHETERIZATION Right 10/31/2018    Procedure: RIGHT HEART CATH;  Surgeon: Robert Lomas MD;  Location: [facility] CATH LAB;  Service: Cardiology;  Laterality: Right;    RIGHT HEART CATHETERIZATION Right 1/10/2019    Procedure: INSERTION, CATHETER, RIGHT HEART;  Surgeon: Miguelina Ruffin MD;  Location: [facility] CATH LAB;  Service: Cardiology;  Laterality: Right;    SHOULDER SURGERY      SINUS SURGERY       Family History   Problem Relation Age of Onset    Cirrhosis Neg Hx      Review of patient's allergies indicates:   Allergen Reactions    Clindamycin Shortness Of Breath    Penicillins Rash     Medications:    Current Outpatient Medications:     allopurinol (ZYLOPRIM) 300 MG tablet, Take 300 mg by mouth once daily. , Disp: , Rfl:     aspirin (ECOTRIN) 81 MG EC tablet, Take 81 mg by mouth once daily., Disp: , Rfl:     atorvastatin (LIPITOR) 40 MG tablet, Take 1 tablet (40 mg total) by mouth once daily., Disp: 90 tablet, Rfl: 3    diphenoxylate-atropine 2.5-0.025 mg (LOMOTIL) 2.5-0.025 mg per tablet, Take 1 tablet by mouth 4 (four) times daily as needed for Diarrhea., Disp: 120 tablet, Rfl: 3    furosemide (LASIX) 40 MG tablet, Take 1 tablet (40 mg total) by mouth once daily., Disp: 30 tablet, Rfl: 12    macitentan (OPSUMIT) 10 mg Tab, Take 10 mg by mouth once daily., Disp: , Rfl:     melatonin 3 mg Tab, Take 1 capsule by mouth daily as needed. , Disp: , Rfl:     multivit-minerals/ferrous fum (MULTI  VITAMIN ORAL), Take 1 tablet by mouth., Disp: , Rfl:     OXYGEN-AIR DELIVERY SYSTEMS MISC, by Misc.(Non-Drug; Combo Route) route., Disp: , Rfl:     potassium chloride (MICRO-K) 10 MEQ CpSR, Take 10 mEq by mouth once. , Disp: , Rfl: 11    RABEprazole (ACIPHEX) 20 mg tablet, Take 20 mg by mouth 2 (two) times daily. , Disp: , Rfl:     spironolactone (ALDACTONE) 25 MG tablet, Take 1 tablet (25 mg total) by mouth every morning., Disp: 90 tablet, Rfl: 3    ticagrelor (BRILINTA) 90 mg tablet, Take 1 tablet (90 mg total) by mouth 2 (two) times daily., Disp: 180 tablet, Rfl: 3    UPTRAVI 1,400 mcg Tab, Take 1,400 mcg by mouth 2 (two) times daily. , Disp: , Rfl:     Review of Systems  Objective:     There were no vitals filed for this visit.    Review of Symptoms  Symptom Assessment (ESAS 0-10 scale)    ESAS 0 1 2 3 4 5 6 7 8 9 10   Pain [x]  []  []  []  []  []  []  []  []  []  []    Dyspnea []  []  []  []  []  [x]  []  []  []  []  []    Anxiety [x]  []  []  []  []  []  []  []  []  []  []    Nausea [x]  []  []  []  []  []  []  []  []  []  []    Depression  [x]  []  []  []  []  []  []  []  []  []  []    Anorexia []  []  [x]  []  []  []  []  []  []  []  []    Fatigue []  []  []  []  [x]  []  []  []  []  []  []    Insomnia []  [x]  []  []  []  []  []  []  []  []  []    Restlessness  [x]  []  []  []  []  []  []  []  []  []  []    Agitation [x]  []  []  []  []  []  []  []  []  []  []    Constipation     _x_ --  ___+   Diarrhea           __ --  __x_+ but very infrequently  Bowel Management Plan (BMP): Yes    OME in 24 hours: 0    Performance Status: 60    Physical Exam   Constitutional: He is oriented to person, place, and time. He is cooperative. He appears ill.   Neck: Normal range of motion. Neck supple.   Cardiovascular:   No peripheral edema   Pulmonary/Chest:   Patient arrived off of oxygen and had dusky color of lips, nails and skin which resolved with oxygen per n/c at 5l.    Neurological: He is alert and oriented to  person, place, and time.   Skin: Skin is warm and dry.   Psychiatric: He has a normal mood and affect. His behavior is normal. Judgment and thought content normal.       Laboratory:   CBC:   WBC   Date Value Ref Range Status   01/31/2020 8.62 3.90 - 12.70 K/uL Final     RBC   Date Value Ref Range Status   01/31/2020 4.75 4.60 - 6.20 M/uL Final     Hemoglobin   Date Value Ref Range Status   01/31/2020 15.6 14.0 - 18.0 g/dL Final     POC Hematocrit   Date Value Ref Range Status   07/20/2018 44 36 - 54 %PCV Final     Hematocrit   Date Value Ref Range Status   01/31/2020 47.2 40.0 - 54.0 % Final     Platelets   Date Value Ref Range Status   01/31/2020 202 150 - 350 K/uL Final     Mean Corpuscular Volume   Date Value Ref Range Status   01/31/2020 99 (H) 82 - 98 fL Final     Mean Corpuscular Hemoglobin   Date Value Ref Range Status   01/31/2020 32.8 (H) 27.0 - 31.0 pg Final     Mean Corpuscular Hemoglobin Conc   Date Value Ref Range Status   01/31/2020 33.1 32.0 - 36.0 g/dL Final     CMP:   Glucose   Date Value Ref Range Status   01/31/2020 86 70 - 110 mg/dL Final     Calcium   Date Value Ref Range Status   01/31/2020 9.1 8.7 - 10.5 mg/dL Final     Albumin   Date Value Ref Range Status   01/31/2020 4.2 3.5 - 5.2 g/dL Final     Total Protein   Date Value Ref Range Status   01/31/2020 7.2 6.0 - 8.4 g/dL Final     Sodium   Date Value Ref Range Status   01/31/2020 144 136 - 145 mmol/L Final     Potassium   Date Value Ref Range Status   01/31/2020 3.3 (L) 3.5 - 5.1 mmol/L Final     CO2   Date Value Ref Range Status   01/31/2020 27 23 - 29 mmol/L Final     Chloride   Date Value Ref Range Status   01/31/2020 106 95 - 110 mmol/L Final     BUN, Bld   Date Value Ref Range Status   01/31/2020 16 8 - 23 mg/dL Final     Creatinine   Date Value Ref Range Status   01/31/2020 1.1 0.5 - 1.4 mg/dL Final     BNP   Date Value Ref Range Status   01/31/2020 206 (H) 0 - 99 pg/mL Final     Comment:     Values of less than 100 pg/ml are  consistent with non-CHF populations.     INR   Date Value Ref Range Status   01/10/2019 1.1 0.8 - 1.2 Final     Comment:     Coumadin Therapy:  2.0 - 3.0 for INR for all indicators except mechanical heart valves  and antiphospholipid syndromes which should use 2.5 - 3.5.       Alkaline Phosphatase   Date Value Ref Range Status   01/31/2020 120 55 - 135 U/L Final     ALT   Date Value Ref Range Status   01/31/2020 12 10 - 44 U/L Final     AST   Date Value Ref Range Status   01/31/2020 17 10 - 40 U/L Final     Total Bilirubin   Date Value Ref Range Status   01/31/2020 2.0 (H) 0.1 - 1.0 mg/dL Final     Comment:     For infants and newborns, interpretation of results should be based  on gestational age, weight and in agreement with clinical  observations.  Premature Infant recommended reference ranges:  Up to 24 hours.............<8.0 mg/dL  Up to 48 hours............<12.0 mg/dL  3-5 days..................<15.0 mg/dL  6-29 days.................<15.0 mg/dL         Psychosocial/Cultural: Unmarried gentlemen who is a  who [place] who lives independently with 1 adult son and 1 adult dght who are his care giving support.  Both of his adult children live within 30-60 minutes from the patient.  Both are active in supporting the patient in his caregiving needs.  Patient reports that he performs most of his basic ADLs himself with support from his family, friends and neighbors for grocery shopping and some cooking.    > 50% of 45 min visit spent in chart review,  symptom assessment, and coordination of care  Spent a total of 60  minutes engaging the patient in this advance care planning and goals of care discussion.    CELE Leslie, CNS  Palliative Medicine  [facility]

## 2020-03-05 ENCOUNTER — LAB VISIT (OUTPATIENT)
Dept: LAB | Facility: HOSPITAL | Age: 80
End: 2020-03-05
Attending: INTERNAL MEDICINE
Payer: MEDICARE

## 2020-03-05 ENCOUNTER — OFFICE VISIT (OUTPATIENT)
Dept: TRANSPLANT | Facility: CLINIC | Age: 80
End: 2020-03-05
Payer: MEDICARE

## 2020-03-05 VITALS
HEIGHT: 69 IN | WEIGHT: 145.31 LBS | HEART RATE: 92 BPM | SYSTOLIC BLOOD PRESSURE: 105 MMHG | OXYGEN SATURATION: 88 % | DIASTOLIC BLOOD PRESSURE: 58 MMHG | BODY MASS INDEX: 21.52 KG/M2

## 2020-03-05 DIAGNOSIS — Z71.89 COUNSELING REGARDING ADVANCE DIRECTIVES AND GOALS OF CARE: ICD-10-CM

## 2020-03-05 DIAGNOSIS — Z51.5 ENCOUNTER FOR PALLIATIVE CARE: Primary | ICD-10-CM

## 2020-03-05 DIAGNOSIS — I25.10 CORONARY ARTERY DISEASE, ANGINA PRESENCE UNSPECIFIED, UNSPECIFIED VESSEL OR LESION TYPE, UNSPECIFIED WHETHER NATIVE OR TRANSPLANTED HEART: ICD-10-CM

## 2020-03-05 DIAGNOSIS — I35.0 MODERATE AORTIC VALVE STENOSIS: ICD-10-CM

## 2020-03-05 DIAGNOSIS — R06.02 SHORTNESS OF BREATH: ICD-10-CM

## 2020-03-05 DIAGNOSIS — Z51.5 PALLIATIVE CARE ENCOUNTER: ICD-10-CM

## 2020-03-05 DIAGNOSIS — I50.810 RIGHT HEART FAILURE DUE TO PULMONARY HYPERTENSION: ICD-10-CM

## 2020-03-05 DIAGNOSIS — I27.29 RIGHT HEART FAILURE DUE TO PULMONARY HYPERTENSION: ICD-10-CM

## 2020-03-05 DIAGNOSIS — I27.20 PULMONARY HYPERTENSION: ICD-10-CM

## 2020-03-05 DIAGNOSIS — I95.9 HYPOTENSION, UNSPECIFIED HYPOTENSION TYPE: Primary | ICD-10-CM

## 2020-03-05 DIAGNOSIS — I27.9 CHRONIC PULMONARY HEART DISEASE: ICD-10-CM

## 2020-03-05 LAB
ALBUMIN SERPL BCP-MCNC: 4.1 G/DL (ref 3.5–5.2)
ALP SERPL-CCNC: 126 U/L (ref 55–135)
ALT SERPL W/O P-5'-P-CCNC: 19 U/L (ref 10–44)
ANION GAP SERPL CALC-SCNC: 10 MMOL/L (ref 8–16)
AST SERPL-CCNC: 20 U/L (ref 10–40)
BASOPHILS # BLD AUTO: 0.05 K/UL (ref 0–0.2)
BASOPHILS NFR BLD: 0.7 % (ref 0–1.9)
BILIRUB SERPL-MCNC: 2.1 MG/DL (ref 0.1–1)
BNP SERPL-MCNC: 132 PG/ML (ref 0–99)
BUN SERPL-MCNC: 19 MG/DL (ref 8–23)
CALCIUM SERPL-MCNC: 9.6 MG/DL (ref 8.7–10.5)
CHLORIDE SERPL-SCNC: 107 MMOL/L (ref 95–110)
CO2 SERPL-SCNC: 23 MMOL/L (ref 23–29)
CREAT SERPL-MCNC: 1 MG/DL (ref 0.5–1.4)
DIFFERENTIAL METHOD: ABNORMAL
EOSINOPHIL # BLD AUTO: 0.1 K/UL (ref 0–0.5)
EOSINOPHIL NFR BLD: 2 % (ref 0–8)
ERYTHROCYTE [DISTWIDTH] IN BLOOD BY AUTOMATED COUNT: 14.2 % (ref 11.5–14.5)
EST. GFR  (AFRICAN AMERICAN): >60 ML/MIN/1.73 M^2
EST. GFR  (NON AFRICAN AMERICAN): >60 ML/MIN/1.73 M^2
GLUCOSE SERPL-MCNC: 115 MG/DL (ref 70–110)
HCT VFR BLD AUTO: 45.1 % (ref 40–54)
HGB BLD-MCNC: 15.1 G/DL (ref 14–18)
IMM GRANULOCYTES # BLD AUTO: 0.02 K/UL (ref 0–0.04)
IMM GRANULOCYTES NFR BLD AUTO: 0.3 % (ref 0–0.5)
LYMPHOCYTES # BLD AUTO: 1.3 K/UL (ref 1–4.8)
LYMPHOCYTES NFR BLD: 18.2 % (ref 18–48)
MCH RBC QN AUTO: 32.4 PG (ref 27–31)
MCHC RBC AUTO-ENTMCNC: 33.5 G/DL (ref 32–36)
MCV RBC AUTO: 97 FL (ref 82–98)
MONOCYTES # BLD AUTO: 0.5 K/UL (ref 0.3–1)
MONOCYTES NFR BLD: 7.1 % (ref 4–15)
NEUTROPHILS # BLD AUTO: 5.1 K/UL (ref 1.8–7.7)
NEUTROPHILS NFR BLD: 71.7 % (ref 38–73)
NRBC BLD-RTO: 0 /100 WBC
PLATELET # BLD AUTO: 213 K/UL (ref 150–350)
PMV BLD AUTO: 9.6 FL (ref 9.2–12.9)
POTASSIUM SERPL-SCNC: 3.5 MMOL/L (ref 3.5–5.1)
PROT SERPL-MCNC: 7.2 G/DL (ref 6–8.4)
RBC # BLD AUTO: 4.66 M/UL (ref 4.6–6.2)
SODIUM SERPL-SCNC: 140 MMOL/L (ref 136–145)
WBC # BLD AUTO: 7.07 K/UL (ref 3.9–12.7)

## 2020-03-05 PROCEDURE — 99999 PR PBB SHADOW E&M-EST. PATIENT-LVL II: CPT | Mod: PBBFAC,,, | Performed by: INTERNAL MEDICINE

## 2020-03-05 PROCEDURE — 99999 PR PBB SHADOW E&M-EST. PATIENT-LVL III: CPT | Mod: PBBFAC,,, | Performed by: CLINICAL NURSE SPECIALIST

## 2020-03-05 PROCEDURE — 99497 PR ADVNCD CARE PLAN 30 MIN: ICD-10-PCS | Mod: S$PBB,,, | Performed by: CLINICAL NURSE SPECIALIST

## 2020-03-05 PROCEDURE — 99999 PR PBB SHADOW E&M-EST. PATIENT-LVL II: ICD-10-PCS | Mod: PBBFAC,,, | Performed by: INTERNAL MEDICINE

## 2020-03-05 PROCEDURE — 99212 OFFICE O/P EST SF 10 MIN: CPT | Mod: PBBFAC,27 | Performed by: INTERNAL MEDICINE

## 2020-03-05 PROCEDURE — 36415 COLL VENOUS BLD VENIPUNCTURE: CPT

## 2020-03-05 PROCEDURE — 99215 OFFICE O/P EST HI 40 MIN: CPT | Mod: S$PBB,,, | Performed by: INTERNAL MEDICINE

## 2020-03-05 PROCEDURE — 99204 OFFICE O/P NEW MOD 45 MIN: CPT | Mod: S$PBB,,, | Performed by: CLINICAL NURSE SPECIALIST

## 2020-03-05 PROCEDURE — 99999 PR PBB SHADOW E&M-EST. PATIENT-LVL III: ICD-10-PCS | Mod: PBBFAC,,, | Performed by: CLINICAL NURSE SPECIALIST

## 2020-03-05 PROCEDURE — 83880 ASSAY OF NATRIURETIC PEPTIDE: CPT

## 2020-03-05 PROCEDURE — 99498 ADVNCD CARE PLAN ADDL 30 MIN: CPT | Mod: PBBFAC | Performed by: CLINICAL NURSE SPECIALIST

## 2020-03-05 PROCEDURE — 99204 PR OFFICE/OUTPT VISIT, NEW, LEVL IV, 45-59 MIN: ICD-10-PCS | Mod: S$PBB,,, | Performed by: CLINICAL NURSE SPECIALIST

## 2020-03-05 PROCEDURE — 99498 PR ADVNCD CARE PLAN ADDL 30 MIN: ICD-10-PCS | Mod: S$PBB,,, | Performed by: CLINICAL NURSE SPECIALIST

## 2020-03-05 PROCEDURE — 99213 OFFICE O/P EST LOW 20 MIN: CPT | Mod: PBBFAC | Performed by: CLINICAL NURSE SPECIALIST

## 2020-03-05 PROCEDURE — 85025 COMPLETE CBC W/AUTO DIFF WBC: CPT

## 2020-03-05 PROCEDURE — 99497 ADVNCD CARE PLAN 30 MIN: CPT | Mod: S$PBB,,, | Performed by: CLINICAL NURSE SPECIALIST

## 2020-03-05 PROCEDURE — 99215 PR OFFICE/OUTPT VISIT, EST, LEVL V, 40-54 MIN: ICD-10-PCS | Mod: S$PBB,,, | Performed by: INTERNAL MEDICINE

## 2020-03-05 PROCEDURE — 80053 COMPREHEN METABOLIC PANEL: CPT

## 2020-03-05 PROCEDURE — 99498 ADVNCD CARE PLAN ADDL 30 MIN: CPT | Mod: S$PBB,,, | Performed by: CLINICAL NURSE SPECIALIST

## 2020-03-05 RX ORDER — FUROSEMIDE 40 MG/1
40 TABLET ORAL DAILY PRN
Qty: 30 TABLET | Refills: 12 | Status: SHIPPED | OUTPATIENT
Start: 2020-03-05

## 2020-03-05 NOTE — ASSESSMENT & PLAN NOTE
Will discuss with TIA Lomas if we can change brillanta to PLAVIX at one year mihir (in April 2020)

## 2020-03-05 NOTE — ASSESSMENT & PLAN NOTE
Pt here for lab only. Stable if not improving on opsimut and uptravi  starting physical therapy   TAke opsumit / uptravi in AM which has helped his headaches

## 2020-03-05 NOTE — PATIENT INSTRUCTIONS
Requested patient to weigh themselves daily, and use lasix if their weight increases by more than 2 lbs in 1 day or 5 lbs in 1 week.  DRY weight 140 at home

## 2020-03-05 NOTE — PROGRESS NOTES
Subjective:    Patient ID:  Greg Nolasco is a 79 y.o. male who presents for follow-up of Pulmonary Hypertension.    HPI   multivessel CAD (s/p PCI of the LAD and D2 4/22/19), severe PH, PAF, COPD with chronic hypoxic respiratory failure (had been home O2) who was started on IV remodulin in late July 2018 for PAH.  Intractible leg pains required transition to selexipag in late Dec 2018 (See my Oct 31 recommendation after his RHC) He has slowly progressed back to his baseline functionality since the transition to selexipag. At his April 2019 visit I increased his increase uptravi 1600 twice a day with the help of pulmonary hypertension nurses in addition to starting a goals of care talk.     No real change for better or worse since last visit. that he does has increased POPE since Nov 2019.  Gets SOB walking out of his house to truck which is new.  Does have some SOB with ADL's    Review of Systems   Constitution: Negative for decreased appetite, weight gain and weight loss.   Cardiovascular: Positive for dyspnea on exertion. Negative for chest pain, leg swelling, near-syncope, orthopnea and palpitations.   Respiratory: Positive for shortness of breath. Negative for cough.    Musculoskeletal: Negative for myalgias.   Gastrointestinal: Negative for jaundice.        Objective:  There were no vitals taken for this visit.      Physical Exam   Constitutional: He is oriented to person, place, and time. He appears well-developed and well-nourished. He is active. He is not intubated.   There were no vitals taken for this visit.     HENT:   Head: Normocephalic and atraumatic. Hair is normal.   Right Ear: External ear normal.   Left Ear: External ear normal.   Nose: Nose normal. No nasal deformity. No epistaxis.  No foreign bodies.   Mouth/Throat: Mucous membranes are normal. Mucous membranes are not cyanotic. No oropharyngeal exudate.   Eyes: Pupils are equal, round, and reactive to light. Conjunctivae and EOM are normal.    Neck: Neck supple. No hepatojugular reflux and no JVD present.   Cardiovascular: Normal rate, regular rhythm, normal heart sounds and normal pulses. Exam reveals no gallop.   Pulmonary/Chest: Effort normal and breath sounds normal. No apnea and no tachypnea. He is not intubated. No respiratory distress. He exhibits no tenderness.   Abdominal: Soft. Normal appearance and bowel sounds are normal. There is no tenderness. No hernia.   Musculoskeletal: Normal range of motion.   Neurological: He is alert and oriented to person, place, and time. He displays no seizure activity.   Skin: Skin is warm, dry and intact. No rash noted. No pallor.   Psychiatric: He has a normal mood and affect. His speech is normal and behavior is normal. Thought content normal. Cognition and memory are normal.           Chemistry        Component Value Date/Time     03/05/2020 0950    K 3.5 03/05/2020 0950     03/05/2020 0950    CO2 23 03/05/2020 0950    BUN 19 03/05/2020 0950    CREATININE 1.0 03/05/2020 0950     (H) 03/05/2020 0950        Component Value Date/Time    CALCIUM 9.6 03/05/2020 0950    ALKPHOS 126 03/05/2020 0950    AST 20 03/05/2020 0950    ALT 19 03/05/2020 0950    BILITOT 2.1 (H) 03/05/2020 0950    ESTGFRAFRICA >60.0 03/05/2020 0950    EGFRNONAA >60.0 03/05/2020 0950            Magnesium   Date Value Ref Range Status   01/31/2020 2.1 1.6 - 2.6 mg/dL Final       Lab Results   Component Value Date    WBC 7.07 03/05/2020    HGB 15.1 03/05/2020    HCT 45.1 03/05/2020    MCV 97 03/05/2020     03/05/2020       Lab Results   Component Value Date    INR 1.1 01/10/2019    INR 1.1 10/31/2018    INR 1.1 07/23/2018       BNP   Date Value Ref Range Status   03/05/2020 132 (H) 0 - 99 pg/mL Final     Comment:     Values of less than 100 pg/ml are consistent with non-CHF populations.   01/31/2020 206 (H) 0 - 99 pg/mL Final     Comment:     Values of less than 100 pg/ml are consistent with non-CHF populations.  "  07/15/2019 53 0 - 99 pg/mL Final     Comment:     Values of less than 100 pg/ml are consistent with non-CHF populations.       No results found for: LDH          Assessment:       1. Hypotension, unspecified hypotension type    2. Chronic pulmonary heart disease    3. Coronary artery disease, angina presence unspecified, unspecified vessel or lesion type, unspecified whether native or transplanted heart    4. Palliative care encounter    5. Moderate aortic valve stenosis        WHO Group 1   Functional Class 3B     Plan:     Chronic pulmonary heart disease  Stable if not improving on opsimut and uptravi  starting physical therapy   TAke opsumit / uptravi in AM which has helped his headaches    CAD (coronary artery disease)  Will discuss with TIA Lomas if we can change brillanta to PLAVIX at one year mihir (in April 2020)     Palliative care encounter   pt and his daughter  are open to pursing his VA benefit to increase his resources and  to improve his oxygen optimization.  He is currently setting up Pulmonary Rehab through the [facility] near his home in [place].   Neither the patient or his daughter are expressing hospice readiness at this time and are willing to be hospitalized for aggressive care.  The patient did state that "when his time came" he wanted to die at home and would be open to home hospice care.           Moderate aortic valve stenosis  Repeat echo annually (would do that for PH)     Problem List Items Addressed This Visit     CAD (coronary artery disease)    Overview     - Patient diagnosed with multivessel CAD by [facility] 7/20 as detailed above. No interventions done.  - Poor surgical candidate given his advanced pulmonary hypertension and chronic lung disease.   - Discussed with Dr. Dex Lomas. He would require ECMO for high risk PCI(s).   - Continue ASA, statin.          Current Assessment & Plan     Will discuss with TIA Lomas if we can change brillanta to PLAVIX at one year mihir (in " "April 2020)          Chronic pulmonary heart disease    Current Assessment & Plan     Stable if not improving on opsimut and uptravi  starting physical therapy   TAke opsumit / uptravi in AM which has helped his headaches         Hypotension - Primary    Overview     Requested patient to weigh themselves daily, and use lasix if their weight increases by more than 2 lbs in 1 day or 5 lbs in 1 week.  DRY weight 140 at home              Palliative care encounter    Current Assessment & Plan      pt and his daughter  are open to pursing his VA benefit to increase his resources and  to improve his oxygen optimization.  He is currently setting up Pulmonary Rehab through the [facility] near his home in [place].   Neither the patient or his daughter are expressing hospice readiness at this time and are willing to be hospitalized for aggressive care.  The patient did state that "when his time came" he wanted to die at home and would be open to home hospice care.                Moderate aortic valve stenosis    Current Assessment & Plan     Repeat echo annually (would do that for PH)                    Follow up in about 6 months (around 9/5/2020).  Orders Placed This Encounter    furosemide (LASIX) 40 MG tablet     "

## 2020-03-06 NOTE — ASSESSMENT & PLAN NOTE
" pt and his daughter  are open to pursing his VA benefit to increase his resources and  to improve his oxygen optimization.  He is currently setting up Pulmonary Rehab through the [facility] near his home in [place].   Neither the patient or his daughter are expressing hospice readiness at this time and are willing to be hospitalized for aggressive care.  The patient did state that "when his time came" he wanted to die at home and would be open to home hospice care.         "

## 2020-03-09 ENCOUNTER — TELEPHONE (OUTPATIENT)
Dept: TRANSPLANT | Facility: CLINIC | Age: 80
End: 2020-03-09

## 2020-03-09 NOTE — TELEPHONE ENCOUNTER
Mr. Nolasco asked for clarification after his appointment last week. Reviewed Dr. Lomas's instructions and talked about his palliative care visit. He is interesting in being able to change from Brilinta as it is expensive for him. Advised that Dr. RUBEN Lomas would reach out to Dr. GILMA Lomas to see if there is a reasonable alternative.  Mr. Nolasco also gave permission for us to share contact information with a VA representative who will connect him with the [facility] in [place].  Patient states that he stopped the Lasix as instructed on Friday and for the first time in a while he has not be nauseated and says his appetite has increased. He feels like he needs to eat all the time. He endorses weighing himself daily and will notify this office if he feels like he is retaining fluid.   No other concerns at this time.

## 2020-03-12 ENCOUNTER — TELEPHONE (OUTPATIENT)
Dept: TRANSPLANT | Facility: CLINIC | Age: 80
End: 2020-03-12

## 2020-03-12 NOTE — TELEPHONE ENCOUNTER
"Patient calling to report that he had an "episode" on Tuesday and "almost bought the farm." He reports being at home resting, with his oxygen on but noticed his dog was outside so walked to the front porch to call for him. The dog came to after a few minutes of him calling for him. He then says he "scolded him" and had to herd him inside the house. As he was reentering his home he became very short of breath, his legs became wobbly and he barely made it back to his chair. He did all of this activity without his oxygen on. Patient states that he immediately put his portable oxygen on 5L and tried to catch his breath. It was hard and he says he panicked and became anxious. He was able to grab his phone and call 911. Mr. Nolasco states that the EMTs put in on "25L O2 with a mask" and it still took him 10--15 minutes to recover. They were not able to get an O2 reading. They tried to get him to go to the hospital but he refused because, "they don't know about my condition and all they could do was give me more oxygen." He states that his nephew lives nearby and come over an sat with him for awhile. That night he tried to sleep but was short of breath and had a "doomsday" feeling. The next morning (yesterday) he took a lasix which he had not been taking per Dr. Lomas. He reported urinating a lot. He stayed home all day and his daughter and son stayed with him for some time. He took a Xanax in the evening, only remembers his "head hitting the pillow" and woke at 4:30 this morning. He took another lasix today and is feeling better. He notes that stopping the lasix did not change his blood pressure as it is still in the 80's systolic.  Mr. Nolasco asks if the doctor can order him an oxygen tank that delivers 25L of oxygen so he can use it in emergencies.   Explained to Mr. Nolasco that that liter of oxygen is used in hospitals, emergently and through hospice. Noted that he needs to wear oxygen ATC and that his pulse dose " machine through a [facility] will not deliver what he needs. He does have a large concentrator by his bed that he turns up to 6L and has put on 10L before.  Reassured Mr. Nolasco. Advised that he should agree to going to the hospital if this happens again and it is recommended. Will ask Dr. Lomas for further instructions and recommendations.

## 2020-03-13 ENCOUNTER — TELEPHONE (OUTPATIENT)
Dept: TRANSPLANT | Facility: CLINIC | Age: 80
End: 2020-03-13

## 2020-03-16 NOTE — TELEPHONE ENCOUNTER
"F/U with Mr. Nolasco. He is doing ok. Says that it was "his fault" that he became too short of breath as he did too much activity. We discussed his options for oxygen delivery systems. Coordinator continued to reinforce need for continuous oxygen rather than pulse dose but this will mean a bigger tank. Advised patient that the oxygen company cannot provide a tank that will deliver 25L of oxygen.   Reassured patient but also discussed patient's deteriorating condition and ways to keep him comfortable and at home. Talked a little bit about palliative care/hospice option as discussed at last clinic visit. Patient is agreeable to a telemedicine visit with Dr. Lomas and Radha, Palliative Care APRN and having his daughter present to talk about next steps.   [facility] [place] He is going to call today.  Will speak with providers about appointment.  "

## 2020-03-17 DIAGNOSIS — I25.10 CORONARY ARTERY DISEASE INVOLVING NATIVE CORONARY ARTERY OF NATIVE HEART WITHOUT ANGINA PECTORIS: ICD-10-CM

## 2020-03-18 RX ORDER — SPIRONOLACTONE 25 MG/1
TABLET ORAL
Qty: 90 TABLET | Refills: 3 | Status: SHIPPED | OUTPATIENT
Start: 2020-03-18

## 2020-03-24 RX ORDER — SELEXIPAG 1600 UG/1
TABLET, COATED ORAL
Qty: 60 TABLET | Refills: 12 | Status: SHIPPED | OUTPATIENT
Start: 2020-03-24

## 2020-04-03 ENCOUNTER — TELEPHONE (OUTPATIENT)
Dept: TRANSPLANT | Facility: CLINIC | Age: 80
End: 2020-04-03

## 2020-04-03 ENCOUNTER — PATIENT MESSAGE (OUTPATIENT)
Dept: TRANSPLANT | Facility: CLINIC | Age: 80
End: 2020-04-03

## 2020-04-03 RX ORDER — POTASSIUM CHLORIDE 750 MG/1
10 CAPSULE, EXTENDED RELEASE ORAL ONCE
Qty: 1 CAPSULE | Refills: 0 | Status: SHIPPED | OUTPATIENT
Start: 2020-04-03 | End: 2020-05-08 | Stop reason: SDUPTHER

## 2020-04-03 NOTE — TELEPHONE ENCOUNTER
"Patient called to report that his daughter had symptoms of the COVID-19 virus and had gotten tested. She won't know the results for a couple of days. Mr. Nolasco states that he was around her awhile ago but they have been trying to maintain distance and have not "been in the same county" recently. Advised patient to call us immediately if he develops symptoms. He understands that if he needs immediate medical attention that he will have to go locally. Patient has been staying home and feels "about the same." He has started taking Lasix on MWF. He had been experiencing leg cramps at night so has been taking K+, 10 mEq, daily and that has helped. Advised him to continue taking the potassium on the days he takes the lasix.  No other issues or concerns at this time. MD notified of all.  "

## 2020-05-08 RX ORDER — POTASSIUM CHLORIDE 750 MG/1
10 CAPSULE, EXTENDED RELEASE ORAL ONCE
Qty: 90 CAPSULE | Refills: 3 | Status: SHIPPED | OUTPATIENT
Start: 2020-05-08 | End: 2020-05-14 | Stop reason: SDUPTHER

## 2020-05-11 DIAGNOSIS — G45.9 TIA (TRANSIENT ISCHEMIC ATTACK): Primary | ICD-10-CM

## 2020-05-12 DIAGNOSIS — G45.9 TIA (TRANSIENT ISCHEMIC ATTACK): Primary | ICD-10-CM

## 2020-05-13 ENCOUNTER — DOCUMENTATION ONLY (OUTPATIENT)
Dept: CARDIOLOGY | Facility: CLINIC | Age: 80
End: 2020-05-13

## 2020-05-13 RX ORDER — ATORVASTATIN CALCIUM 40 MG/1
TABLET, FILM COATED ORAL
Qty: 90 TABLET | Refills: 3 | Status: SHIPPED | OUTPATIENT
Start: 2020-05-13

## 2020-05-14 ENCOUNTER — OFFICE VISIT (OUTPATIENT)
Dept: CARDIOLOGY | Facility: CLINIC | Age: 80
End: 2020-05-14
Payer: MEDICARE

## 2020-05-14 ENCOUNTER — HOSPITAL ENCOUNTER (OUTPATIENT)
Dept: RADIOLOGY | Facility: CLINIC | Age: 80
Discharge: HOME | End: 2020-05-14
Attending: INTERNAL MEDICINE
Payer: MEDICARE

## 2020-05-14 DIAGNOSIS — I27.29 RIGHT HEART FAILURE DUE TO PULMONARY HYPERTENSION: ICD-10-CM

## 2020-05-14 DIAGNOSIS — I25.10 CORONARY ARTERY DISEASE WITHOUT ANGINA PECTORIS, UNSPECIFIED VESSEL OR LESION TYPE, UNSPECIFIED WHETHER NATIVE OR TRANSPLANTED HEART: ICD-10-CM

## 2020-05-14 DIAGNOSIS — R09.02 HYPOXIA: ICD-10-CM

## 2020-05-14 DIAGNOSIS — G47.33 OSA (OBSTRUCTIVE SLEEP APNEA): Primary | ICD-10-CM

## 2020-05-14 DIAGNOSIS — I50.810 RIGHT HEART FAILURE DUE TO PULMONARY HYPERTENSION: ICD-10-CM

## 2020-05-14 DIAGNOSIS — I35.0 MODERATE AORTIC VALVE STENOSIS: ICD-10-CM

## 2020-05-14 DIAGNOSIS — G45.9 TIA (TRANSIENT ISCHEMIC ATTACK): ICD-10-CM

## 2020-05-14 PROCEDURE — 93880 EXTRACRANIAL BILAT STUDY: CPT | Mod: TC,PO

## 2020-05-14 PROCEDURE — 93880 US CAROTID BILATERAL: ICD-10-PCS | Mod: 26,,, | Performed by: RADIOLOGY

## 2020-05-14 PROCEDURE — 93880 EXTRACRANIAL BILAT STUDY: CPT | Mod: 26,,, | Performed by: RADIOLOGY

## 2020-05-14 PROCEDURE — 99213 OFFICE O/P EST LOW 20 MIN: CPT | Mod: 95,,, | Performed by: INTERNAL MEDICINE

## 2020-05-14 PROCEDURE — 99213 PR OFFICE/OUTPT VISIT, EST, LEVL III, 20-29 MIN: ICD-10-PCS | Mod: 95,,, | Performed by: INTERNAL MEDICINE

## 2020-05-14 RX ORDER — POTASSIUM CHLORIDE 750 MG/1
10 CAPSULE, EXTENDED RELEASE ORAL ONCE
Qty: 90 CAPSULE | Refills: 3 | Status: SHIPPED | OUTPATIENT
Start: 2020-05-14 | End: 2020-05-18 | Stop reason: SDUPTHER

## 2020-05-14 NOTE — PROGRESS NOTES
The patient location is:  In his daughter's vehicle  The chief complaint leading to consultation is:  Slurred speech  Visit type: audiovisual  Total time spent with patient:  40 min  Each patient to whom he or she provides medical services by telemedicine is:  (1) informed of the relationship between the physician and patient and the respective role of any other health care provider with respect to management of the patient; and (2) notified that he or she may decline to receive medical services by telemedicine and may withdraw from such care at any time.    Notes:  See below   Subjective:    Patient ID:  Greg Nolasco is a 79 y.o. male who presents for evaluation of possible TIA.    HPI   Mr. Nolasco is a 78 y/o gentleman who was originally seen in this clinic on 7/19/18 for chronic hypoxic respiratory failure and exertional dyspnea.  He underwent cardiac catheterization on 7/20/18 and was found to have pulmonary hypertensionwith right to left shunting across a PFO and multi-vessel CAD.  He was referred to Dr. Ruffin, pulmonary HTN clinic, and started on Uptravi.  On 4/22/19 he underwent PCI of the LAD and D2.  He hasa  of the RCA with left to right collaterals. Today he reports no significant change in his exertional dyspnea since he was last seen in this clinic on 05/16/2019..  He is able to complete full sentences in clinic without dyspnea.  He reports that he uses home O2 at night and 2-3 hours during the day. He no longer experiences exertional chest discomfort.  Mr. Nolasco denies LE edema, orthopnea, or PND.  He denies palpitations, syncope, or near syncope.  He denies claudication, Mr. Nolasco reports good medication compliance. However he continues to report h/o lightheadedness when looking up regardless of whether he is sitting or standing.  The patient reports that the Saturday before last while talking to his nephew the patient experienced transient slurred speech with the inability to  appropriately move the right side of his mouth. The patient states that the symptoms lasted 10 sec before completely resolving.  They have not recurred.  The patient denies ever having experienced anything like this in the past.  He denies any lower extremity edema and as stated above he denies palpitations.  Patient denies any loss of balance or focal weakness on either side of his body.  Of note the patient reports that occasionally his home blood pressure monitor reads an irregular heart rhythm.    Past Medical History:   Diagnosis Date    Chronic hypoxemic respiratory failure     Coronary artery disease     BARBARA (obstructive sleep apnea)     Pulmonary hypertension      Past Surgical History:   Procedure Laterality Date    ANGIOPLASTY  1991    BACK SURGERY      COLONOSCOPY  04/2019    CORONARY STENT PLACEMENT Right 4/22/2019    Procedure: INSERTION, STENT, CORONARY ARTERY;  Surgeon: Dex Lomas MD;  Location: [facility] CATH LAB;  Service: Cardiology;  Laterality: Right;    HERNIA REPAIR      KNEE SURGERY      REMOVAL OF TUNNELED CENTRAL VENOUS CATHETER (CVC) N/A 12/20/2018    Procedure: REMOVAL, CATHETER, CENTRAL VENOUS, TUNNELED;  Surgeon: Walt Smith MD;  Location: [facility] CATH LAB;  Service: Nephrology;  Laterality: N/A;    RIGHT HEART CATHETERIZATION Right 10/31/2018    Procedure: RIGHT HEART CATH;  Surgeon: Robert Lomas MD;  Location: [facility] CATH LAB;  Service: Cardiology;  Laterality: Right;    RIGHT HEART CATHETERIZATION Right 1/10/2019    Procedure: INSERTION, CATHETER, RIGHT HEART;  Surgeon: Miguelina Ruffin MD;  Location: [facility] CATH LAB;  Service: Cardiology;  Laterality: Right;    SHOULDER SURGERY      SINUS SURGERY       Current Outpatient Medications on File Prior to Visit   Medication Sig Dispense Refill    allopurinol (ZYLOPRIM) 300 MG tablet Take 300 mg by mouth once daily.       aspirin (ECOTRIN) 81 MG EC tablet Take 81 mg by mouth once daily.      atorvastatin  (LIPITOR) 40 MG tablet TAKE 1 TABLET DAILY 90 tablet 3    diphenoxylate-atropine 2.5-0.025 mg (LOMOTIL) 2.5-0.025 mg per tablet Take 1 tablet by mouth 4 (four) times daily as needed for Diarrhea. (Patient not taking: Reported on 2020) 120 tablet 3    furosemide (LASIX) 40 MG tablet Take 1 tablet (40 mg total) by mouth daily as needed. 30 tablet 12    macitentan (OPSUMIT) 10 mg Tab Take 10 mg by mouth once daily.      melatonin 3 mg Tab Take 1 capsule by mouth daily as needed.       multivit-minerals/ferrous fum (MULTI VITAMIN ORAL) Take 1 tablet by mouth.      OXYGEN-AIR DELIVERY SYSTEMS MISC by [facility].(Non-Drug; Combo Route) route.      RABEprazole (ACIPHEX) 20 mg tablet Take 20 mg by mouth 2 (two) times daily.       spironolactone (ALDACTONE) 25 MG tablet TAKE 1 TABLET EVERY MORNING 90 tablet 3    ticagrelor (BRILINTA) 90 mg tablet Take 1 tablet (90 mg total) by mouth 2 (two) times daily. 180 tablet 3    UPTRAVI 1,600 mcg Tab Take 1 tablet by mouth twice daily. 60 tablet 12    [DISCONTINUED] potassium chloride (MICRO-K) 10 MEQ CpSR Take 1 capsule (10 mEq total) by mouth once. for 1 dose 90 capsule 3     No current facility-administered medications on file prior to visit.      Review of patient's allergies indicates:   Allergen Reactions    Clindamycin Shortness Of Breath    Penicillins Rash    Plavix [clopidogrel] Diarrhea and Nausea Only     Social History     Tobacco Use    Smoking status: Former Smoker     Last attempt to quit:      Years since quittin.3    Smokeless tobacco: Current User     Types: Snuff   Substance Use Topics    Alcohol use: No     Frequency: Never     Comment: none since 2018    Drug use: No     Family History   Problem Relation Age of Onset    Cirrhosis Neg Hx        Review of Systems   Constitution: Negative for decreased appetite, diaphoresis, fever, malaise/fatigue, weight gain and weight loss.   HENT: Negative for congestion, nosebleeds and sore  throat.    Eyes: Negative for blurred vision, vision loss in left eye, vision loss in right eye and visual disturbance.   Cardiovascular: Negative for chest pain, claudication, dyspnea on exertion, leg swelling, near-syncope, orthopnea, palpitations, paroxysmal nocturnal dyspnea and syncope.   Respiratory: Negative for cough, hemoptysis, shortness of breath and wheezing.    Endocrine: Negative for polyuria.   Hematologic/Lymphatic: Does not bruise/bleed easily.   Skin: Negative for nail changes and rash.   Musculoskeletal: Negative for back pain, muscle cramps and myalgias.   Gastrointestinal: Negative for abdominal pain, change in bowel habit, diarrhea, heartburn, hematemesis, hematochezia, melena, nausea and vomiting.   Genitourinary: Negative for bladder incontinence, dysuria, frequency and hematuria.   Neurological:        Transient slurred speech   Psychiatric/Behavioral: Negative for depression.   Allergic/Immunologic: Negative for hives.        Objective:    Physical Exam    physical exam was not performed as this was a tele healthy a visit  Assessment:       1. BARBARA (obstructive sleep apnea)    2. 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 Hypoxia    4. Coronary artery disease without angina pectoris, unspecified vessel or lesion type, unspecified whether native or transplanted heart    5. Moderate aortic valve stenosis         Plan:       1) CAD.  The patient has a h/o pulmonary HTN as well as right to left shunting across his PFO which certainly is likely contributing to his exertional dyspnea.  He underwent PCI of high grade steneosis of the LAD and D2. He has a  of the RCA with left to right collaterals   -continue EC ASA 81mg po qday  -continue Ticagelor 90mg po BID X 6 months minimum  -the patient has completed phase 2 cardiac rehab; recommend starting phase 3 cardiac rehab     2) Chronic hypoxic respiratory failure and pulmonary HTN.  HTS/ pulmonary hypertension management  appreciated  -patient will bring his Uptravi     3) Dyslipidemia. Continue statin     4) H/O GERD. Continue rabeprazole     5) H/o positional vertigo.   the symptoms are stable and unchanged from the past; will defer workup to Neurology (see below #6)    6)  history of slurred speech.  The patient reports 1 episode of slurred speech lasting 10 sec that occur the Saturday before last.  The symptoms have not recurred.  The differential diagnosis for this symptom includes a TIA.  The patient had a carotid duplex study today that demonstrated minimal plaque in his carotid arteries.  However given the patient's age and cardiac morphology he is at substantial risk for having paroxysmal atrial fibrillation.  Additionally the patient reports that his home blood pressure cuff occasionally flex an irregular heart rhythm.  I discussed the benefit of anticoagulation in patients with paroxysmal atrial fibrillation.  I discussed a 30 day monitor with the patient.  The patient stated that he would prefer an implantable monitor as he already has to carry oxygen with him.  Will therefore refer the patient to Dr. Royce Bonilla for evaluation to get an implantable loop monitor.  Furthermore the patient has a PFO with right-to-left shunting due to pulmonary hypertension.  He denies any symptoms or signs of lower extremity DVT.  I explained to the patient that in addition to her paroxysmal atrial fibrillation a thrombus that migrated from his deep veins across his PFO could cause it TIA.  Unfortunately due to the patient's severe pulmonary hypertension PFO closure is not an option as this will likely result in acute RV failure.  In addition to an assessment for proximal atrial fibrillation, I will refer the patient to vascular Neurology for further evaluation of possible TIA.  -continue high-intensity statin  -continue enteric-coated aspirin 81 mg p.o. Q.day for now  -continue ticagrelor 90 mg p.o. b.i.d.  -if the patient is found to  have paroxysmal atrial fibrillation then will recommend initiation of an oral anticoagulant (e.g. Eliquis) and discontinuation of aspirin but continuation of ticagrelor  -I discussed the increased bleeding risk of being on an oral anticoagulant as well as dual antiplatelet therapy with the patient and his daughter    All of the patient's and his daughter's questions were answered.

## 2020-05-18 DIAGNOSIS — G45.9 TIA (TRANSIENT ISCHEMIC ATTACK): Primary | ICD-10-CM

## 2020-05-18 RX ORDER — POTASSIUM CHLORIDE 750 MG/1
10 CAPSULE, EXTENDED RELEASE ORAL ONCE
Qty: 90 CAPSULE | Refills: 3 | Status: SHIPPED | OUTPATIENT
Start: 2020-05-18 | End: 2020-05-20

## 2020-05-19 ENCOUNTER — TELEPHONE (OUTPATIENT)
Dept: CARDIOLOGY | Facility: HOSPITAL | Age: 80
End: 2020-05-19

## 2020-05-19 NOTE — TELEPHONE ENCOUNTER
Spoke with  to schedule 24 Holter and appt with . Pt stated he lives in [place] and prefer to have Holter done closer to home. was informed that there's a location closer to him in [place], [place]. Pt stated that location would be better instead of driving  Hours. Pt was informed that I would gather all information to give and call with updated information in regards to scheduling Holter in [place]. Pt verbalized understanding

## 2020-05-20 ENCOUNTER — TELEPHONE (OUTPATIENT)
Dept: ELECTROPHYSIOLOGY | Facility: CLINIC | Age: 80
End: 2020-05-20

## 2020-05-20 NOTE — TELEPHONE ENCOUNTER
----- Message from Shawnee Coreas MA sent at 5/20/2020  3:24 PM CDT -----  Pt would like to have a virtual visit with Dr Bajwa on 6/2/2020 at 1120.  I will call [facility] to schedule holter appt for 5/25/2020.    ThanksShawnee  ----- Message -----  From: Lizette Mcdonald RN  Sent: 5/20/2020   1:55 PM CDT  To: [facility] Staff    Following up on message below.  Please call pt today and set up Holter (pt lives in [place]).  Please offer clinic f/u with Dr. Bajwa for 6/2 at 1120.  If they accept this date/time let me know and I will double book him.    ----- Message -----  From: Royce Bajwa MD  Sent: 5/15/2020  12:52 PM CDT  To: Lizette Mcdonald RN, LEIGHANN Stanley and Lizette Lomas would like for me to see Mr. Noalsco for consideration for ILR implant for cryptogenic TIA. Can we arrange for a 24 hour holter monitor and then clinic visit with me ASAP?    Thanks    Royce  ----- Message -----  From: Dex Lomas MD  Sent: 5/14/2020   3:16 PM CDT  To: MD Shemar Lazaro,  This is the patient with pulmonary hypertension, right to left shunting across the PFO, and irregular heartbeat alert on his home blood pressure monitor who had a TIA about whom I just called you regarding getting an implantable heart rhythm monitor  Thanks,  Iván

## 2020-05-20 NOTE — TELEPHONE ENCOUNTER
Spoke with pt to let him know of the time change for his holter monitor appt at [facility]

## 2020-05-20 NOTE — TELEPHONE ENCOUNTER
Spoke with pt to schedule appts below.  Pt will go to [facility] for holter and will do a video visti with PA.  I gave pt the address for his holter appt.  We also reviewed his meds.  ----- Message from Lizette Mcdonald RN sent at 5/20/2020  1:55 PM CDT -----  Following up on message below.  Please call pt today and set up Holter (pt lives in [place]).  Please offer clinic f/u with Dr. Bajwa for 6/2 at 1120.  If they accept this date/time let me know and I will double book him.    ----- Message -----  From: Royce Bajwa MD  Sent: 5/15/2020  12:52 PM CDT  To: Lizette Mcdonald RN, Amanda Crews, LEIGHANN Patel and Lizette Lomas would like for me to see Mr. Nolasco for consideration for ILR implant for cryptogenic TIA. Can we arrange for a 24 hour holter monitor and then clinic visit with me ASAP?    Thanks    Royce  ----- Message -----  From: Dex Lomas MD  Sent: 5/14/2020   3:16 PM CDT  To: MD Shemar Lazaro,  This is the patient with pulmonary hypertension, right to left shunting across the PFO, and irregular heartbeat alert on his home blood pressure monitor who had a TIA about whom I just called you regarding getting an implantable heart rhythm monitor  Thanks,  -Nae

## 2020-05-22 DIAGNOSIS — I25.10 CORONARY ARTERY DISEASE INVOLVING NATIVE CORONARY ARTERY OF NATIVE HEART WITHOUT ANGINA PECTORIS: ICD-10-CM

## 2020-05-22 RX ORDER — TICAGRELOR 90 MG/1
TABLET ORAL
Qty: 180 TABLET | Refills: 3 | OUTPATIENT
Start: 2020-05-22

## 2020-05-25 ENCOUNTER — CLINICAL SUPPORT (OUTPATIENT)
Dept: CARDIOLOGY | Facility: HOSPITAL | Age: 80
End: 2020-05-25
Attending: INTERNAL MEDICINE
Payer: MEDICARE

## 2020-05-25 DIAGNOSIS — G45.9 TIA (TRANSIENT ISCHEMIC ATTACK): ICD-10-CM

## 2020-05-25 PROCEDURE — 93225 XTRNL ECG REC<48 HRS REC: CPT

## 2020-06-02 ENCOUNTER — OFFICE VISIT (OUTPATIENT)
Dept: ELECTROPHYSIOLOGY | Facility: CLINIC | Age: 80
End: 2020-06-02
Payer: MEDICARE

## 2020-06-02 ENCOUNTER — TELEPHONE (OUTPATIENT)
Dept: ELECTROPHYSIOLOGY | Facility: CLINIC | Age: 80
End: 2020-06-02

## 2020-06-02 DIAGNOSIS — G47.33 OSA (OBSTRUCTIVE SLEEP APNEA): ICD-10-CM

## 2020-06-02 DIAGNOSIS — I27.20 PHT (PULMONARY HYPERTENSION): ICD-10-CM

## 2020-06-02 DIAGNOSIS — I25.10 CORONARY ARTERY DISEASE WITHOUT ANGINA PECTORIS, UNSPECIFIED VESSEL OR LESION TYPE, UNSPECIFIED WHETHER NATIVE OR TRANSPLANTED HEART: ICD-10-CM

## 2020-06-02 DIAGNOSIS — I48.0 PAROXYSMAL ATRIAL FIBRILLATION: Primary | ICD-10-CM

## 2020-06-02 DIAGNOSIS — G45.9 TIA (TRANSIENT ISCHEMIC ATTACK): ICD-10-CM

## 2020-06-02 LAB
OHS CV EVENT MONITOR DAY: 0
OHS CV HOLTER LENGTH DECIMAL HOURS: 24
OHS CV HOLTER LENGTH HOURS: 24
OHS CV HOLTER LENGTH MINUTES: 0

## 2020-06-02 PROCEDURE — 99214 OFFICE O/P EST MOD 30 MIN: CPT | Mod: 95,,, | Performed by: INTERNAL MEDICINE

## 2020-06-02 PROCEDURE — 99214 PR OFFICE/OUTPT VISIT, EST, LEVL IV, 30-39 MIN: ICD-10-PCS | Mod: 95,,, | Performed by: INTERNAL MEDICINE

## 2020-06-02 NOTE — PROGRESS NOTES
Subjective:    Patient ID:  Greg Nolasco is a 79 y.o. male who presents for follow-up of Atrial Fibrillation    The patient location is: home in [place]  The chief complaint leading to consultation is: cryptogenic stroke    Visit type: audiovisual    Face to Face time with patient: 20 minutes  40 minutes of total time spent on the encounter, which includes face to face time and non-face to face time preparing to see the patient (eg, review of tests), Obtaining and/or reviewing separately obtained history, Documenting clinical information in the electronic or other health record, Independently interpreting results (not separately reported) and communicating results to the patient/family/caregiver, or Care coordination (not separately reported).         Each patient to whom he or she provides medical services by telemedicine is:  (1) informed of the relationship between the physician and patient and the respective role of any other health care provider with respect to management of the patient; and (2) notified that he or she may decline to receive medical services by telemedicine and may withdraw from such care at any time.    Referring Cardiologist: Joseph Lomas MD  Primary Care Physician: Pablo Lorenzo MD    HPI  I had the pleasure of seeing Mr. Nolasco in our electrophysiology clinic in consultation for his cryptogenic TIA. As you are aware he is a pleasant 79 year-old man hypertension, pulmonary hypertension, and coronary artery disease with  of the RCA and s/p LAD PCI. He recently reports an episode where he had 10 seconds of slurred speech and right sided weakness. He notes occasionally his home BP monitor reports an irregular rhythm. Mr. Nolasco was diagnosed with paroxysmal AF over 20 years ago. He went to local ER ([facility]) for each episdoe. He reports he was given IV medications and spontaneously converted. He had never required cardioversion and had never been on an anticoagulant.  He was  initiated on sotalol 120mg bid. He has had no further episodes since starting sotalol. I saw him in consultation in July of 2018, referred by Dr. Dale. He was on sotalol at that time. We had a long discussion regarding anticoagulation. He was not interested and elected to continue to follow with his local cardiologist in regards to his sotalol and AF. Dr. Dale eventually stopped his sotalol.       Review of Systems   Constitution: Negative for fever and malaise/fatigue.   HENT: Negative for congestion and sore throat.    Eyes: Negative for blurred vision and visual disturbance.   Cardiovascular: Negative for chest pain, dyspnea on exertion, irregular heartbeat, near-syncope, orthopnea, palpitations, paroxysmal nocturnal dyspnea and syncope.   Respiratory: Negative for cough and shortness of breath.    Hematologic/Lymphatic: Negative for bleeding problem. Does not bruise/bleed easily.   Skin: Negative.    Musculoskeletal: Negative.    Gastrointestinal: Negative for bloating, abdominal pain, hematochezia and melena.   Neurological:        Per HPI        Objective:    Physical Exam   Constitutional: He is oriented to person, place, and time. He appears well-developed and well-nourished. No distress.   HENT:   Head: Normocephalic and atraumatic.   Eyes: Conjunctivae are normal.   Neurological: He is alert and oriented to person, place, and time.   Skin: He is not diaphoretic.   Psychiatric: He has a normal mood and affect. His behavior is normal. Judgment and thought content normal.   Limited exam, telemedicine visit      Assessment:       1. Paroxysmal atrial fibrillation    2. TIA (transient ischemic attack)    3. PHT (pulmonary hypertension)    4. Coronary artery disease without angina pectoris, unspecified vessel or lesion type, unspecified whether native or transplanted heart    5. BARBARA (obstructive sleep apnea)         Plan:       In summary, Mr Nolasco is a pleasant 79 year-old man hypertension, pulmonary  hypertension, and coronary artery disease with  of the RCA and s/p LAD PCI with pAF and likely recent TIA. We have had prior discussions regarding anticoagulation, which he declined. He is now agreeable. I had a long discussion with the patient about the pathophysiology and risks of atrial fibrillation and its basic pathophysiology, including its health implications and treatment options. Specifically, I addressed the need for CVA (stroke) prophylaxis with aspirin versus oral anticoagulation (warfarin vs DOACs, discussed bleeding risks, and need to come to the ER for any head trauma for CT scanning even if asymptomatic). His [id number] score is 6 and indefinite anticoagulation is recommended. Discussed options with him and Dr. Joseph Lomas.  Will stop aspirin and start eliquis 5mg bid. I would like to get a CBC in 6-8 weeks. He would like to do it through his local physician, Dr. Lorenzo.    RTC in 6-8 months    Thank you for allowing me to participate in the care of this patient. Please do not hesitate to call me with any questions or concerns.    Royce Bajwa MD, PhD  Cardiac Electrophysiology

## 2020-06-02 NOTE — TELEPHONE ENCOUNTER
Spoke to Mr. Nolasco.  CBC set up to be drawn on 7/21/2020 at 11 am at the [facility] lab.  Outside lab order entered and faxed to [facility] ([phone] fax [phone] phone). A copy of lab order also mailed to Mr. Nolasco to bring with him to lab.  Mr. Nolasco verbalizes full understanding and will call our office with any questions or concerns.

## 2020-06-02 NOTE — TELEPHONE ENCOUNTER
----- Message from Royce Bajwa MD sent at 6/2/2020 11:47 AM CDT -----  Shemar Bauer    I am starting Mr. Nolasco on eliis and would like a CBC in 6-8 weeks, which he would like to do locally in [place]. He requested a phone call in 6-8 weeks as a reminder, if possible.    Thanks    Royce

## 2020-06-05 ENCOUNTER — TELEPHONE (OUTPATIENT)
Dept: TRANSPLANT | Facility: CLINIC | Age: 80
End: 2020-06-05

## 2020-06-05 NOTE — TELEPHONE ENCOUNTER
"Patient called to update coordinator on medical issues. States that he met with Dr. Bajwa who said he has afib. He is supposed to start Eliquis but has not received the medication yet. He is also scheduled to see a neurologist.  Mr. Nolasco also states that he visited his PCP due to concerns about a hernia. He has had a hernia on the L side but it has not bothered him and surgery was not recommended. Now he has a hernia on the R side that causes him some discomfort. He was referred to a surgeon who said he will reach out to Dr. Lomas about surgery. Mr. Nolasco said the doctor was surprised he was on aspirin, Eliquis and Brillinta.  Mr. Nolasco says he is not going to start the Eliquis until he finds out about surgery since he will need to "stop it anyway." Instructed Mr. Nolasco to take the Eliquis as prescribed. Will confirm with Dr. Bajwa if he should also continue the Brillinta.  Answered all questions and concerns.  "

## 2020-06-08 ENCOUNTER — TELEPHONE (OUTPATIENT)
Dept: TRANSPLANT | Facility: CLINIC | Age: 80
End: 2020-06-08

## 2020-06-10 ENCOUNTER — TELEPHONE (OUTPATIENT)
Dept: TRANSPLANT | Facility: CLINIC | Age: 80
End: 2020-06-10

## 2020-06-10 NOTE — TELEPHONE ENCOUNTER
"Discussed reasons for not recommending hernia surgery. Noted that unless it is an emergency he is extremely high risk due to his advanced disease. Mr. Nolasco stated that the discomfot comes and goes but is tolerable. He also said that the surgeon mentioned doing the procedure with a "local" instead of "knocking him out." Advised that he would need to discuss with the surgeon. Patient verbalized understanding.  Also, reinforced the need for patient to start taking his Eliquis and that he should continue taking his Brilinta. Mr. Nolasco asked what he should do if he starts bleeding. Advised him to go to his local ER first and get the bleeding under control and then call our office.   He says that the Eliquis is affordable but the 90-day supply of Brilinta is too much. He could not remember exactly how much it is. Will give him coupons to see if they will help with cost. Mr. Nolasco has a follow-up with neurology here in [place] tomorrow.  "

## 2020-06-11 ENCOUNTER — OFFICE VISIT (OUTPATIENT)
Dept: NEUROLOGY | Facility: CLINIC | Age: 80
End: 2020-06-11
Payer: MEDICARE

## 2020-06-11 DIAGNOSIS — G45.8 ACUTE CEREBROVASCULAR INSUFFICIENCY TRANSIENT FOCAL NEUROLOGIC DEFICIT: ICD-10-CM

## 2020-06-11 DIAGNOSIS — E78.5 DYSLIPIDEMIA: ICD-10-CM

## 2020-06-11 DIAGNOSIS — G45.9 TIA (TRANSIENT ISCHEMIC ATTACK): Primary | ICD-10-CM

## 2020-06-11 DIAGNOSIS — I48.0 PAROXYSMAL ATRIAL FIBRILLATION: ICD-10-CM

## 2020-06-11 PROCEDURE — 99213 OFFICE O/P EST LOW 20 MIN: CPT | Mod: PBBFAC | Performed by: PSYCHIATRY & NEUROLOGY

## 2020-06-11 PROCEDURE — 99999 PR PBB SHADOW E&M-EST. PATIENT-LVL III: ICD-10-PCS | Mod: PBBFAC,,, | Performed by: PSYCHIATRY & NEUROLOGY

## 2020-06-11 PROCEDURE — 99999 PR PBB SHADOW E&M-EST. PATIENT-LVL III: CPT | Mod: PBBFAC,,, | Performed by: PSYCHIATRY & NEUROLOGY

## 2020-06-11 PROCEDURE — 99203 PR OFFICE/OUTPT VISIT, NEW, LEVL III, 30-44 MIN: ICD-10-PCS | Mod: S$PBB,,, | Performed by: PSYCHIATRY & NEUROLOGY

## 2020-06-11 PROCEDURE — 99203 OFFICE O/P NEW LOW 30 MIN: CPT | Mod: S$PBB,,, | Performed by: PSYCHIATRY & NEUROLOGY

## 2020-06-11 NOTE — PROGRESS NOTES
"Vascular Neurology  Clinic Note    ___________________  ASSESSMENT & PLAN    Problem List Items Addressed This Visit        1 - High    TIA    Current Assessment & Plan     Etiology: Cardioembolism Evident  JADEN Absent -- CE Major:  a.fib --   Incomplete -- Other Absent  Transient neurological symptoms of right sided weakness and speech. Has already been started on apixaban by cardiology.   · Diagnostic Orders: none  · Secondary stroke prevention: Continue apixaban 5 bid  · Continue current statin therapy (goal LDL < 100)  · Blood pressure goal < 130/80 mmHg   · Stroke Risk Factors Addressed: a.fib  · Stroke education administered              Unprioritized    Paroxysmal atrial fibrillation    Overview     -         Dyslipidemia    TIA (transient ischemic attack) - Primary    Relevant Orders    CTA Head and Neck (xpd)    MRI Brain Without Contrast    TCD Emboli Detection w/o Intravenous    Lipid Panel    Basic metabolic panel    CBC auto differential          Reason For Visit (Chief Complaint): 10 second episode of slurred speech    HPI: 79 y.o. right handed male with HTN, pulmonary HTN, coronary artery disease with  of the RCA and s/p LAD PCI, atrial fibrillation (diagnosed 20 years ago but did not want OAC). He recently saw cardiology EP on  for this "cryptogenic TIA" and given his history of a.fib, CHADSVASc of 6, was started on Apixaban 5 bid and aspirin was discontinued.    He had a 10 second episode of slurred speech with quick resolution, felt like he was babbling, no other weakness, facial droop or arm or leg weakness. No episodes since then.    For about 1 year he has had symptoms of vertigo on neck hyperextension or looking up relieved by looking back down.    Occasionally he has episodes of loss of balance but these happen quite infrequently and last very shortly.    Brain Imaging:    Vessel Imaging:  Carotid US 2020  No evidence of a hemodynamically significant carotid bifurcation stenosis. "  Small amount of calcified plaque in both internal carotid arteries resulting in no significant stenosis.  Cardiac Evaluation:  TTE 1/2020  · Normal left ventricular systolic function. The estimated ejection fraction is 63%.  · Septal wall has abnormal motion.  · Concentric left ventricular remodeling.  · Indeterminate left ventricular diastolic function.  · Mild left atrial enlargement.  · Moderate right ventricular enlargement.  · Low normal right ventricular systolic function.  · Moderate right atrial enlargement.  · Moderate aortic valve stenosis.  · Aortic valve area is 1.14 cm2; peak velocity is 2.02 m/s; mean gradient is 9 mmHg.  · Moderate mitral sclerosis.  · TV sclerosis with mild tricuspid regurgitation.  · Intermediate central venous pressure (8 mmHg).  · The estimated PA systolic pressure is 93 mmHg.  · Severe Pulmonary Hypertension present.  Other:     Relevant Labwork:        I independently viewed the above imaging studies  I reviewed the above labwork.    Review of Systems  Msk: negative for muscle pain  Skin: negative for pruritis  Neuro: negative for headache  All others negative    Past Medical History  Past Medical History:   Diagnosis Date    Chronic hypoxemic respiratory failure     Coronary artery disease     BARBARA (obstructive sleep apnea)     Pulmonary hypertension      Family History  No relevant history   Social History  Never Smoker     Medication List with Changes/Refills   Current Medications    ALLOPURINOL (ZYLOPRIM) 300 MG TABLET    Take 300 mg by mouth once daily.     APIXABAN (ELIQUIS) 5 MG TAB    Take 1 tablet (5 mg total) by mouth 2 (two) times daily.    ATORVASTATIN (LIPITOR) 40 MG TABLET    TAKE 1 TABLET DAILY    DIPHENOXYLATE-ATROPINE 2.5-0.025 MG (LOMOTIL) 2.5-0.025 MG PER TABLET    Take 1 tablet by mouth 4 (four) times daily as needed for Diarrhea.    FUROSEMIDE (LASIX) 40 MG TABLET    Take 1 tablet (40 mg total) by mouth daily as needed.    MACITENTAN (OPSUMIT) 10 MG TAB   "  Take 10 mg by mouth once daily.    MELATONIN 3 MG TAB    Take 1 capsule by mouth daily as needed.     MULTIVIT-MINERALS/FERROUS FUM (MULTI VITAMIN ORAL)    Take 1 tablet by mouth.    OXYGEN-AIR DELIVERY SYSTEMS MISC    by Misc.(Non-Drug; Combo Route) route.    POTASSIUM CHLORIDE (MICRO-K) 10 MEQ CPSR    Take 1 capsule (10 mEq total) by mouth once. for 1 dose    RABEPRAZOLE (ACIPHEX) 20 MG TABLET    Take 20 mg by mouth 2 (two) times daily.     SPIRONOLACTONE (ALDACTONE) 25 MG TABLET    TAKE 1 TABLET EVERY MORNING    TICAGRELOR (BRILINTA) 90 MG TABLET    Take 1 tablet (90 mg total) by mouth 2 (two) times daily.    UPTRAVI 1,600 MCG TAB    Take 1 tablet by mouth twice daily.       EXAM  Vital Signs:  Vitals - 1 value per visit 6/6/2019 7/15/2019 7/15/2019 7/15/2019 1/31/2020 1/31/2020 3/5/2020   SYSTOLIC 97 100 107 - 92 110 105   DIASTOLIC 59 60 62 - 65 56 58   PULSE 86 95 86 - 89 74 92   TEMPERATURE 98.2 - - - - - -   RESPIRATIONS 18 - - - - - -   SPO2 96 - 94 - 82 - 88   Weight (lb) 146.61 146 148.15 140 149.03 140 145.28   Weight (kg) 66.5 66.225 67.2 63.504 67.6 63.504 65.9   HEIGHT - 5' 9" 5' 9" 5' 9" 5' 9" 5' 9" 5' 9"   BODY MASS INDEX - 21.56 21.88 20.67 22.01 20.67 21.45   VISIT REPORT - - - - - - -   Pain Score  - - 0 - 0 - 0   Some recent data might be hidden       General: well appearing without discomfort   Mental Status:alert, oriented to person - place - age - month   Language: able to name, repeat, comprehend commands   Cranial Nerves: EOMI, PERRL, no facial asymmetry, tongue to midline, palate midline  Motor: 5/5 power in all extremities, normal tone  Sensory: intact light touch bilaterally, intact proprioception bilaterally  Coordination: no ataxia on finger-to-nose or heel-to-shin testing; no truncal ataxia  Gait & Stance: normal    Stroke Scales      MD Manuel  Vascular Neurology  Office [phone]  Fax [phone] "

## 2020-06-11 NOTE — ASSESSMENT & PLAN NOTE
Etiology: Cardioembolism Evident  JADEN Absent -- CE Major:  cindafib --   Incomplete -- Other Absent  Transient neurological symptoms of right sided weakness and speech. Has already been started on apixaban by cardiology.   · Diagnostic Orders: none  · Secondary stroke prevention: Continue apixaban 5 bid  · Continue current statin therapy (goal LDL < 100)  · Blood pressure goal < 130/80 mmHg   · Stroke Risk Factors Addressed: dagoberto  · Stroke education administered

## 2020-06-11 NOTE — LETTER
June 11, 2020      Dex Lomas MD  [address] [address]           [facility]  [facility]  [place] [address]  Phone: [phone]          Patient: Greg Nolasco   MR Number: [id number]   YOB: 1940   Date of Visit: 6/11/2020       Dear Dr. Dex Lomas:    Thank you for referring Greg Nolasco to me for evaluation. Attached you will find relevant portions of my assessment and plan of care.    If you have questions, please do not hesitate to call me. I look forward to following Greg Nolasco along with you.    Sincerely,    Damien Back MD    Enclosure  CC:  No Recipients    If you would like to receive this communication electronically, please contact [email or web address] or [phone] to request more information on [facility] access.    For providers and/or their staff who would like to refer a patient to [facility], please contact us through our one-stop-shop provider referral line, [facility] , at [phone].    If you feel you have received this communication in error or would no longer like to receive these types of communications, please e-mail [email or web address]

## 2020-06-16 ENCOUNTER — HOSPITAL ENCOUNTER (OUTPATIENT)
Dept: RADIOLOGY | Facility: HOSPITAL | Age: 80
Discharge: HOME | End: 2020-06-16
Attending: PSYCHIATRY & NEUROLOGY
Payer: MEDICARE

## 2020-06-16 DIAGNOSIS — G45.9 TIA (TRANSIENT ISCHEMIC ATTACK): ICD-10-CM

## 2020-06-16 PROCEDURE — 70551 MRI BRAIN STEM W/O DYE: CPT | Mod: 26,,, | Performed by: RADIOLOGY

## 2020-06-16 PROCEDURE — 70498 CTA HEAD AND NECK (XPD): ICD-10-PCS | Mod: 26,,, | Performed by: RADIOLOGY

## 2020-06-16 PROCEDURE — 70498 CT ANGIOGRAPHY NECK: CPT | Mod: TC

## 2020-06-16 PROCEDURE — 25500020 PHARM REV CODE 255

## 2020-06-16 PROCEDURE — 70551 MRI BRAIN WITHOUT CONTRAST: ICD-10-PCS | Mod: 26,,, | Performed by: RADIOLOGY

## 2020-06-16 PROCEDURE — 70496 CTA HEAD AND NECK (XPD): ICD-10-PCS | Mod: 26,,, | Performed by: RADIOLOGY

## 2020-06-16 PROCEDURE — 70551 MRI BRAIN STEM W/O DYE: CPT | Mod: TC

## 2020-06-16 PROCEDURE — 70496 CT ANGIOGRAPHY HEAD: CPT | Mod: 26,,, | Performed by: RADIOLOGY

## 2020-06-16 PROCEDURE — 70498 CT ANGIOGRAPHY NECK: CPT | Mod: 26,,, | Performed by: RADIOLOGY

## 2020-06-16 PROCEDURE — 70496 CT ANGIOGRAPHY HEAD: CPT | Mod: TC

## 2020-06-16 RX ADMIN — IOHEXOL 75 ML: 350 INJECTION, SOLUTION INTRAVENOUS at 12:06

## 2020-06-25 ENCOUNTER — TELEPHONE (OUTPATIENT)
Dept: TRANSPLANT | Facility: CLINIC | Age: 80
End: 2020-06-25

## 2020-06-25 NOTE — TELEPHONE ENCOUNTER
Patient reports feeling more SOB and waking with a headache and dizziness. He notes BPs of 77/55 (on Monday), 81/58, 80/61. He is taking 40mg lasix daily and Spironolactone. He thinks he is drinking about the same amount and his weight is stable.  Spoke with Dr. Lomas who instructed patient to hold Lasix for now. Will have him check in with PH coordinator on Friday. Instructed patient to take care when sitting to standing and do so slowly. Call if his symptoms worsen or with any other concerns. Patient verbalized understanding.

## 2020-06-30 ENCOUNTER — TELEPHONE (OUTPATIENT)
Dept: TRANSPLANT | Facility: CLINIC | Age: 80
End: 2020-06-30

## 2020-06-30 NOTE — TELEPHONE ENCOUNTER
"Spoke with patient on Friday. He reported feeling somewhat better. He noted that he had an "episode" on Wednesday. He said he went to his kitchen to fix a sandwich and he could not catch his breath. He experienced chest tightness and dizziness. He was able to get back to his chair and take his pulse ox which was 56%. He then put his oxygen on 10L and rested.   Had a long discussion with Mr. Nolasco about the need for him to wear oxygen with any exertion. Noted that because of his disease he does not have the oxygen reserve he needs to recover. Mr. Nolasco states that he uses his portable oxygen to walk out to his shed and sit but it doesn't seem to do anything. He states that it gives him puffs of air but he is breathing hard and it doesn't keep up. Explained that he needs continuous oxygen at a higher flow than what the portable can provide for him. He is concerned because he wasn't able to carry the continuous unit to his truck or into stores.   PH Coordinator further discussed the progression of PAH and that he may have to do less driving on his own. Also, not try to do too much in one day. Noted that the hot, humid weather will make things worse.  Mr. Nolasco said he would think about switching back to continous oxygen. Also, instructed him to take 20mg of  lasix as needed for now. Patient's BP is hovering around the mid 80's systolic.    Updated Dr. RUBEN Lomas.  "

## 2020-06-30 NOTE — TELEPHONE ENCOUNTER
Spoke with Katie. Patient's daughter called to switch patient from a pulse dose to continuous oxygen unit. Katie explained that the highest liter flow on a portable unit is 3L and Mr. Nolasco has an order for 5L. Mr. Nolasco's daughter asked if they could get the tank for 3L and Katie stated that they would need an order from the doctor to decrease patient's O2.  Katie advised that patient is currently on 5L and he can get E tanks which would last about 2 hours. He will only be able to get 4 of those a month. Mr. Nolasco does have his concentrator at home that delivers 10L and they can give him 50Ft of tubing.    Will update Dr. RUBEN Lomas and will send any new orders to [employer].

## 2020-06-30 NOTE — TELEPHONE ENCOUNTER
----- Message from Deidre Jernigan sent at 6/30/2020  2:09 PM CDT -----  Regarding: Oxygen  Katie from  oxygen will need new order for Pt. Thanks  [phone]

## 2020-07-02 ENCOUNTER — TELEPHONE (OUTPATIENT)
Dept: TRANSPLANT | Facility: CLINIC | Age: 80
End: 2020-07-02

## 2020-07-02 DIAGNOSIS — I27.0 PRIMARY PULMONARY HYPERTENSION: Primary | ICD-10-CM

## 2020-07-02 DIAGNOSIS — R06.00 DYSPNEA AND RESPIRATORY ABNORMALITY: ICD-10-CM

## 2020-07-02 DIAGNOSIS — R06.89 DYSPNEA AND RESPIRATORY ABNORMALITY: ICD-10-CM

## 2020-07-02 DIAGNOSIS — J43.2 CENTRILOBULAR EMPHYSEMA: ICD-10-CM

## 2020-07-02 NOTE — TELEPHONE ENCOUNTER
----- Message from Ariana Grande sent at 7/2/2020  1:43 PM CDT -----  Regarding: refill for oxygen  Contact: patient's daughter Carly Wilson, The pt's daughter is calling to speak with you regarding his refill on his oxygen. Please call her back @ [phone]. Thanks, Ariana

## 2020-07-06 ENCOUNTER — TELEPHONE (OUTPATIENT)
Dept: TRANSPLANT | Facility: CLINIC | Age: 80
End: 2020-07-06

## 2020-07-06 NOTE — TELEPHONE ENCOUNTER
"Spoke with Mr. Nolasco who says he is doing ok today. He had a rough day on Saturday when his power went out (2nd time recently). He had been using his home concentrator, continuously on 5L but had to switch to his portable unit. The battery ran down in that unit. He tried to use an E tank that had been delivered on Friday but the regulator was broken. He was able to reach someone with [employer] and they fixed his tank about the time the power came back on.   Mr. Nolasco says that he runs his pulse dose unit at 5L but it runs out of O2 quickly and it "gets hot." He wants to have a portable unit that runs at 3L continuously. Explained that 3L does not meet his oxygen needs. He asked what that means. Noted that he may become extremely SOB, not able to catch his breath and may not be able to recover.   Mr. Nolasco states that he just needs to use it "to get to his truck." Once he gets to his truck he rests for awhile and then he can go driving. He says the E tanks are definitely too big for him to carry or lift.   Patient also reports feeling better after stopping the Brilinta, no headache, appetite is back and has more energy. He also states that he resumed taking his lasix 40mg daily.  PH Coordinator said that she would update Dr. Lomas. Also, would like to set up virtual appointment with him and his daughter. He says that she has access to his account but lives in [place]. Asked him to call with a time that works for both of them.   Additionally, asked Mr. Nolasco to provide information about his power company. Will send letter to ask for priority status when restoring electricity.  "

## 2020-07-13 ENCOUNTER — TELEPHONE (OUTPATIENT)
Dept: TRANSPLANT | Facility: CLINIC | Age: 80
End: 2020-07-13

## 2020-07-13 NOTE — TELEPHONE ENCOUNTER
SW received notification from  nurse coordinator Carol Wolf, JENNIFER that pt and family interested in looking into Home Hospice due progressive course of disease pt has experienced, specifically with increased O2 requirements and weakness. Per Rachel, pt's dgtr curious about hospice resources and would like a call from SW.     SW contacted pt's dgtr Carlyderrick Nolasco (ph#[phone]). Dgtr reports that pt has gotten weaker with increasing O2 requirements and family looking into improving pt's quality of life with hospice. Dgtr stating that they would like to receive an informational session with hospice before making any decisions. Dgtr interested in [facility] and gave SW permission to contact the agency regarding pt's situation. Dgtr would like SW to have hospice reach out to her directly regarding coordination of informational session. SW expressed understanding. Dgtr to follow up with SW once family has made a decision on whether or not pt will utilize home hospice. SW remains available.    LUZMA contacted the [facility] (ph#[phone]) and spoke with Susanne who states that she will reach out to pt's dgtr re: informational session. Susanne states that they are going to homes to do informational sessions despite the COVID pandemic. Susanne or another liaison from [facility] to follow up as needed. LUZMA remains available and will send Hospice Orders if it is determined that pt would like to initiate services.

## 2020-07-14 ENCOUNTER — DOCUMENTATION ONLY (OUTPATIENT)
Dept: TRANSPLANT | Facility: HOSPITAL | Age: 80
End: 2020-07-14

## 2020-07-16 ENCOUNTER — TELEPHONE (OUTPATIENT)
Dept: TRANSPLANT | Facility: CLINIC | Age: 80
End: 2020-07-16

## 2020-07-16 NOTE — TELEPHONE ENCOUNTER
"Received a call from Mr. Nolasco's son. He has concerns about his father. States that they are exploring hospice options. Says that yesterday, patient's sister told the family that she had leukemia and PH and the [facility] in [place], [place] was able to "cure her." Explained to the son about the pathophysiology of PH and there are different kinds that have different treatments. Mr. Nolasco's sister may not have had the same kind of PH.  His son said that he is an  of a school district trying to open schools and he has concerns about taking his elderly father to [place] if there is no reason. PH coordinator said that we totally understand about wanting a second opinion but also would have concerns. We will be happy to forward his father's records and speak to the [facility] on the phone. Advised him to ask the clinic to review first. Reassured Mr. Nolasco.  "

## 2020-07-16 NOTE — TELEPHONE ENCOUNTER
----- Message from Eliseo Andrade sent at 7/16/2020  9:38 AM CDT -----  Contact: Aiden ( son ) @ [phone]  Caller requesting a return call form Rachel regarding patient's care, naty call

## 2020-07-21 ENCOUNTER — TELEPHONE (OUTPATIENT)
Dept: ELECTROPHYSIOLOGY | Facility: CLINIC | Age: 80
End: 2020-07-21

## 2020-07-21 ENCOUNTER — TELEPHONE (OUTPATIENT)
Dept: TRANSPLANT | Facility: CLINIC | Age: 80
End: 2020-07-21

## 2020-07-21 NOTE — TELEPHONE ENCOUNTER
----- Message from Lizette Mcdonald RN sent at 6/2/2020  3:57 PM CDT -----  On 7- call patient and remind him of blood work tomorrow.     6/2/2020 - Spoke to Mr. Nolasco.  CBC set up to be drawn on 7/21/2020 at 11 am at the [facility].  Outside lab order entered and faxed to [facility] ([phone] fax [phone] phone). A copy of lab order also mailed to Mr. Nolasco to bring with him to lab.  Mr. Nolasco verbalizes full understanding and will call our office with any questions or concerns.

## 2020-07-21 NOTE — TELEPHONE ENCOUNTER
Attempting to reach Mr. Nolasco to remind him to have CBC drawn today.  No answer.  Message left reminding him to have blood work drawn today.  Asked he call our office with any questions or concerns.

## 2020-08-13 ENCOUNTER — TELEPHONE (OUTPATIENT)
Dept: ELECTROPHYSIOLOGY | Facility: CLINIC | Age: 80
End: 2020-08-13

## 2020-08-13 NOTE — TELEPHONE ENCOUNTER
Dr. Bajwa,  TONE only - Mr. Nolasco's CBC crossed over to epic.  Unsure if results would go to your inbasket or not.  His CBC looks good (14/43) after starting Eliquis on 6/2/2020.  He has recall letter in to f/u with you in December.   Thanks,  Lizette

## 2020-08-13 NOTE — TELEPHONE ENCOUNTER
----- Message from Lizette Mcdonald RN sent at 6/2/2020  3:57 PM CDT -----  On 7- call patient and remind him of blood work tomorrow.     6/2/2020 - Spoke to Mr. Nolasco.  CBC set up to be drawn on 7/21/2020 at 11 am at the [facility].  Outside lab order entered and faxed to [facility] ([phone] fax [phone] phone). A copy of lab order also mailed to Mr. Nolasco to bring with him to lab.  Mr. Nolasco verbalizes full understanding and will call our office with any questions or concerns.

## 2020-08-14 NOTE — TELEPHONE ENCOUNTER
"F/U with Mr. Nolasco. He states that he has been feeling okay. No episodes of syncope or severe breathlessness. States that he is wearing his oxygen all the time and staying home. Tried to get the O2  to add "extra" tubing so he could make it to his kitchen on oxygen. The person said that he could not add more than 50ft of tubing because patient would not get enough oxygen. Mr. Nolasco says that he added tubing himself. Has been keeping his oxygen around 4 or 5L delivery.   Does report some issues with leg cramps at night. Is taking 40 mg of lasix and Spironolactone daily. Takes one 10mEq of potassium. He agreed to have labs drawn but needs this family to transport. States that he has submitted his paperwork for  benefits.   Also, reports having some trouble with billing for his Uptravi and [place] [employer]. Coordinator stated she would reach out to [employer] to see if she could help.      "

## 2020-08-17 LAB
BNP SERPL-MCNC: 344.4 PG/ML
EXT BUN: 16
EXT CALCIUM: 9.3
EXT CHLORIDE: 102
EXT CREATININE: 1.1 MG/DL
EXT GLUCOSE: 89
EXT POTASSIUM: 4.2
EXT SODIUM: 139 MMOL/L

## 2020-08-18 ENCOUNTER — DOCUMENTATION ONLY (OUTPATIENT)
Dept: TRANSPLANT | Facility: CLINIC | Age: 80
End: 2020-08-18

## 2020-08-18 LAB — BNP: 344.4

## 2020-08-24 ENCOUNTER — TELEPHONE (OUTPATIENT)
Dept: TRANSPLANT | Facility: CLINIC | Age: 80
End: 2020-08-24

## 2020-08-24 NOTE — TELEPHONE ENCOUNTER
"Spoke with Mr. Nolasco and his daughter. They said they had heard about an inhaled form of medicine for PH. Upon further discussion, they were asking about Tyvaso. Explained what the drug was and how it was different than Uptravi. That Tyvaso would not be in addition to Uptravi.   Patient and daughter also asked about getting "some kind of inhaler" for patient's emphysema to help him breathe better. Advised that patient should see a pulmonologist about this. Noted that he can follow with a local pulmonologist and follow here for PH. They also asked about "lung nodules" shown on a scan a few years ago. Noted that the pulmonologist can review that as well.  Also, once again addressed oxygen needs. PH coordinator has called patient's oxygen company. If patient wants to switch back to bigger oxygen device (rolling cart), we will send an order. Patient has trouble with portable, pulse dose machine as it is "running out too fast." Have discussed patient's O2 requirements and the effect on battery life and O2 delivery.    Answered all questions and concerns.  "

## 2020-08-27 RX ORDER — TRAMADOL HYDROCHLORIDE 50 MG/1
50 TABLET ORAL EVERY 6 HOURS PRN
Qty: 60 TABLET | Refills: 3 | Status: SHIPPED | OUTPATIENT
Start: 2020-08-27

## 2020-11-13 ENCOUNTER — TELEPHONE (OUTPATIENT)
Dept: TRANSPLANT | Facility: CLINIC | Age: 80
End: 2020-11-13

## 2020-11-14 ENCOUNTER — TELEPHONE (OUTPATIENT)
Dept: TRANSPLANT | Facility: CLINIC | Age: 80
End: 2020-11-14

## 2020-11-14 NOTE — TELEPHONE ENCOUNTER
"2020 - Received word from patient's daughter, Carly, that Mr. Nolasco  prior to transport to [facility]. She states that he "coded 5 times." Offered condolences and remained available to daughter for emotional needs. Patient was transported to the  home from [facility] later in the evening. Carly does have family with her for support.    PH Coordinator notified Dr. Eaton, HTS.  "

## 2022-11-11 NOTE — HPI
We are admitting this 77 yo gentleman for complaints of productive cough, nasal congestion and chief complaint of shortness of breath. 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     He had been in his usual state of health, but began having sinus congestion and drainage on Friday, this progressed to productive cough and worsening dyspnea that has not improved with nebs (received Saturday), but has stabilized somewhat since he started guaifenesin.    He denies weight gain (in fact reports weight loss), has been compliant with diet and medications and is usually on 4L NC at home. He stopped his macitentan on Wednesday as there was a suspicion this was causing his symptoms.    Currently he is short of breath (relieved with partial non-rebreather) and reports being thirsty. He denies fevers, chest pain, palpitations, syncope.  
We are admitting this 77 yo gentleman for complaints of productive cough, nasal congestion and chief complaint of shortness of breath. 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     He had been in his usual state of health, but began having sinus congestion and drainage on Friday, this progressed to productive cough and worsening dyspnea that has not improved with nebs (received Saturday), but has stabilized somewhat since he started guaifenesin.    He denies weight gain (in fact reports weight loss), has been compliant with diet and medications and is usually on 4L NC at home. He stopped his macitentan on Wednesday as there was a suspicion this was causing his symptoms.    Currently he is short of breath (relieved with partial non-rebreather) and reports being thirsty. He denies fevers, chest pain, palpitations, syncope.  
No

## 2022-12-06 NOTE — TELEPHONE ENCOUNTER
"Patient called to report that he thinks he has a hernia, "like I had several years ago but on a different side." He says it is not painful, just noticeable. Advised patient to have an MD assess him and he asked for a doctor at [facility] (sent referral and message to scheduling.) Mr. Nolasco states that he is doing ok on the Adempas and Uptravi. Says he has a little more energy. Feels like he recovers quicker from episodes of SOB. His SOB is worse in the morning but improves during the day. He has been wearing his oxygen at night. Notes that he is not restless or waking up feeling breathless like he was before. He is also trying to wear his oxygen all the time.  He is scheduled for a visit from Raul PAEZ RN for Adempas titration today. No other concerns at this time.  "
no

## 2023-01-09 NOTE — TELEPHONE ENCOUNTER
"Patient's daughter called to report that patient was in [facility] in [place]. She states he has been there for about 8 days. She says that patient had an episode last week in which he could not get his nose to stop bleeding. He called the paramedics and she went to his home to meet them there. While there she noted that patient had swollen legs and feet and was short of breath. Daughter states that she knows that when he has more fluid than can be removed by taking an extra lasix that he should go to the hospital. She reports calling the [facility] 24 hour nurse line and they told her to go the nearest hospital. She also did not think he could make the 2 hour drive to [facility]. Upon arrival she says that despite her telling them about his oxygen needs, they would not give her dad oxygen. He was "wheeled" throughout the hospital without any on. Daughter told the doctors about the patient's pulmonary hypertension and he was allowed to continue taking his oral meds. She assumed that "we" (the PH dept and doctors) knew patient was in the hospital. She states that he had been in the ICU but is now on "HiFlo O2 at 100% and 35L" and the doctor says he can't go home on that. Asks what he needs to be on so that he can go home. She states that "they are trying to push him out." Want him to sign a DNR, go to [facility] to wean O2 and gave him a paper to sign that says he will have to appeal if he refuses to leave the hospital." They also discussed hospice. Tried hospice in July but they would not cover the cost of his PH meds.  Daughter also questions why they have stopped lasix and only have him on "aldactone." She knows that more fluid means more short of breath for her dad. She says that they are not giving IV lasix like we would do, citing his BP is too low and is kidneys "aren't happy."  PH Coordinator contacted bedside nurse and nurse navigator. Gave phone numbers of on call [facility] MD and own phone number. Talked with " daughter several times during the day, provided education and reassuance.Asked the patient to ask for a transfer and it was accepted. [place] way [place] [facility] [place].   Glycopyrrolate Counseling:  I discussed with the patient the risks of glycopyrrolate including but not limited to skin rash, drowsiness, dry mouth, difficulty urinating, and blurred vision.

## 2023-10-04 PROBLEM — I28.0 ARTERIOVENOUS FISTULA OF PULMONARY VESSELS: Status: ACTIVE | Noted: 2018-07-24

## (undated) DEVICE — GUIDE LAUNCHER 6FR EBU 3.5

## (undated) DEVICE — KIT PROBE COVER WITH GEL

## (undated) DEVICE — STOPCOCK 3-WAY

## (undated) DEVICE — CATH BLLN FG APEX MR 3.00X15MM

## (undated) DEVICE — KIT GLIDESHEATH SLEND 6FR 10CM

## (undated) DEVICE — CATH SWAN GANZ STND 7FR

## (undated) DEVICE — DRESSING TRANS 4X4 TEGADERM

## (undated) DEVICE — SEE MEDLINE ITEM 156894

## (undated) DEVICE — SHEATH INTRODUCER 7FR 11CM

## (undated) DEVICE — GUIDEWIRE VERRATA JSHAPE 185CM

## (undated) DEVICE — COVER SURG TABLE BACK 44X76IN

## (undated) DEVICE — WIRE DOC EXTENSION

## (undated) DEVICE — SPIKE CONTRAST CONTROLLER

## (undated) DEVICE — HEMOSTAT VASC BAND REG 24CM

## (undated) DEVICE — FLUID DELIVERY SET WITH FILTER

## (undated) DEVICE — GUIDEWIRE ROTAWIRE 330X0.014CM

## (undated) DEVICE — GUIDEWIRE EMERALD 150CM PTFE

## (undated) DEVICE — CATH BLLN FG APEX MR 2.00X8MM

## (undated) DEVICE — CATH ANGIO GUIDEZILLA 6FR LONG

## (undated) DEVICE — CATH MICRO CORSAIR PRO 135CM

## (undated) DEVICE — Device

## (undated) DEVICE — INFLATOR ENCORE 26 BLLN INFL

## (undated) DEVICE — SET MICROPUNCTURE 5FR 501NT

## (undated) DEVICE — KIT CUSTOM MANIFOLD

## (undated) DEVICE — CATH EAGLE EYE PLATINUM

## (undated) DEVICE — OMNIPAQUE 350 200ML

## (undated) DEVICE — SEE MEDLINE ITEM 157187

## (undated) DEVICE — KIT CO-PILOT

## (undated) DEVICE — DRESSING TRANS 2X2 TEGADERM

## (undated) DEVICE — GUIDE WIRE BMW 014 X190

## (undated) DEVICE — TOWELS STERILE 18 X 25.5